# Patient Record
Sex: MALE
[De-identification: names, ages, dates, MRNs, and addresses within clinical notes are randomized per-mention and may not be internally consistent; named-entity substitution may affect disease eponyms.]

---

## 2017-03-22 ENCOUNTER — HOSPITAL ENCOUNTER (INPATIENT)
Dept: HOSPITAL 42 - ED | Age: 82
LOS: 19 days | Discharge: NURSING, REHAB OR LONG-TERM CARE | DRG: 871 | End: 2017-04-10
Attending: INTERNAL MEDICINE | Admitting: INTERNAL MEDICINE
Payer: MEDICARE

## 2017-03-22 VITALS — BODY MASS INDEX: 37.7 KG/M2

## 2017-03-22 DIAGNOSIS — M62.82: ICD-10-CM

## 2017-03-22 DIAGNOSIS — Z93.1: ICD-10-CM

## 2017-03-22 DIAGNOSIS — R53.81: ICD-10-CM

## 2017-03-22 DIAGNOSIS — H26.9: ICD-10-CM

## 2017-03-22 DIAGNOSIS — J44.9: ICD-10-CM

## 2017-03-22 DIAGNOSIS — Z87.891: ICD-10-CM

## 2017-03-22 DIAGNOSIS — I21.4: ICD-10-CM

## 2017-03-22 DIAGNOSIS — R65.21: ICD-10-CM

## 2017-03-22 DIAGNOSIS — I25.2: ICD-10-CM

## 2017-03-22 DIAGNOSIS — I83.009: ICD-10-CM

## 2017-03-22 DIAGNOSIS — Z98.49: ICD-10-CM

## 2017-03-22 DIAGNOSIS — I50.9: ICD-10-CM

## 2017-03-22 DIAGNOSIS — E66.01: ICD-10-CM

## 2017-03-22 DIAGNOSIS — S91.302A: ICD-10-CM

## 2017-03-22 DIAGNOSIS — R32: ICD-10-CM

## 2017-03-22 DIAGNOSIS — T83.83XA: ICD-10-CM

## 2017-03-22 DIAGNOSIS — Z91.81: ICD-10-CM

## 2017-03-22 DIAGNOSIS — F01.51: ICD-10-CM
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
## 2017-03-22 DIAGNOSIS — A41.9: Primary | ICD-10-CM

## 2017-03-22 DIAGNOSIS — J96.02: ICD-10-CM

## 2017-03-22 DIAGNOSIS — E11.65: ICD-10-CM

## 2017-03-22 DIAGNOSIS — B95.4: ICD-10-CM

## 2017-03-22 DIAGNOSIS — Z66: ICD-10-CM

## 2017-03-22 DIAGNOSIS — E61.1: ICD-10-CM

## 2017-03-22 DIAGNOSIS — I45.10: ICD-10-CM

## 2017-03-22 DIAGNOSIS — Z87.442: ICD-10-CM

## 2017-03-22 DIAGNOSIS — N17.9: ICD-10-CM

## 2017-03-22 DIAGNOSIS — E87.2: ICD-10-CM

## 2017-03-22 DIAGNOSIS — B95.61: ICD-10-CM

## 2017-03-22 DIAGNOSIS — S80.219A: ICD-10-CM

## 2017-03-22 DIAGNOSIS — E11.622: ICD-10-CM

## 2017-03-22 DIAGNOSIS — T42.4X5A: ICD-10-CM

## 2017-03-22 DIAGNOSIS — Z78.1: ICD-10-CM

## 2017-03-22 DIAGNOSIS — I13.0: ICD-10-CM

## 2017-03-22 DIAGNOSIS — I25.10: ICD-10-CM

## 2017-03-22 DIAGNOSIS — Z74.01: ICD-10-CM

## 2017-03-22 DIAGNOSIS — E83.39: ICD-10-CM

## 2017-03-22 DIAGNOSIS — L03.116: ICD-10-CM

## 2017-03-22 DIAGNOSIS — K92.2: ICD-10-CM

## 2017-03-22 DIAGNOSIS — H91.90: ICD-10-CM

## 2017-03-22 DIAGNOSIS — R40.2412: ICD-10-CM

## 2017-03-22 DIAGNOSIS — D64.9: ICD-10-CM

## 2017-03-22 DIAGNOSIS — R55: ICD-10-CM

## 2017-03-22 DIAGNOSIS — L03.115: ICD-10-CM

## 2017-03-22 DIAGNOSIS — E87.0: ICD-10-CM

## 2017-03-22 DIAGNOSIS — E11.22: ICD-10-CM

## 2017-03-22 DIAGNOSIS — G92: ICD-10-CM

## 2017-03-22 DIAGNOSIS — I48.0: ICD-10-CM

## 2017-03-22 DIAGNOSIS — M17.11: ICD-10-CM
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
## 2017-03-22 DIAGNOSIS — D68.9: ICD-10-CM

## 2017-03-22 DIAGNOSIS — B96.89: ICD-10-CM

## 2017-03-22 DIAGNOSIS — R79.82: ICD-10-CM
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
## 2017-03-22 DIAGNOSIS — L97.909: ICD-10-CM

## 2017-03-22 DIAGNOSIS — G31.9: ICD-10-CM

## 2017-03-22 DIAGNOSIS — Z88.8: ICD-10-CM

## 2017-03-22 DIAGNOSIS — J96.01: ICD-10-CM

## 2017-03-22 DIAGNOSIS — F05: ICD-10-CM

## 2017-03-22 DIAGNOSIS — I73.9: ICD-10-CM

## 2017-03-22 DIAGNOSIS — R80.9: ICD-10-CM

## 2017-03-22 DIAGNOSIS — N20.0: ICD-10-CM

## 2017-03-22 DIAGNOSIS — M16.11: ICD-10-CM

## 2017-03-22 DIAGNOSIS — R44.1: ICD-10-CM

## 2017-03-22 DIAGNOSIS — N18.3: ICD-10-CM

## 2017-03-22 DIAGNOSIS — M51.36: ICD-10-CM

## 2017-03-22 DIAGNOSIS — R06.89: ICD-10-CM

## 2017-03-22 LAB
ADD MANUAL DIFF?: YES
ALBUMIN/GLOB SERPL: 1.2 {RATIO} (ref 1.1–1.8)
ALP SERPL-CCNC: 97 U/L (ref 38–133)
ALT SERPL-CCNC: 34 U/L (ref 7–56)
APPEARANCE UR: (no result)
APTT BLD: 32.7 SECONDS (ref 23.7–30.8)
AST SERPL-CCNC: 147 U/L (ref 15–59)
BACTERIA #/AREA URNS HPF: (no result) /[HPF]
BASE EXCESS BLDV CALC-SCNC: -3.6 MMOL/L (ref 0–2)
BASE EXCESS BLDV CALC-SCNC: -4.2 MMOL/L (ref 0–2)
BILIRUB SERPL-MCNC: 0.7 MG/DL (ref 0.2–1.3)
BILIRUB UR-MCNC: NEGATIVE MG/DL
BUN SERPL-MCNC: 41 MG/DL (ref 7–21)
CALCIUM SERPL-MCNC: 9.5 MG/DL (ref 8.4–10.5)
CHLORIDE SERPL-SCNC: 103 MMOL/L (ref 95–110)
CO2 SERPL-SCNC: 20 MMOL/L (ref 21–33)
COLOR UR: YELLOW
ERYTHROCYTE [DISTWIDTH] IN BLOOD BY AUTOMATED COUNT: 14.5 % (ref 11.5–14.5)
GLOBULIN SER-MCNC: 3.3 GM/DL
GLUCOSE SERPL-MCNC: 110 MG/DL (ref 70–110)
GLUCOSE UR STRIP-MCNC: NEGATIVE MG/DL
HCT VFR BLD CALC: 42.9 % (ref 42–52)
INR PPP: 1.26 (ref 0.93–1.08)
KETONES UR STRIP-MCNC: NEGATIVE MG/DL
LEUKOCYTE ESTERASE UR-ACNC: (no result) LEU/UL
MAGNESIUM SERPL-MCNC: 1.9 MG/DL (ref 1.7–2.2)
MCH RBC QN AUTO: 27.5 PG (ref 25–35)
MCHC RBC AUTO-ENTMCNC: 33.8 G/DL (ref 31–37)
MCV RBC AUTO: 81.4 FL (ref 80–105)
NEUTROPHILS NFR BLD AUTO: 74 % (ref 50–70)
NEUTS BAND NFR BLD: 11 % (ref 0–2)
PH BLDV: 7.35 [PH] (ref 7.32–7.43)
PH BLDV: 7.4 [PH] (ref 7.32–7.43)
PH UR STRIP: 5.5 [PH] (ref 4.7–8)
PHOSPHATE SERPL-MCNC: 1.9 MG/DL (ref 2.5–4.5)
PLATELET # BLD EST: NORMAL 10*3/UL
PLATELET # BLD: 193 10^3/UL (ref 120–450)
PMV BLD AUTO: 9.7 FL (ref 7–11)
POTASSIUM SERPL-SCNC: 4.5 MMOL/L (ref 3.6–5)
PROT SERPL-MCNC: 7.1 G/DL (ref 5.8–8.3)
PROT UR STRIP-MCNC: 100 MG/DL
RBC # UR STRIP: (no result) /UL
SODIUM SERPL-SCNC: 138 MMOL/L (ref 132–148)
SP GR UR STRIP: 1.02 (ref 1–1.03)
TROPONIN I SERPL-MCNC: 0.3 NG/ML
UROBILINOGEN UR STRIP-ACNC: 0.2 E.U./DL
WBC # BLD AUTO: 24.3 10^3/UL (ref 4.5–11)
WBC #/AREA URNS HPF: (no result) /HPF (ref 0–6)

## 2017-03-22 PROCEDURE — 3E0U3BZ INTRODUCTION OF ANESTHETIC AGENT INTO JOINTS, PERCUTANEOUS APPROACH: ICD-10-PCS | Performed by: ORTHOPAEDIC SURGERY

## 2017-03-22 PROCEDURE — 3E0U33Z INTRODUCTION OF ANTI-INFLAMMATORY INTO JOINTS, PERCUTANEOUS APPROACH: ICD-10-PCS | Performed by: ORTHOPAEDIC SURGERY

## 2017-03-22 RX ADMIN — MORPHINE SULFATE PRN MG: 2 INJECTION, SOLUTION INTRAMUSCULAR; INTRAVENOUS at 12:51

## 2017-03-22 RX ADMIN — POTASSIUM & SODIUM PHOSPHATES POWDER PACK 280-160-250 MG SCH PKT: 280-160-250 PACK at 21:50

## 2017-03-22 RX ADMIN — MEROPENEM AND SODIUM CHLORIDE SCH MLS/HR: 1 INJECTION, SOLUTION INTRAVENOUS at 11:35

## 2017-03-22 RX ADMIN — CLOTRIMAZOLE SCH: 1 CREAM TOPICAL at 19:28

## 2017-03-22 RX ADMIN — MEROPENEM AND SODIUM CHLORIDE SCH MLS/HR: 1 INJECTION, SOLUTION INTRAVENOUS at 21:48

## 2017-03-22 RX ADMIN — MORPHINE SULFATE PRN MG: 2 INJECTION, SOLUTION INTRAMUSCULAR; INTRAVENOUS at 22:19

## 2017-03-22 NOTE — CP.PCM.CON
History of Present Illness





- History of Present Illness


History of Present Illness: 


81 year old male with PMH of HTN, dementia, obesity with BMI 38 came in to 

[facility] after he sustained a mechanical fall at home. He did not 

hit his head and did not lose consciousness, no convulsions, no fevers at home, 

no dizziness, no lightheadedness, no chest pain or palpitations. He was not 

able to get himself off the floor and stayed there for sometime. In the ED, he 

was noted to have low grade fevers and Infectious diseases consult is requested 

to further evaluate and manage. The patient has wounds on both lower 

extremities whic has ongoing for the past month, but seemed to have worsened 

over the past few days. Patient has a pet dog at home, but no history of 

soaking his legs in water. 





Review of Systems





- Review of Systems


All systems: reviewed and no additional remarkable complaints except (as per hPI

)





Past Patient History





- Past Medical History & Family History


Past Medical History?: Yes


Past Family History: Reviewed and not pertinent





- Past Social History


Smoking Status: Former Smoker


Alcohol: None


Drugs: Denies


Home Situation {Lives}: With Family





- CARDIAC


Hx Hypertension: Yes





- PSYCHIATRIC


Hx Substance Use: No





- SURGICAL HISTORY


Other/Comment: pilonidal cyst





Meds


Allergies/Adverse Reactions: 


 Allergies











Allergy/AdvReac Type Severity Reaction Status Date / Time


 


No Known Allergies Allergy   Verified 03/22/17 03:08














- Medications


Medications: 


 Current Medications





Sodium Chloride (Sodium Chloride 0.9%)  1,000 mls @ 100 mls/hr IV .Q10H [facility]


   Last Admin: 03/22/17 05:55 Dose:  100 mls/hr


Cefepime HCl (Maxipime 1gm)  100 mls @ 100 mls/hr IVPB Q12 [facility]


   PRN Reason: Protocol


   Stop: 03/29/17 10:01











Physical Exam





- Constitutional


Appears: Non-toxic, No Acute Distress





- Head Exam


Head Exam: NORMAL INSPECTION





- ENT Exam


ENT Exam: Mucous Membranes Moist





- Neck Exam


Neck exam: Negative for: Lymphadenopathy, Meningismus





- Respiratory Exam


Respiratory Exam: Decreased Breath Sounds





- Cardiovascular Exam


Cardiovascular Exam: +S1, +S2





- GI/Abdominal Exam


GI & Abdominal Exam: Soft.  absent: Tenderness





- Extremities Exam


Additional comments: 


both lower extremities with dressings in place; areas of erythema noted





Results





- Vital Signs


Recent Vital Signs: 


 Last Vital Signs











Temp  97.8 F   03/22/17 05:13


 


Pulse  88   03/22/17 03:18


 


Resp  15   03/22/17 03:18


 


BP  104/48 L  03/22/17 03:18


 


Pulse Ox  100   03/22/17 03:18














- Labs


Result Diagrams: 


 03/22/17 03:33





 03/22/17 03:45





Assessment & Plan





- Assessment and Plan (Free Text)


Plan: 


Assessment


Consider sepsis secondary to both lower extremities skin and skin structure 

infection


HTN


chronic renal failure


dementia


obesity with BMI 38





Plan


Started patient on a dose of IV Vancomycin and Merrem pending blood, wound, 

urine cx, PCT, CXR


Follow up Podiatry evaluation 


will monitor clinically

## 2017-03-22 NOTE — CP.PCM.PN
Subjective





- Date & Time of Evaluation


Date of Evaluation: 03/22/17


Time of Evaluation: 21:30





- Subjective


Subjective: 


S:Patient was seen at bedside because  nurse called and told that BP was 184/84.


    He has no headache, dizziness, heaviness in head, nausea.


    States that he took cozaar and lasix about one week ago , he was not sure 

about that.


    Pertinent medical record was reviewed.





O:


Last Vital Signs





 3





Temp  98 F   03/22/17 18:30


 


Pulse  82   03/22/17 18:30


 


Resp  22   03/22/17 18:30


 


BP  184/84 H  03/22/17 18:30


 


Pulse Ox  96   03/22/17 16:15








    Awake, not in distress.


    Confused.


    LUNGS:Normal breathing pattern.





A:Elevated blood pressure reading 





P:Cozaar 100 mg PO STAT.





Objective





- Vital Signs/Intake and Output


Vital Signs (last 24 hours): 


 











Temp Pulse Resp BP Pulse Ox


 


 98 F   82   22   184/84 H  96 


 


 03/22/17 18:30  03/22/17 18:30  03/22/17 18:30  03/22/17 18:30  03/22/17 16:15











- Medications


Medications: 


 Current Medications





Acetaminophen (Tylenol 325mg Tab)  650 mg PO Q4 PRN


   PRN Reason: Fever >100.4 F


   Last Admin: 03/22/17 11:49 Dose:  650 mg


Clotrimazole (Lotrimin 1%)  0 gm TOP BID [facility]


   Last Admin: 03/22/17 19:28 Dose:  Not Given


MEROPENEM-0.9% SODIUM CHLORIDE (Meropenem 1g/Ns 100ml Ivpb)  100 mls @ 100 mls/

hr IVPB Q12 [facility]


   PRN Reason: Protocol


   Stop: 03/29/17 10:01


   Last Admin: 03/22/17 11:35 Dose:  100 mls/hr


Sodium Chloride (Sodium Chloride 0.9%)  1,000 mls @ 50 mls/hr IV .Q20H [facility]


   Stop: 03/23/17 01:44


   Last Admin: 03/22/17 10:37 Dose:  50 mls/hr


Morphine Sulfate (Morphine)  1 mg IVP Q4H [facility]


   PRN Reason: Pain, moderate (4-7)


   Last Admin: 03/22/17 12:51 Dose:  1 mg











- Labs


Labs: 


 











PT  13.6 Seconds (9.9-11.8)  H  03/22/17  03:33    


 


INR  1.26  (0.93-1.08)  H  03/22/17  03:33    


 


APTT  32.7 Seconds (23.7-30.8)  H  03/22/17  03:33

## 2017-03-22 NOTE — CON
DATE: 03/22/2017



HISTORY OF PRESENT ILLNESS:  This is an 81-year-old white male with past medical history of hypertens
ion, dementia, came to the Emergency Room with severe right knee pain.  The patient remains uncomfort
able because of right knee pain and has multiple lesions on his feet and legs, unable to provide much
 of the history about that.  



PAST MEDICAL HISTORY: The patient had surgery for a Pilonidal cyst.



PHYSICAL EXAMINATION:

HEENT:  Normocephalic, atraumatic.

NECK:  Supple.

NEUROLOGIC:  Awake, alert, oriented.  No aphasia.  Cranial nerves II through XII were tested.  Pupils
 reactive.  EOM intact.  Visual fields full.  No facial asymmetry.  Tongue midline.  Motor examinatio
n:  Moves all the extremities spontaneously.  Deep tendon reflexes 1+.  Both plantars are downgoing. 
 Sensory appears intact.  Cerebellar and gait deferred.



IMPRESSION AND PLAN:  Intermittent confusional state, superimposed on dementia, renal insufficiency, 
right knee pain.  X-ray of the knee is done, did not show fracture.  Had a CAT scan of the head that 
was done, which was reported no infarct or bleed.  Workup is in progress.  We will follow up.





__________________________________________

Kalen Andrade MD







cc:



DD: 03/22/2017 17:29:05  [id number]

TT: 03/22/2017 21:49:48

Confirmation # [id number]

Dictation # [id number]

rn

## 2017-03-22 NOTE — ED PDOC
Arrival/HPI





- General


Historian: Patient, Spouse, Family, EMS





- History of Present Illness


Time/Duration: Prior to Arrival


Symptom Course: Unchanged


Quality: Aching (right knee)


Severity Level: 6





<Carl Thompson - Last Filed: 03/22/17 07:07>





<Brad Kaur - Last Filed: 03/22/17 20:40>





- General


Chief Complaint: Lower Extremity Problem/Injury


Time Seen by Provider: 03/22/17 03:06





- History of Present Illness


Narrative History of Present Illness (Text): 





03/22/17 04:00


This is an 81 year old male with PMH notable for dementia and HTN presenting to 

the ED s/p fall at home.  The patient reports that he does not remember 

falling.  According to the patient's family, the patient has had deteriorating 

short term memory.  The patient reports pain in his right knee.  The patient 

reports that he felt weak since 3/21/17 in the AM.  He states that he had 

difficulty moving his legs.  The patient normally ambulates with an assistive 

device.  The patient denies fever, chills, headache, chest pain, SOB, abdominal 

pain, changes in bowel/bladder, and extremity paresthesias. 








PMH: HTN, dementia


Allergy: NKDA


Surg: Pilonidal Cyst removal


Soc: Quit smoking >10yrs ago, denies EtOH


PMD; Dr. Sotelo


Podiatry: Dr. Howard  (Carl Thompson)








Past Medical History





- Provider Review


Nursing Documentation Reviewed: Yes





- Travel History


Have you recently traveled outside [place] w/in the past 3 mons?: No





- Past History


Past History: Non-Contributing





- Cardiac


Hx Hypertension: Yes





- Psychiatric


Hx Substance Use: No





- Surgical History


Other/Comment: pilonidal cyst





<Carl Thompson - Last Filed: 03/22/17 07:07>





Family/Social History


Family/Social History: No Known Family HX


Smoking Status: Former Smoker


Hx Alcohol Use: No


Hx Substance Use: No





<Carl Thompson - Last Filed: 03/22/17 07:07>





Allergies/Home Meds





<Carl Thompson - Last Filed: 03/22/17 07:07>





<Brad Kaur - Last Filed: 03/22/17 20:40>


Allergies/Adverse Reactions: 


Allergies





No Known Allergies Allergy (Verified 03/22/17 03:08)


 








Home Medications: 


 Home Meds











 Medication  Instructions  Recorded  Confirmed


 


Furosemide [Lasix] 40 mg PO 03/22/17 


 


Losartan Potassium [Cozaar]  03/22/17 














Review of Systems





- Physician Review


All systems were reviewed & negative as marked: Yes





- Review of Systems


Constitutional: absent: Fatigue, Fevers


Eyes: absent: Vision Changes, Photophobia


ENT: absent: Hearing Changes, Tinnitus


Respiratory: absent: SOB, Cough


Cardiovascular: absent: Chest Pain, Palpitations


Gastrointestinal: absent: Abdominal Pain, Nausea, Vomiting


Genitourinary Male: absent: Dysuria, Frequency


Musculoskeletal: absent: Arthralgias


Skin: absent: Rash, Pruritis


Neurological: absent: Headache, Dizziness


Endocrine: absent: Diaphoresis, Polyuria


Hemo/Lymphatic: absent: Adenopathy


Psychiatric: Other (AMS per family )





<Carl Thompson - Last Filed: 03/22/17 07:07>





Physical Exam


Blood Pressure: Hypotensive


Pulse: Regular


Respiratory Rate: Tachypneic


Appearance: Positive for: Well-Appearing, Non-Toxic, Comfortable


Pain Distress: None


Mental Status: Positive for: Alert and Oriented X 3





- Systems Exam


Head: Present: Atraumatic, Normocephalic.  No: Abrasion, Laceration


Pupils: Present: PERRL


Extroacular Muscles: Present: EOMI.  No: Entrapment


Conjunctiva: Present: Normal.  No: Injected


Mouth: Present: Dry


Respiratory/Chest: Present: Clear to Auscultation, Good Air Exchange, 

Tachypneic.  No: Respiratory Distress, Accessory Muscle Use


Cardiovascular: Present: Regular Rate and Rhythm, Normal S1, S2.  No: Murmurs


Abdomen: Present: Normal Bowel Sounds.  No: Tenderness, Distention, Peritoneal 

Signs


Upper Extremity: Present: Normal Inspection, NORMAL PULSES, Neurovascularly 

Intact.  No: Cyanosis, Edema


Lower Extremity: Present: Normal Inspection, Tenderness, Neurovascularly Intact

, Other (abrasion right shin, chronic venous stasis changes foot and ankle B/L)

.  No: Edema, NORMAL PULSES (diminished PT and DP pulse B/L), Swelling, 

Deformity


Neurological: Present: GCS=15, CN II-XII Intact


Skin: Present: Warm, Dry, Abrasion


Psychiatric: Present: Alert, Oriented x 3





<Carl Thompson - Last Filed: 03/22/17 07:07>


Vital Signs











  Temp Pulse Resp BP Pulse Ox


 


 03/22/17 16:15   81  16  160/82 H  96


 


 03/22/17 14:15   95 H  18  162/88 H  97


 


 03/22/17 11:55   98 H  20  145/81  99


 


 03/22/17 10:00   88  18  141/72  98


 


 03/22/17 07:44   80  18  134/65  97


 


 03/22/17 06:16  98.4 F  92 H  20  171/79 H  97


 


 03/22/17 05:13  97.8 F    


 


 03/22/17 03:18   88  15  104/48 L  100

















Medical Decision Making





- RAD Interpretation


: ED Physician, Radiologist





- EKG Interpretation


Interpreted by ED Physician: Yes





<Carl Thompson - Last Filed: 03/22/17 07:07>





<Brad Kaur - Last Filed: 03/22/17 20:40>


ED Course and Treatment: 


03/22/17 04:11


Impression:


This is an 81 year old male with PMH notable for dementia and HTN presenting to 

the ED s/p fall at home.  The patient has impaired short term memory.  The 

patient does not remember his fall at home and is altered as per family. 





Differential:


Syncope


Arrhythmia


Electrolyte Imbalance


Vasovagal Syncope





Plan:


Admit to Hospital for telemetric monitoring


CT Head


Right Knee XR


EKG


CBC, CMP, Mag, Phos, Cardiac ISO, PT, PTT





Prior Visits:


None





Progress Note:


Patient seen and examined at the bedside.  Minimal distress 2/2 to right knee 

pain.  Patient alert and orieted to person, place, time, but not situation.  

The patient is tachypneic and sating 92% on room air.  Saturation increased to 

96% on 4L NC.  The patient will be evaluated for syncope.  





03/22/17 05:31


Code Sepsis called at 5:21AM  for elevated white count, tachypnea, and elevated 

lactate.  Empiric antibiotics (vancomycin 1g and Zosyn 3.375mg) given, 1 dose 

each.  (Carl Thompson)


Impression:


Pt seen and evaluated with medical resident. Pt, whose past medical history 

includes dementia and hypertension, presented s/p fall at home. Pt states he is 

unable to recall the incident and notes he has been experiencing generalized 

weakness since yesterday. Pt also complaining of right knee pain. Aware and 

agree with HPI, clinical findings, plan, and management.





Plan:


-- EKG


-- Chest X-ray


-- XR Right Knee


-- CT Head w/o contrast


-- Lanbs, cardiac enzymes, VBG, blood cultures


-- UA


-- Reassess and disposition





03/22/17 05:10


Reviewed CT Head shows,


Brain: There are areas of diminished density in the white matter bilaterally 

consistent with chronic


small vessel ischemic changes. Gray-white matter differentiation is intact and 

unremarkable. No


mass lesion. No evidence of intracranial hemorrhage. Mild prominence of extra-

axial CSF space in


right frontal and parietal lobes measuring 8 mm consistent with subdural 

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


old subdural bleed. No acute hemorrhagic products seen.


Ventricles: Unremarkable. No ventriculomegaly.


Bones/joints: No evidence of fracture.


Soft tissues: Unremarkable.


Sinuses: Unremarkable as visualized. No acute sinusitis.


Mastoid air cells: Unremarkable as visualized. No mastoid effusion.


IMPRESSION:


1. No evidence of fracture. No evidence of intracranial bleed.


2. Chronic ischemic changes bilaterally.





 (Brad Kaur)








- Lab Interpretations


Lab Results: 








 03/22/17 03:33 





 03/22/17 03:45 





 Lab Results





03/22/17 06:00: Procalcitonin 31.55 H


03/22/17 05:10: pO2 117 H, VBG pH 7.40, VBG pCO2 33.0 L, VBG HCO3 20.4 L, VBG 

Total CO2 21.4 L, VBG O2 Sat (Calc) 98.9 H, VBG Base Excess -3.6 L, VBG 

Potassium 4.3, Sodium 138.0, Chloride 110.0 H, Glucose 111 H, Lactate 2.8 H, 

FiO2 21.0, Venous Blood Potassium 4.3


03/22/17 03:45: Sodium 138, Chloride 103, Potassium 4.5, Carbon Dioxide 20 L, 

Anion Gap 20, BUN 41 H, Creatinine 2.4 H, Est GFR ( Amer) 32, Est GFR (

Non-Af Amer) 26, Random Glucose 110, Calcium 9.5, Phosphorus 1.9 L, Magnesium 

1.9, Total Bilirubin 0.7,  H, ALT 34, Alkaline Phosphatase 97, Lactate 

Dehydrogenase 1269 H, Total Creatine Kinase 7927 H, CK-MB (CK-2) 14.8 H, CK-MB (

CK-2) % 0.2 L, Troponin I 0.30 H*, Total Protein 7.1, Albumin 3.8, Globulin 3.3

, Albumin/Globulin Ratio 1.2


03/22/17 03:33: WBC 24.3 H, RBC 5.27, Hgb 14.5, Hct 42.9, MCV 81.4, MCH 27.5, 

MCHC 33.8, RDW 14.5, Plt Count 193, MPV 9.7, Neutrophils % (Manual) 74 H, Band 

Neutrophils % 11 H*, Lymphocytes % (Manual) 3 L, Monocytes % (Manual) 12 H, 

Platelet Evaluation Normal, PT 13.6 H, INR 1.26 H, APTT 32.7 H














- RAD Interpretation


Narrative RAD Interpretations (Text): 





03/22/17 05:52


CT Head: 


FINDINGS:


Brain: There are areas of diminished density in the white matter bilaterally 

consistent with chronic


small vessel ischemic changes. Gray-white matter differentiation is intact and 

unremarkable. No


mass lesion. No evidence of intracranial hemorrhage. Mild prominence of extra-

axial CSF space in


right frontal and parietal lobes measuring 8 mm consistent with subdural 

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


old subdural bleed. No acute hemorrhagic products seen.


Ventricles: Unremarkable. No ventriculomegaly.


Bones/joints: No evidence of fracture.


Soft tissues: Unremarkable.


Sinuses: Unremarkable as visualized. No acute sinusitis.


Mastoid air cells: Unremarkable as visualized. No mastoid effusion.


IMPRESSION:


1. No evidence of fracture. No evidence of intracranial bleed.


2. Chronic ischemic changes bilaterally.








CXR: No acute pathology





Right Knee XR: No acute fracture or dislocation  (Carl Thompson)





Radiology Orders: 








03/22/17 03:09


HEAD W/O CONTRAST [CT] Stat 


CHEST PORTABLE [RAD] Stat 





03/22/17 03:11


KNEE RIGHT 2 VIEWS (AP & LAT) [RAD] Stat 

















- EKG Interpretation


EKG Interpretation (Text): 





03/22/17 04:16


Normal Sinus Rhythm


Right bundle branch block


inferior infarct age undetermined (Carl Thompson)








- Medication Orders


Current Medication Orders: 








Acetaminophen (Tylenol 325mg Tab)  650 mg PO Q4 PRN


   PRN Reason: Fever >100.4 F


   Last Admin: 03/22/17 11:49  Dose: 650 MG





Clotrimazole (Lotrimin 1%)  0 gm TOP BID [facility]


   Last Admin: 03/22/17 19:28  Dose:  





MEROPENEM-0.9% SODIUM CHLORIDE (Meropenem 1g/Ns 100ml [facility])  100 mls @ 100 mls/

hr IVPB Q12 [facility]


   PRN Reason: Protocol


   Stop: 03/29/17 10:01


   Last Admin: 03/22/17 11:35  Dose: 100 MLS/HR





eMAR Start Stop


 Document     03/22/17 11:35  [facility]  (Rec: 03/22/17 11:35  [place] [id number])


     Intravenous Solution


      Start Date                                 03/22/17


      Start Time                                 11:35


      End Date                                   03/22/17


      End time                                   12:35


      Total Infusion Time                        60





Sodium Chloride (Sodium Chloride 0.9%)  1,000 mls @ 50 mls/hr IV .Q20H [facility]


   Stop: 03/23/17 01:44


   Last Admin: 03/22/17 10:37  Dose: 50 MLS/HR





eMAR Start Stop


 Document     03/22/17 10:37  [facility]  (Rec: 03/22/17 10:37  [place] [id number])


     Intravenous Solution


      Start Date                                 03/22/17


      Start Time                                 10:37





Morphine Sulfate (Morphine)  1 mg IVP Q4H PRN


   PRN Reason: Pain, moderate (4-7)


   Last Admin: 03/22/17 12:51  Dose: 1 MG





MAR Pain Assessment


 Document     03/22/17 12:51  [facility]  (Rec: 03/22/17 12:52  [place] [id number])


     Pain Reassessment


      Is this a pain reassessment?               Yes


     Sleep


      Is patient sleeping during reassessment?   No


     Presence of Pain


      Presence of Pain                           Yes


     Pain Scale Used


      Pain Scale Used                            Numeric


     Location


      Left, Right or Bilateral                   Bilateral


      Pain Location Body Site                    Leg


     Description


      Intensity of Pain at present               10


IVP Administration


 Document     03/22/17 12:51  [facility]  (Rec: 03/22/17 12:52  [place] [id number])


     Charges for Administration


      # of IVP Administrations                   1








Discontinued Medications





Piperacillin Sod/Tazobactam Sod (Zosyn 3.375 In Ns 100ml)  100 mls @ 200 mls/hr 

IVPB STAT STA


   PRN Reason: Protocol


   Stop: 03/22/17 05:46


   Last Admin: 03/22/17 05:45  Dose: 200 MLS/HR





eMAR Start Stop


 Document     03/22/17 05:45  MR  (Rec: 03/22/17 05:45  MR  [id number])


     Intravenous Solution


      Start Date                                 03/22/17


      Start Time                                 05:45


      End Date                                   03/22/17


      End time                                   06:15


      Total Infusion Time                        30





Vancomycin HCl (Vancomycin 1gm)  250 mls @ 167 mls/hr IVPB STAT [facility]


   PRN Reason: Protocol


   Stop: 03/22/17 06:46


   Last Admin: 03/22/17 07:28  Dose: 167 MLS/HR





eMAR Start Stop


 Document     03/22/17 07:28  MR  (Rec: 03/22/17 07:28  MR  [id number])


     Intravenous Solution


      Start Date                                 03/22/17


      Start Time                                 07:18


      End Date                                   03/22/17


      End time                                   08:48


      Total Infusion Time                        90





Sodium Chloride (Sodium Chloride 0.9%)  1,000 mls @ 100 mls/hr IV .Q10H [facility]


   Last Admin: 03/22/17 05:55  Dose: 100 MLS/HR





eMAR Start Stop


 Document     03/22/17 05:55  MR  (Rec: 03/22/17 05:55  MR  [id number])


     Intravenous Solution


      Start Date                                 03/22/17


      Start Time                                 05:55





Cefepime HCl (Maxipime 1gm)  100 mls @ 100 mls/hr IVPB Q12 [facility]


   PRN Reason: Protocol


   Stop: 03/29/17 10:01


   Last Admin: 03/22/17 09:32  Dose: 100 MLS/HR





eMAR Start Stop


 Document     03/22/17 09:32  [facility]  (Rec: 03/22/17 09:32  [facility] [place]  [id number])


     Intravenous Solution


      Start Date                                 03/22/17


      Start Time                                 09:32


      End Date                                   03/22/17


      End time                                   10:35


      Total Infusion Time                        63

















Disposition/Present on Arrival





- Present on Arrival


Any Indicators Present on Arrival: No


History of DVT/PE: No


History of Uncontrolled Diabetes: No


Urinary Catheter: No


History of Decub. Ulcer: No


History Surgical Site Infection Following: None





- Disposition


Have Diagnosis and Disposition been Completed?: Yes


Disposition Time: 04:00


Patient Plan: Admission





<Carl Thompson - Last Filed: 03/22/17 07:07>





- Present on Arrival


Any Indicators Present on Arrival: No


History of DVT/PE: No


History of Uncontrolled Diabetes: No


Urinary Catheter: No


History of Decub. Ulcer: No


History Surgical Site Infection Following: None





- Disposition


Have Diagnosis and Disposition been Completed?: Yes


Disposition Time: 06:22


Patient Plan: Admission





<Brad Kaur - Last Filed: 03/22/17 20:40>





- Disposition


Diagnosis: 


 Sepsis, NSTEMI (non-ST elevated myocardial infarction), Rhabdomyolysis


Disposition: HOSPITALIZED


Patient Problems: 


 Current Active Problems











Problem Status Diagnosed


 


NSTEMI (non-ST elevated myocardial infarction) Acute 


 


Sepsis Acute 











Condition: FAIR

## 2017-03-22 NOTE — CT
PROCEDURE:  CT HEAD WITHOUT CONTRAST.



HISTORY:

fall



COMPARISON:

None available. 



TECHNIQUE:

Axial computed tomography images were obtained through the head/brain 

without intravenous contrast.  



Radiation dose:



Total exam DLP = 768.25 mGy-cm.



FINDINGS:



HEMORRHAGE:

The current study reveals enlargement of the right frontal parietal 

subarachnoid space with underlying cortical atrophic changes.  

Findings could represent ex vacuo dilatation of the subarachnoid 

space versus subdural hygroma or less likely chronic subdural 

hematoma. 



BRAIN:

There are minor chronic periventricular white matter ischemic 

changes. There also a few scattered chronic bilateral basal nuclei 

lacunar type infarcts. Note made of a small round/elliptical shaped 

low-attenuation focus within the mid ventral ovidio that could 

represent artifact note chronic ischemic focus not excluded. Mild 

vascular calcifications are present. 



VENTRICLES:

Mild generalized volume loss with more localized cortical atrophic 

changes right frontoparietal lobe. 



CALVARIUM:

No acute calvarial fractures. . . 



PARANASAL SINUSES:

Unremarkable as visualized. No significant inflammatory changes.



MASTOID AIR CELLS:

Unremarkable as visualized. No inflammatory changes.



OTHER FINDINGS:

Bilateral cataract surgery.



IMPRESSION:

No acute intracranial hemorrhage.  The enlarged subarachnoid space 

right frontoparietal region could be due to atrophy versus subdural 

hygroma.  Chronic subdural hematoma cannot be completely excluded. . 



Mild chronic periventricular white matter and scattered chronic 

bilateral basal nuclei ischemic changes. .  Questionable crossing 

streak and beam hardening artifact versus small ischemic focus within 

the mid ventral ovidio.

## 2017-03-22 NOTE — RAD
PROCEDURE:  Right Knee Radiographs.



HISTORY:

fall



COMPARISON:

None.



FINDINGS:



BONES:

No fracture.  Superior patellar osseous productive changes noted 

anteriorly and posteriorly.  Tibial spine spurring 



JOINTS:

Osteoarthrosis medial femoral tibial and patellofemoral compartments 

probably due most notable. .  The lateral view of the patellofemoral 

joint is limited -nonetheless 



JOINT EFFUSION:

Small 



OTHER FINDINGS:

Vascular calcifications



IMPRESSION:

No fracture. Osteoarthrosis.  Atherosclerotic vascular disease

## 2017-03-22 NOTE — CON
DATE: 03/22/2017



REQUESTING PHYSICIAN:  Dr. Arguello



REASON FOR CONSULTATION:  Elevated troponin.



HISTORY:  This is an 81-year-old man with a history of hypertension and dementia, who apparently fell
 at home.  He complained of severe pain of his right knee.  He feels that his fall was secondary to a
 knee weakness.  There are conflicting reports in the chart as to whether or not he had loss of consc
iousness.  He was brought to the Emergency Room and admitted for evaluation.  He is seen in the Emerg
ency Room at the present time.  He remains uncomfortable.  He states that his right knee is painful. 
 He has multiple lesions on his feet and legs as well.  He is unable to provide much history regardin
g prior injury or providers of care.  The rest of the history is obtained via the chart.  



PAST HISTORY:  Notable for the problems mentioned above.  He underwent prior pilonidal cyst surgery. 
 He apparently has been under the care of Dr. Howard.  Apparently, upon his initial presentation, he
 was hypotensive, but improved with IV fluids.



MEDICATIONS AT HOME:  Included Cozaar and Lasix.



ALLERGIES:  He apparently has no reported allergies.



SOCIAL HISTORY:  He is a former smoker.  There is no history of alcohol use.



FAMILY HISTORY:  He is uncertain.



REVIEW OF SYSTEMS:  Ten point review of systems is limited, but otherwise unremarkable.



PHYSICAL EXAMINATION:

GENERAL:  He is a somewhat disheveled appearing elderly man.

VITAL SIGNS:  His recent blood pressure is 140/70 with a pulse of 88 and respirations are 16.  He is 
currently afebrile.

HEENT:  Normocephalic, atraumatic.  Pupils equally react to light and accommodation.

NECK:  Supple.  No JVD noted.

CHEST:  A few scattered rhonchi heard.

HEART:  PMI displaced laterally with a systolic murmur present at the left sternal border.

ABDOMEN:  Soft, nontender, normoactive bowel sounds.

EXTREMITIES:  1+ lower extremity edema is noted.  An abrasion on his right knee is noted as well.  Ch
ronic stasis changes are present on both lower extremities.  He has multiple apparent lacerations and
 evidence of skin breakdown on his toes and anterior shins.

PSYCHIATRIC:  Somewhat restless and appears disoriented.

NEUROLOGIC:  Moving all 4 extremities, but unable to fully assess.



DIAGNOSTIC DATA:  Chest x-ray reveals enlarged cardiac silhouette with no clear evidence of congestiv
e changes.  Electrocardiogram reveals sinus rhythm with right bundle branch block and prior inferior 
wall myocardial infarction cannot be excluded.  White count is 24.3, hemoglobin and hematocrit of 14.
5 and 42.9 with a platelet count of 193,000.  PT, PTT 13.6 and 32.7.  Arterial blood gas revealed a p
H of 7.40, pCO2 of 33 and pO2 of 113.  Potassium 4.5.  BUN and creatinine are 41 and 2.4.  Phosphorus
 1.9.  CK 7927 with a negative MB fraction.  Troponin 0.30.



IMPRESSION:

1.  Status post fall with evidence of mild rhabdomyolysis. 

2.  Elevated troponin, suspect this may be due to reduced renal clearance.

3.  Renal insufficiency, unclear if this is acute or chronic.

4.  Altered mental status, unclear if this is baseline dementia.

5.  Rest of problems as noted.



RECOMMENDATION:  The admission to telemetry is reasonable.  Repeat cardiac enzymes will be planned fo
r the morning.  Repeat electrocardiogram will be planned as well.  An echocardiogram will be obtained
 to assess LV size and wall motion abnormalities.  Broad spectrum antibiotics were provided.  Culture
s have been drawn.  Podiatric followup would be appropriate.



Thank you for this consultation.  I would be happy to follow along through his hospital course and ma
ke further recommendations as needed.





__________________________________________

Cortez Wallace MD







cc:



DD: 03/22/2017 11:43:53  [id number]

TT: 03/22/2017 12:31:35

Confirmation # [id number]

Dictation # [id number]

sn

## 2017-03-22 NOTE — CARD
--------------- APPROVED REPORT --------------





EKG Measurement

Heart [id number]

GA [id number]

[id number]

[id number]

[id number]



<Conclusion>

Normal sinus rhythm

Right bundle branch block

Possible Inferior infarct, age undetermined

Abnormal ECG

## 2017-03-22 NOTE — PN
DATE: 03/22/2017



This is an 81-year-old male seen in the ER for multiple leg ulcers.  The 
patient is known to me from the podiatry office where he comes in every 2 
months for fungal nail and calluses.  The patient is seen at bedside with his 
family.  He apparently had a fall at home which brought him into the Emergency 
Room.  The patient's wife is upset about his feet.  He has fissures and 
calluses on his feet and I did discuss with her that, her  comes to the 
office all the time in shoes that are big, that are rubbing on his toes and 
comes in without any socks.  She does agree that he does that at home.  He 
walks barefoot at home and she states that the beagle at home is constantly 
licking on his feet.



REVIEW OF SYSTEMS:  Shows that he has negative fever or chills.  He was on the 
floor for about 25 minutes.  His ENT is negative.  His eyes negative.  
Respiratory is negative.  Chest pain:  He does not have any chest pain or 
palpitations; however, his cardiac enzymes are elevated.  He denies any 
gastrointestinal or genitourinary problems.

MUSCULOSKELETAL:  Positive for arthralgias.

SKIN:  For the fissures and the calluses and the fungal nails as noted above.

NEUROLOGICAL:  Also negative.



PHYSICAL EXAMINATION:  

GENERAL:  Shows he is alert at bedside.  He is slightly with some dementia.

VITAL SIGNS:  Show a temperature of 98.7, his pulse is 95, his blood pressure 
is 162/88 and his oxygen sat was 97.



MEDICATIONS:  Noted on the MAR.  He is presently being given Maxipime, meropenem
, morphine, sodium chloride, and Tylenol.



LABORATORY DATA:  The patient's labs were also reviewed.  He has a white blood 
cell count of 24.3.  His H and H is 14.5 and 42.9.  His neutrophils are 74 and 
his lymphocytes were 3 with a shift to the left.  The patient's blood gases 
when he came in the pO2 was high at 117, pCO2 of 33.  Chemistry shows a BUN and 
creatinine of 41 and 2.4.  His random glucose was 110.  His cardiac enzymes are 
all elevated.  Troponins were 0.3.



The patient's lower extremities were evaluated.  He has weakly palpable pedal 
pulses on his right foot.  He has 1/4 palpable pedal pulses on his left foot.  
He has bilateral. +1 edema to his feet and legs.  He has fungal nails to all of 
his toes.  He has large calluses on the bunion area on his left foot, 
especially with an open fissure on that callus.  It does not probe to bone and 
there is necrotic tissue inside that fissure.  He also has a fissure on dorsal 
medial aspect of the hallux.  Again on that foot, it is a partial thickness and 
he has a necrotic callus on his second PIPJ, all on his left foot.  He has an 
open wound on the shin on his left leg.  This is secondary to the fall that he 
had at home, measures approximately 1.5 x 1.5 x 0.3 cm and there are no 
fulminant signs of infection.  There is some redness to the right foot around 
the mid foot area and the lower leg which may be coming from that open fissure.
  However, at this time, it is not certain that the white blood cell count is 
coming from the foot.  



The patient did have evidence of mild rhabdomyolysis which could also be 
causing some of that white blood cell count and the CK.



ASSESSMENT:  Fungal nails, multiple necrotic fissures and partial thickness, an 
ulcer to the leg.



PLAN OF TREATMENT:  Orders were put in for a hemoglobin A1c, CRP, ESR.  We also 
put in for a foot x-ray.  Wound culture was taken and done of the fissure, 
although it was dry and I am not sure if it is going to bring back much.  We 
also put in for him to have heel pads while he is in bed for offloading.  We 
also ordered Lotrimin for the fungal infection on his foot and once he is 
settled in the bed, we will come back and we will debride all of the calluses 
on his feet.  The patient will be seen and followed.  All his wounds were 
cleansed with Betadine and dry sterile dressings.





__________________________________________

Priya Howard DPM







cc:   



DD: 03/22/2017 16:07:55  [id number]

TT: 03/22/2017 16:46:18

Confirmation # [id number]

Dictation # [id number]

elizabeth JOYCE

## 2017-03-22 NOTE — RAD
HISTORY:

syncope  



COMPARISON:

No prior. 



FINDINGS:



LUNGS:

No gross consolidation



The  right infra and periareolar opacity perceived is probably due to 

summation of bronchovascular markings and prominent anterior inferior 

right costo cartilaginous junctional calcifications



PLEURA:

No significant pleural effusion identified, no pneumothorax apparent.



CARDIOVASCULAR:

Mild cardiomegaly



OSSEOUS STRUCTURES:

Diffuse thoracic spondylosis.  Bilateral shoulder arthrosis



VISUALIZED UPPER ABDOMEN:

Normal.



OTHER FINDINGS:

None.



IMPRESSION:

No active pulmonary disease.

## 2017-03-23 LAB
ALBUMIN/GLOB SERPL: 1.1 {RATIO} (ref 1.1–1.8)
ALP SERPL-CCNC: 94 U/L (ref 38–133)
ALT SERPL-CCNC: 244 U/L (ref 7–56)
AST SERPL-CCNC: 1274 U/L (ref 15–59)
BILIRUB SERPL-MCNC: 1.1 MG/DL (ref 0.2–1.3)
BUN SERPL-MCNC: 58 MG/DL (ref 7–21)
CALCIUM SERPL-MCNC: 8.8 MG/DL (ref 8.4–10.5)
CHLORIDE SERPL-SCNC: 102 MMOL/L (ref 98–107)
CO2 SERPL-SCNC: 20 MMOL/L (ref 21–33)
ERYTHROCYTE [DISTWIDTH] IN BLOOD BY AUTOMATED COUNT: 14.7 % (ref 11.5–14.5)
ERYTHROCYTE [SEDIMENTATION RATE] IN BLOOD: 15 MM/HR (ref 0–15)
GLOBULIN SER-MCNC: 3.3 GM/DL
GLUCOSE SERPL-MCNC: 111 MG/DL (ref 70–110)
HCT VFR BLD CALC: 41.3 % (ref 42–52)
MAGNESIUM SERPL-MCNC: 2.2 MG/DL (ref 1.7–2.2)
MCH RBC QN AUTO: 28 PG (ref 25–35)
MCHC RBC AUTO-ENTMCNC: 34.1 G/DL (ref 31–37)
MCV RBC AUTO: 81.9 FL (ref 80–105)
PHOSPHATE SERPL-MCNC: 6.1 MG/DL (ref 2.5–4.5)
PLATELET # BLD: 131 10^3/UL (ref 120–450)
PMV BLD AUTO: 9.4 FL (ref 7–11)
POTASSIUM SERPL-SCNC: 4.6 MMOL/L (ref 3.6–5)
PROT SERPL-MCNC: 6.8 G/DL (ref 5.8–8.3)
SODIUM SERPL-SCNC: 138 MMOL/L (ref 132–148)
TROPONIN I SERPL-MCNC: 0.18 NG/ML
WBC # BLD AUTO: 23.7 10^3/UL (ref 4.5–11)

## 2017-03-23 RX ADMIN — CLOTRIMAZOLE SCH APPLIC: 1 CREAM TOPICAL at 17:46

## 2017-03-23 RX ADMIN — CLOTRIMAZOLE SCH APPLIC: 1 CREAM TOPICAL at 11:00

## 2017-03-23 RX ADMIN — POTASSIUM & SODIUM PHOSPHATES POWDER PACK 280-160-250 MG SCH PKT: 280-160-250 PACK at 11:18

## 2017-03-23 RX ADMIN — POTASSIUM & SODIUM PHOSPHATES POWDER PACK 280-160-250 MG SCH PKT: 280-160-250 PACK at 17:47

## 2017-03-23 RX ADMIN — MEROPENEM AND SODIUM CHLORIDE SCH MLS/HR: 1 INJECTION, SOLUTION INTRAVENOUS at 22:46

## 2017-03-23 RX ADMIN — MEROPENEM AND SODIUM CHLORIDE SCH MLS/HR: 1 INJECTION, SOLUTION INTRAVENOUS at 11:18

## 2017-03-23 NOTE — CP.PCM.PN
Subjective





- Date & Time of Evaluation


Date of Evaluation: 03/23/17


Time of Evaluation: 08:00





- Subjective


Subjective: 


Stable on 2R. Poor historian. Denies CP, SOB





V/S noted. RSR/S. Tachy





PE:





Lungs: clear


Cor.: S1S2


Abd.: soft


Ext.: no edema


Neuro.: ementia





Labs noted: WBC= 23,700, Cr.= 3.4,  trop.= 0.18





BC x1 + GPC in chains





ECG 3/22: RSR, RBBB, Possible IMI





Objective





- Vital Signs/Intake and Output


Vital Signs (last 24 hours): 


 











Temp Pulse Resp BP Pulse Ox


 


 98.6 F   102 H  22   133/66   95 


 


 03/23/17 06:00  03/23/17 06:00  03/23/17 06:00  03/23/17 06:00  03/23/17 06:00








Intake and Output: 


 











 03/23/17 03/23/17





 06:59 18:59


 


Intake Total 620 


 


Output Total 275 


 


Balance 345 














- Medications


Medications: 


 Current Medications





Acetaminophen (Tylenol 325mg Tab)  650 mg PO Q4 PRN


   PRN Reason: Fever >100.4 F


   Last Admin: 03/22/17 11:49 Dose:  650 mg


Clotrimazole (Lotrimin 1%)  0 gm TOP BID [facility]


   Last Admin: 03/22/17 19:28 Dose:  Not Given


MEROPENEM-0.9% SODIUM CHLORIDE (Meropenem 1g/Ns 100ml Ivpb)  100 mls @ 100 mls/

hr IVPB Q12 WAQAR


   PRN Reason: Protocol


   Stop: 03/29/17 10:01


   Last Admin: 03/22/17 21:48 Dose:  100 mls/hr


Vancomycin HCl 2 gm/ Sodium (Chloride)  500 mls @ 170 mls/hr IVPB ONCE ONE


   PRN Reason: Protocol


   Stop: 03/23/17 12:05


Sodium Chloride (Sodium Chloride 0.45%)  1,000 mls @ 80 mls/hr IV .[id number] [facility]


Morphine Sulfate (Morphine)  1 mg IVP Q4H PRN


   PRN Reason: Pain, moderate (4-7)


   Last Admin: 03/22/17 22:19 Dose:  1 mg


Potassium Phos/Sodium Phos (Neutra-Phos)  1 pkt PO BID [facility]


   Stop: 03/23/17 21:16


   Last Admin: 03/22/17 21:50 Dose:  1 pkt











- Labs


Labs: 


 





 03/23/17 07:30 





 03/23/17 07:30 





 











PT  13.6 Seconds (9.9-11.8)  H  03/22/17  03:33    


 


INR  1.26  (0.93-1.08)  H  03/22/17  03:33    


 


APTT  32.7 Seconds (23.7-30.8)  H  03/22/17  03:33    














Assessment and Plan





- Assessment and Plan (Free Text)


Plan: 


Assessment:





Fall at home, details unknown/Right Knee Pain


+ trop in setting of acute on chronic kidney disease, probably not acute MI


Dementia


HBP


RBBB


IMI on ECG, probbably old


Former Smoker


Renal Stones


Cataracts


Diminished hearing








Plan:





AB


Check echo


IVF


Monitor: I/O, labs, Renal fx., cultures, sats., etc


As per ID, Ortho, Neuro.,Podiatry, Dr. Muller


Conservative Cardiac Care is anticipated

## 2017-03-23 NOTE — CP.PCM.PN
Subjective





- Date & Time of Evaluation


Date of Evaluation: 03/23/17


Time of Evaluation: 09:55





- Subjective


Subjective: 


Patient developed fever this morning, still feels weak, no nausea or diarrhea, 

still with pain in both legs





Objective





- Vital Signs/Intake and Output


Vital Signs (last 24 hours): 


 











Temp Pulse Resp BP Pulse Ox


 


 100.7 F H  109 H  22   106/59 L  95 


 


 03/23/17 12:00  03/23/17 12:00  03/23/17 12:00  03/23/17 12:00  03/23/17 06:00








Intake and Output: 


 











 03/23/17 03/23/17





 06:59 18:59


 


Intake Total 620 


 


Output Total 275 


 


Balance 345 














- Medications


Medications: 


 Current Medications





Acetaminophen (Tylenol 325mg Tab)  650 mg PO Q4 PRN


   PRN Reason: Fever >100.4 F


   Last Admin: 03/23/17 09:15 Dose:  650 mg


Clotrimazole (Lotrimin 1%)  0 gm TOP BID [facility]


   Last Admin: 03/23/17 11:00 Dose:  1 applic


MEROPENEM-0.9% SODIUM CHLORIDE (Meropenem 1g/Ns 100ml Ivpb)  100 mls @ 100 mls/

hr IVPB Q12 WAQAR


   PRN Reason: Protocol


   Stop: 03/29/17 10:01


   Last Admin: 03/23/17 11:18 Dose:  100 mls/hr


Sodium Chloride (Sodium Chloride 0.45%)  1,000 mls @ 100 mls/hr IV .Q10H [facility]


   Last Admin: 03/23/17 11:25 Dose:  100 mls/hr


Morphine Sulfate (Morphine)  1 mg IVP Q4H PRN


   PRN Reason: Pain, moderate (4-7)


   Last Admin: 03/22/17 22:19 Dose:  1 mg


Potassium Phos/Sodium Phos (Neutra-Phos)  1 pkt PO BID [facility]


   Stop: 03/23/17 21:16


   Last Admin: 03/23/17 11:18 Dose:  1 pkt











- Labs


Labs: 


 





 03/23/17 07:30 





 03/23/17 07:30 





 











PT  13.6 Seconds (9.9-11.8)  H  03/22/17  03:33    


 


INR  1.26  (0.93-1.08)  H  03/22/17  03:33    


 


APTT  32.7 Seconds (23.7-30.8)  H  03/22/17  03:33    














- Constitutional


Appears: Non-toxic, No Acute Distress





- Head Exam


Head Exam: NORMAL INSPECTION





- ENT Exam


ENT Exam: Mucous Membranes Moist





- Neck Exam


Neck Exam: absent: Lymphadenopathy, Meningismus





- Respiratory Exam


Respiratory Exam: Decreased Breath Sounds





- Cardiovascular Exam


Cardiovascular Exam: +S1, +S2





- GI/Abdominal Exam


GI & Abdominal Exam: Soft.  absent: Tenderness





- Extremities Exam


Additional comments: 


both feet with dry dressings in place





Assessment and Plan





- Assessment and Plan (Free Text)


Plan: 


Assessment


severe sepsis with acute on chronic renal failure due to gram positive cocci in 

chains bacteremia, probably secondary to both lower extremities skin and skin 

structure infection


acute rhabdomyolysis


consider acute NSTEMI


HTN


chronic renal failure


dementia


obesity with BMI 38





Plan


will give another dose of IV Vancomycin (since we are unable to use Daptomycin 

since the CPK is very elevated) and continue Merrem (day 2) pending 

identification and sensitivities of the bacteria in the blood; follow up wound, 

urine cx; PCT is elevated but the patient has renal failure; reviewed CXR


Follow up Podiatry evaluation 


follow up echocardiogram; will repeat blood cultures


will monitor clinically

## 2017-03-23 NOTE — HP
CHIEF COMPLAINT AND HISTORY OF PRESENT ILLNESS:  This is an 81-year-old male who is coming into the [facility] with complaints of right knee pain.  The patient has a history of hypertension and dementia. 
 The patient has been having falls because of his pain.  He does not remember exactly how many times 
he has had a fall.  The patient's family reports that he has been having short term memory loss.  He 
has been feeling weak.  The patient has been having difficulty moving his legs.  He mainly complains 
of pain that is 5/10 in his right knee.  He denies any headaches, no chest pain or shortness of breat
h, no nausea, no vomiting, no dysuria, frequency, no abdominal pain, no back pain, no weakness in the
 arms or the legs.



REVIEW OF SYSTEMS:  All other review of symptoms are within normal limits except as mentioned.



ALLERGIES:  No known drug allergies.



HOME MEDICATIONS:  Cozaar and Lasix.



SOCIAL HISTORY:  Former smoker.  Denies alcohol or drug abuse.



PAST MEDICAL HISTORY:  As mentioned above. Also has:

1.  Hypertension.

2.  Nephrolithiasis.

3.  Cataracts.

4.  Hearing impairment.



PHYSICAL EXAMINATION:

VITAL SIGNS:  Temperature is 98.4, pulse of 92, blood pressure 171/79, repeat is 134/65, respirations
 20, O2 saturation 97%, height is 5 feet 10 inches, weight is 263 pounds, BMI 37.7.

GENERAL:   Patient lying in bed, flat, and in no apparent distress.

HEAD AND NECK EXAM:   Atraumatic, normocephalic.  Conjunctivae are pink.  Throat clear and mouth with
 moist mucosa.  Oropharynx benign.

EYES:   Extraocular movements are intact.  PERRLA.

NECK:   Supple.  No JVD, thyromegaly, or adenopathy.  No bruits.

HEART:   S1 and S2 regular rate and rhythm.  No murmurs, rubs, or gallops.

LUNGS:   Clear to auscultation bilaterally.  No wheezing rales or rhonchi appreciated.  No retraction
s on exam.

ABDOMEN:   Soft, nontender, nondistended.  Bowel sounds are positive in all quadrants.   No rebound. 
  No hepatosplenomegaly.

EXTREMITIES:    Lower extremities have 1+ pulses.  There is 1+ edema bilaterally.  He has onychomycos
is.  There is a necrotic callus on the second PIP joint area of the left foot. 

NEURO:   No facial asymmetry, tongue is midline, no uvula deviation.  Power is 5/5 in upper extremity
 and 5/5 in lower extremity.  Sensation is normal in upper extremity and lower extremity.

PSYCH:   Awake, alert, oriented x3.  No anxiety or depression symptoms.  Good insight.   Normal affec
t.

:   No CVA tenderness

VASCULAR:   2+ pulses in carotid and pedal pulses.

SKIN:   No erythema or abnormal nodules noted.

SPINE:   Normal curvature.

LYMPHADENOPATHY:   No anterior cervical or posterior cervical adenopathy.  No inguinal adenopathy.



LABORATORY DATA:  White count of 24.3, hemoglobin is 14.5.  He has neutrophils of 74% with bandemia o
f 11%.  INR is 1.2.  He has a pH of 7.35.  He has a pCO2 of 38 and his bicarbonate is 21.  Lactate in
itially was 2.8, and now it is 1.8.  He has a sodium 138, potassium 4.5, creatinine is 2.4.  His init
ial troponin 0.3, repeat is 0.4.  Procalcitonin level is 31.5.  The patient has blood that is large, 
nitrites are negative, bilirubin is negative.



Chest x-ray shows no active pulmonary disease.  He had a CT of the head done, shows no acute intracra
nial hemorrhage.  There is an enlarged subarachnoid space, right frontoparietal region, could be due 
to atrophy versus subdural hygroma.



He has an EKG that shows sinus rhythm at 76, right bundle branch block, .



His right knee x-ray done shows no fracture.



ASSESSMENT:

1.  Sepsis.

2.  Acute kidney injury.

3.  Elevated troponin.

4.  Rhabdomyolysis.

5.  Hypophosphatemia.



PLAN:  The patient is going to be admitted to the hospital because of sepsis.  I will get ID and podi
atry evaluation.  The patient is also going to need cardiology evaluation for the elevated troponin. 
 The patient was given Tylenol for pain, but was not having any improvement.  I will get orthopedics 
for evaluation of the right knee.  I will place him on IV fluids for his acute kidney injury.  He is 
going to be on morphine for pain.  He is on meropenem for antibiotics Dopplers of the lower legs have
 been ordered.  An echo has been ordered as well.  We will repeat the patient's blood work.  He has h
ypophosphatemia, this will need to be replaced as well.  We will continue to follow closely.  Overall
 prognosis is guarded.





__________________________________________

Jose Martin Arguello MD







cc:



DD: 03/22/2017 21:05:36  [id number]

TT: 03/23/2017 06:17:14

Job # [id number]

jn

## 2017-03-23 NOTE — RAD
PROCEDURE:  Left Foot Radiographs.



HISTORY:

ulcer bunion left foot  



COMPARISON:

None.



FINDINGS:



BONES:

No definite radiographic evidence of osteomyelitis. No evidence of 

acute fracture. 



JOINTS:

Advanced arthritic degenerative changes. 



SOFT TISSUES:

Soft tissue swelling seen in the left big toe 



OTHER FINDINGS:

None.



IMPRESSION:

No definite radiographic evidence of osteomyelitis. Soft tissue 

swelling of the left big toe.  Advanced arthritic degenerative 

changes.

## 2017-03-23 NOTE — CON
DATE: 03/23/2017



Room 276, bed 2.  



An 81-year-old male complains of right knee pain.  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 
a spur superiorly on the patella which is probably causing some aggravation,  especially since he fel
l on his right knee.  He has a mild abrasion in the prepatellar region.  I feel as though he has oste
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
pportunity to feel better so he can go for therapy by injecting the right knee with Depo-Medrol and M
arcaine.  Hopefully, this will give him enough relief so he could do some therapy.  In the meantime, 
he has a very stiff hip, mostly on the right side.  I am going to order bilateral hip x-rays to see h
ow this contributes to his pain in the lower extremities.  I will follow the patient when the hip x-r
ays are done and see how he does with the injection in the right knee.





__________________________________________

Taran Feliciano DO







cc:



DD: 03/23/2017 08:40:59  [id number]

TT: 03/23/2017 10:54:34

Confirmation # [id number]

Dictation # [id number]

tn

## 2017-03-23 NOTE — RAD
PROCEDURE:  Pelvis bilateral hips



HISTORY:

Pain. No history of recent/ related trauma provided



COMPARISON:

None



TECHNIQUE:

Standard protocol for this study/examination.



FINDINGS:

Right hip: Severe degenerative changes.  Marked joint space 

narrowing.  Proliferative changes primarily on the acetabular side of 

the joint. Chronic degenerative changes presumed posttraumatic. 

Partial collapse of the femoral head suggests a component of 

osteonecrosis.



Left hip: Moderate -severe degenerative changes.  Preserved femoral 

acetabular relationship. No acute findings 



IMPRESSION:

Right hip: Presumed long-standing degenerative/posttraumatic changes. 

 A component of osteonecrosis suspected right femoral head. No acute 

findings.



Left hip: Less severe degenerative changes.

## 2017-03-23 NOTE — US
PROCEDURE:  Lower extremity DEYSI exam



HISTORY:

Peripheral vascular disease with pain and claudication.



PHYSICIAN(S):  Jerzy Sotelo MD.



FINDINGS:

The resting DEYSI's are mildly to moderately abnormal: Right, 0.84 and 

left, 0.60 



The brachial systolic pressures are symmetric.



The high thigh pressures and waveforms are relatively normal.



The calf PVR waveforms do not augment. This is consistent with 

bilateral SFA disease, greater on the left than the right. 



Significant gradients are noted across both knees. This is consistent 

with bilateral distal SFA, popliteal, and/ trifurcation disease. 



The ankle PVR waveforms are moderately blunted bilaterally. This 

consistent with bilateral tibial disease. 



IMPRESSION:

1. Moderately abnormal ABIs at rest. 



2. Bilateral SFA disease, greater on the left than the right. 



3. Bilateral distal SFA, popliteal, and/ trifurcation disease.

## 2017-03-23 NOTE — CARD
--------------- APPROVED REPORT --------------





EKG Measurement

Heart [id number]

NE [id number]

[id number]

[id number]

[id number]



<Conclusion>

Sinus tachycardia

Right bundle branch block

Inferior infarct, age undetermined

Abnormal ECG

## 2017-03-23 NOTE — CP.PCM.PN
<Rebecca Song - Last Filed: 03/23/17 13:26>





Subjective





- Date & Time of Evaluation


Date of Evaluation: 03/23/17


Time of Evaluation: 13:26





- Subjective


Subjective: 


80 yo male seen at bedside for multiple leg ulcers and fissures of feet. 

Patient resting comfortably in bed in NAD and AAOx3. patient denies any acute 

events overnight. Patient went for multiple tests this morning. Patient denies 

any pain in his feet. Patient denies numbness, burning or tingling in his feet. 

Denies n/f/v/d/c/sob. 





Objective





- Vital Signs/Intake and Output


Vital Signs (last 24 hours): 


 











Temp Pulse Resp BP Pulse Ox


 


 100.7 F H  109 H  22   106/59 L  95 


 


 03/23/17 12:00  03/23/17 12:00  03/23/17 12:00  03/23/17 12:00  03/23/17 06:00








Intake and Output: 


 











 03/23/17 03/23/17





 06:59 18:59


 


Intake Total 620 


 


Output Total 275 


 


Balance 345 














- Medications


Medications: 


 Current Medications





Acetaminophen (Tylenol 325mg Tab)  650 mg PO Q4 PRN


   PRN Reason: Fever >100.4 F


   Last Admin: 03/23/17 09:15 Dose:  650 mg


Clotrimazole (Lotrimin 1%)  0 gm TOP BID [facility]


   Last Admin: 03/23/17 11:00 Dose:  1 applic


MEROPENEM-0.9% SODIUM CHLORIDE (Meropenem 1g/Ns 100ml Ivpb)  100 mls @ 100 mls/

hr IVPB Q12 WAQAR


   PRN Reason: Protocol


   Stop: 03/29/17 10:01


   Last Admin: 03/23/17 11:18 Dose:  100 mls/hr


Sodium Chloride (Sodium Chloride 0.45%)  1,000 mls @ 100 mls/hr IV .Q10H [facility]


   Last Admin: 03/23/17 11:25 Dose:  100 mls/hr


Morphine Sulfate (Morphine)  1 mg IVP Q4H PRN


   PRN Reason: Pain, moderate (4-7)


   Last Admin: 03/22/17 22:19 Dose:  1 mg


Potassium Phos/Sodium Phos (Neutra-Phos)  1 pkt PO BID [facility]


   Stop: 03/23/17 21:16


   Last Admin: 03/23/17 11:18 Dose:  1 pkt











- Labs


Labs: 


 





 03/23/17 07:30 





 03/23/17 07:30 





 











PT  13.6 Seconds (9.9-11.8)  H  03/22/17  03:33    


 


INR  1.26  (0.93-1.08)  H  03/22/17  03:33    


 


APTT  32.7 Seconds (23.7-30.8)  H  03/22/17  03:33    














- Constitutional


Appears: Well, Non-toxic, No Acute Distress





- Extremities Exam


Additional comments: 


Vasc: weakly palpable pulses of right foot, 1/4 palpable pedal pulse of left 

foot, +1 edema to feet and legs


neuro: grossly diminished


derm: elongated, thickened and dystrophic nails x 10, hyperkeratotic lesions 

noted to feet bilaterally, no probe to bone- under fissure is necrotic tissue 

of dorsomedial left foot, open superficial wound noted to anterior left leg 

secondary to fall at home, 1.5 x 1.5 x 0.3, no acute clinical signs of infection

, mild redness to right foot








- Neurological Exam


Neurological Exam: Alert, Awake, Oriented x3





- Psychiatric Exam


Psychiatric exam: Normal Affect, Normal Mood





Assessment and Plan





- Assessment and Plan (Free Text)


Assessment: 


80 y/o male seen at bedside for multiple necrotic fissures, fungal nails and 

superficial ulceration to legs 


Plan: 


patient evaluated and chart reviewed


discussed in detail with attending Dr. Howard


labs and vitals reviewed, WBC 23.7


patient instructed to soak feet for 5 minutes, excisional debridement of 

calluses on left foot using a sterilr #15 blade down to level of healthy tissue


excisional debridement of toenails x 10 using sterile nippers down to hygienic 

length


applied DSD to foot and legs b/l


patient tolerated procedures well with no complications


continue IV abx


DEYSI / PVR= 0.84 Right, 0.6 left


f.u wound cx


instructed to apply lotrimin to legs daily


continue wearing heel pads while in bed to offload pressure 


podiatry will continue to monitor while patient remains in house 





<Priya Howard - Last Filed: 03/24/17 15:07>





Objective





- Vital Signs/Intake and Output


Vital Signs (last 24 hours): 


 











Temp Pulse Resp BP Pulse Ox


 


 98.6 F   93 H  20   110/85   95 


 


 03/24/17 11:40  03/24/17 13:00  03/24/17 11:40  03/24/17 11:40  03/24/17 09:00








Intake and Output: 


 











 03/24/17 03/24/17





 06:59 18:59


 


Intake Total 1680 


 


Output Total 200 


 


Balance 1480 














- Medications


Medications: 


 Current Medications





Acetaminophen (Tylenol 325mg Tab)  650 mg PO Q4 PRN


   PRN Reason: Fever >100.4 F


   Last Admin: 03/23/17 09:15 Dose:  650 mg


Clotrimazole (Lotrimin 1%)  0 gm TOP BID [facility]


   Last Admin: 03/24/17 09:27 Dose:  2 applic


MEROPENEM-0.9% SODIUM CHLORIDE (Meropenem 1g/Ns 100ml Ivpb)  100 mls @ 100 mls/

hr IVPB Q12 [facility]


   PRN Reason: Protocol


   Stop: 03/29/17 10:01


   Last Admin: 03/24/17 09:36 Dose:  100 mls/hr


Sodium Bicarbonate 50 meq/ (Sodium Chloride)  1,050 mls @ 125 mls/hr IV .Q8H24M 

[facility]


   Last Admin: 03/24/17 09:14 Dose:  125 mls/hr


diltiaZEM IVPB 100mg in NS (Cardizem 100mg In Ns)  100 mls @ 5 mls/hr IV .Q20H 

PRN; Protocol; 5 MG/HR


   PRN Reason: TITRATE PER MD ORDER


Heparin Sodium/Sodium Chloride (Heparin 27460 Units/250ml 1/2 Normal Saline)  

250 mls @ 11.571 mls/hr IV .U20O79M PRN; Protocol; 10 UNITS/KG/HR


   PRN Reason: ADJUST RATE PER PROTOCOL


Metoprolol Succinate (Toprol Xl)  25 mg PO BRK [facility]


   Last Admin: 03/24/17 13:44 Dose:  Not Given


Morphine Sulfate (Morphine)  1 mg IVP Q4H PRN


   PRN Reason: Pain, moderate (4-7)


   Last Admin: 03/22/17 22:19 Dose:  1 mg











- Labs


Labs: 


 





 03/24/17 06:00 





 03/24/17 06:00 





 











PT  13.6 Seconds (9.9-11.8)  H  03/22/17  03:33    


 


INR  1.26  (0.93-1.08)  H  03/22/17  03:33    


 


APTT  32.7 Seconds (23.7-30.8)  H  03/22/17  03:33    














Attending/Attestation





- Attestation


I have personally seen and examined this patient.: Yes


I have fully participated in the care of the patient.: Yes


I have reviewed all pertinent clinical information, including history, physical 

exam and plan: Yes

## 2017-03-23 NOTE — PN
DATE: 03/23/2017



SUBJECTIVE:  The patient has no complaints of any chest pain, no shortness of breath, no headaches.  
He says he does have pain in the right knee.



PHYSICAL EXAMINATION:

VITAL SIGNS:  Temperature is 98, pulse of 82, blood pressure is 184/84, respirations 22.

GENERAL:   The patient comfortable, in no acute distress.

HEENT:   Anicteric sclerae.  Moist mucosa.

NECK:   No JVD or adenopathy.

CARDIAC:   S1/S2.  No murmurs.  No rubs.  Regular.

RESPIRATORY:   Clear to auscultation bilaterally.  No wheezes, rales, or rhonchi.  Good air entry.

ABDOMEN:   Bowel sounds are positive, soft, nontender, and nondistended.

EXTREMITIES:   No edema.  Has 1+ pulses.



LABORATORIES:  Yesterday's white count was 24.3.  His creatinine was 2.4.



Telemetry monitoring, the patient had a junctional rhythm on monitor.  He is confused and agitated.



ASSESSMENT:

1.  Delirium.

2.  Acute kidney injury.

3.  Sepsis.

4.  Obese with a body mass index of 37.

5.  Right knee pain.

6.  Elevated troponin.

7.  Rhabdomyolysis.

8.  Probable peripheral arterial disease.

9.  Hypophosphatemia.



PLAN:  The patient is going to be admitted to the hospital.  He has an elevated procalcitonin level a
nd elevated white count.  He is being followed by multiple consultants.  Appreciate their input.  The
 patient is on meropenem for antibiotics.  He has blood cultures that are negative so far.  OR cultur
es are pending.  There is also a _____ that has been ordered and a lower extremity Doppler.  The adrián
ent is on Tylenol.  He was given phosphorus replacement yesterday.  He is on morphine for pain.  He i
s going to be needing physical therapy.  I have ordered that as well.  We will get repeat blood work 
today.  We will need to replace his phosphorus.  I have ordered urine protein/creatinine ratio to shahrzad
ntify his urine.  Waiting for those results.





__________________________________________

Jose Martin Arguello MD







cc:



DD: 03/23/2017 05:53:04  358

TT: 03/23/2017 08:48:33

Confirmation # [id number]

Dictation # [id number]

en

## 2017-03-24 LAB
ALBUMIN/GLOB SERPL: 0.9 {RATIO} (ref 1.1–1.8)
ALP SERPL-CCNC: 94 U/L (ref 38–133)
ALT SERPL-CCNC: 201 U/L (ref 7–56)
AST SERPL-CCNC: 516 U/L (ref 15–59)
BILIRUB SERPL-MCNC: 0.7 MG/DL (ref 0.2–1.3)
BUN SERPL-MCNC: 70 MG/DL (ref 7–21)
CALCIUM SERPL-MCNC: 8.2 MG/DL (ref 8.4–10.5)
CHLORIDE SERPL-SCNC: 105 MMOL/L (ref 98–107)
CO2 SERPL-SCNC: 18 MMOL/L (ref 21–33)
ERYTHROCYTE [DISTWIDTH] IN BLOOD BY AUTOMATED COUNT: 14.9 % (ref 11.5–14.5)
GLOBULIN SER-MCNC: 3.5 GM/DL
GLUCOSE SERPL-MCNC: 157 MG/DL (ref 70–110)
HCT VFR BLD CALC: 39.7 % (ref 42–52)
MAGNESIUM SERPL-MCNC: 2.5 MG/DL (ref 1.7–2.2)
MCH RBC QN AUTO: 27.8 PG (ref 25–35)
MCHC RBC AUTO-ENTMCNC: 33.8 G/DL (ref 31–37)
MCV RBC AUTO: 82.4 FL (ref 80–105)
PHOSPHATE SERPL-MCNC: 7.3 MG/DL (ref 2.5–4.5)
PLATELET # BLD: 138 10^3/UL (ref 120–450)
PMV BLD AUTO: 9.3 FL (ref 7–11)
POTASSIUM SERPL-SCNC: 4.7 MMOL/L (ref 3.6–5)
PROT SERPL-MCNC: 6.7 G/DL (ref 5.8–8.3)
SODIUM SERPL-SCNC: 139 MMOL/L (ref 132–148)
T4 FREE SERPL-MCNC: 1.1 NG/DL (ref 0.78–2.19)
TSH SERPL-ACNC: 3.61 MIU/ML (ref 0.46–4.68)
WBC # BLD AUTO: 19 10^3/UL (ref 4.5–11)

## 2017-03-24 RX ADMIN — MEROPENEM AND SODIUM CHLORIDE SCH MLS/HR: 1 INJECTION, SOLUTION INTRAVENOUS at 23:15

## 2017-03-24 RX ADMIN — CLOTRIMAZOLE SCH APPLIC: 1 CREAM TOPICAL at 17:56

## 2017-03-24 RX ADMIN — MEROPENEM AND SODIUM CHLORIDE SCH MLS/HR: 1 INJECTION, SOLUTION INTRAVENOUS at 09:36

## 2017-03-24 RX ADMIN — CLOTRIMAZOLE SCH APPLIC: 1 CREAM TOPICAL at 09:27

## 2017-03-24 NOTE — CARD
--------------- APPROVED REPORT --------------





EKG Measurement

Heart [id number]

[id number]

[id number]

[id number]



<Conclusion>

Atrial fibrillation

Right bundle branch block

Abnormal ECG

## 2017-03-24 NOTE — US
PROCEDURE:  Ultrasound of the Kidneys



HISTORY:

ARF



COMPARISON:

None available.



TECHNIQUE:

Sonogram of the kidneys.



FINDINGS:



RIGHT KIDNEY:

Measures: 13.7 cm. 



Normal in size, contour and echogenicity. 



No stone, solid mass lesion or hydronephrosis visualized. 



LEFT KIDNEY:

Measures: 10.9 cm. 



Normal in size, contour and echogenicity. 



No stone, solid mass lesion or hydronephrosis visualized. 



OTHER FINDINGS:

None.



IMPRESSION:

Unremarkable renal sonogram.

## 2017-03-24 NOTE — CP.PCM.PN
Subjective





- Date & Time of Evaluation


Date of Evaluation: 03/24/17


Time of Evaluation: 14:21





- Subjective


Subjective: 


called by nurse pt has rythem change of a-fb, pt with nstemi rhabdomyalysis. 

sepsis . has a-fib with rate of 95 -104. pt denies complaints.





Objective





- Vital Signs/Intake and Output


Vital Signs (last 24 hours): 


 











Temp Pulse Resp BP Pulse Ox


 


 98.6 F   93 H  20   110/85   95 


 


 03/24/17 11:40  03/24/17 13:00  03/24/17 11:40  03/24/17 11:40  03/24/17 09:00








Intake and Output: 


 











 03/24/17 03/24/17





 06:59 18:59


 


Intake Total 1680 


 


Output Total 200 


 


Balance 1480 














- Medications


Medications: 


 Current Medications





Acetaminophen (Tylenol 325mg Tab)  650 mg PO Q4 PRN


   PRN Reason: Fever >100.4 F


   Last Admin: 03/23/17 09:15 Dose:  650 mg


Clotrimazole (Lotrimin 1%)  0 gm [facility]ID [facility]


   Last Admin: 03/24/17 09:27 Dose:  2 applic


Heparin Sodium (Porcine) (Heparin)  5,000 units IVP STAT STA


   PRN Reason: Protocol


   Stop: 03/24/17 14:15


MEROPENEM-0.9% SODIUM CHLORIDE (Meropenem 1g/Ns 100ml Ivpb)  100 mls @ 100 mls/

hr IVPB Q12 [facility]


   PRN Reason: Protocol


   Stop: 03/29/17 10:01


   Last Admin: 03/24/17 09:36 Dose:  100 mls/hr


Sodium Bicarbonate 50 meq/ (Sodium Chloride)  1,050 mls @ 125 mls/hr IV .Q8H24M 

[facility]


   Last Admin: 03/24/17 09:14 Dose:  125 mls/hr


Vancomycin HCl (Vancomycin 1gm)  250 mls @ 167 mls/hr IVPB STAT STA


   PRN Reason: Protocol


   Stop: 03/24/17 14:25


   Last Admin: 03/24/17 14:05 Dose:  167 mls/hr


diltiaZEM IVPB 100mg in NS (Cardizem 100mg In Ns)  100 mls @ 5 mls/hr IV .Q20H 

PRN; Protocol; 5 MG/HR


   PRN Reason: TITRATE PER MD ORDER


Heparin Sodium/Sodium Chloride (Heparin 42916 Units/250ml 1/2 Normal Saline)  

250 mls @ 11.571 mls/hr IV .[id number] PRN; Protocol; 10 UNITS/KG/HR


   PRN Reason: ADJUST RATE PER PROTOCOL


Metoprolol Succinate (Toprol Xl)  25 mg PO BRK [facility]


   Last Admin: 03/24/17 13:44 Dose:  Not Given


Morphine Sulfate (Morphine)  1 mg IVP Q4H PRN


   PRN Reason: Pain, moderate (4-7)


   Last Admin: 03/22/17 22:19 Dose:  1 mg











- Labs


Labs: 


 





 03/24/17 06:00 





 03/24/17 06:00 





 











PT  13.6 Seconds (9.9-11.8)  H  03/22/17  03:33    


 


INR  1.26  (0.93-1.08)  H  03/22/17  03:33    


 


APTT  32.7 Seconds (23.7-30.8)  H  03/22/17  03:33    














- Constitutional


Appears: No Acute Distress





- Eye Exam


Eye Exam: PERRL





- ENT Exam


ENT Exam: Mucous Membranes Moist





- Respiratory Exam


Respiratory Exam: Decreased Breath Sounds, Wheezes


Additional comments: 


pt has decreased breath sound abd wheezez in the left lower lung region.





- Cardiovascular Exam


Cardiovascular Exam: Tachycardia, Irregular Rhythm





- GI/Abdominal Exam


GI & Abdominal Exam: Soft





- Extremities Exam


Additional comments: 


generalized weakness.





- Neurological Exam


Neurological Exam: Alert, Awake, Oriented x3





- Skin


Skin Exam: Dry, Warm





Assessment and Plan





- Assessment and Plan (Free Text)


Assessment: 


a-fib with rapid vent rate.


NSTEMI.


 RENAL FAILURE 


rhabdomyalysis.


Plan: 


xopenex t/m x1 chest x-ray. cardiazem debbie and drip . haparine.


pt discussed with dr watson.

## 2017-03-24 NOTE — CP.PCM.PN
Subjective





- Date & Time of Evaluation


Date of Evaluation: 03/24/17


Time of Evaluation: 08:45





- Subjective


Subjective: 


Patient is comfortable in bed, still have low grade fevers overnight but this 

morning he is afebrile. No diarrhea, no chest pain.





Objective





- Vital Signs/Intake and Output


Vital Signs (last 24 hours): 


 











Temp Pulse Resp BP Pulse Ox


 


 97.9 F   72   20   129/81   96 


 


 03/24/17 06:00  03/24/17 06:00  03/24/17 06:00  03/24/17 06:00  03/24/17 06:00








Intake and Output: 


 











 03/24/17 03/24/17





 06:59 18:59


 


Intake Total 1680 


 


Output Total 200 


 


Balance 1480 














- Medications


Medications: 


 Current Medications





Acetaminophen (Tylenol 325mg Tab)  650 mg PO Q4 PRN


   PRN Reason: Fever >100.4 F


   Last Admin: 03/23/17 09:15 Dose:  650 mg


Clotrimazole (Lotrimin 1%)  0 gm TOP BID [facility]


   Last Admin: 03/23/17 17:46 Dose:  1 applic


MEROPENEM-0.9% SODIUM CHLORIDE (Meropenem 1g/Ns 100ml Ivpb)  100 mls @ 100 mls/

hr IVPB Q12 [facility]


   PRN Reason: Protocol


   Stop: 03/29/17 10:01


   Last Admin: 03/23/17 22:46 Dose:  100 mls/hr


Morphine Sulfate (Morphine)  1 mg IVP Q4H PRN


   PRN Reason: Pain, moderate (4-7)


   Last Admin: 03/22/17 22:19 Dose:  1 mg











- Labs


Labs: 


 





 03/24/17 06:00 





 03/24/17 06:00 





 











PT  13.6 Seconds (9.9-11.8)  H  03/22/17  03:33    


 


INR  1.26  (0.93-1.08)  H  03/22/17  03:33    


 


APTT  32.7 Seconds (23.7-30.8)  H  03/22/17  03:33    














- Constitutional


Appears: Non-toxic, No Acute Distress





- Head Exam


Head Exam: NORMAL INSPECTION





- ENT Exam


ENT Exam: Mucous Membranes Moist





- Neck Exam


Neck Exam: absent: Lymphadenopathy, Meningismus





- Respiratory Exam


Respiratory Exam: Decreased Breath Sounds





- Cardiovascular Exam


Cardiovascular Exam: +S1, +S2





- GI/Abdominal Exam


GI & Abdominal Exam: Soft.  absent: Tenderness





- Extremities Exam


Additional comments: 


both lower extremities with dry dressings in place





Assessment and Plan





- Assessment and Plan (Free Text)


Plan: 


Assessment


severe sepsis with acute on chronic renal failure due to gram positive cocci in 

chains bacteremia, probably secondary to bilateral lower extremities skin and 

skin structure infection


acute rhabdomyolysis


consider acute NSTEMI


HTN


chronic renal failure


dementia


obesity with BMI 38





Plan


will give another dose of IV Vancomycin (since we are unable to use Daptomycin 

since the CPK is very elevated at ~44,000) and get Vanco level in the morning; 

will also continue Merrem (day 3) pending identification and sensitivities of 

the bacteria in the blood and in the wound cx; PCT is elevated but the patient 

has renal failure; reviewed CXR


reviewed Podiatry evaluation 


follow up echocardiogram results and repeat blood cultures which were taken 

yesterday


will continue to monitor clinically


Discussed with Dr. Bhat

## 2017-03-24 NOTE — PN
DATE: 03/24/2017



SUBJECTIVE:  The patient has no complaints of any chest pain, no shortness of breath, no headaches.  
He had a right knee injection yesterday given by Dr. Feliciano from orthopedics.    

 

PHYSICAL EXAMINATION:    

VITAL SIGNS:  Temperature is 97.6, pulse is 72, blood pressure is 120/72, respirations 20.  

GENERAL:   The patient comfortable, in no acute distress.

HEENT:   Anicteric sclerae.  Moist mucosa.

NECK:   No JVD or adenopathy.

CARDIAC:   S1/S2.  No murmurs.  No rubs.  Regular.

RESPIRATORY:   Clear to auscultation bilaterally.  No wheezes, rales, or rhonchi.  Good air entry.

ABDOMEN:   Bowel sounds are positive, soft, nontender, and nondistended.

EXTREMITIES:   No edema.  Has 1+ pulses.



LABORATORIES:  White count of 23.7, hemoglobin is 14.1.  Creatinine is _____.



Pelvis, bilateral hips shows right hip is _____ longstanding degenerative changes, component of avasc
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
nic changes.



Echo is pending.



ASSESSMENT:

1.  Delirium, improving.

2.  Acute kidney injury versus chronic kidney disease.

3.  Sepsis.

4.  Hypophosphatemia.

5.  Right knee pain, secondary to degenerative joint disease.

6.  Elevated troponin.

7.  Rhabdomyolysis.

8.  Possible peripheral arterial disease.

9.  Obesity with a body mass index of 37.     

10.  Proteinuria.

 

PLAN:  The patient has 1 blood culture that was positive for gram-positive cocci.  The patient is cur
rently on IV fluids with normal saline.  He is on _____ antibiotics.  He is on morphine for pain.  He
 is receiving Xanax as needed.  He is on a heart healthy diet.  He is being followed by Dr. Sandro moreira.  The patient will need a renal ultrasound.  I am not sure of the cause of his elevated creatinin
e.  He may have chronic kidney disease, but he does have proteinuria according to his protein/creatin
ine ratio.  We will get renal ultrasound and also serology.





__________________________________________

Jose Martin Arguello MD







cc:



DD: 03/24/2017 05:55:43  358

TT: 03/24/2017 09:30:25

Confirmation # [id number]

Dictation # [id number]

en

## 2017-03-24 NOTE — CARD
--------------- APPROVED REPORT --------------





EKG Measurement

Heart [id number]

[id number]

[id number]

[id number]



<Conclusion>

Atrial fibrillation

Right bundle branch block

Inferior infarct, age undetermined

Abnormal ECG

## 2017-03-24 NOTE — CARD
--------------- APPROVED REPORT --------------





EXAM: Two-dimensional and M-mode echocardiogram with Doppler and 

color Doppler.



Other Information 

Quality : AverageRhythm : 



INDICATION

Syncope 



2D DIMENSIONS 

Left Atrium (2D)3.6   (1.6-4.0cm)IVSd1.2   (0.7-1.1cm)

LVDd4.8   (3.9-5.9cm)PWd1.2   (0.7-1.1cm)

LVDs3.1   (2.5-4.0cm)FS (%) 34.7   %

LVEF (%)63.0   (>50%)



M-Mode DIMENSIONS 

Aortic Root3.30   (2.2-3.7cm)Aortic Cusp Exc.1.70   (1.5-2.0cm)



Aortic Valve

AoV Peak Vdvwfjiu581.0cm/Tisha Peak GR.6mmHg



Mitral Valve

E/A ratio0.0



TDI

E/Lateral E'0.0E/Medial E'0.0



Tricuspid Valve

TR Peak [id number]/sRAP [id number] Peak Gr.10mmHg

[id number]



 LEFT VENTRICLE 

The left ventricle is normal size. There is mild concentric left 

ventricular hypertrophy. The left ventricular function is normal. The 

left ventricular ejection fraction is within the normal range. There 

is normal LV segmental wall motion.



 RIGHT VENTRICLE 

The right ventricle is normal size.



 ATRIA 

The left atrium size is normal. The right atrium size is normal. The 

interatrial septum is intact with no evidence for an atrial septal 

defect.



 AORTIC VALVE 

The aortic valve is mildly to moderately calcified.



 MITRAL VALVE 

The mitral valve is mildly thickened but opens well. Mitral annular 

calcification is mild to moderate. Mitral regurgitation is trace.



 TRICUSPID VALVE 

The tricuspid valve is normal in structure. There is mild tricuspid 

regurgitation.



 PULMONIC VALVE 

The pulmonic valve is not well visualized.



 GREAT VESSELS 

The aortic root is normal in size.



 PERICARDIAL EFFUSION 

There is no pericardial effusion.



<Conclusion>

The left ventricle is normal size.

There is mild concentric left ventricular hypertrophy.

The left ventricular function is normal.

## 2017-03-25 LAB
ALBUMIN/GLOB SERPL: 0.9 {RATIO} (ref 1.1–1.8)
ALP SERPL-CCNC: 91 U/L (ref 38–133)
ALT SERPL-CCNC: 177 U/L (ref 7–56)
AST SERPL-CCNC: 323 U/L (ref 15–59)
BILIRUB SERPL-MCNC: 0.8 MG/DL (ref 0.2–1.3)
BUN SERPL-MCNC: 61 MG/DL (ref 7–21)
CALCIUM SERPL-MCNC: 8.2 MG/DL (ref 8.4–10.5)
CHLORIDE SERPL-SCNC: 103 MMOL/L (ref 98–107)
CO2 SERPL-SCNC: 22 MMOL/L (ref 21–33)
COLLECT DURATION TIME UR: 24 HOURS
ERYTHROCYTE [DISTWIDTH] IN BLOOD BY AUTOMATED COUNT: 14.7 % (ref 11.5–14.5)
GLOBULIN SER-MCNC: 3.3 GM/DL
GLUCOSE SERPL-MCNC: 135 MG/DL (ref 70–110)
HCT VFR BLD CALC: 38.5 % (ref 42–52)
INR PPP: 1.12 (ref 0.93–1.08)
IRON SERPL-MCNC: 11 UG/DL (ref 45–180)
MCH RBC QN AUTO: 27.6 PG (ref 25–35)
MCHC RBC AUTO-ENTMCNC: 34 G/DL (ref 31–37)
MCV RBC AUTO: 81.1 FL (ref 80–105)
PLATELET # BLD: 133 10^3/UL (ref 120–450)
PMV BLD AUTO: 9.5 FL (ref 7–11)
POTASSIUM SERPL-SCNC: 4.1 MMOL/L (ref 3.6–5)
PROT SERPL-MCNC: 6.2 G/DL (ref 5.8–8.3)
SODIUM SERPL-SCNC: 137 MMOL/L (ref 132–148)
URATE SERPL-MCNC: 8.3 MG/DL (ref 3.5–8.5)
VITAMIN D 25 OH TOTAL: 42.5 NG/ML (ref 30–100)
WBC # BLD AUTO: 12.9 10^3/UL (ref 4.5–11)

## 2017-03-25 RX ADMIN — MORPHINE SULFATE PRN MG: 2 INJECTION, SOLUTION INTRAMUSCULAR; INTRAVENOUS at 19:46

## 2017-03-25 RX ADMIN — CLOTRIMAZOLE SCH APPLIC: 1 CREAM TOPICAL at 17:36

## 2017-03-25 RX ADMIN — SILVER SULFADIAZINE SCH APPLIC: 10 CREAM TOPICAL at 08:19

## 2017-03-25 NOTE — PN
DATE: 03/25/2017



This is an 81-year-old male seen at bedside for bilateral multiple lower leg ulcerations and painful 
fissures on both feet.  The patient is complaining of pain in his right knee and denies any pain in b
oth feet or lower legs.



VITAL SIGNS:  Reveal temperature of 98.1, pulse rate of 63, blood pressure 138/63, respiratory rate o
f 20.



LABORATORY DATA:  Reveal a white count of 12.9 down from 19 yesterday.  Hemoglobin of 13.1, hematocri
t of 38.5, platelet count of 133.  His ESR 15.  Left foot culture reveals Staphylococcus aureus growt
h.  Foot x-rays reveal no radiographic evidence of osteomyelitis.  Arterial Dopplers revealed PVR wav
es that are moderately blunted, consistent with bilateral tibial disease.



OBJECTIVE:  Nonpalpable posterior tibial pulse bilaterally.  Weakly palpable dorsalis pedis pulse not
ed bilaterally, +1 nonpitting lower extremity edema noted bilaterally.  The patient has superficial n
on-stageable wounds which are at the anterior aspect of his left lower leg, each measure approximatel
y 1.2 cm x 1.2 cm x 0.3 cm.  The bases are granular with no signs of purulence and no clinical signs 
of abscess formation.  There are unstageable superficial wounds on the right lower leg, measure less 
than 0.5 x 0.5 x 0.2 cm.  They remain granular with minimal serous drainage and no clinical signs of 
abscess formation.



ASSESSMENT:  Non-stageable lateral lower leg ulcerations with accompanying localized cellulitis.



PLAN:  Both legs were cleansed with normal sterile saline.  We will apply Silvadene cream to both wou
nds with Optifoam dressing.  Recommend orthopedic consultation for right knee pain.  The patient will
 be seen and followed daily.





__________________________________________

Rodrigo Do DPM







cc:



DD: 03/25/2017 09:46:01  [id number]

TT: 03/25/2017 10:02:52

Confirmation # [id number]

Dictation # [id number]

jn

## 2017-03-25 NOTE — PN
DATE: 03/25/2017



The patient is in bed in no acute distress, nontoxic.



PHYSICAL EXAMINATION:

VITAL SIGNS:  Temperature is 97, blood pressure is 138/60, respiratory rate of 20.

HEENT:  Unremarkable.

NECK:  Supple.

LUNGS:  Have decreased breath sounds.

HEART:  Normal S1, S2.

ABDOMEN:  Soft, nontender.



LABORATORY DATA:  Reveals a white count of 12,900, hemoglobin of 13, platelets of 133.  Coagulation i
s noted.  Chemistries reveal a BUN of 61, creatinine of 1.7.  Procalcitonin is 31.  Urinalysis is not
ed.  Vancomycin trough is 15.7 yesterday.  HIV is nonreactive.  Microbiology reveals the patient has 
a group G strep in the blood and left foot the patient has a Staphylococcus aureus and foot was exami
edouard with Dr. Do.  The Staphylococcus aureus is pansensitive.



ASSESSMENT AND PLAN:  This is an 81-year-old male with past medical history significant for dementia,
 chronic renal failure, obesity, BMI of 38, admitted with severe sepsis with acute kidney injury on t
op of chronic renal failure with group G strep bacteremia and Staph aureus lower extremity cellulitis
.  Will use ceftriaxone 2 grams a day, discontinue the meropenem.  Although lower extremity has mild 
erythema, I am not convinced that this is the etiology of the bacteremia.  Repeat blood cultures from
 the 23rd are negative, must rule out underlying osteomyelitis.  The patient's sed rate is 15 with a 
C-reactive protein of greater than 15.  Should have an MRI of the lower extremities if possible and r
epeat cultures are negative.  The patient had an echo on the 23rd, read by Dr. Cam Arias, mitral v
alve thickened.  There is no mention of vegetation by Dr. Cam Arias.  We will continue with the an
tibiotic ceftriaxone 2 grams daily.  The patient's renal function appears to be improving.





__________________________________________

Lj Baca MD







cc:



DD: 03/25/2017 09:49:40  350

TT: 03/25/2017 10:06:38

Confirmation # [id number]

Dictation # [id number]

jn

## 2017-03-25 NOTE — PN
DATE: 03/25/2017



SUBJECTIVE:  The patient is an 81-year-old white male who came to Emergency Room on 03/22, after he f
ell at home.  The patient could not recall about that.  According to the patient's family,  he was fo
und to be confused and disoriented.  He also has history of hypertension, mild dementia, morbid obesi
ty.  He also has bilateral ulcers and being treated for that.



PHYSICAL EXAMINATION:

GENERAL:  He is sleepy, but arousable, does not seem to be in distress.

VITAL SIGNS:  He is afebrile, pulse 63, respirations 20, blood pressure 160/70.

LUNGS:  Bilateral fair airflow, no rhonchi or crackle.

HEART:  S1, S2 audible.

ABDOMEN:  Soft, obese, nontender, no rebound, no guarding.

NEUROLOGIC:  He is awake and alert, somewhat confused, moves all extremities.

EXTREMITIES:  Bilateral legs, he has cellulitis and bilateral leg ulcers.



LABORATORY EXAMINATION:  WBC is 12.9, that is nicely coming down.  His admission WBC was 24,000.  Hem
oglobin 13, hematocrit 38, platelets 133.  PT 12.1, INR 1.12.  Chemistry:  Sodium 137, potassium 4.1,
 chloride 103, CO2 22, BUN 61, creatinine 1.7, blood sugar 135.  His iron is 11.  His , that i
s coming down, ALT is 177.  Troponin was positive on 03/23/2017, and his CPK is also nicely coming do
wn.  His foot wound was positive for Staphylococcus aureus and one blood culture positive for group G
 Streptococcus.



ASSESSMENT:

1. Status post fall with generalized weakness.

2. Improving renal failure.

3. Improving leukocytosis.

4. Paroxysmal atrial fibrillation, but has been in sinus rhythm lately.

5. Mild dementia.

6. Status post electrolyte imbalance.

7. Right knee degenerative disk disease.

8. Resolving rhabdomyolysis.



PLAN:  Currently, the patient is on IV fluid.  He is on aspirin.  He is getting nebulizer treatment a
s needed.  He is on beta blocker.  He is also receiving Rocephin 2 grams q. 24 hour.  He is being car
ed for by Dr. Do, a podiatrist, for his leg wounds.  His _____ is being discontinued today. We wi
ll follow up his electrolytes, CBC, and CMP in a.m., and also request for Physical Therapy evaluation
.





__________________________________________

Madelin Gay MD







cc:



DD: 03/25/2017 11:31:47  [id number]

TT: 03/25/2017 12:11:29

Confirmation # [id number]

Dictation # [id number]

ln

## 2017-03-25 NOTE — PN
DATE: 03/25/2017



SUBJECTIVE:  The patient is seen lying in bed on telemetry.  He remains uncomfortable.  He finds diff
iculty moving his right leg because of knee pain.  He has had paroxysmal episodes of atrial fibrillat
ion with a heart rate into the 130-range.  He is currently in sinus rhythm.



CURRENT MEDICATIONS:  Include DuoNeb inhaler, meropenem, morphine p.r.n., IV fluids with sodium bicar
bonate.  Metoprolol has been placed on hold apparently.



OBJECTIVE:

GENERAL:  He is an elderly man who appears somewhat uncomfortable in bed.

VITAL SIGNS:  His blood pressure is 138/60 with a pulse of 66.  He is in sinus rhythm.  Respirations 
are 16.  He is afebrile.

HEENT:  No JVD.

CHEST:  Bilateral scattered rhonchi.

HEART:  PMI displaced laterally with a systolic murmur at the left sternal border.

ABDOMEN:  Soft, nontender, normoactive bowel sounds.

EXTREMITIES:  Multiple excoriations on both legs and feet are present, and abrasion on his right knee
 persists.  One plus edema is noted.



DIAGNOSTIC DATA:  White count 12.9, hemoglobin and hematocrit 13.1 and 38.5 with a platelet count to 
133,000.  Potassium 4.1.  BUN and creatinine are 61 and 1.7, down from 70 and 2.9.  AST and ALT - 323
 and 177.  CK 5352, down from 44,000.  



Chest x-ray reveals mildly increased cardiac silhouette with clear lung fields.  



Echocardiogram reveals normal LV size, mild concentric LVH and normal LV systolic function.



IMPRESSION:

1.  Status post fall.

2.  Resolving acute renal failure likely secondary to rhabdomyolysis.

3.  Recent mildly elevated troponin likely due to decreased renal clearance.

4.  History of tobacco abuse.

5.  Possible coronary artery disease, clinically stable at present.

6.  Paroxysmal atrial fibrillation, currently in sinus rhythm.



RECOMMENDATIONS:  At the present time, resumption of beta-blocker therapy would appear reasonable for
 heart rate control in the event of recurrence of atrial fibrillation.  Given his current status and 
risk of falls, I would withhold full anticoagulation for now unless he develops larger  evidence of i
ncreased burden of atrial fibrillation.  At the present time, I believe his risk of falls exceeds the
 benefit of anticoagulation.  Although not totally efficacious, one aspirin can be added to his regim
en for now.  We will continue to follow along and make further recommendations as needed.





__________________________________________

Cortez Wallace MD







cc:



DD: 03/25/2017 09:39:03  [id number]

TT: 03/25/2017 10:12:31

Confirmation # [id number]

Dictation # [id number]

jn

## 2017-03-25 NOTE — CP.PCM.PN
Subjective





- Date & Time of Evaluation


Date of Evaluation: 03/25/17


Time of Evaluation: 20:53





- Subjective


Subjective: 


Patient was seen at bedside because nurse Raiza told that patient is restless

, trying to get out of bed, is Sundowning.


Patient is screaming occasionally,is confused, states that he is in his house 

on [address], this is March 17h, it's a Saturday or Sunday.


Denies pains, sob, nausea,sweating.


Medical record was reviewed.


This 81 year old white male after a fall with acute rhabdomyolysis,ARF,sepsis.


 Has PMH of HTN,Dementia, obesity, hearing impairment, OA,CKD.





Objective





- Vital Signs/Intake and Output


Vital Signs (last 24 hours): 


 











Temp Pulse Resp BP Pulse Ox


 


 98.6 F   80   18   175/69 H  100 


 


 03/25/17 20:39  03/25/17 20:39  03/25/17 20:39  03/25/17 17:36  03/25/17 20:39








Intake and Output: 


Pulse ox 94 % on 2L/min.





- Medications


Medications: 


 Current Medications





Acetaminophen (Tylenol 325mg Tab)  650 mg PO Q4 PRN


   PRN Reason: Fever >100.4 F


   Last Admin: 03/23/17 09:15 Dose:  650 mg


Albuterol/Ipratropium (Duoneb 3 Mg/0.5 Mg (3 Ml) Ud)  3 ml IH [id number] PRN


   PRN Reason: Shortness of Breath


Aspirin (Aspirin)  325 mg PO DAILY Formerly [facility], 1928–1983


   Last Admin: 03/25/17 10:55 Dose:  325 mg


Clotrimazole (Lotrimin 1%)  0 gm TOP BID Former[facility], [facility]–1983


   Last Admin: 03/25/17 17:36 Dose:  2 applic


Sodium Bicarbonate 50 meq/ (Sodium Chloride)  1,050 mls @ 125 mls/hr IV .[id number] 

Formerly [facility], [facility]–1983


   Last Admin: 03/25/17 12:14 Dose:  125 mls/hr


Ceftriaxone Sodium (Rocephin 2 Gm Ivpb)  100 mls @ 100 mls/hr IVPB DAILY Former[facility], 1928–[facility]


   PRN Reason: Protocol


   Stop: 04/08/17 10:01


   Last Admin: 03/25/17 10:55 Dose:  100 mls/hr


Metoprolol Tartrate (Lopressor)  25 mg PO BID [facility], [facility]–1983


   Last Admin: 03/25/17 17:36 Dose:  25 mg


Morphine Sulfate (Morphine)  1 mg IVP Q4H PRN


   PRN Reason: Pain, moderate (4-7)


   Last Admin: 03/25/17 19:46 Dose:  1 mg


Silver Sulfadiazine (Silvadene 1% 20 Gm)  0 ea TOP DAILY [facility]


   Last Admin: 03/25/17 08:19 Dose:  2 applic











- Labs


Labs: 


 





 03/25/17 06:15 





 03/25/17 14:50 





 











PT  12.1 Seconds (9.9-11.8)  H  03/25/17  06:15    


 


INR  1.12  (0.93-1.08)  H  03/25/17  06:15    


 


APTT  32.7 Seconds (23.7-30.8)  H  03/22/17  03:33    














- Constitutional


Appears: Well, No Acute Distress





- Head Exam


Head Exam: ATRAUMATIC, NORMAL INSPECTION, NORMOCEPHALIC





- Eye Exam


Eye Exam: Normal appearance





- ENT Exam


ENT Exam: Normal External Ear Exam





- Neck Exam


Neck Exam: Normal Inspection





- Respiratory Exam


Respiratory Exam: NORMAL BREATHING PATTERN





- Cardiovascular Exam


Cardiovascular Exam: REGULAR RHYTHM.  absent: JVD





- Rectal Exam


Rectal Exam: Deferred





- Extremities Exam


Extremities Exam: Normal Inspection





- Back Exam


Back Exam: NORMAL INSPECTION





- Neurological Exam


Neurological Exam: Altered





- Psychiatric Exam


Psychiatric exam: Agitated





- Skin


Skin Exam: Normal Color





Assessment and Plan





- Assessment and Plan (Free Text)


Assessment: 


A/P:Agitation.


      Confusion.


      Restlessness.


      Sundowning.


      S/P fall.


      Acute rhabdomyolysis.


      Sepsis.


      CKD.


      HTN.


      Dementia.


      Ativan 1 mg IV now.


      POSEY vest restraint.

## 2017-03-25 NOTE — CON
DATE: 03/25/2017



HISTORY OF PRESENT ILLNESS:  The patient is an 81-year-old male admitted to the telemetry unit on 03/
22/2017 after sustaining a fall at home.  The patient reports slipping in his bathroom and being unab
le to get up.  Apparently, the patient was lying on the floor for an unspecified amount of time befor
e being placed on a stretcher by the fire department.  



In the Emergency Room, he was found to be in acute rhabdomyolysis, and has also been empirically tapan
adrian for presumed sepsis of unclear etiology.  The patient received IV hydration, urine was alkalinize
d with bicarb, and kidney function has improved.  



The patient was seen in consultation by cardiology.  The patient has had intermittent episodes of rap
id atrial fibrillation, and it was felt that anticoagulation has been contraindicated due to increase
d risk of falls.  



The patient has been seen in consultation by Dr. Taran Feliciano for orthopedic surgery.  The pat
ient has sustained no acute fracture, but is unable to ambulate or move his right leg due to pain inv
olving the right knee.  Dr. Feliciano has recommended an MRI of the lower extremities to evaluate 
this problem.  The patient is essentially bedbound at the present time.  He is also being treated for
 chronic venous stasis ulcers of the lower extremities by Dr. Howard.  He denies any chest pain or s
hortness of breath at the present time.  There is no nausea, no vomiting, no melena, no bright red bl
ood per rectum.



PAST MEDICAL HISTORY:  Hypertension, possible chronic kidney disease, dementia, and hearing impairmen
t.



The patient has no significant past surgical history.



He has no known drug allergies.



CURRENT MEDICATIONS:  Include DuoNeb q. 4 hours, metoprolol 25 mg twice a daily, Rocephin 1 g IV q. 2
4 hours.  The patient was on vancomycin, which has been discontinued.  The patient had a trough level
 of 15.7, which is high.  The patient is on aspirin 325 mg per day, and is currently receiving half n
ormal saline with 1 amp of sodium bicarb at 125 mL per hour.



The patient has a history of tobacco use, but quit 10 years ago.  He has no history of alcohol use.  
He presently lives with his wife.  He was previously independent with ADLs.



REVIEW OF SYSTEMS:  Essentially as above.



On physical examination the patient is a well-developed, obese male lying in bed in no acute distress
.

VITAL SIGNS:  Blood pressure 172/57, pulse 101, temperature 98.2, respiratory rate 19.

HEAD:  Normocephalic, atraumatic.  Pupils equal, round, reactive.  Extraocular movements intact.  The
re is some mild sensorineural hearing loss.

NECK:  Supple, with no thyromegaly, no adenopathy.

HEART:  Regular rate and rhythm.  Occasional ectopic.  No murmurs.

LUNGS:  Clear.

ABDOMEN:  Soft, obese, nontender.  Bowel sounds are normoactive.

EXTREMITIES:  Without cyanosis or clubbing.  There is trace to 1+ bipedal edema.

NEUROLOGIC:  The patient is awake and responsive to verbal commands.  There are some mild deficits in
 orientation and short-term memory.  The patient has some weakness of the right lower extremity which
 may be secondary to pain.

SKIN:  There are chronic degenerative skin changes of the lower extremities bilaterally with chronic 
venous stasis ulcers, to which dressings have been applied, and are intact.



LABORATORY DATA:  WBC is 12.9, hemoglobin 13.1, hematocrit 38.5.  Sodium 137, potassium 4.1, chloride
 103, CO2 of 22, BUN 61, creatinine 1.7, glucose 135.  CPK is 5352, troponin 0.18.  



Chest x-ray from 03/24 shows no active disease.



IMPRESSION:

1. Acute rhabdomyolysis status post fall, improving.

2. Acute renal failure superimposed on probable chronic kidney disease secondary to #1.

3. Chronic venous stasis ulcers of the lower extremities with possible cellulitis.  The patient is cu
rrently being empirically treated for sepsis.

4. Hypertension.

5. Paroxysmal atrial fibrillation with intermittent episodes of rapid ventricular rate.

6. Possible coronary artery disease.

7. Dementia with possible superimposed delirium secondary to underlying metabolic problem.

8. Immobility secondary to right leg weakness of uncertain etiology, and obesity.



PLAN:  The patient, at the present time, has needs for complex medical care, and at the present time,
 I feel is not stable for transfer to a skilled nursing facility.  Would consider starting anticoagul
ation as  this patient is currently admitted immobility and at very low risk for falls.  Would possib
ly start Lovenox 40 mg subQ daily.  Continue orthopedic workup by Dr. Feliciano.  Continue cardiol
ogy followup.  Continue infectious disease followup, and monitor renal function, and continue IV hydr
ation for rhabdomyolysis.



Thank you for this consultation.





__________________________________________

Ariel Browne JD, MD







cc:



DD: 03/25/2017 14:33:24  [id number]

TT: 03/25/2017 15:33:14

Confirmation # [id number]

Dictation # [id number]

jn

## 2017-03-25 NOTE — RAD
HISTORY:

r/o infiltrates  



COMPARISON:

03/22/2017. 



FINDINGS:



LUNGS:

The lungs are clear.



PLEURA:

No significant pleural effusion identified, no pneumothorax apparent.



CARDIOVASCULAR:

Stable. Atherosclerotic aortic arch calcifications are present. 



OSSEOUS STRUCTURES:

No significant abnormalities.



VISUALIZED UPPER ABDOMEN:

Normal.



OTHER FINDINGS:

None.



IMPRESSION:

No active pulmonary disease.

## 2017-03-25 NOTE — PN
DATE: 03/25/2017



CHIEF COMPLAINT:  Follow up for confusion.



SUBJECTIVE:  The patient seen and examined at bedside.  He was found to be confused and disoriented w
hich has slowly improved since he has been having improvement in renal failure and improvement in  le
ukocytosis. He had paroxysmal AFib, but has been in sinus rhythm.  He is status post electrolyte imba
david which is being currently managed. He had no acute events overnight.  He was found to have bilat
eral ulcers which are being treated.  His foot wound was positive for Staph aureus and one blood cult
ure was positive for group B Streptococcus for which he is on antibiotics per ID.



PAST MEDICAL HISTORY:  History of dementia, history of right knee degenerative disk disease, history 
of hypertension, morbid obesity.



REVIEW OF SYSTEMS:  A 14-point review of systems is negative except for the HPI.



MEDICATIONS:  Reviewed in nurse reconciliation sheet. 



ALLERGIES:  No known drug allergies.



SOCIAL HISTORY:  No illicit drug use, smoking or EtOH abuse at this time.



PHYSICAL EXAMINATION:

VITAL SIGNS:  Temperature 98.2, pulse rate of 70, blood pressure 161/70, respiratory rate 20, oxygen 
94% on room air.

GENERAL:  The patient is sitting up in bed in no acute distress.

HEENT:  Atraumatic, normocephalic.  PERRLA. Extraocular muscles intact.

NECK:  Supple, no JVD, no adenopathy noted.

LUNGS:  Clear to auscultation.  No adventitious sounds.

HEART:  S1, S2, normal rate and rhythm.  No murmurs, rubs, or gallops.

ABDOMEN:  Soft, nontender, nondistended.  Bowel sounds are present.  He is obese.

EXTREMITIES:  He has bilateral leg cellulitis and bilateral leg ulcers. 

NEUROLOGIC:  The patient is alert, oriented to person and place, not to month and year.  Poor attenti
on span, slow thought process.  Cranial nerves II through XII are intact.

MOTOR:  Moves all extremities equally.  No pronator is seen.

SENSORY:  Decreased light touch and pinprick up to calves bilaterally, decreased vibration of the toe
s.

DEEP TENDON REFLEXES:  1+ throughout.

COORDINATION:  Finger-to-nose intact.

GAIT:  Deferred for now.



LABORATORIES:  Sodium is 131, potassium 4.1, chloride of 103, carbon dioxide 22, BUN of 61, creatinin
e 1.7, random glucose 135.  His hemoglobin A1c is 6.6.  He has  iron deficiency anemia at this time.



ASSESSMENT AND PLAN:  This is an 81-year-old man, morbidly obese with mild dementia, hypertension who
 came in for generalized weakness and had bilateral lower extremity cellulitis.  The patient was admi
tted for severe sepsis, acute kidney injury on top of chronic renal failure with group B strep bacter
emia and Staph aureus lower extremity cellulitis, for which he is on antibiotics. I was called in for
 confusion and generalized weakness.  His confusion and generalized weakness are transient, which is 
likely secondary to toxic metabolic encephalopathy superimposed on underlying medical problems.  At t
his time, recommend:

1.  Continue with podiatry management for his underlying bilateral lower extremity cellulitis in [place]
tion to his ceftriaxone antibiotics per ID.

2.  Aspirin 325 p.o. daily for stroke prevention.

3.  Keep his blood pressure between 120 to 130 mmHg.  He is on Lopressor 25 p.o. b.i.d.

4.  Will monitor his electrolytes accordingly.

5.  Will likely need some physical therapy evaluation and subacute rehab eventually. 



No further neurological workup needed at this time.  Avoid nighttime interruptions and make frequent 
orientations throughout the day and will sign off.





__________________________________________

Andres Andrade MD







cc:



DD: 03/25/2017 14:55:50  [phone]

TT: 03/25/2017 15:39:21

Confirmation # [id number]

Dictation # [id number]

an

## 2017-03-26 LAB
ADD MANUAL DIFF?: YES
ALBUMIN/GLOB SERPL: 0.9 {RATIO} (ref 1.1–1.8)
ALP SERPL-CCNC: 98 U/L (ref 38–133)
ALT SERPL-CCNC: 171 U/L (ref 7–56)
AST SERPL-CCNC: 322 U/L (ref 15–59)
BILIRUB SERPL-MCNC: 0.8 MG/DL (ref 0.2–1.3)
BUN SERPL-MCNC: 59 MG/DL (ref 7–21)
CALCIUM SERPL-MCNC: 8.3 MG/DL (ref 8.4–10.5)
CHLORIDE SERPL-SCNC: 103 MMOL/L (ref 98–107)
CO2 SERPL-SCNC: 25 MMOL/L (ref 21–33)
COHGB MFR BLD: 2.1 % (ref 0.5–1.5)
EOSINOPHIL NFR BLD: 1 % (ref 0–3)
ERYTHROCYTE [DISTWIDTH] IN BLOOD BY AUTOMATED COUNT: 14.5 % (ref 11.5–14.5)
GLOBULIN SER-MCNC: 3.2 GM/DL
GLUCOSE SERPL-MCNC: 166 MG/DL (ref 70–110)
HCO3 BLDA-SCNC: 25.2 MMOL/L (ref 21–28)
HCT VFR BLD CALC: 39.1 % (ref 42–52)
HHB: 2.1 % (ref 0–5)
MAGNESIUM SERPL-MCNC: 2.1 MG/DL (ref 1.7–2.2)
MCH RBC QN AUTO: 27.5 PG (ref 25–35)
MCHC RBC AUTO-ENTMCNC: 33.5 G/DL (ref 31–37)
MCV RBC AUTO: 82.1 FL (ref 80–105)
METHGB MFR BLD: 0.8 % (ref 0–3)
NEUTROPHILS NFR BLD AUTO: 90 % (ref 50–70)
O2 CAP BLDA-SCNC: 19.6 ML/DL (ref 16–24)
O2 CT BLDA-SCNC: 19.2 ML/DL (ref 15–23)
PH BLDA: 7.43 [PH] (ref 7.35–7.45)
PLATELET # BLD: 144 10^3/UL (ref 120–450)
PMV BLD AUTO: 9.8 FL (ref 7–11)
PO2 BLDA: 90 MM/HG (ref 80–100)
POTASSIUM SERPL-SCNC: 4.1 MMOL/L (ref 3.6–5)
PROT SERPL-MCNC: 5.3 G/DL (ref 6.1–8.1)
PROT SERPL-MCNC: 6.1 G/DL (ref 5.8–8.3)
SAO2 % BLDA: 95.1 % (ref 95–98)
SODIUM SERPL-SCNC: 139 MMOL/L (ref 132–148)
TROPONIN I SERPL-MCNC: 0.09 NG/ML
WBC # BLD AUTO: 14.3 10^3/UL (ref 4.5–11)

## 2017-03-26 RX ADMIN — CLOTRIMAZOLE SCH APPLIC: 1 CREAM TOPICAL at 10:56

## 2017-03-26 RX ADMIN — CLOTRIMAZOLE SCH APPLIC: 1 CREAM TOPICAL at 17:55

## 2017-03-26 RX ADMIN — IPRATROPIUM BROMIDE AND ALBUTEROL SULFATE PRN ML: .5; 3 SOLUTION RESPIRATORY (INHALATION) at 21:16

## 2017-03-26 RX ADMIN — MORPHINE SULFATE PRN MG: 2 INJECTION, SOLUTION INTRAMUSCULAR; INTRAVENOUS at 02:00

## 2017-03-26 RX ADMIN — IPRATROPIUM BROMIDE AND ALBUTEROL SULFATE PRN ML: .5; 3 SOLUTION RESPIRATORY (INHALATION) at 05:05

## 2017-03-26 RX ADMIN — SILVER SULFADIAZINE SCH APPLIC: 10 CREAM TOPICAL at 10:57

## 2017-03-26 NOTE — PN
DATE: 03/26/2017



SUBJECTIVE:  The patient is an 81-year-old, seen and examined, seems to be confused and disoriented, 
was confused last night, currently in posey to keep him safe from climbing out of bed.  According to 
the nurse, he did not eat much.  



PHYSICAL EXAMINATION:

VITAL SIGNS:  He has temperature of 100.5, pulse 71, respirations 18, blood pressure 175/60.

LUNGS:  Bilateral soft crackle in the upper lung region.

HEART:  S1, S2 audible.

ABDOMEN:  Soft, obese, nontender, no rebound, no guarding.

NEUROLOGIC:  He is awake and alert, but confused and disoriented.

EXTREMITIES:  Bilateral leg.  He has chronic stasis dermatitis with social history of ulcers.



LABORATORY EXAMINATION:  WBC is 14.3, hemoglobin 13, hematocrit 39, platelet of 144.  Chemistry:  Sod
ium 139, potassium 4.1, chloride 103, CO2 of 25, BUN 59, creatinine 1.7, blood sugar of 166.  CPK is 
4436.  BNP is 9350.



ASSESSMENT AND PLAN:

1.  Status post altered mental status, status post fall.

2.  Resolving rhabdomyolysis, resolving renal failure.

3.  Paroxysmal atrial fibrillation.

4.  Dementia.

5.  Metabolic encephalopathy.

6.  Electrolyte imbalance.

7.  Staphylococcus aureus foot infection.

8.  Streptococcus bacteremia.  Repeat urine cultures are negative.

9.  Dementia.

10.  Mild chronic periventricular disease, vascular disease.



PLAN:  The patient is on IV fluid.  I will cut down from 125 mL to 80 mL per hour since his BNP is hi
gh.  Continue him on beta blockers.  He is currently on Rocephin.  Follow up his electrolytes in a.m.






__________________________________________

Madelin Gay MD







cc:



DD: 03/26/2017 14:17:46  413

TT: 03/26/2017 14:43:17

Confirmation # [id number]

Dictation # [id number]

an

## 2017-03-26 NOTE — PN
DATE: 03/26/2017



SUBJECTIVE:  The patient is lying in bed.  He is slightly agitated and coughing.  He is responsive to
 verbal commands.  He is restrained in a Posey vest.



OBJECTIVE:

VITAL SIGNS:  Blood pressure 175/60, temperature 100.5 axillary, pulse 71, respiratory rate 18.

LUNGS:  Show a few bibasilar rales.

HEART:  Regular rate and rhythm.

ABDOMEN:  Soft, bowel sounds are normal, active, nontender.

GENITOURINARY:  Indwelling Bill catheter is intact with gross red blood.

EXTREMITIES:  With 1+ bipedal edema.  Wound dressings are intact.

NEUROLOGIC:  The patient is awake and slightly confused without focal sensory or motor deficits.

SKIN:  Warm and dry.



LABORATORY DATA:  WBC is 14.3, hemoglobin 13.1, hematocrit 35.1.  Sodium 139, potassium 4.1, chloride
 103, CO2 of 25, BUN 59, creatinine 1.7, glucose 166.  CPK is 4436.  BNP is elevated at 9350.  Chest 
x-ray shows no active disease.  Arterial blood gas shows pH 7.43, pCO2 38, pO2 of 90, bicarb 25.2% on
 FIO2 of 32% with O2 saturation 95.1%.



IMPRESSION:

1.  Hypertension, hypertensive cardiovascular disease and congestive heart failure.

2.  Acute rhabdomyolysis with acute renal failure superimposed on chronic kidney disease.

3.  Chronic venous stasis ulcers of the lower extremities with cellulitis and sepsis.

4.  Paroxysmal atrial fibrillation with intermittent rapid ventricular rate.

5.  Coronary artery disease.

6.  Dementia with superimposed delirium secondary to underlying metabolic problem.

7.  Immobility secondary to degenerative joint disease and right leg weakness of uncertain etiology.

8.  Hematuria, probably trauma secondary to patient pulling on Bill catheter.



PLAN:  The patient has been given a dose of IV Lasix this morning.  Will give additional 40 mg subQ n
ow, monitor renal function.  Consider discontinuation of saline.  Continue discontinuation of IV flui
ds with bicarb when renal function is stable.  Continue orthopedic followup with Dr. Feliciano.  A
waiting results of CT of the hip.  Continue cardiology followup with Calvin Arias/Rodrigo.  Continue 
infectious disease followup with Dr. Baca, continue with IV antibiotics.  Would obtain  consu
lt if hematuria persists.  The patient at the present time requires a higher level of care than can b
e provided at a skilled nursing facility.  Would continue inpatient care at the present time.





__________________________________________

Ariel Browne JD, MD







cc:



DD: 03/26/2017 14:49:27  353

TT: 03/26/2017 15:13:53

Confirmation # [id number]

Dictation # [id number]

an

## 2017-03-26 NOTE — CT
PROCEDURE:  CT of the bilateral hip joints 



HISTORY:

fx rt hip c arthritis







COMPARISON:

Plain radiographs from 03/23 2017.



TECHNIQUE:

Contiguous axial images of the right hip were obtained. Coronal and 

sagittal reformats were generated.



FINDINGS:



BONES:

The pelvic ring is intact.  There is no acute fracture or bone 

destruction.  There is diffuse bone demineralization. 



RIGHT HIP JOINT:

There is severe degenerative osteoarthrosis in the right hip joint 

with near complete loss of joint space, extensive subarticular cystic 

changes in the femoral head and acetabulum and large marginal 

osteophytes. There is a small joint effusion. 



There is also moderate to severe degenerative osteoarthrosis in the 

left hip joint with near complete loss of superolateral joint space 

and marginal osteophytes.  There are mild subarticular cystic changes.



SOFT TISSUES:

There is calcification lateral to the right greater trochanter. There 

is mild fatty atrophy of the periarticular muscles. 



IMPRESSION:

1. Severe degenerative osteoarthrosis in the right hip joint. 

Extensive subarticular cystic changes.  Small right joint effusion. 



2. Moderate to severe degenerative osteoarthrosis in the left hip 

joint. 



3. No acute displaced fracture or dislocation. Please note occult 

fractures cannot be excluded.  If there is a persistent clinical 

concern, an MRI of the hip may be performed for further evaluation.

## 2017-03-26 NOTE — RAD
HISTORY:

cough  



COMPARISON:

03/24/2017 



FINDINGS:



LUNGS:

The lungs are well inflated.



PLEURA:

No significant pleural effusion identified, no pneumothorax apparent.



CARDIOVASCULAR:

The heart is normal in size. 



OSSEOUS STRUCTURES:

No significant abnormalities.



VISUALIZED UPPER ABDOMEN:

Normal.



OTHER FINDINGS:

None.



IMPRESSION:

No active pulmonary disease.

## 2017-03-26 NOTE — PN
DATE: 03/26/2017



SUBJECTIVE:  The patient is seen lying in bed on telemetry.  He was restless last evening and require
d sedation, as well as placement of a Posey vest.  He remains in a Posey vest and is somewhat restles
s and somnolent.  He has had paroxysmal atrial fibrillation on the monitor.



CURRENT MEDICATIONS:  Include aspirin, DuoNeb inhaler, metoprolol 25 mg b.i.d., Rocephin, sodium bica
rbonate infusion.



OBJECTIVE:

GENERAL:  He is an obese, elderly man.

VITAL SIGNS:  His blood pressure is 146/90 with a pulse of 100-120 in atrial fibrillation.  Respirati
ons are 16.  He is afebrile.

HEENT:  No JVD.

CHEST:  Clear breath sounds anteriorly.

HEART:  Rhythm is irregularly irregular with a systolic murmur at the left sternal border.

ABDOMEN:  Soft, obese with bowel sounds present.

EXTREMITIES:  Chronic cellulitic changes, and wounds appear improved.



DIAGNOSTIC DATA:  CK is down to 4436.  BUN and creatinine 59 and 1.7.  Potassium 4.1.  White count 14
.3 with hemoglobin and hematocrit 13.1 and 39.1, and platelet count 144,000.



IMPRESSION:

1.  Resolving rhabdomyolysis, clinically improved with improved renal parameters.

2.  Status post recent fall.

3.  Paroxysmal atrial fibrillation with high risk for self-injury given falls and altered mental stat
us.

4.  Probable coronary artery disease, clinically stable.

5.  History of tobacco abuse.



RECOMMENDATIONS:  Continued beta-blocker therapy for rate control is advised at this time.  Anticoagu
lation appears somewhat risky given his recent falls and potential risk for self-injury.  If his ment
al status improves, and it is proven he does not have significant gait instability, chronic anticoagu
lant therapy can be considered.  We will continue to follow along and make further recommendations as
 appropriate.





__________________________________________

Cortez Wallace MD







cc:



DD: 03/26/2017 10:19:41  [id number]

TT: 03/26/2017 11:10:49

Confirmation # [id number]

Dictation # [id number]

jannie

## 2017-03-27 LAB
ABG MECHANICAL RATE: 16
ADD MANUAL DIFF?: NO
ADD MANUAL DIFF?: YES
ALBUMIN/GLOB SERPL: 0.8 {RATIO} (ref 1.1–1.8)
ALBUMIN/GLOB SERPL: 0.9 {RATIO} (ref 1.1–1.8)
ALP SERPL-CCNC: 70 U/L (ref 38–133)
ALP SERPL-CCNC: 98 U/L (ref 38–133)
ALT SERPL-CCNC: 150 U/L (ref 7–56)
ALT SERPL-CCNC: 183 U/L (ref 7–56)
ANISOCYTOSIS BLD QL SMEAR: SLIGHT
AST SERPL-CCNC: 203 U/L (ref 15–59)
AST SERPL-CCNC: 226 U/L (ref 15–59)
ATERIAL BLOOD GAS PEEP: 5
BASE EXCESS BLDV CALC-SCNC: -0.3 MMOL/L (ref 0–2)
BASOPHILS # BLD AUTO: 0.02 K/MM3 (ref 0–2)
BASOPHILS NFR BLD: 0.1 % (ref 0–3)
BILIRUB SERPL-MCNC: 0.6 MG/DL (ref 0.2–1.3)
BILIRUB SERPL-MCNC: 0.7 MG/DL (ref 0.2–1.3)
BUN SERPL-MCNC: 72 MG/DL (ref 7–21)
BUN SERPL-MCNC: 79 MG/DL (ref 7–21)
CALCIUM SERPL-MCNC: 7.9 MG/DL (ref 8.4–10.5)
CALCIUM SERPL-MCNC: 8.5 MG/DL (ref 8.4–10.5)
CHLORIDE SERPL-SCNC: 104 MMOL/L (ref 98–107)
CHLORIDE SERPL-SCNC: 108 MMOL/L (ref 98–107)
CO2 SERPL-SCNC: 27 MMOL/L (ref 21–33)
CO2 SERPL-SCNC: 27 MMOL/L (ref 21–33)
COHGB MFR BLD: 2.6 % (ref 0.5–1.5)
EOSINOPHIL # BLD: 0 10*3/UL (ref 0–0.7)
EOSINOPHIL NFR BLD: 0 % (ref 1.5–5)
ERYTHROCYTE [DISTWIDTH] IN BLOOD BY AUTOMATED COUNT: 14.5 % (ref 11.5–14.5)
ERYTHROCYTE [DISTWIDTH] IN BLOOD BY AUTOMATED COUNT: 14.7 % (ref 11.5–14.5)
GLOBULIN SER-MCNC: 2.9 GM/DL
GLOBULIN SER-MCNC: 3.1 GM/DL
GLUCOSE SERPL-MCNC: 159 MG/DL (ref 70–110)
GLUCOSE SERPL-MCNC: 167 MG/DL (ref 70–110)
GRANULOCYTES # BLD: 11.97 10*3/UL (ref 1.4–6.5)
GRANULOCYTES NFR BLD: 87.3 % (ref 50–68)
HCO3 BLDA-SCNC: 25.4 MMOL/L (ref 21–28)
HCO3 BLDA-SCNC: 27.8 MMOL/L (ref 21–28)
HCT VFR BLD CALC: 33.6 % (ref 42–52)
HCT VFR BLD CALC: 39.7 % (ref 42–52)
HHB: 2.2 % (ref 0–5)
LYMPHOCYTES # BLD: 1.1 10*3/UL (ref 1.2–3.4)
LYMPHOCYTES NFR BLD AUTO: 8 % (ref 22–35)
MCH RBC QN AUTO: 27.5 PG (ref 25–35)
MCH RBC QN AUTO: 27.8 PG (ref 25–35)
MCHC RBC AUTO-ENTMCNC: 32.4 G/DL (ref 31–37)
MCHC RBC AUTO-ENTMCNC: 33.5 G/DL (ref 31–37)
MCV RBC AUTO: 82.9 FL (ref 80–105)
MCV RBC AUTO: 84.6 FL (ref 80–105)
METHGB MFR BLD: 0.5 % (ref 0–3)
MONOCYTES # BLD AUTO: 0.6 10*3/UL (ref 0.1–0.6)
MONOCYTES NFR BLD: 4.6 % (ref 1–6)
NEUTROPHILS NFR BLD AUTO: 83 % (ref 50–70)
NEUTS BAND NFR BLD: 2 % (ref 0–2)
O2 CAP BLDA-SCNC: 17.7 ML/DL (ref 16–24)
O2 CT BLDA-SCNC: 17.3 ML/DL (ref 15–23)
PH BLDA: 7.16 [PH] (ref 7.35–7.45)
PH BLDA: 7.34 [PH] (ref 7.35–7.45)
PH BLDV: 7.27 [PH] (ref 7.32–7.43)
PLATELET # BLD EST: NORMAL 10*3/UL
PLATELET # BLD: 156 10^3/UL (ref 120–450)
PLATELET # BLD: 163 10^3/UL (ref 120–450)
PMV BLD AUTO: 9.4 FL (ref 7–11)
PMV BLD AUTO: 9.6 FL (ref 7–11)
PO2 BLDA: 100 MM/HG (ref 80–100)
PO2 BLDA: 366 MM/HG (ref 80–100)
POTASSIUM SERPL-SCNC: 4.1 MMOL/L (ref 3.6–5)
POTASSIUM SERPL-SCNC: 4.6 MMOL/L (ref 3.6–5)
PROT SERPL-MCNC: 5.2 G/DL (ref 5.8–8.3)
PROT SERPL-MCNC: 5.9 G/DL (ref 5.8–8.3)
SAO2 % BLDA: 94.8 % (ref 95–98)
SODIUM SERPL-SCNC: 143 MMOL/L (ref 132–148)
SODIUM SERPL-SCNC: 145 MMOL/L (ref 132–148)
TROPONIN I SERPL-MCNC: 1.51 NG/ML
WBC # BLD AUTO: 13.7 10^3/UL (ref 4.5–11)
WBC # BLD AUTO: 19.4 10^3/UL (ref 4.5–11)

## 2017-03-27 PROCEDURE — 02HV33Z INSERTION OF INFUSION DEVICE INTO SUPERIOR VENA CAVA, PERCUTANEOUS APPROACH: ICD-10-PCS | Performed by: RADIOLOGY

## 2017-03-27 PROCEDURE — 0BH18EZ INSERTION OF ENDOTRACHEAL AIRWAY INTO TRACHEA, VIA NATURAL OR ARTIFICIAL OPENING ENDOSCOPIC: ICD-10-PCS | Performed by: INTERNAL MEDICINE

## 2017-03-27 PROCEDURE — B548ZZA ULTRASONOGRAPHY OF SUPERIOR VENA CAVA, GUIDANCE: ICD-10-PCS | Performed by: RADIOLOGY

## 2017-03-27 PROCEDURE — 5A1935Z RESPIRATORY VENTILATION, LESS THAN 24 CONSECUTIVE HOURS: ICD-10-PCS | Performed by: INTERNAL MEDICINE

## 2017-03-27 PROCEDURE — B51NZZA FLUOROSCOPY OF LEFT UPPER EXTREMITY VEINS, GUIDANCE: ICD-10-PCS | Performed by: RADIOLOGY

## 2017-03-27 RX ADMIN — IPRATROPIUM BROMIDE AND ALBUTEROL SULFATE PRN ML: .5; 3 SOLUTION RESPIRATORY (INHALATION) at 02:20

## 2017-03-27 RX ADMIN — MORPHINE SULFATE PRN MG: 2 INJECTION, SOLUTION INTRAMUSCULAR; INTRAVENOUS at 02:22

## 2017-03-27 RX ADMIN — SILVER SULFADIAZINE SCH APPLIC: 10 CREAM TOPICAL at 10:59

## 2017-03-27 RX ADMIN — IPRATROPIUM BROMIDE AND ALBUTEROL SULFATE PRN ML: .5; 3 SOLUTION RESPIRATORY (INHALATION) at 13:58

## 2017-03-27 RX ADMIN — CLOTRIMAZOLE SCH APPLIC: 1 CREAM TOPICAL at 10:57

## 2017-03-27 RX ADMIN — Medication PRN MLS/HR: at 17:02

## 2017-03-27 RX ADMIN — HEPARIN SODIUM SCH MLS/HR: 10000 INJECTION, SOLUTION INTRAVENOUS at 21:42

## 2017-03-27 NOTE — PN
DATE: 03/27/2017



SUBJECTIVE:  The patient is lying in bed in no acute distress.  He is lethargic, but arousable.



OBJECTIVE:

VITAL SIGNS:  Blood pressure 115/44, temperature 98, pulse 85, respiratory rate 20.

LUNGS:  Show a few crepitations at the bases.

HEART:  Regular rate and rhythm.

ABDOMEN:  Soft, nontender, bowel sounds are normoactive.

GENITOURINARY:  Indwelling Bill catheter is intact with blood-tinged urine.

EXTREMITIES:  With 1+ bipedal edema.  Wound dressings are intact.

NEUROLOGIC:  The patient is lethargic, slightly confused, without focal sensory or motor deficits.

SKIN:  Warm and dry.



LABORATORY DATA:  WBC is 19.4, hemoglobin 13.3, hematocrit 39.7.  Sodium 143, potassium 4.1, chloride
 104, CO2 27, BUN 72, creatinine 2.1, glucose 159.



IMPRESSION:

1.  Hypertension, hypertensive cardiovascular disease, possible congestive heart failure.

2.  Acute rhabdomyolysis with acute renal failure superimposed on chronic kidney disease.

3.  Chronic venous stasis ulcers of the lower extremities with cellulitis and sepsis.

4.  Paroxysmal atrial fibrillation with intermittent rapid ventricular rate.

5.  Coronary artery disease.

6.  Dementia with superimposed delirium secondary to underlying metabolic derangement.

7.  Immobility secondary to severe degenerative joint disease of the right hip and obesity and decond
itioning.

8.  Hematuria, probably trauma secondary to Bill catheter.



PLAN:  Continue medical followup, IV antibiotics.  The patient for PICC line placement today.  Social
 work for discharge planning.





__________________________________________

Ariel Browne JD, MD







cc:



DD: 03/27/2017 14:31:21  [id number]

TT: 03/27/2017 15:42:37

Confirmation # [id number]

Dictation # [id number]

mn

## 2017-03-27 NOTE — CP.PCM.PN
<Yoan Gamble - Last Filed: 03/27/17 14:58>





Subjective





- Date & Time of Evaluation


Date of Evaluation: 03/27/17


Time of Evaluation: 14:26





- Subjective


Subjective: 


Rapid Response Note:





Responded stat to rapid call. Patient was unresponsive and ABG was abnormal 

which led to the rapid response call. Patient was obtunded and per nursing, 

patient was somnolent all morning and went for PICC line placement. He had been 

given ativan for procedure.





VS were: /50, HR- 99, SpO2 < 90, on BIPAP SpO2 94 





ROS could not be obtained due to patient's condition.





PMHx: HTN, dementia, obesity, chronic renal failure, mechanical falls was 

admitted with sepsis and NSTEMI














Objective





- Vital Signs/Intake and Output


Vital Signs (last 24 hours): 


 











Temp Pulse Resp BP Pulse Ox


 


 98 F   82   20   115/44 L  96 


 


 03/27/17 12:00  03/27/17 14:40  03/27/17 12:00  03/27/17 12:00  03/27/17 06:00








Intake and Output: 


 











 03/27/17 03/27/17





 06:59 18:59


 


Intake Total 960 240


 


Output Total 700 2500


 


Balance 260 -2260














- Medications


Medications: 


 Current Medications





Acetaminophen (Tylenol 325mg Tab)  650 mg PO Q4 PRN


   PRN Reason: Fever >100.4 F


   Last Admin: 03/23/17 09:15 Dose:  650 mg


Albuterol/Ipratropium (Duoneb 3 Mg/0.5 Mg (3 Ml) Ud)  3 ml IH [id number] PRN


   PRN Reason: Shortness of Breath


   Last Admin: 03/27/17 13:58 Dose:  3 ml


Alprazolam (Xanax)  0.25 mg PO Q6 PRN; Protocol


   PRN Reason: Anxiety


   Stop: 04/03/17 12:01


Aspirin (Aspirin)  325 mg PO DAILY [facility]


   Last Admin: 03/26/17 11:51 Dose:  325 mg


Clotrimazole (Lotrimin 1%)  0 gm TOP BID [facility]


   Last Admin: 03/27/17 10:57 Dose:  1 applic


Ceftriaxone Sodium (Rocephin 2 Gm Ivpb)  100 mls @ 100 mls/hr IVPB DAILY [facility]


   PRN Reason: Protocol


   Stop: 04/08/17 10:01


   Last Admin: 03/27/17 11:02 Dose:  100 mls/hr


Metoprolol Tartrate (Lopressor)  25 mg PO BID [facility]


   Last Admin: 03/26/17 17:55 Dose:  25 mg


Morphine Sulfate (Morphine)  1 mg IVP Q4H PRN


   PRN Reason: Pain, moderate (4-7)


   Last Admin: 03/27/17 02:22 Dose:  1 mg


Silver Sulfadiazine (Silvadene 1% 20 Gm)  0 ea TOP DAILY [facility]


   Last Admin: 03/27/17 10:59 Dose:  1 applic











- Labs


Labs: 


 





 03/27/17 06:30 





 03/27/17 06:30 





 











PT  12.1 Seconds (9.9-11.8)  H  03/25/17  06:15    


 


INR  1.12  (0.93-1.08)  H  03/25/17  06:15    


 


APTT  32.7 Seconds (23.7-30.8)  H  03/22/17  03:33    














- Constitutional


Appears: Other (obtunded, unresponsive to deep pain stimuli, shallow 

respirations, patient just placed on BIPAP)





- Head Exam


Head Exam: ATRAUMATIC, NORMAL INSPECTION, NORMOCEPHALIC





- Eye Exam


Additional comments: 


2mm pupils bilaterally


sluggish to light





- ENT Exam


ENT Exam: Mucous Membranes Moist





- Neck Exam


Neck Exam: Normal Inspection





- Respiratory Exam


Respiratory Exam: Decreased Breath Sounds.  absent: Accessory Muscle Use, 

Rhonchi, Wheezes





- Cardiovascular Exam


Cardiovascular Exam: REGULAR RHYTHM, +S1, +S2.  absent: Gallop, Rubs, Murmur





- GI/Abdominal Exam


GI & Abdominal Exam: Soft, Normal Bowel Sounds





- Extremities Exam


Extremities Exam: absent: Pedal Edema


Additional comments: 


multiple non-healed lesions


some with dressings


dorsalis pedis pulses not palpable





- Neurological Exam


Neurological Exam: Altered (obtunded, unresponsive to deep painful stimuli, )


Neuro motor strength exam: Left Upper Extremity: 0, Right Upper Extremity: 0, 

Left Lower Extremity: 0, Right Lower Extremity: 0


Additional comments: 


obtunded








- Skin


Skin Exam: Diaphoretic, Warm.  absent: Intact (multiple non-healing lesions)





Assessment and Plan





- Assessment and Plan (Free Text)


Assessment: 


80 y/o M  admitted for sepsis developed CO2 narcosis


Plan: 


Stat EKG showed NSR with RBBB unchanged from prior EKG in chart.


Already placed on BIPAP, labs from this AM reviewed.


Portable chest x-ray and troponin ordered stat


Patient assessed by intensivist Dr. Ferrara and accepted to ICU, to be 

intubated there.


With BIPAP and cardiac monitor in place, he was transferred to ICU by RR team 

and endorsed to ICU.








<Jennifer Dang - Last Filed: 03/27/17 15:35>





Objective





- Vital Signs/Intake and Output


Vital Signs (last 24 hours): 


 











Temp Pulse Resp BP Pulse Ox


 


 98 F   82   20   115/44 L  96 


 


 03/27/17 12:00  03/27/17 14:40  03/27/17 12:00  03/27/17 12:00  03/27/17 06:00








Intake and Output: 


 











 03/27/17 03/27/17





 06:59 18:59


 


Intake Total 960 240


 


Output Total 700 2500


 


Balance 260 -2260














- Medications


Medications: 


 Current Medications





Acetaminophen (Tylenol 325mg Tab)  650 mg PO Q4 PRN


   PRN Reason: Fever >100.4 F


   Last Admin: 03/23/17 09:15 Dose:  650 mg


Albuterol/Ipratropium (Duoneb 3 Mg/0.5 Mg (3 Ml) Ud)  3 ml IH [id number] PRN


   PRN Reason: Shortness of Breath


   Last Admin: 03/27/17 13:58 Dose:  3 ml


Alprazolam (Xanax)  0.25 mg PO Q6 PRN; Protocol


   PRN Reason: Anxiety


   Stop: 04/03/17 12:01


Aspirin (Aspirin)  325 mg PO DAILY [facility]


   Last Admin: 03/26/17 11:51 Dose:  325 mg


Clotrimazole (Lotrimin 1%)  0 gm TOP BID [facility]


   Last Admin: 03/27/17 10:57 Dose:  1 applic


Ceftriaxone Sodium (Rocephin 2 Gm Ivpb)  100 mls @ 100 mls/hr IVPB DAILY [facility]


   PRN Reason: Protocol


   Stop: 04/08/17 10:01


   Last Admin: 03/27/17 11:02 Dose:  100 mls/hr


Metoprolol Tartrate (Lopressor)  25 mg PO BID [facility]


   Last Admin: 03/26/17 17:55 Dose:  25 mg


Morphine Sulfate (Morphine)  1 mg IVP Q4H PRN


   PRN Reason: Pain, moderate (4-7)


   Last Admin: 03/27/17 02:22 Dose:  1 mg


Silver Sulfadiazine (Silvadene 1% 20 Gm)  0 ea TOP DAILY [facility]


   Last Admin: 03/27/17 10:59 Dose:  1 applic











- Labs


Labs: 


 





 03/27/17 06:30 





 03/27/17 06:30 





 











PT  12.1 Seconds (9.9-11.8)  H  03/25/17  06:15    


 


INR  1.12  (0.93-1.08)  H  03/25/17  06:15    


 


APTT  32.7 Seconds (23.7-30.8)  H  03/22/17  03:33    














Attending/Attestation





- Attestation


I have personally seen and examined this patient.: Yes


I have fully participated in the care of the patient.: Yes


I have reviewed all pertinent clinical information, including history, physical 

exam and plan: Yes

## 2017-03-27 NOTE — CP.PCM.PN
Subjective





- Date & Time of Evaluation


Date of Evaluation: 03/26/17


Time of Evaluation: 23:59





- Subjective


Subjective: 


Patient was seen at bedside because he was acting up.


 Patient has no complaints.Keeps on moaning.


 Medical record was reviewed.


 This 81 year old white male after a fall with acute rhabdomyolysis,ARF,sepsis.


 Has PMH of HTN,Dementia, obesity, hearing impairment, OA,CKD.








Objective





- Vital Signs/Intake and Output


Vital Signs (last 24 hours): 


 











Temp Pulse Resp BP Pulse Ox


 


 98.2 F   86   18   167/69 H  98 


 


 03/26/17 20:28  03/26/17 17:55  03/26/17 12:00  03/26/17 17:55  03/26/17 06:00








Intake and Output: 


 











 03/26/17 03/27/17





 18:59 06:59


 


Intake Total 900 240


 


Output Total  200


 


Balance 900 40














- Medications


Medications: 


 Current Medications





Acetaminophen (Tylenol 325mg Tab)  650 mg PO Q4 PRN


   PRN Reason: Fever >100.4 F


   Last Admin: 03/23/17 09:15 Dose:  650 mg


Albuterol/Ipratropium (Duoneb 3 Mg/0.5 Mg (3 Ml) Ud)  3 ml IH [id number] PRN


   PRN Reason: Shortness of Breath


   Last Admin: 03/26/17 21:16 Dose:  3 ml


Aspirin (Aspirin)  325 mg PO DAILY [facility]


   Last Admin: 03/26/17 11:51 Dose:  325 mg


Clotrimazole (Lotrimin 1%)  0 gm TOP BID [facility]


   Last Admin: 03/26/17 17:55 Dose:  1 applic


Ceftriaxone Sodium (Rocephin 2 Gm Ivpb)  100 mls @ 100 mls/hr IVPB DAILY [facility]


   PRN Reason: Protocol


   Stop: 04/08/17 10:01


   Last Admin: 03/26/17 10:03 Dose:  100 mls/hr


Sodium Bicarbonate 50 meq/ (Sodium Chloride)  1,050 mls @ 50 mls/hr IV .Q21H [facility]


Metoprolol Tartrate (Lopressor)  25 mg PO BID [facility]


   Last Admin: 03/26/17 17:55 Dose:  25 mg


Morphine Sulfate (Morphine)  1 mg IVP Q4H PRN


   PRN Reason: Pain, moderate (4-7)


   Last Admin: 03/26/17 02:00 Dose:  1 mg


Silver Sulfadiazine (Silvadene 1% 20 Gm)  0 ea TOP DAILY [facility]


   Last Admin: 03/26/17 10:57 Dose:  1 applic











- Labs


Labs: 


 





 03/26/17 06:00 





 03/26/17 06:00 





 











PT  12.1 Seconds (9.9-11.8)  H  03/25/17  06:15    


 


INR  1.12  (0.93-1.08)  H  03/25/17  06:15    


 


APTT  32.7 Seconds (23.7-30.8)  H  03/22/17  03:33    














- Constitutional


Appears: Well, No Acute Distress





- Head Exam


Head Exam: ATRAUMATIC, NORMAL INSPECTION, NORMOCEPHALIC





- Eye Exam


Eye Exam: Normal appearance


Additional comments: 


Obese.





- ENT Exam


ENT Exam: Normal External Ear Exam





- Neck Exam


Neck Exam: Normal Inspection





- Respiratory Exam


Respiratory Exam: NORMAL BREATHING PATTERN





- Cardiovascular Exam


Cardiovascular Exam: absent: JVD





- GI/Abdominal Exam


GI & Abdominal Exam: absent: Distended





- Rectal Exam


Rectal Exam: Deferred





- Extremities Exam


Extremities Exam: Normal Inspection





- Back Exam


Back Exam: NORMAL INSPECTION





- Neurological Exam


Neurological Exam: Altered





- Psychiatric Exam


Psychiatric exam: absent: Agitated





- Skin


Skin Exam: Normal Color





Assessment and Plan





- Assessment and Plan (Free Text)


Assessment: 


A/P:Agitation.


      Hypertension.


      Dementia.


      Obesity.


      Ativan 0.5 mg IV x 1


      Continue management.

## 2017-03-27 NOTE — PN
DATE: 03/27/2017



SUBJECTIVE:  The patient has no complaints of any chest pain, no shortness of breath, no headaches or
 dizziness.



PHYSICAL EXAMINATION:

VITAL SIGNS:  Temperature 98, pulse of 107, blood pressure 150/73. 

GENERAL:   The patient comfortable, in no acute distress.

HEENT:   Anicteric sclerae.  Moist mucosa.

NECK:   No JVD or adenopathy.

CARDIAC:   S1/S2.  No murmurs.  No rubs.  Regular.

RESPIRATORY:   Clear to auscultation bilaterally.  No wheezes, rales, or rhonchi.  Good air entry.

ABDOMEN:   Bowel sounds are positive, soft, nontender, and nondistended.

EXTREMITIES:   No edema.  Has 1+ pulses.



LABS:    Creatinine is 1.7.  White count is 14.3.



Chest x-ray done shows no active pulmonary disease.



Imaging of the hip shows degenerative osteoarthritis with extensive articular cystic changes, moderat
e to severe degenerative osteoarthritis in the left hip.  No acute displaced fracture.



ASSESSMENT:

1.  Delirium.

2.  Sepsis.

3.  Acute kidney injury secondary to rhabdomyolysis.

4.  Iron deficiency.

5.  Proteinuria, 2 grams per day.  

6.  Paroxysmal atrial fibrillation.  

7.  Right knee pain secondary to degenerative joint disease.

8.  Hyperphosphatemia.

9.  Obesity with a body mass index of 37.

10.  Acute kidney injury, improving.



PLAN:  The patient is currently comfortable.  I will discontinue his IV fluids.  He is on morphine fo
r pain.  He is receiving Rocephin for antibiotics.  He is on a heart healthy diet.  He is to get phys
ical therapy.  He is being followed by multiple consultants.  Will repeat his blood work tomorrow.





__________________________________________

Jose Martin Arguello MD







cc:



DD: 03/27/2017 06:31:17  358

TT: 03/27/2017 08:15:37

Confirmation # [id number]

Dictation # [id number]

mn

## 2017-03-27 NOTE — CP.PCM.CON
<Pattie Campo - Last Filed: 03/27/17 15:47>





History of Present Illness





- History of Present Illness


History of Present Illness: 


ICU consult 





81 year old male with PMH of COPD, HTN, dementia, obesity with BMI 38 came in 

to [facility] after he sustained a mechanical fall at home. He did 

not hit his head and did not lose consciousness, no convulsions, no fevers at 

home, no dizziness, no lightheadedness, no chest pain or palpitations. He was 

not able to get himself off the floor and stayed there for sometime. In the ED, 

he was noted to have low grade fevers. The patient has wounds on both lower 

extremities whic has ongoing for the past month, but seemed to have worsened 

over the past few days. Patient has a pet dog at home, but no history of 

soaking his legs in water. Pt was admitted for acute on chronic CARLOS 2/2 

rhabdomyelysis and sepsis. Pt has been spiking fevers and has leukocytosis. 

While pt was on the floor, rapid response was called, pt was found to be 

unresponsive and have respiratory failure. Pt is tranfered to ICU and 

intubated. 





Review of Systems





- Review of Systems


Systems not reviewed;Unavailable: Intubated





Past Patient History





- Past Medical History & Family History


Past Medical History?: Yes


Past Family History: Reviewed and not pertinent





- Past Social History


Smoking Status: Former Smoker





- CARDIAC


Hx Hypertension: Yes





- PULMONARY


Hx Respiratory Disorders: No





- NEUROLOGICAL


Hx Neurological Disorder: No





- HEENT


Hx Cataracts: Yes (bilateral surgery)


Hx Deafness: Yes (bilateral hearing aids)


Other/Comment: eye glasses for driving and reading





- RENAL


Hx Kidney Stones: Yes (many years ago)





- HEMATOLOGICAL/ONCOLOGICAL


Hx Blood Disorders: No





- MUSCULOSKELETAL/RHEUMATOLOGICAL


Hx Falls: Yes





- PSYCHIATRIC


Hx Substance Use: No





- SURGICAL HISTORY


Hx Surgeries: Yes (cataract surgery)


Other/Comment: pilonidal cyst





Meds


Allergies/Adverse Reactions: 


 Allergies











Allergy/AdvReac Type Severity Reaction Status Date / Time


 


No Known Allergies Allergy   Verified 03/22/17 03:08














- Medications


Medications: 


 Current Medications





Acetaminophen (Tylenol 325mg Tab)  650 mg PO Q4 PRN


   PRN Reason: Fever >100.4 F


   Last Admin: 03/23/17 09:15 Dose:  650 mg


Albuterol/Ipratropium (Duoneb 3 Mg/0.5 Mg (3 Ml) Ud)  3 ml IH [id number] PRN


   PRN Reason: Shortness of Breath


   Last Admin: 03/27/17 13:58 Dose:  3 ml


Alprazolam (Xanax)  0.25 mg PO Q6 PRN; Protocol


   PRN Reason: Anxiety


   Stop: 04/03/17 12:01


Aspirin (Aspirin)  325 mg PO DAILY [facility]


   Last Admin: 03/26/17 11:51 Dose:  325 mg


Clotrimazole (Lotrimin 1%)  0 gm TOP BID [facility]


   Last Admin: 03/27/17 10:57 Dose:  1 applic


Ceftriaxone Sodium (Rocephin 2 Gm Ivpb)  100 mls @ 100 mls/hr IVPB DAILY [facility]


   PRN Reason: Protocol


   Stop: 04/08/17 10:01


   Last Admin: 03/27/17 11:02 Dose:  100 mls/hr


Metoprolol Tartrate (Lopressor)  25 mg PO BID [facility]


   Last Admin: 03/26/17 17:55 Dose:  25 mg


Morphine Sulfate (Morphine)  1 mg IVP Q4H PRN


   PRN Reason: Pain, moderate (4-7)


   Last Admin: 03/27/17 02:22 Dose:  1 mg


Silver Sulfadiazine (Silvadene 1% 20 Gm)  0 ea TOP DAILY [facility]


   Last Admin: 03/27/17 10:59 Dose:  1 applic











Physical Exam





- Constitutional


Appears: In Acute Distress, Confused





- Head Exam


Head Exam: ATRAUMATIC, NORMAL INSPECTION, NORMOCEPHALIC





- Eye Exam


Eye Exam: EOMI, Normal appearance, PERRL


Pupil Exam: NORMAL ACCOMODATION





- ENT Exam


ENT Exam: Mucous Membranes Dry





- Respiratory Exam


Respiratory Exam: Accessory Muscle Use, Wheezes, Respiratory Distress.  absent: 

NORMAL BREATHING PATTERN


Additional comments: 


Intubated 





- Cardiovascular Exam


Cardiovascular Exam: REGULAR RHYTHM, +S1, +S2





- GI/Abdominal Exam


GI & Abdominal Exam: Soft.  absent: Distended, Firm, Guarding, Hernia





-  Exam


Additional comments: 


Bill in place: Dione cloudy color 





- Extremities Exam


Extremities exam: Positive for: pedal pulses present.  Negative for: normal 

inspection





- Neurological Exam


Neurological exam: Altered





- Skin


Skin Exam: Dry, Erythema, Intact





Results





- Vital Signs


Recent Vital Signs: 


 Last Vital Signs











Temp  98 F   03/27/17 12:00


 


Pulse  82   03/27/17 14:40


 


Resp  20   03/27/17 12:00


 


BP  115/44 L  03/27/17 12:00


 


Pulse Ox  96   03/27/17 06:00














- Labs


Result Diagrams: 


 03/27/17 06:30





 03/27/17 06:30


Labs: 


 Laboratory Results - last 24 hr











  03/25/17 03/27/17 03/27/17





  06:15 06:30 13:55


 


WBC   19.4 H D 


 


RBC   4.79 


 


Hgb   13.3 L 


 


Hct   39.7 L 


 


MCV   82.9 


 


MCH   27.8 


 


MCHC   33.5 


 


RDW   14.5 


 


Plt Count   163 


 


MPV   9.6 


 


Neutrophils % (Manual)   83 H 


 


Band Neutrophils %   2 


 


Lymphocytes % (Manual)   10 L 


 


Monocytes % (Manual)   4 


 


Platelet Evaluation   Normal 


 


Anisocytosis (manual)   Slight 


 


pCO2   


 


pO2   


 


HCO3   


 


ABG pH   


 


ABG Total CO2   


 


ABG O2 Saturation   


 


ABG O2 Content   


 


ABG Base Excess   


 


ABG Hemoglobin   


 


ABG Carboxyhemoglobin   


 


POC ABG HHb (Measured)   


 


ABG Methemoglobin   


 


ABG O2 Capacity   


 


Hgb O2 Saturation   


 


FiO2   


 


Sodium   143 


 


Potassium   4.1 


 


Chloride   104 


 


Carbon Dioxide   27 


 


Anion Gap   16 


 


BUN   72 H 


 


Creatinine   2.1 H 


 


Est GFR ( Amer)   37 


 


Est GFR (Non-Af Amer)   30 


 


POC Glucose (mg/dL)    217 H


 


Random Glucose   159 H 


 


Calcium   8.5 


 


Total Bilirubin   0.7 


 


AST   203 H 


 


ALT   150 H 


 


Alkaline Phosphatase   98 


 


Ammonia   < 9 L 


 


Total Protein   5.9 


 


Albumin   2.8 L 


 


Globulin   3.1 


 


Albumin/Globulin Ratio   0.9 L 


 


Rheumatoid Factor  16 H  


 


Hepatitis A IgM Ab  Negative  


 


Hep Bs Antigen  Negative  


 


Hep B Core IgM Ab  Negative  


 


Hepatitis C Antibody  Negative  














  03/27/17 03/27/17





  14:00 14:26


 


WBC  


 


RBC  


 


Hgb  


 


Hct  


 


MCV  


 


MCH  


 


MCHC  


 


RDW  


 


Plt Count  


 


MPV  


 


Neutrophils % (Manual)  


 


Band Neutrophils %  


 


Lymphocytes % (Manual)  


 


Monocytes % (Manual)  


 


Platelet Evaluation  


 


Anisocytosis (manual)  


 


pCO2  78 H* 


 


pO2  100.0 


 


HCO3  27.8 


 


ABG pH  7.16 L* 


 


ABG Total CO2  30.2 H 


 


ABG O2 Saturation  97.7 


 


ABG O2 Content  17.3 


 


ABG Base Excess  -2.7 L 


 


ABG Hemoglobin  12.9 


 


ABG Carboxyhemoglobin  2.6 H 


 


POC ABG HHb (Measured)  2.2 


 


ABG Methemoglobin  0.5 


 


ABG O2 Capacity  17.7 


 


Hgb O2 Saturation  94.8 L 


 


FiO2  32.0 


 


Sodium  


 


Potassium  


 


Chloride  


 


Carbon Dioxide  


 


Anion Gap  


 


BUN  


 


Creatinine  


 


Est GFR ( Amer)  


 


Est GFR (Non-Af Amer)  


 


POC Glucose (mg/dL)   144 H


 


Random Glucose  


 


Calcium  


 


Total Bilirubin  


 


AST  


 


ALT  


 


Alkaline Phosphatase  


 


Ammonia  


 


Total Protein  


 


Albumin  


 


Globulin  


 


Albumin/Globulin Ratio  


 


Rheumatoid Factor  


 


Hepatitis A IgM Ab  


 


Hep Bs Antigen  


 


Hep B Core IgM Ab  


 


Hepatitis C Antibody  














Assessment & Plan





- Assessment and Plan (Free Text)


Assessment: 


81 M with 


Hypercapnic respiratory failure : vent dependent 


Acute on chronic kidney injury 


Sepsis 





Neuro


-Sedation : versed


-Fentanyl


-Morphine





CV: keep AMP 65+


-Levophed/Vasopressin


-Lopressor PRN 


-ASA





Pulm: Keep SaO2 90%+


-Wean vent 


-Aspiration precaution 


-Elevate head 30degree


-Duoneb


-Hydrocortisone


-repeat ABG 





GI


-PTX 


-OGT 





Renal


-Bill 


-replete e-lyte as needed





ID


Leukocytosis


-Keep normothermia


-ID on board


-Tylenol


-Ceftriaxone


-Silvadene


-Podiatry on board





DVT


-SCD





DW ICU attending. 





<Kentrell Lira - Last Filed: 03/27/17 17:08>





Meds





- Medications


Medications: 


 Current Medications





Acetaminophen (Tylenol 325mg Tab)  650 mg PO Q4 PRN


   PRN Reason: Fever >100.4 F


   Last Admin: 03/23/17 09:15 Dose:  650 mg


Albuterol/Ipratropium (Duoneb 3 Mg/0.5 Mg (3 Ml) Ud)  3 ml IH [id number] PRN


   PRN Reason: Shortness of Breath


   Last Admin: 03/27/17 13:58 Dose:  3 ml


Alprazolam (Xanax)  0.25 mg PO Q6 PRN; Protocol


   PRN Reason: Anxiety


   Stop: 04/03/17 12:01


Aspirin (Aspirin)  325 mg PO DAILY [facility]


   Last Admin: 03/26/17 11:51 Dose:  325 mg


Clotrimazole (Lotrimin 1%)  0 gm TOP BID [facility]


   Last Admin: 03/27/17 10:57 Dose:  1 applic


Heparin Sodium (Porcine) (Heparin)  5,000 units SC Q8 [facility]


   PRN Reason: Protocol


Hydrocortisone Sodium Succinate (Solu-Cortef)  50 mg IVP Q6H [facility]


Ceftriaxone Sodium (Rocephin 2 Gm Ivpb)  100 mls @ 100 mls/hr IVPB DAILY [facility]


   PRN Reason: Protocol


   Stop: 04/08/17 10:01


   Last Admin: 03/27/17 11:02 Dose:  100 mls/hr


Fentanyl Citrate (Fentanyl Citrate/Sodium Chloride 1 Mg/100 Ml)  100 mls @ 7.5 

mls/hr IV .[id number] PRN; Protocol; 75 MCG/HR


   PRN Reason: TITRATE PER MD ORDER


Norepinephrine Bitartrate 4 mg (/ Dextrose)  254 mls @ 15.24 mls/hr IV .[id number] 

PRN; Protocol; 4 MCG/MIN


   PRN Reason: TITRATE PER MD ORDER


Vasopressin 20 units/ Sodium (Chloride)  101 mls @ 9.09 mls/hr IV .[id number] [facility]; 

0.03 U/MIN


   PRN Reason: Protocol


Vancomycin HCl (Vancomycin 1gm)  250 mls @ 167 mls/hr IVPB STAT STA


   PRN Reason: Protocol


   Stop: 03/27/17 17:23


Meropenem 1g/NS 100mL IVPB (Meropenem 1g/Ns 100ml Ivpb)  100 mls @ 100 mls/hr 

IVPB Q12 [facility]


   PRN Reason: Protocol


   Stop: 03/27/17 22:59


Metoprolol Tartrate (Lopressor)  25 mg PO BID [facility]


   Last Admin: 03/26/17 17:55 Dose:  25 mg


Midazolam HCl (Versed Inj)  4 mg IVP Q4H PRN


   PRN Reason: Agitation


Morphine Sulfate (Morphine)  1 mg IVP Q4H PRN


   PRN Reason: Pain, moderate (4-7)


   Last Admin: 03/27/17 02:22 Dose:  1 mg


Pantoprazole Sodium (Protonix Inj)  40 mg IVP DAILY [facility]


Silver Sulfadiazine (Silvadene 1% 20 Gm)  0 ea TOP DAILY [facility]


   Last Admin: 03/27/17 10:59 Dose:  1 applic











Results





- Vital Signs


Recent Vital Signs: 


 Last Vital Signs











Temp  98 F   03/27/17 12:00


 


Pulse  82   03/27/17 14:40


 


Resp  20   03/27/17 12:00


 


BP  115/44 L  03/27/17 12:00


 


Pulse Ox  96   03/27/17 06:00














- Labs


Result Diagrams: 


 03/27/17 16:24





 03/27/17 16:24


Labs: 


 Laboratory Results - last 24 hr











  03/25/17 03/27/17 03/27/17





  06:15 06:30 13:55


 


WBC   19.4 H D 


 


RBC   4.79 


 


Hgb   13.3 L 


 


Hct   39.7 L 


 


MCV   82.9 


 


MCH   27.8 


 


MCHC   33.5 


 


RDW   14.5 


 


Plt Count   163 


 


MPV   9.6 


 


Gran %   


 


Lymph % (Auto)   


 


Mono % (Auto)   


 


Eos % (Auto)   


 


Baso % (Auto)   


 


Gran #   


 


Lymph #   


 


Mono #   


 


Eos #   


 


Baso #   


 


Neutrophils % (Manual)   83 H 


 


Band Neutrophils %   2 


 


Lymphocytes % (Manual)   10 L 


 


Monocytes % (Manual)   4 


 


Platelet Evaluation   Normal 


 


Anisocytosis (manual)   Slight 


 


pCO2   


 


pO2   


 


HCO3   


 


ABG pH   


 


ABG Total CO2   


 


ABG O2 Saturation   


 


ABG O2 Content   


 


ABG Base Excess   


 


ABG Hemoglobin   


 


ABG Carboxyhemoglobin   


 


POC ABG HHb (Measured)   


 


ABG Methemoglobin   


 


ABG O2 Capacity   


 


ABG Potassium   


 


VBG pH   


 


VBG pCO2   


 


VBG HCO3   


 


VBG Total CO2   


 


VBG O2 Sat (Calc)   


 


VBG Base Excess   


 


VBG Potassium   


 


Hgb O2 Saturation   


 


Glucose   


 


Lactate   


 


Mechanical Rate   


 


FiO2   


 


Tidal Volume   


 


PEEP   


 


Sodium   143 


 


Potassium   4.1 


 


Chloride   104 


 


Carbon Dioxide   27 


 


Anion Gap   16 


 


BUN   72 H 


 


Creatinine   2.1 H 


 


Est GFR ( Amer)   37 


 


Est GFR (Non-Af Amer)   30 


 


POC Glucose (mg/dL)    217 H


 


Random Glucose   159 H 


 


Calcium   8.5 


 


Total Bilirubin   0.7 


 


AST   203 H 


 


ALT   150 H 


 


Alkaline Phosphatase   98 


 


Ammonia   < 9 L 


 


Troponin I   


 


Total Protein   5.9 


 


Albumin   2.8 L 


 


Globulin   3.1 


 


Albumin/Globulin Ratio   0.9 L 


 


Arterial Blood Potassium   


 


Venous Blood Potassium   


 


Rheumatoid Factor  16 H  


 


Hepatitis A IgM Ab  Negative  


 


Hep Bs Antigen  Negative  


 


Hep B Core IgM Ab  Negative  


 


Hepatitis C Antibody  Negative  














  03/27/17 03/27/17 03/27/17





  14:00 14:26 16:24


 


WBC    13.7 H D


 


RBC    3.97


 


Hgb    10.9 L


 


Hct    33.6 L


 


MCV    84.6


 


MCH    27.5


 


MCHC    32.4


 


RDW    14.7 H


 


Plt Count    156


 


MPV    9.4


 


Gran %    87.3 H


 


Lymph % (Auto)    8.0 L


 


Mono % (Auto)    4.6


 


Eos % (Auto)    0.0 L


 


Baso % (Auto)    0.1


 


Gran #    11.97 H


 


Lymph #    1.1 L


 


Mono #    0.6


 


Eos #    0.0


 


Baso #    0.02


 


Neutrophils % (Manual)   


 


Band Neutrophils %   


 


Lymphocytes % (Manual)   


 


Monocytes % (Manual)   


 


Platelet Evaluation   


 


Anisocytosis (manual)   


 


pCO2  78 H*   47 H


 


pO2  100.0   45


 


HCO3  27.8   25.4


 


ABG pH  7.16 L*   7.34 L


 


ABG Total CO2  30.2 H   26.8


 


ABG O2 Saturation  97.7   99.5 H


 


ABG O2 Content  17.3  


 


ABG Base Excess  -2.7 L   -0.8


 


ABG Hemoglobin  12.9  


 


ABG Carboxyhemoglobin  2.6 H  


 


POC ABG HHb (Measured)  2.2  


 


ABG Methemoglobin  0.5  


 


ABG O2 Capacity  17.7  


 


ABG Potassium    4.4


 


VBG pH    7.27 L


 


VBG pCO2    61.0 H


 


VBG HCO3    28.0


 


VBG Total CO2    29.9 H


 


VBG O2 Sat (Calc)    82.3 H


 


VBG Base Excess    -0.3 L


 


VBG Potassium    4.6


 


Hgb O2 Saturation  94.8 L  


 


Glucose    174 H


 


Lactate    1.2


 


Mechanical Rate    16


 


FiO2  32.0   21.0


 


Tidal Volume    450


 


PEEP    5


 


Sodium    145


 


Potassium    4.6


 


Chloride    108 H


 


Carbon Dioxide    27


 


Anion Gap    15


 


BUN    79 H


 


Creatinine    2.3 H


 


Est GFR ( Amer)    33


 


Est GFR (Non-Af Amer)    27


 


POC Glucose (mg/dL)   144 H 


 


Random Glucose    167 H


 


Calcium    7.9 L


 


Total Bilirubin    0.6


 


AST    226 H


 


ALT    183 H


 


Alkaline Phosphatase    70


 


Ammonia   


 


Troponin I    1.51 H* D


 


Total Protein    5.2 L


 


Albumin    2.3 L


 


Globulin    2.9


 


Albumin/Globulin Ratio    0.8 L


 


Arterial Blood Potassium    4.4


 


Venous Blood Potassium    4.6


 


Rheumatoid Factor   


 


Hepatitis A IgM Ab   


 


Hep Bs Antigen   


 


Hep B Core IgM Ab   


 


Hepatitis C Antibody   














Addendum


Addendum: 





03/27/17 17:08


patient was seen and examined at bedside with Dr. Pattie Campo. Please see Dr. Lira note

## 2017-03-27 NOTE — CP.PCM.PN
Subjective





- Date & Time of Evaluation


Date of Evaluation: 03/27/17


Time of Evaluation: 08:00





- Subjective


Subjective: 


Stable on 2R. Confused. No CP or SOB reported.





V/S noted. RSR/S. Tachy. No PAF recently





PE:





Lungs: clear


Cor.: S1S2


Abd.: soft


Ext.: no edema


Neuro.: dementia





I/O= 240/2500





Labs noted: Cr.= 2.1,  WBC = 90579





BC x1 + Grp G Strep. Several BCs are NG. Wound C+S: Staph aur.





ECG 3/22: RSR, RBBB, Possible IMI





Echo: Nl LV fx with mild conc. LVH, no veg. seen. See report.





Objective





- Vital Signs/Intake and Output


Vital Signs (last 24 hours): 


 











Temp Pulse Resp BP Pulse Ox


 


 98 F   104 H  18   148/76   96 


 


 03/27/17 06:00  03/27/17 06:00  03/27/17 06:00  03/27/17 06:00  03/27/17 06:00








Intake and Output: 


 











 03/27/17 03/27/17





 06:59 18:59


 


Intake Total 960 240


 


Output Total 700 2500


 


Balance 260 -2260














- Medications


Medications: 


 Current Medications





Acetaminophen (Tylenol 325mg Tab)  650 mg PO Q4 PRN


   PRN Reason: Fever >100.4 F


   Last Admin: 03/23/17 09:15 Dose:  650 mg


Albuterol/Ipratropium (Duoneb 3 Mg/0.5 Mg (3 Ml) Ud)  3 ml IH [id number] PRN


   PRN Reason: Shortness of Breath


   Last Admin: 03/27/17 02:20 Dose:  3 ml


Aspirin (Aspirin)  325 mg PO DAILY [facility]


   Last Admin: 03/26/17 11:51 Dose:  325 mg


Clotrimazole (Lotrimin 1%)  0 gm TOP BID [facility]


   Last Admin: 03/26/17 17:55 Dose:  1 applic


Ceftriaxone Sodium (Rocephin 2 Gm Ivpb)  100 mls @ 100 mls/hr IVPB DAILY [facility]


   PRN Reason: Protocol


   Stop: 04/08/17 10:01


   Last Admin: 03/26/17 10:03 Dose:  100 mls/hr


Metoprolol Tartrate (Lopressor)  25 mg PO BID [facility]


   Last Admin: 03/26/17 17:55 Dose:  25 mg


Morphine Sulfate (Morphine)  1 mg IVP Q4H PRN


   PRN Reason: Pain, moderate (4-7)


   Last Admin: 03/27/17 02:22 Dose:  1 mg


Silver Sulfadiazine (Silvadene 1% 20 Gm)  0 ea TOP DAILY [facility]


   Last Admin: 03/26/17 10:57 Dose:  1 applic











- Labs


Labs: 


 





 03/27/17 06:30 





 03/27/17 06:30 





 











PT  12.1 Seconds (9.9-11.8)  H  03/25/17  06:15    


 


INR  1.12  (0.93-1.08)  H  03/25/17  06:15    


 


APTT  32.7 Seconds (23.7-30.8)  H  03/22/17  03:33    














Assessment and Plan





- Assessment and Plan (Free Text)


Plan: 


Assessment:





Fall at home, details unknown/Right Knee Pain


Acute rhabdo., improving


Acute renal failure, improving


+ trop in setting of acute on chronic kidney disease and acute rhabdo., 

probably not acute MI


Sepsis/Grp G strept bacteremia


Cellulitis


Dementia


HBP


RBBB


IMI on ECG, probbably old


Former Smoker


Renal Stones


Cataracts


Diminished hearing








Plan:





AB, as per ID


Monitor: I/O, labs, Renal fx., cultures, sats., CK's, etc


As per ID, Ortho, Neuro.,Podiatry, Dr. Muller and Dr. Browne


Conservative Cardiac Care is anticipated

## 2017-03-27 NOTE — RAD
HISTORY:

intubation , OGT  



COMPARISON:

Earlier same day 



FINDINGS:



LUNGS:

The endotracheal and nasogastric tubes are in satisfactory position.  

There is a left-sided PICC line in the right atrium



PLEURA:

No significant pleural effusion identified, no pneumothorax apparent.



CARDIOVASCULAR:

Normal.



OSSEOUS STRUCTURES:

No significant abnormalities.



VISUALIZED UPPER ABDOMEN:

Normal.



OTHER FINDINGS:

None.



IMPRESSION:

Central lines and tubes in satisfactory position

## 2017-03-27 NOTE — RAD
HISTORY:

sob  



COMPARISON:

3/24/2017 



FINDINGS:



LUNGS:

Linear scar/atelectasis at left base. No pulmonary infiltrate.



PLEURA:

No significant pleural effusion identified, no pneumothorax apparent.



CARDIOVASCULAR:

The left PICC catheter terminates in the region of the superior vena 

cava.



OSSEOUS STRUCTURES:

No significant abnormalities.



VISUALIZED UPPER ABDOMEN:

Normal.



OTHER FINDINGS:

None.



IMPRESSION:

No acute infiltrate. Probable linear atelectasis/ scar at left base.  

New left PICC catheter.

## 2017-03-28 LAB
ALBUMIN/GLOB SERPL: 0.9 {RATIO} (ref 1.1–1.8)
ALP SERPL-CCNC: 72 U/L (ref 38–133)
ALT SERPL-CCNC: 171 U/L (ref 7–56)
AST SERPL-CCNC: 171 U/L (ref 15–59)
BILIRUB SERPL-MCNC: 0.5 MG/DL (ref 0.2–1.3)
BUN SERPL-MCNC: 87 MG/DL (ref 7–21)
CALCIUM SERPL-MCNC: 8.1 MG/DL (ref 8.4–10.5)
CHLORIDE SERPL-SCNC: 110 MMOL/L (ref 95–110)
CO2 SERPL-SCNC: 27 MMOL/L (ref 21–33)
COHGB MFR BLD: 1.8 % (ref 0.5–1.5)
ERYTHROCYTE [DISTWIDTH] IN BLOOD BY AUTOMATED COUNT: 14.8 % (ref 11.5–14.5)
GLOBULIN SER-MCNC: 2.8 GM/DL
GLUCOSE SERPL-MCNC: 196 MG/DL (ref 70–110)
HCO3 BLDA-SCNC: 25.4 MMOL/L (ref 21–28)
HCT VFR BLD CALC: 33.6 % (ref 42–52)
HHB: 1.2 % (ref 0–5)
MCH RBC QN AUTO: 27.7 PG (ref 25–35)
MCHC RBC AUTO-ENTMCNC: 33 G/DL (ref 31–37)
MCV RBC AUTO: 83.8 FL (ref 80–105)
METHGB MFR BLD: 0.4 % (ref 0–3)
O2 CAP BLDA-SCNC: 15.4 ML/DL (ref 16–24)
O2 CT BLDA-SCNC: 15.2 ML/DL (ref 15–23)
PH BLDA: 7.38 [PH] (ref 7.35–7.45)
PLATELET # BLD: 191 10^3/UL (ref 120–450)
PMV BLD AUTO: 9.4 FL (ref 7–11)
PO2 BLDA: 105 MM/HG (ref 80–100)
POTASSIUM SERPL-SCNC: 4.2 MMOL/L (ref 3.6–5)
PROT SERPL-MCNC: 5.3 G/DL (ref 5.8–8.3)
PROTEINASE3 AB SER IA-ACNC: <1 AI (ref ?–1)
SAO2 % BLDA: 96.6 % (ref 95–98)
SODIUM SERPL-SCNC: 149 MMOL/L (ref 132–148)
WBC # BLD AUTO: 12.8 10^3/UL (ref 4.5–11)

## 2017-03-28 RX ADMIN — IPRATROPIUM BROMIDE AND ALBUTEROL SULFATE SCH ML: .5; 3 SOLUTION RESPIRATORY (INHALATION) at 20:22

## 2017-03-28 RX ADMIN — SILVER SULFADIAZINE SCH APPLIC: 10 CREAM TOPICAL at 10:40

## 2017-03-28 RX ADMIN — CLOTRIMAZOLE SCH APPLIC: 1 CREAM TOPICAL at 18:01

## 2017-03-28 RX ADMIN — HEPARIN SODIUM SCH MLS/HR: 10000 INJECTION, SOLUTION INTRAVENOUS at 13:25

## 2017-03-28 RX ADMIN — Medication PRN MLS/HR: at 03:31

## 2017-03-28 RX ADMIN — IPRATROPIUM BROMIDE AND ALBUTEROL SULFATE SCH ML: .5; 3 SOLUTION RESPIRATORY (INHALATION) at 13:14

## 2017-03-28 RX ADMIN — MEROPENEM AND SODIUM CHLORIDE SCH MLS/HR: 1 INJECTION, SOLUTION INTRAVENOUS at 22:02

## 2017-03-28 RX ADMIN — IPRATROPIUM BROMIDE AND ALBUTEROL SULFATE SCH ML: .5; 3 SOLUTION RESPIRATORY (INHALATION) at 07:21

## 2017-03-28 RX ADMIN — METOPROLOL TARTRATE SCH MG: 5 INJECTION INTRAVENOUS at 09:27

## 2017-03-28 RX ADMIN — MEROPENEM AND SODIUM CHLORIDE SCH MLS/HR: 1 INJECTION, SOLUTION INTRAVENOUS at 09:31

## 2017-03-28 RX ADMIN — CLOTRIMAZOLE SCH APPLIC: 1 CREAM TOPICAL at 10:40

## 2017-03-28 RX ADMIN — METOPROLOL TARTRATE SCH MG: 5 INJECTION INTRAVENOUS at 18:01

## 2017-03-28 RX ADMIN — MORPHINE SULFATE PRN MG: 2 INJECTION, SOLUTION INTRAMUSCULAR; INTRAVENOUS at 23:16

## 2017-03-28 NOTE — CP.PCM.PCO
Physician Communication Note





- Physician Communication Note


Physician Communication Note: Because of a trop inc overnight (1.51-->3.37), 

Heparin Drip was started

## 2017-03-28 NOTE — CON
DATE: 03/28/2017



PULMONARY CONSULTATION



REASON FOR CONSULTATION:  Respiratory failure.



REFERRING PHYSICIAN:  Jose Martin Arguello MD.



History is obtained via extensive discussion with the ICU staff.  I have also 
reviewed the chart at length.  The patient is currently intubated and sedated.



The patient is an 81-year-old male with past medical history significant for 
hypertension, dementia, chronic obstructive pulmonary disease, who originally 
presented to the hospital - on 3/22/17 - after falling at home.  On admission, 
initial work up and laboratory evaluation were consistent with acute 
rhabdomyolysis, along with acute renal insufficiency.  The patient was, thus, 
admitted for additional evaluation.



Again, I did discuss the case with the ICU staff at length.  Apparently, 
shortly after the PICC line insertion, the patient was found to be lethargic (
by the nurses on telemetry).  Arterial blood gas was then done - and showed an 
acute respiratory acidosis.  The patient was initially placed on BiPAP and 
transferred to the medical ICU.  I did review the note by Dr. Lira (
intensivist).  The patient remained lethargic on presentation to the ICU.  He 
was, subsequently, intubated for airway protection and ventilation.  I am, thus
, asked to evaluate on this case for additional ventilator management.



Again, I did discuss the case with the ICU staff at length.  I have also 
reviewed the notes by the telemetry nurse.  Again, after returning from the 
PICC line insertion, the patient was noted to be lethargic,short of breath and 
with oxygen desaturation.  There is no history of cough or sputum production.  
There is no history of chest pain, coughing up of blood, or chest pain - made 
worse with deep respirations.  There have been temperatures throughout the 
patient's hospital stay.  Fortunately, the temperatures have been resolving 
over the past 1-2 days.  No history of chills or infectious exposure.  No 
history of night sweats, weight loss, or appetite change prior to the above 
events.  No history of calf pains.  No history of true syncope.  No history of 
recent travel or trauma.



REVIEW OF SYSTEMS:  No history of nausea, vomiting, or diarrhea.  No acute 
urinary symptoms.  The rest of the review of systems is negative.



ALLERGIES:  No known allergies.



SOCIAL HISTORY:  Positive for tobacco, negative for alcohol.



FAMILY HISTORY:  No inheritable diseases.



HOME MEDICATIONS:  Include Cozaar and Lasix.



PHYSICAL EXAMINATION:

GENERAL:  The patient is currently intubated and sedated.

VITAL SIGNS:  Temperature is 99.1, pulse 58, respirations 16/16, blood pressure 
131/48.

HEENT:  Normocephalic, atraumatic.

NECK:  No JVD.

CARDIOVASCULAR:  Systolic ejection murmur at the lower left sternal border.  No 
S3 gallop.

LUNGS:  Decreased breath sounds at the bases.  Minimal bilateral rhonchi.  No 
wheezing.

EXTREMITIES:  Mild edema.  No cyanosis, no clubbing.

GASTROINTESTINAL:  Abdomen is soft, nondistended.  Bowel sounds are positive.

SKIN:  Reveals evidence of cellulitis on both legs (anterior aspects).

NEUROLOGIC:  Limited at the present time.



PERTINENT LABORATORY DATA:  Chest x-ray was done this morning and reviewed.  It 
is very poor, very rotated film.  There is a small consolidation noted at the 
left base.  I do not appreciate any other significant infiltrates.  



Arterial blood gas was done on assist control 16, tidal volume 450, FiO2 of 50%
.  Results are:  pH 7.38, pCO2 of 43, pO2 of 105.  Peak troponin 3.37.  
Complete metabolic profile:  Sodium 149, BUN 87, creatinine 2.0.  Glucose 196.  
, , total protein 5.3, albumin 2.5.  The rest of the metabolic 
profile is within normal limits.  CBC:  White count 12.8, hemoglobin 11.1, 
hematocrit 33.6, platelets of 191.



Arterial blood gas done prior to intubation:  pH 7.16, pCO2 of 78, pO2 of 100.



IMPRESSION:

1.  Hypercarbic respiratory failure.

2.  Severe sepsis.

3.  Rule out pneumonia - left base.

4.  Bilateral cellulitis.

5.  Chronic obstructive pulmonary disease.

6.  Renal insufficiency.

7.  Acute myocardial infarction.

8.  Mild anemia.



PLAN:  I did discuss the case with the ICU staff at length.  I have also 
reviewed the chart at length.  Apparently, the patient was initially admitted 
to [facility] - on 3/22/17 - after falling at home.  As above, 
initial evaluation was consistent with acute rhabdomyolysis and worsening renal 
dysfunction.  The patient was, thus, admitted for additional evaluation.  I 
have also reviewed the notes by the telemetry nurse.  Apparently, after the 
PICC line insertion, the patient was found to be short of breath,lethargic and 
with oxygen desaturation.  Arterial blood gas then revealed an acute 
respiratory acidosis.  The patient was, subsequently, intubated in the ICU.  



I have also reviewed the note by Dr. Lira.  I did review the x-ray from 
today.  The x-ray is a very poor rotated portable film.  There is a small 
consolidation at the left base.  This consolidation was seen on previous films.
  



I have also reviewed the arterial blood gas.  The arterial blood gas is much 
improved with normalization of the pH and a decrease in the alveolar arterial 
gradient.  I will discuss weaning  procedures with the ICU team in the next few 
moments.  



On physical exam, there is no significant bronchospasm noted.  The patient does 
have a long history of chronic obstructive pulmonary disease.  I will change to 
scheduled nebulizer treatments at this point in time.  I would continue with 
the antibiotic coverage, as per infectious disease.  Input by Dr. Ham is 
noted.  Temperatures are resolving.  The leukocytosis is also resolving.  



Lastly, rising troponin is noted.  I would continue with the cardiology 
evaluation, as per Dr. Arias.  Repeat labs and x-ray are ordered and will be 
followed.  The patient is critically ill at the present time with a very 
guarded prognosis.  I will discuss the above with the entire ICU team in the 
next few moments.  I will also discuss the above with Dr. Arguello.



Thank you very much for this pulmonary consultation.





__________________________________________

Balwinder Fall MD







cc:   



DD: 03/28/2017 06:50:20  [id number]

TT: 03/28/2017 11:11:46

Confirmation # [id number]

Dictation # [id number]

jn

MARIA DEL CARMEN

## 2017-03-28 NOTE — CON
DATE: 03/27/2017



HISTORY OF PRESENT ILLNESS:   This is an 81-year-old gentleman with history of 
dementia, hypertension, who presented to the [facility] on 3/22 (5 
days ago) after a fall at home.  The patient, however, reportedly did not 
remember falling.  The patient started to have right knee pain after the fall 
and had difficulty moving his legs.  Subsequent workup revealed small joint 
effusion in the affected knee; however, decision was made to proceed with 
conservative management as it was pretty minimal.  At the same time, he started 
to develop low-grade fevers and ID service was consulted to rule out severe 
sepsis.  A small joint effusion in the effected knee did not appear to be a 
source of low-grade fever.  The patient was started on broad-spectrum 
antibiotics and septic workup was sent at that time.  Sepsis was attributed to 
lower extremity skin and skin tissue infection.  Meanwhile blood culture came 
back group G Streptococcus positive and a left foot was also positive for MSSA 
as well.  At the same time, procalcitonin came back highly elevated at 31.55 (
disproportionally higher than the degree of renal insufficiency at that time). 
Patient maintained relative respiratory and hemodynamical stability.  However, 
today his respiratory status substantially worsened and he developed acute 
hypercarbic respiratory failure, was emergently transferred to ICU where he was 
intubated.  No nausea, no vomiting, no diarrhea, no constipation.  Of note, his 
echocardiogram was done on 3/23 and showed normal right ventricle and left 
ventricle.  RVSP was 20 mmHg.  The patient did get new PICC line today.  The 
patient received 1 mg of Ativan for the procedure.



PAST MEDICAL HISTORY:  Hypertension, dementia.



FAMILY HISTORY:  Noncontributory.



SOCIAL HISTORY:  No alcohol or illicit drug abuse.  The patient is a former 
tobacco smoker.



ALLERGIES:  NKDA.



REVIEW OF SYSTEMS:  Revealed 12 organ system other than mentioned in history of 
present illness is negative.



PHYSICAL EXAMINATION:

GENERAL:  At present time, the patient is intubated, blood pressure 91/38, 
heart rate 65, oxygen saturation 100% on 100% FIO2.  His setting is 450/16/5.

HEAD AND NECK:  Atraumatic.

LUNGS:  Clear to auscultation bilaterally.

HEART:  Regular rate and rhythm.  S1, S2 normal.

ABDOMEN:  Soft, nontender, nondistended.

MUSCULOSKELETAL:  No C/C/E; however, there is some acute cellulitic changes on 
both lower extremities.

SKIN:  Color moist with signs of acute cellulitis.  

PSYCHIATRIC:  The patient is sedated for the intubation.



LABORATORY DATA:  WBC 19.4, hemoglobin 13.3, platelet count 163.  Sodium 143, 
potassium 4.1, chloride 104, carbon dioxide 27, BUN 72, creatinine 2.1, up from 
1.7, glucose 159.  , , ammonia level less than 9, albumin 2.8.  
ProBNP 9350.  INR 1.12, ABG prior to intubation showed 7.16, pCO2 78, pO2 132% 
FIO2.  Vancomycin as of 2 days ago 15.7.



MEDICATIONS:  Aspirin, DuoNeb p.r.n., metoprolol 25 mg p.o. b.i.d., now held, 
topical lotrimin cream, morphine p.r.n., ceftriaxone, Xanax p.r.n., Tylenol 
p.r.n.



Chest x-ray:  No acute pulmonary disease.  An NG tube and endotracheal tube is 
in the right position.



Microbiology positive for blood culture and group G streptococcus in the blood 
and culture from the left foot positive for MSSA.  The patient is on 
ceftriaxone.



ASSESSMENT AND PLAN:  This is an 81-year-old gentleman with severe sepsis 
secondary to bilateral cellulitis complicated by multiorgan system failure 
including acute kidney injury, transaminitis, acute hypercapnic respiratory 
failure, acute kidney injury and gram-positive bacteremia.

1.  Neurologic:  The patient is sedated, will proceed with fentanyl drip and 
Ativan p.r.n.  He has underlying dementia.  Thus, he is at risk for developing 
ICU delirium, especially in the setting of critical illness.  We will proceed 
with sedation vacation, pain control, optimizing his sleep and circadian 
rhythms. 

2.  Pulmonary:  At present time, we will avoid hyperventilation and 
overcorrection of acute respiratory acidosis.  ABG will be done in 1 hour and 
will be followed.  We will also at the same time proceed with protective lung 
ventilation strategy to avoid ventilator-induced lung injury.  The patient is 
borderline hypotensive, thus he requires fluid resuscitation.  Once his 
hemodynamics improve, we will proceed with conservative fluid management.  We 
will continue with head of bed elevated more than 35 degrees.  VAP bundle.  
Deep venous thrombosis and gastrointestinal prophylaxis.  Pulmonary toilet and 
bronchodilators.

3.  Cardiovascular:  Patient has septic shock. Will start NE, vasopressin, 
stress dose steroids. We will proceed with POCUS.  The echocardiogram performed 
4 days ago did not show significant left or right ventricular dysfunction.

4.  Infectious Disease:  The patient has septic shock secondary to lower 
extremity cellulitis.  We will repeat procalcitonin.  I would empirically 
upgraded his antibiotics pending infectious disease service input.  I would 
repeat blood, urine and sputum culture.

5.  Endocrine:  We will maintain blood glucose within 140-180 range.

6.  Renal:  The patient's acute kidney injury is worsening.  We will continue 
with fluid resuscitation, treating his severe sepsis and stricter glucose 
control for additional nephro protection.  We will avoid nephrotoxic medication
; however, not in expense of treating underlying disease.

7.  Gastrointestinal:  We will start patient on enteral nutrition.  Nasogastric 
tube is in the right position.  Gastrointestinal prophylaxis.



ccm time 40 min





__________________________________________

Kentrell Lira MD







cc:   



DD: 03/27/2017 15:40:45  [id number]

TT: 03/28/2017 03:14:05

Confirmation # [id number]

Dictation # [id number]

tn

MTDD

## 2017-03-28 NOTE — CP.CCUPN
<Lucero Lopez - Last Filed: 03/28/17 12:30>





CCU Subjective





- Physician Review


Events Since Last Encounter (Free Text): 


03/28/17 11:45


Patient seen and examined bedside. No acute events overnight. Patient tolerated 

PS trial this morning, was successfully extubated this morning. Denies CP, Abd 

pain, n/v. Patient able to cough and protect airway s/p extubation thus far. 


Critical Care Time Spent (in minutes): 40





CCU Objective





- Vital Signs / Intake & Output


Vital Signs (Last 4 hours): 


Vital Signs











  BP


 


 03/28/17 09:27  183/76 H











Intake and Output (Last 8hrs): 


 Intake & Output











 03/27/17 03/28/17 03/28/17





 22:59 06:59 14:59


 


Intake Total 265 447 


 


Output Total 251 1000 


 


Balance 14 -553 


 


Weight 250 lb 6.4 oz  


 


Intake:   


 


   447 


 


    Left Forearm 15 183 


 


    Left Upper arm  184 


 


    right antecubital 250 80 


 


  Oral  0 


 


Output:   


 


  Urine 251 1000 


 


    Urine, Voided 1  


 


    Urethral (Bill) 250 1000 


 


Other:   


 


  Voiding Method Indwelling Catheter  


 


  # Bowel Movements  0 














- Physical Exam


Head: Positive for: Atraumatic, Normocephalic.  Negative for: Abrasion, 

Laceration


Pupils: Positive for: PERRL


Extroacular Muscles: Positive for: EOMI.  Negative for: Entrapment


Conjunctiva: Positive for: Normal.  Negative for: Injected


Mouth: Positive for: Moist Mucous Membranes


Respiratory/Chest: Positive for: Clear to Auscultation, Good Air Exchange, 

Tachypneic.  Negative for: Respiratory Distress, Accessory Muscle Use


Cardiovascular: Positive for: Regular Rate and Rhythm, Normal S1, S2.  Negative 

for: Murmurs


Abdomen: Positive for: Normal Bowel Sounds.  Negative for: Tenderness, 

Distention, Peritoneal Signs


Upper Extremity: Positive for: Normal Inspection, NORMAL PULSES, 

Neurovascularly Intact.  Negative for: Cyanosis, Edema


Lower Extremity: Positive for: Normal Inspection, Tenderness, Neurovascularly 

Intact, Other (abrasion right shin, chronic venous stasis changes foot and 

ankle B/L).  Negative for: Edema, NORMAL PULSES (diminished PT and DP pulse B/L)

, Swelling, Deformity


Neurological: Positive for: GCS=15, CN II-XII Intact


Skin: Positive for: Warm, Dry, Abrasion


Psychiatric: Positive for: Alert, Oriented x 3





- Medications


Active Medications: 


Active Medications











Generic Name Dose Route Start Last Admin





  Trade Name Freq  PRN Reason Stop Dose Admin


 


Acetaminophen  650 mg 03/22/17 11:33 03/23/17 09:15





  Tylenol 325mg Tab  PO   650 mg





  Q4 PRN   Administration





  Fever >100.4 F   


 


Albuterol/Ipratropium  3 ml 03/28/17 08:00 03/28/17 07:21





  Duoneb 3 Mg/0.5 Mg (3 Ml) Ud  IH   3 ml





  [id number] WAQAR   Administration


 


Alprazolam  0.25 mg 03/27/17 08:42  





  Xanax  PO 04/03/17 12:01  





  Q6 PRN   





  Anxiety   





  Protocol   


 


Aspirin  325 mg 03/25/17 10:00 03/28/17 10:32





  Aspirin  PO   Not Given





  DAILY WAQAR   


 


Clotrimazole  0 gm 03/22/17 18:00 03/28/17 10:40





  Lotrimin 1%  TOP   1 applic





  BID WAQAR   Administration


 


Hydrocortisone Sodium Succinate  50 mg 03/27/17 15:45 03/28/17 09:26





  Solu-Cortef  IVP   50 mg





  Q6H WAQAR   Administration


 


Fentanyl Citrate  100 mls @ 7.5 mls/hr 03/27/17 15:41 03/28/17 03:31





  Fentanyl Citrate/Sodium Chloride 1 Mg/100 Ml  IV   7.5 mls/hr





  .[id number] PRN   Administration





  TITRATE PER MD ORDER   





  Protocol   





  75 MCG/HR   


 


Norepinephrine Bitartrate 4 mg  254 mls @ 15.24 mls/hr 03/27/17 15:42 03/27/17 

17:06





  / Dextrose  IV   15.24 mls/hr





  .[id number] PRN   Administration





  TITRATE PER MD ORDER   





  Protocol   





  4 MCG/MIN   


 


Vasopressin 20 units/ Sodium  101 mls @ 9.09 mls/hr 03/27/17 15:45 03/28/17 04:

00





  Chloride  IV   9.09 mls/hr





  .[id number] WAQAR   Administration





  Protocol   





  0.03 U/MIN   


 


Heparin Sodium/Sodium Chloride  250 mls @ 13.63 mls/hr 03/27/17 21:30 03/28/17 

05:30





  Heparin 70895 Units/250ml 1/2 Normal Saline  IV   9 units/kg/hr





  .[id number] WAQAR   Titration





  Protocol   





  12 UNITS/KG/HR   


 


Dextrose  1,000 mls @ 100 mls/hr 03/28/17 08:00 03/28/17 08:50





  Dextrose 5% In Water 1000 Ml  IV 03/29/17 03:59  100 mls/hr





  .Q10H WAQAR   Administration


 


Meropenem 1g/NS 100mL IVPB  100 mls @ 100 mls/hr 03/28/17 08:30 03/28/17 09:31





  Meropenem 1g/Ns 100ml Ivpb  IVPB 04/04/17 08:31  100 mls/hr





  Q12 WAQAR   Administration





  Protocol   


 


Metoprolol Tartrate  2.5 mg 03/28/17 09:00 03/28/17 09:27





  Lopressor  IV   2.5 mg





  Q8H WAQAR   Administration


 


Midazolam HCl  4 mg 03/27/17 15:41  





  Versed Inj  IVP   





  Q4H PRN   





  Agitation   


 


Morphine Sulfate  1 mg 03/22/17 12:41 03/27/17 02:22





  Morphine  IVP   1 mg





  Q4H PRN   Administration





  Pain, moderate (4-7)   


 


Pantoprazole Sodium  40 mg 03/27/17 16:00 03/28/17 09:26





  Protonix Inj  IVP   40 mg





  DAILY WAQAR   Administration


 


Silver Sulfadiazine  0 ea 03/25/17 10:00 03/28/17 10:40





  Silvadene 1% 20 Gm  TOP   1 applic





  DAILY WAQAR   Administration














- Patient Studies


Lab Studies: 


 Microbiology Studies











 03/23/17 15:50 Blood Culture - Preliminary





 Blood-Venous    NO GROWTH AFTER 4 DAYS


 


 03/23/17 15:30 Blood Culture - Preliminary





 Blood-Venous    NO GROWTH AFTER 4 DAYS








 Lab Studies











  03/28/17 03/28/17 03/28/17 Range/Units





  07:00 05:30 05:15 


 


WBC   12.8 H   (4.5-11.0)  10^3/ul


 


RBC   4.01   (3.5-6.1)  10^6/uL


 


Hgb   11.1 L   (14.0-18.0)  gm/dL


 


Hct   33.6 L   (42.0-52.0)  %


 


MCV   83.8   (80.0-105.0)  fL


 


MCH   27.7   (25.0-35.0)  pg


 


MCHC   33.0   (31.0-37.0)  g/dl


 


RDW   14.8 H   (11.5-14.5)  %


 


Plt Count   191   (120.0-450.0)  10^3/uL


 


MPV   9.4   (7.0-11.0)  fl


 


Gran %     (50.0-68.0)  %


 


Lymph % (Auto)     (22.0-35.0)  %


 


Mono % (Auto)     (1.0-6.0)  %


 


Eos % (Auto)     (1.5-5.0)  %


 


Baso % (Auto)     (0.0-3.0)  %


 


Gran #     (1.4-6.5)  


 


Lymph #     (1.2-3.4)  


 


Mono #     (0.1-0.6)  


 


Eos #     (0.0-0.7)  


 


Baso #     (0.0-2.0)  K/mm3


 


APTT     (23.7-30.8)  Seconds


 


pCO2    43  (35-45)  mm/Hg


 


pO2    105.0 H  ()  mm/Hg


 


HCO3    25.4  (21-28)  mmol/L


 


ABG pH    7.38  (7.35-7.45)  


 


ABG Total CO2    26.7  (22-28)  mmol.L


 


ABG O2 Saturation    98.8 H  (95-98)  %


 


ABG O2 Content    15.2  (15-23)  ML/dl


 


ABG Base Excess    0.1  (-2.0-3.0)  mmol/L


 


ABG Hemoglobin    11.1 L  (11.7-17.4)  g/dL


 


ABG Carboxyhemoglobin    1.8 H  (0.5-1.5)  %


 


POC ABG HHb (Measured)    1.2  (0-5)  %


 


ABG Methemoglobin    0.4  (0.0-3.0)  %


 


ABG O2 Capacity    15.4 L  (16-24)  mL/dl


 


ABG Potassium     (3.6-5.2)  mmol/L


 


VBG pH     (7.32-7.43)  


 


VBG pCO2     (40-60)  


 


VBG HCO3     (21-28)  mmol/l


 


VBG Total CO2     (22-28)  mmol.L


 


VBG O2 Sat (Calc)     (40-65)  %


 


VBG Base Excess     (0.0-2.0)  mmol/L


 


VBG Potassium     (3.6-5.2)  mmol/L


 


Hgb O2 Saturation    96.6  (95.0-98.0)  %


 


Glucose     ()  mg/dl


 


Lactate     (0.7-2.1)  mmol/L


 


Mechanical Rate     


 


FiO2    50.0  %


 


Tidal Volume     


 


PEEP     


 


Sodium   149 H   (132-148)  mmol/L


 


Potassium   4.2   (3.6-5.0)  mmol/L


 


Chloride   110   ()  mmol/L


 


Carbon Dioxide   27   (21-33)  mmol/L


 


Anion Gap   16   (10-20)  


 


BUN   87 H   (7-21)  mg/dL


 


Creatinine   2.0 H   (0.5-1.4)  mg/dL


 


Est GFR ( Amer)   39   


 


Est GFR (Non-Af Amer)   32   


 


POC Glucose (mg/dL)     ()  mg/dL


 


Random Glucose   196 H   ()  mg/dL


 


Calcium   8.1 L   (8.4-10.5)  mg/dL


 


Total Bilirubin   0.5   (0.2-1.3)  mg/dL


 


AST   171 H   (15-59)  U/L


 


ALT   171 H   (7-56)  U/L


 


Alkaline Phosphatase   72   ()  U/L


 


Troponin I  5.25 H* D    ng/mL


 


Total Protein   5.3 L   (5.8-8.3)  g/dL


 


Albumin   2.5 L   (3.0-4.8)  g/dL


 


Globulin   2.8   gm/dL


 


Albumin/Globulin Ratio   0.9 L   (1.1-1.8)  


 


Procalcitonin     (0.19-0.49)  NG/ML


 


Arterial Blood Potassium     (3.6-5.2)  mmol/L


 


Venous Blood Potassium     (3.6-5.2)  mmol/L


 


REJI Screen     (Negative)  


 


REJI Titer     


 


REJI Titer 2     


 


REJI Pattern     


 


REJI Pattern 2     














  03/28/17 03/27/17 03/27/17 Range/Units





  03:45 21:30 19:50 


 


WBC     (4.5-11.0)  10^3/ul


 


RBC     (3.5-6.1)  10^6/uL


 


Hgb     (14.0-18.0)  gm/dL


 


Hct     (42.0-52.0)  %


 


MCV     (80.0-105.0)  fL


 


MCH     (25.0-35.0)  pg


 


MCHC     (31.0-37.0)  g/dl


 


RDW     (11.5-14.5)  %


 


Plt Count     (120.0-450.0)  10^3/uL


 


MPV     (7.0-11.0)  fl


 


Gran %     (50.0-68.0)  %


 


Lymph % (Auto)     (22.0-35.0)  %


 


Mono % (Auto)     (1.0-6.0)  %


 


Eos % (Auto)     (1.5-5.0)  %


 


Baso % (Auto)     (0.0-3.0)  %


 


Gran #     (1.4-6.5)  


 


Lymph #     (1.2-3.4)  


 


Mono #     (0.1-0.6)  


 


Eos #     (0.0-0.7)  


 


Baso #     (0.0-2.0)  K/mm3


 


APTT  118.3 H*    (23.7-30.8)  Seconds


 


pCO2     (35-45)  mm/Hg


 


pO2     ()  mm/Hg


 


HCO3     (21-28)  mmol/L


 


ABG pH     (7.35-7.45)  


 


ABG Total CO2     (22-28)  mmol.L


 


ABG O2 Saturation     (95-98)  %


 


ABG O2 Content     (15-23)  ML/dl


 


ABG Base Excess     (-2.0-3.0)  mmol/L


 


ABG Hemoglobin     (11.7-17.4)  g/dL


 


ABG Carboxyhemoglobin     (0.5-1.5)  %


 


POC ABG HHb (Measured)     (0-5)  %


 


ABG Methemoglobin     (0.0-3.0)  %


 


ABG O2 Capacity     (16-24)  mL/dl


 


ABG Potassium     (3.6-5.2)  mmol/L


 


VBG pH     (7.32-7.43)  


 


VBG pCO2     (40-60)  


 


VBG HCO3     (21-28)  mmol/l


 


VBG Total CO2     (22-28)  mmol.L


 


VBG O2 Sat (Calc)     (40-65)  %


 


VBG Base Excess     (0.0-2.0)  mmol/L


 


VBG Potassium     (3.6-5.2)  mmol/L


 


Hgb O2 Saturation     (95.0-98.0)  %


 


Glucose     ()  mg/dl


 


Lactate     (0.7-2.1)  mmol/L


 


Mechanical Rate     


 


FiO2     %


 


Tidal Volume     


 


PEEP     


 


Sodium     (132-148)  mmol/L


 


Potassium     (3.6-5.0)  mmol/L


 


Chloride     ()  mmol/L


 


Carbon Dioxide     (21-33)  mmol/L


 


Anion Gap     (10-20)  


 


BUN     (7-21)  mg/dL


 


Creatinine     (0.5-1.4)  mg/dL


 


Est GFR (African Amer)     


 


Est GFR (Non-Af Amer)     


 


POC Glucose (mg/dL)   203 H   ()  mg/dL


 


Random Glucose     ()  mg/dL


 


Calcium     (8.4-10.5)  mg/dL


 


Total Bilirubin     (0.2-1.3)  mg/dL


 


AST     (15-59)  U/L


 


ALT     (7-56)  U/L


 


Alkaline Phosphatase     ()  U/L


 


Troponin I    3.37 H* D  ng/mL


 


Total Protein     (5.8-8.3)  g/dL


 


Albumin     (3.0-4.8)  g/dL


 


Globulin     gm/dL


 


Albumin/Globulin Ratio     (1.1-1.8)  


 


Procalcitonin     (0.19-0.49)  NG/ML


 


Arterial Blood Potassium     (3.6-5.2)  mmol/L


 


Venous Blood Potassium     (3.6-5.2)  mmol/L


 


REJI Screen     (Negative)  


 


REJI Titer     


 


REJI Titer 2     


 


REJI Pattern     


 


REJI Pattern 2     














  03/27/17 03/27/17 03/27/17 Range/Units





  16:58 16:24 14:26 


 


WBC   13.7 H D   (4.5-11.0)  10^3/ul


 


RBC   3.97   (3.5-6.1)  10^6/uL


 


Hgb   10.9 L   (14.0-18.0)  gm/dL


 


Hct   33.6 L   (42.0-52.0)  %


 


MCV   84.6   (80.0-105.0)  fL


 


MCH   27.5   (25.0-35.0)  pg


 


MCHC   32.4   (31.0-37.0)  g/dl


 


RDW   14.7 H   (11.5-14.5)  %


 


Plt Count   156   (120.0-450.0)  10^3/uL


 


MPV   9.4   (7.0-11.0)  fl


 


Gran %   87.3 H   (50.0-68.0)  %


 


Lymph % (Auto)   8.0 L   (22.0-35.0)  %


 


Mono % (Auto)   4.6   (1.0-6.0)  %


 


Eos % (Auto)   0.0 L   (1.5-5.0)  %


 


Baso % (Auto)   0.1   (0.0-3.0)  %


 


Gran #   11.97 H   (1.4-6.5)  


 


Lymph #   1.1 L   (1.2-3.4)  


 


Mono #   0.6   (0.1-0.6)  


 


Eos #   0.0   (0.0-0.7)  


 


Baso #   0.02   (0.0-2.0)  K/mm3


 


APTT     (23.7-30.8)  Seconds


 


pCO2   47 H   (35-45)  mm/Hg


 


pO2   366.0 H   ()  mm/Hg


 


HCO3   25.4   (21-28)  mmol/L


 


ABG pH   7.34 L   (7.35-7.45)  


 


ABG Total CO2   26.8   (22-28)  mmol.L


 


ABG O2 Saturation   99.5 H   (95-98)  %


 


ABG O2 Content     (15-23)  ML/dl


 


ABG Base Excess   -0.8   (-2.0-3.0)  mmol/L


 


ABG Hemoglobin     (11.7-17.4)  g/dL


 


ABG Carboxyhemoglobin     (0.5-1.5)  %


 


POC ABG HHb (Measured)     (0-5)  %


 


ABG Methemoglobin     (0.0-3.0)  %


 


ABG O2 Capacity     (16-24)  mL/dl


 


ABG Potassium   4.4   (3.6-5.2)  mmol/L


 


VBG pH   7.27 L   (7.32-7.43)  


 


VBG pCO2   61.0 H   (40-60)  


 


VBG HCO3   28.0   (21-28)  mmol/l


 


VBG Total CO2   29.9 H   (22-28)  mmol.L


 


VBG O2 Sat (Calc)   82.3 H   (40-65)  %


 


VBG Base Excess   -0.3 L   (0.0-2.0)  mmol/L


 


VBG Potassium   4.6   (3.6-5.2)  mmol/L


 


Hgb O2 Saturation     (95.0-98.0)  %


 


Glucose   167 H   ()  mg/dl


 


Lactate   1.2   (0.7-2.1)  mmol/L


 


Mechanical Rate   16   


 


FiO2   100.0   %


 


Tidal Volume   450   


 


PEEP   5   


 


Sodium   145.0   (132-148)  mmol/L


 


Potassium   4.6   (3.6-5.0)  mmol/L


 


Chloride   116.0 H   ()  mmol/L


 


Carbon Dioxide   27   (21-33)  mmol/L


 


Anion Gap   15   (10-20)  


 


BUN   79 H   (7-21)  mg/dL


 


Creatinine   2.3 H   (0.5-1.4)  mg/dL


 


Est GFR ( Amer)   33   


 


Est GFR (Non-Af Amer)   27   


 


POC Glucose (mg/dL)  171 H   144 H  ()  mg/dL


 


Random Glucose   167 H   ()  mg/dL


 


Calcium   7.9 L   (8.4-10.5)  mg/dL


 


Total Bilirubin   0.6   (0.2-1.3)  mg/dL


 


AST   226 H   (15-59)  U/L


 


ALT   183 H   (7-56)  U/L


 


Alkaline Phosphatase   70   ()  U/L


 


Troponin I   1.51 H* D   ng/mL


 


Total Protein   5.2 L   (5.8-8.3)  g/dL


 


Albumin   2.3 L   (3.0-4.8)  g/dL


 


Globulin   2.9   gm/dL


 


Albumin/Globulin Ratio   0.8 L   (1.1-1.8)  


 


Procalcitonin   7.36 H   (0.19-0.49)  NG/ML


 


Arterial Blood Potassium   4.4   (3.6-5.2)  mmol/L


 


Venous Blood Potassium   4.6   (3.6-5.2)  mmol/L


 


REJI Screen     (Negative)  


 


REJI Titer     


 


REJI Titer 2     


 


REJI Pattern     


 


REJI Pattern 2     














  03/27/17 03/27/17 03/25/17 Range/Units





  14:00 13:55 06:15 


 


WBC     (4.5-11.0)  10^3/ul


 


RBC     (3.5-6.1)  10^6/uL


 


Hgb     (14.0-18.0)  gm/dL


 


Hct     (42.0-52.0)  %


 


MCV     (80.0-105.0)  fL


 


MCH     (25.0-35.0)  pg


 


MCHC     (31.0-37.0)  g/dl


 


RDW     (11.5-14.5)  %


 


Plt Count     (120.0-450.0)  10^3/uL


 


MPV     (7.0-11.0)  fl


 


Gran %     (50.0-68.0)  %


 


Lymph % (Auto)     (22.0-35.0)  %


 


Mono % (Auto)     (1.0-6.0)  %


 


Eos % (Auto)     (1.5-5.0)  %


 


Baso % (Auto)     (0.0-3.0)  %


 


Gran #     (1.4-6.5)  


 


Lymph #     (1.2-3.4)  


 


Mono #     (0.1-0.6)  


 


Eos #     (0.0-0.7)  


 


Baso #     (0.0-2.0)  K/mm3


 


APTT     (23.7-30.8)  Seconds


 


pCO2  78 H*    (35-45)  mm/Hg


 


pO2  100.0    ()  mm/Hg


 


HCO3  27.8    (21-28)  mmol/L


 


ABG pH  7.16 L*    (7.35-7.45)  


 


ABG Total CO2  30.2 H    (22-28)  mmol.L


 


ABG O2 Saturation  97.7    (95-98)  %


 


ABG O2 Content  17.3    (15-23)  ML/dl


 


ABG Base Excess  -2.7 L    (-2.0-3.0)  mmol/L


 


ABG Hemoglobin  12.9    (11.7-17.4)  g/dL


 


ABG Carboxyhemoglobin  2.6 H    (0.5-1.5)  %


 


POC ABG HHb (Measured)  2.2    (0-5)  %


 


ABG Methemoglobin  0.5    (0.0-3.0)  %


 


ABG O2 Capacity  17.7    (16-24)  mL/dl


 


ABG Potassium     (3.6-5.2)  mmol/L


 


VBG pH     (7.32-7.43)  


 


VBG pCO2     (40-60)  


 


VBG HCO3     (21-28)  mmol/l


 


VBG Total CO2     (22-28)  mmol.L


 


VBG O2 Sat (Calc)     (40-65)  %


 


VBG Base Excess     (0.0-2.0)  mmol/L


 


VBG Potassium     (3.6-5.2)  mmol/L


 


Hgb O2 Saturation  94.8 L    (95.0-98.0)  %


 


Glucose     ()  mg/dl


 


Lactate     (0.7-2.1)  mmol/L


 


Mechanical Rate     


 


FiO2  32.0    %


 


Tidal Volume     


 


PEEP     


 


Sodium     (132-148)  mmol/L


 


Potassium     (3.6-5.0)  mmol/L


 


Chloride     ()  mmol/L


 


Carbon Dioxide     (21-33)  mmol/L


 


Anion Gap     (10-20)  


 


BUN     (7-21)  mg/dL


 


Creatinine     (0.5-1.4)  mg/dL


 


Est GFR (African Amer)     


 


Est GFR (Non-Af Amer)     


 


POC Glucose (mg/dL)   217 H   ()  mg/dL


 


Random Glucose     ()  mg/dL


 


Calcium     (8.4-10.5)  mg/dL


 


Total Bilirubin     (0.2-1.3)  mg/dL


 


AST     (15-59)  U/L


 


ALT     (7-56)  U/L


 


Alkaline Phosphatase     ()  U/L


 


Troponin I     ng/mL


 


Total Protein     (5.8-8.3)  g/dL


 


Albumin     (3.0-4.8)  g/dL


 


Globulin     gm/dL


 


Albumin/Globulin Ratio     (1.1-1.8)  


 


Procalcitonin     (0.19-0.49)  NG/ML


 


Arterial Blood Potassium     (3.6-5.2)  mmol/L


 


Venous Blood Potassium     (3.6-5.2)  mmol/L


 


REJI Screen    Negative  (Negative)  


 


REJI Titer    TEST NOT PERFORMED  


 


REJI Titer 2    TEST NOT PERFORMED  


 


REJI Pattern    TEST NOT PERFORMED  


 


REJI Pattern 2    TEST NOT PERFORMED  








 Laboratory Results - last 24 hr











  03/25/17 03/27/17 03/27/17





  06:15 13:55 14:00


 


WBC   


 


RBC   


 


Hgb   


 


Hct   


 


MCV   


 


MCH   


 


MCHC   


 


RDW   


 


Plt Count   


 


MPV   


 


Gran %   


 


Lymph % (Auto)   


 


Mono % (Auto)   


 


Eos % (Auto)   


 


Baso % (Auto)   


 


Gran #   


 


Lymph #   


 


Mono #   


 


Eos #   


 


Baso #   


 


APTT   


 


pCO2    78 H*


 


pO2    100.0


 


HCO3    27.8


 


ABG pH    7.16 L*


 


ABG Total CO2    30.2 H


 


ABG O2 Saturation    97.7


 


ABG O2 Content    17.3


 


ABG Base Excess    -2.7 L


 


ABG Hemoglobin    12.9


 


ABG Carboxyhemoglobin    2.6 H


 


POC ABG HHb (Measured)    2.2


 


ABG Methemoglobin    0.5


 


ABG O2 Capacity    17.7


 


ABG Potassium   


 


VBG pH   


 


VBG pCO2   


 


VBG HCO3   


 


VBG Total CO2   


 


VBG O2 Sat (Calc)   


 


VBG Base Excess   


 


VBG Potassium   


 


Hgb O2 Saturation    94.8 L


 


Sodium   


 


Chloride   


 


Glucose   


 


Lactate   


 


Mechanical Rate   


 


FiO2    32.0


 


Tidal Volume   


 


PEEP   


 


Potassium   


 


Carbon Dioxide   


 


Anion Gap   


 


BUN   


 


Creatinine   


 


Est GFR ( Amer)   


 


Est GFR (Non-Af Amer)   


 


POC Glucose (mg/dL)   217 H 


 


Random Glucose   


 


Calcium   


 


Total Bilirubin   


 


AST   


 


ALT   


 


Alkaline Phosphatase   


 


Troponin I   


 


Total Protein   


 


Albumin   


 


Globulin   


 


Albumin/Globulin Ratio   


 


Procalcitonin   


 


Arterial Blood Potassium   


 


Venous Blood Potassium   


 


REJI Screen  Negative  


 


REJI Titer  TEST NOT PERFORMED  


 


REJI Titer 2  TEST NOT PERFORMED  


 


REJI Pattern  TEST NOT PERFORMED  


 


REJI Pattern 2  TEST NOT PERFORMED  














  03/27/17 03/27/17 03/27/17





  14:26 16:24 16:58


 


WBC   13.7 H D 


 


RBC   3.97 


 


Hgb   10.9 L 


 


Hct   33.6 L 


 


MCV   84.6 


 


MCH   27.5 


 


MCHC   32.4 


 


RDW   14.7 H 


 


Plt Count   156 


 


MPV   9.4 


 


Gran %   87.3 H 


 


Lymph % (Auto)   8.0 L 


 


Mono % (Auto)   4.6 


 


Eos % (Auto)   0.0 L 


 


Baso % (Auto)   0.1 


 


Gran #   11.97 H 


 


Lymph #   1.1 L 


 


Mono #   0.6 


 


Eos #   0.0 


 


Baso #   0.02 


 


APTT   


 


pCO2   47 H 


 


pO2   45 


 


HCO3   25.4 


 


ABG pH   7.34 L 


 


ABG Total CO2   26.8 


 


ABG O2 Saturation   99.5 H 


 


ABG O2 Content   


 


ABG Base Excess   -0.8 


 


ABG Hemoglobin   


 


ABG Carboxyhemoglobin   


 


POC ABG HHb (Measured)   


 


ABG Methemoglobin   


 


ABG O2 Capacity   


 


ABG Potassium   4.4 


 


VBG pH   7.27 L 


 


VBG pCO2   61.0 H 


 


VBG HCO3   28.0 


 


VBG Total CO2   29.9 H 


 


VBG O2 Sat (Calc)   82.3 H 


 


VBG Base Excess   -0.3 L 


 


VBG Potassium   4.6 


 


Hgb O2 Saturation   


 


Sodium   145 


 


Chloride   108 H 


 


Glucose   174 H 


 


Lactate   1.2 


 


Mechanical Rate   16 


 


FiO2   21.0 


 


Tidal Volume   450 


 


PEEP   5 


 


Potassium   4.6 


 


Carbon Dioxide   27 


 


Anion Gap   15 


 


BUN   79 H 


 


Creatinine   2.3 H 


 


Est GFR ( Amer)   33 


 


Est GFR (Non-Af Amer)   27 


 


POC Glucose (mg/dL)  144 H   171 H


 


Random Glucose   167 H 


 


Calcium   7.9 L 


 


Total Bilirubin   0.6 


 


AST   226 H 


 


ALT   183 H 


 


Alkaline Phosphatase   70 


 


Troponin I   1.51 H* D 


 


Total Protein   5.2 L 


 


Albumin   2.3 L 


 


Globulin   2.9 


 


Albumin/Globulin Ratio   0.8 L 


 


Procalcitonin   7.36 H 


 


Arterial Blood Potassium   4.4 


 


Venous Blood Potassium   4.6 


 


REJI Screen   


 


REJI Titer   


 


REJI Titer 2   


 


REJI Pattern   


 


REJI Pattern 2   














  03/27/17 03/27/17 03/28/17





  19:50 21:30 03:45


 


WBC   


 


RBC   


 


Hgb   


 


Hct   


 


MCV   


 


MCH   


 


MCHC   


 


RDW   


 


Plt Count   


 


MPV   


 


Gran %   


 


Lymph % (Auto)   


 


Mono % (Auto)   


 


Eos % (Auto)   


 


Baso % (Auto)   


 


Gran #   


 


Lymph #   


 


Mono #   


 


Eos #   


 


Baso #   


 


APTT    118.3 H*


 


pCO2   


 


pO2   


 


HCO3   


 


ABG pH   


 


ABG Total CO2   


 


ABG O2 Saturation   


 


ABG O2 Content   


 


ABG Base Excess   


 


ABG Hemoglobin   


 


ABG Carboxyhemoglobin   


 


POC ABG HHb (Measured)   


 


ABG Methemoglobin   


 


ABG O2 Capacity   


 


ABG Potassium   


 


VBG pH   


 


VBG pCO2   


 


VBG HCO3   


 


VBG Total CO2   


 


VBG O2 Sat (Calc)   


 


VBG Base Excess   


 


VBG Potassium   


 


Hgb O2 Saturation   


 


Sodium   


 


Chloride   


 


Glucose   


 


Lactate   


 


Mechanical Rate   


 


FiO2   


 


Tidal Volume   


 


PEEP   


 


Potassium   


 


Carbon Dioxide   


 


Anion Gap   


 


BUN   


 


Creatinine   


 


Est GFR ( Amer)   


 


Est GFR (Non-Af Amer)   


 


POC Glucose (mg/dL)   203 H 


 


Random Glucose   


 


Calcium   


 


Total Bilirubin   


 


AST   


 


ALT   


 


Alkaline Phosphatase   


 


Troponin I  3.37 H* D  


 


Total Protein   


 


Albumin   


 


Globulin   


 


Albumin/Globulin Ratio   


 


Procalcitonin   


 


Arterial Blood Potassium   


 


Venous Blood Potassium   


 


REJI Screen   


 


REJI Titer   


 


REJI Titer 2   


 


REJI Pattern   


 


REJI Pattern 2   














  03/28/17 03/28/17 03/28/17





  05:15 05:30 07:00


 


WBC   12.8 H 


 


RBC   4.01 


 


Hgb   11.1 L 


 


Hct   33.6 L 


 


MCV   83.8 


 


MCH   27.7 


 


MCHC   33.0 


 


RDW   14.8 H 


 


Plt Count   191 


 


MPV   9.4 


 


Gran %   


 


Lymph % (Auto)   


 


Mono % (Auto)   


 


Eos % (Auto)   


 


Baso % (Auto)   


 


Gran #   


 


Lymph #   


 


Mono #   


 


Eos #   


 


Baso #   


 


APTT   


 


pCO2  43  


 


pO2  105.0 H  


 


HCO3  25.4  


 


ABG pH  7.38  


 


ABG Total CO2  26.7  


 


ABG O2 Saturation  98.8 H  


 


ABG O2 Content  15.2  


 


ABG Base Excess  0.1  


 


ABG Hemoglobin  11.1 L  


 


ABG Carboxyhemoglobin  1.8 H  


 


POC ABG HHb (Measured)  1.2  


 


ABG Methemoglobin  0.4  


 


ABG O2 Capacity  15.4 L  


 


ABG Potassium   


 


VBG pH   


 


VBG pCO2   


 


VBG HCO3   


 


VBG Total CO2   


 


VBG O2 Sat (Calc)   


 


VBG Base Excess   


 


VBG Potassium   


 


Hgb O2 Saturation  96.6  


 


Sodium   149 H 


 


Chloride   110 


 


Glucose   


 


Lactate   


 


Mechanical Rate   


 


FiO2  50.0  


 


Tidal Volume   


 


PEEP   


 


Potassium   4.2 


 


Carbon Dioxide   27 


 


Anion Gap   16 


 


BUN   87 H 


 


Creatinine   2.0 H 


 


Est GFR ( Amer)   39 


 


Est GFR (Non-Af Amer)   32 


 


POC Glucose (mg/dL)   


 


Random Glucose   196 H 


 


Calcium   8.1 L 


 


Total Bilirubin   0.5 


 


AST   171 H 


 


ALT   171 H 


 


Alkaline Phosphatase   72 


 


Troponin I    5.25 H* D


 


Total Protein   5.3 L 


 


Albumin   2.5 L 


 


Globulin   2.8 


 


Albumin/Globulin Ratio   0.9 L 


 


Procalcitonin   


 


Arterial Blood Potassium   


 


Venous Blood Potassium   


 


REJI Screen   


 


REJI Titer   


 


REJI Titer 2   


 


REJI Pattern   


 


REJI Pattern 2   











EKG/Cardiology Studies: 


Cardiology / EKG Studies





03/27/17 14:40


EKG [ELECTROCARDIOGRAM] Stat 


   Comment: NSTEMI on admission


   Reason For Exam: Pt is unresponsive














Review of Systems





- Constitutional


Constitutional: absent: Fever, Chills





- EENT


Eyes: absent: Change in Vision


Ears: absent: Dizziness





- Cardiovascular


Cardiovascular: absent: Chest Pain, Diaphoresis





- Respiratory


Respiratory: absent: Cough, Dyspnea, Pain on Inspiration





- Gastrointestinal


Gastrointestinal: absent: Abdominal Pain, Nausea, Vomiting





- Integumentary


Integumentary: absent: New Lesions





Critical Care Progress Note





- Ventilator Checklist


PUD Prophalyxis: Yes


DVT Prophylaxis: Yes





- Nutrition


Nutrition: 


 Nutrition











 Category Date Time Status


 


 NPO Diet [DIET] Diets  03/27/17 Dinner Ordered














Assessment/Plan





- Assessment and Plan (Free Text)


Assessment: 


82 yo M w h/o HTN, COPD, obesity, dementia initially admitted to [facility] with Group 

G strep bacteremia, L foot MSSA infection transferred to ICU yesterday s/p RRT 

due to hypoxic, hypercarbic RF requiring intubation, now s/p extubation this 

AM. Now off pressors, new troponin elevations. 


Plan: 


Neuro: AAOx3, NAD, s/p extubation this AM, tolerating NC, able to clear 

secretions and protect airway





Pulm: Hypercarbic RF 2/2 Ativan with hypoventilation, patient was difficult to 

arouse, GCS worrisome for protecting airway yesterday


   Will monitor in ICU for a few hours post-extubation


   Duonebs Q6hr





CV: Off vasopressors. 


   New troponin elevation now to 5.25. Will repeat EKG STAT and 2D ECHO to 

assess for new LV wall motion abnormalities. Cardio recs appreciated. 


   


GI: GI ppx


   Transaminitis still improving. Continue monitoring





Renal: Stress dose steroids.


   CARLOS (unknonw baseline) stable. Continue IVF as per primary team





Endo:  No acute issues. Maintain euglycemia 140-180





ID: Continue Meropenem for L foot MSSA cellulitis, Group G Strep bacteremia. 

Procalcitonin trending down 7.36 from 31.55


   


Heme: Hb stable. No signs of bleeding. 





DVT/GI ppx: Heparin gtt, protonix





- Date & Time


Date: 03/28/17


Time: 12:32





<Mohsen Lovelace MD H - Last Filed: 03/28/17 15:19>





CCU Objective





- Vital Signs / Intake & Output


Vital Signs (Last 4 hours): 


Vital Signs











  Temp Pulse Resp BP Pulse Ox


 


 03/28/17 12:32   71  24  191/75 H  96


 


 03/28/17 12:30   72  22  186/69 H  97


 


 03/28/17 12:19   73   182/45 H 


 


 03/28/17 12:15   69  16   98


 


 03/28/17 12:00  97.1 F L  73  27 H  182/45 H  96


 


 03/28/17 11:45   86  30 H  


 


 03/28/17 11:30   66  29 H  187/65 H  96


 


 03/28/17 11:27   67  19  193/79 H  95











Intake and Output (Last 8hrs): 


 Intake & Output











 03/28/17 03/28/17 03/28/17





 06:59 14:59 22:59


 


Intake Total 447  


 


Output Total 1000  


 


Balance -553  


 


Weight  250 lb 6.4 oz 


 


Intake:   


 


    


 


    Left Forearm 183  


 


    Left Upper arm 184  


 


    right antecubital 80  


 


  Oral 0  


 


Output:   


 


  Urine 1000  


 


    Urethral (Bill) 1000  


 


Other:   


 


  # Bowel Movements 0  














- Medications


Active Medications: 


Active Medications











Generic Name Dose Route Start Last Admin





  Trade Name Freq  PRN Reason Stop Dose Admin


 


Acetaminophen  650 mg 03/22/17 11:33 03/23/17 09:15





  Tylenol 325mg Tab  PO   650 mg





  Q4 PRN   Administration





  Fever >100.4 F   


 


Albuterol/Ipratropium  3 ml 03/28/17 08:00 03/28/17 13:14





  Duoneb 3 Mg/0.5 Mg (3 Ml) Ud  IH   3 ml





  [id number] WAQAR   Administration


 


Alprazolam  0.25 mg 03/27/17 08:42  





  Xanax  PO 04/03/17 12:01  





  Q6 PRN   





  Anxiety   





  Protocol   


 


Aspirin  325 mg 03/25/17 10:00 03/28/17 10:32





  Aspirin  PO   Not Given





  DAILY WAQAR   


 


Clotrimazole  0 gm 03/22/17 18:00 03/28/17 10:40





  Lotrimin 1%  TOP   1 applic





  BID WAQAR   Administration


 


Hydralazine HCl  5 mg 03/28/17 12:07 03/28/17 12:19





  Apresoline  IVP   5 mg





  Q6 PRN   Administration





  Systolic Blood Pressure   


 


Hydralazine HCl  10 mg 03/28/17 18:00  





  Apresoline  IVP   





  Q6 WAQAR   


 


Hydrocortisone Sodium Succinate  50 mg 03/27/17 15:45 03/28/17 14:59





  Solu-Cortef  IVP   50 mg





  Q6H WAQAR   Administration


 


Heparin Sodium/Sodium Chloride  250 mls @ 13.63 mls/hr 03/27/17 21:30 03/28/17 

05:30





  Heparin 87368 Units/250ml 1/2 Normal Saline  IV   9 units/kg/hr





  .[id number] WAQAR   Titration





  Protocol   





  12 UNITS/KG/HR   


 


Dextrose  1,000 mls @ 100 mls/hr 03/28/17 08:00 03/28/17 08:50





  Dextrose 5% In Water 1000 Ml  IV 03/29/17 03:59  100 mls/hr





  .Q10H WAQAR   Administration


 


Meropenem 1g/NS 100mL IVPB  100 mls @ 100 mls/hr 03/28/17 08:30 03/28/17 09:31





  Meropenem 1g/Ns 100ml Ivpb  IVPB 04/04/17 08:31  100 mls/hr





  Q12 WAQAR   Administration





  Protocol   


 


Metoprolol Tartrate  2.5 mg 03/28/17 09:00 03/28/17 09:27





  Lopressor  IV   2.5 mg





  Q8H WAQAR   Administration


 


Morphine Sulfate  1 mg 03/22/17 12:41 03/27/17 02:22





  Morphine  IVP   1 mg





  Q4H PRN   Administration





  Pain, moderate (4-7)   


 


Pantoprazole Sodium  40 mg 03/27/17 16:00 03/28/17 09:26





  Protonix Inj  IVP   40 mg





  DAILY WAQAR   Administration


 


Silver Sulfadiazine  0 ea 03/25/17 10:00 03/28/17 10:40





  Silvadene 1% 20 Gm  TOP   1 applic





  DAILY WAQAR   Administration














- Patient Studies


Lab Studies: 


 Microbiology Studies











 03/27/17 23:05 Gram Stain - Final





 Trachasp 


 


 03/23/17 15:50 Blood Culture - Preliminary





 Blood-Venous    NO GROWTH AFTER 4 DAYS


 


 03/23/17 15:30 Blood Culture - Preliminary





 Blood-Venous    NO GROWTH AFTER 4 DAYS








 Lab Studies











  03/28/17 03/28/17 03/28/17 Range/Units





  12:30 07:00 05:30 


 


WBC    12.8 H  (4.5-11.0)  10^3/ul


 


RBC    4.01  (3.5-6.1)  10^6/uL


 


Hgb    11.1 L  (14.0-18.0)  gm/dL


 


Hct    33.6 L  (42.0-52.0)  %


 


MCV    83.8  (80.0-105.0)  fL


 


MCH    27.7  (25.0-35.0)  pg


 


MCHC    33.0  (31.0-37.0)  g/dl


 


RDW    14.8 H  (11.5-14.5)  %


 


Plt Count    191  (120.0-450.0)  10^3/uL


 


MPV    9.4  (7.0-11.0)  fl


 


Gran %     (50.0-68.0)  %


 


Lymph % (Auto)     (22.0-35.0)  %


 


Mono % (Auto)     (1.0-6.0)  %


 


Eos % (Auto)     (1.5-5.0)  %


 


Baso % (Auto)     (0.0-3.0)  %


 


Gran #     (1.4-6.5)  


 


Lymph #     (1.2-3.4)  


 


Mono #     (0.1-0.6)  


 


Eos #     (0.0-0.7)  


 


Baso #     (0.0-2.0)  K/mm3


 


APTT  37.0 H    (23.7-30.8)  Seconds


 


pCO2     (35-45)  mm/Hg


 


pO2     ()  mm/Hg


 


HCO3     (21-28)  mmol/L


 


ABG pH     (7.35-7.45)  


 


ABG Total CO2     (22-28)  mmol.L


 


ABG O2 Saturation     (95-98)  %


 


ABG O2 Content     (15-23)  ML/dl


 


ABG Base Excess     (-2.0-3.0)  mmol/L


 


ABG Hemoglobin     (11.7-17.4)  g/dL


 


ABG Carboxyhemoglobin     (0.5-1.5)  %


 


POC ABG HHb (Measured)     (0-5)  %


 


ABG Methemoglobin     (0.0-3.0)  %


 


ABG O2 Capacity     (16-24)  mL/dl


 


ABG Potassium     (3.6-5.2)  mmol/L


 


VBG pH     (7.32-7.43)  


 


VBG pCO2     (40-60)  


 


VBG HCO3     (21-28)  mmol/l


 


VBG Total CO2     (22-28)  mmol.L


 


VBG O2 Sat (Calc)     (40-65)  %


 


VBG Base Excess     (0.0-2.0)  mmol/L


 


VBG Potassium     (3.6-5.2)  mmol/L


 


Hgb O2 Saturation     (95.0-98.0)  %


 


Glucose     ()  mg/dl


 


Lactate     (0.7-2.1)  mmol/L


 


Mechanical Rate     


 


FiO2     %


 


Tidal Volume     


 


PEEP     


 


Sodium    149 H  (132-148)  mmol/L


 


Potassium    4.2  (3.6-5.0)  mmol/L


 


Chloride    110  ()  mmol/L


 


Carbon Dioxide    27  (21-33)  mmol/L


 


Anion Gap    16  (10-20)  


 


BUN    87 H  (7-21)  mg/dL


 


Creatinine    2.0 H  (0.5-1.4)  mg/dL


 


Est GFR ( Amer)    39  


 


Est GFR (Non-Af Amer)    32  


 


POC Glucose (mg/dL)     ()  mg/dL


 


Random Glucose    196 H  ()  mg/dL


 


Calcium    8.1 L  (8.4-10.5)  mg/dL


 


Total Bilirubin    0.5  (0.2-1.3)  mg/dL


 


AST    171 H  (15-59)  U/L


 


ALT    171 H  (7-56)  U/L


 


Alkaline Phosphatase    72  ()  U/L


 


Troponin I   5.25 H* D   ng/mL


 


Total Protein    5.3 L  (5.8-8.3)  g/dL


 


Albumin    2.5 L  (3.0-4.8)  g/dL


 


Globulin    2.8  gm/dL


 


Albumin/Globulin Ratio    0.9 L  (1.1-1.8)  


 


Procalcitonin     (0.19-0.49)  NG/ML


 


Arterial Blood Potassium     (3.6-5.2)  mmol/L


 


Venous Blood Potassium     (3.6-5.2)  mmol/L


 


Serum Immunofixation     (())  


 


Urine Immunofixation     (())  


 


REJI Screen     (Negative)  


 


REJI Titer     


 


REJI Titer 2     


 


REJI Pattern     


 


REJI Pattern 2     














  03/28/17 03/28/17 03/27/17 Range/Units





  05:15 03:45 21:30 


 


WBC     (4.5-11.0)  10^3/ul


 


RBC     (3.5-6.1)  10^6/uL


 


Hgb     (14.0-18.0)  gm/dL


 


Hct     (42.0-52.0)  %


 


MCV     (80.0-105.0)  fL


 


MCH     (25.0-35.0)  pg


 


MCHC     (31.0-37.0)  g/dl


 


RDW     (11.5-14.5)  %


 


Plt Count     (120.0-450.0)  10^3/uL


 


MPV     (7.0-11.0)  fl


 


Gran %     (50.0-68.0)  %


 


Lymph % (Auto)     (22.0-35.0)  %


 


Mono % (Auto)     (1.0-6.0)  %


 


Eos % (Auto)     (1.5-5.0)  %


 


Baso % (Auto)     (0.0-3.0)  %


 


Gran #     (1.4-6.5)  


 


Lymph #     (1.2-3.4)  


 


Mono #     (0.1-0.6)  


 


Eos #     (0.0-0.7)  


 


Baso #     (0.0-2.0)  K/mm3


 


APTT   118.3 H*   (23.7-30.8)  Seconds


 


pCO2  43    (35-45)  mm/Hg


 


pO2  105.0 H    ()  mm/Hg


 


HCO3  25.4    (21-28)  mmol/L


 


ABG pH  7.38    (7.35-7.45)  


 


ABG Total CO2  26.7    (22-28)  mmol.L


 


ABG O2 Saturation  98.8 H    (95-98)  %


 


ABG O2 Content  15.2    (15-23)  ML/dl


 


ABG Base Excess  0.1    (-2.0-3.0)  mmol/L


 


ABG Hemoglobin  11.1 L    (11.7-17.4)  g/dL


 


ABG Carboxyhemoglobin  1.8 H    (0.5-1.5)  %


 


POC ABG HHb (Measured)  1.2    (0-5)  %


 


ABG Methemoglobin  0.4    (0.0-3.0)  %


 


ABG O2 Capacity  15.4 L    (16-24)  mL/dl


 


ABG Potassium     (3.6-5.2)  mmol/L


 


VBG pH     (7.32-7.43)  


 


VBG pCO2     (40-60)  


 


VBG HCO3     (21-28)  mmol/l


 


VBG Total CO2     (22-28)  mmol.L


 


VBG O2 Sat (Calc)     (40-65)  %


 


VBG Base Excess     (0.0-2.0)  mmol/L


 


VBG Potassium     (3.6-5.2)  mmol/L


 


Hgb O2 Saturation  96.6    (95.0-98.0)  %


 


Glucose     ()  mg/dl


 


Lactate     (0.7-2.1)  mmol/L


 


Mechanical Rate     


 


FiO2  50.0    %


 


Tidal Volume     


 


PEEP     


 


Sodium     (132-148)  mmol/L


 


Potassium     (3.6-5.0)  mmol/L


 


Chloride     ()  mmol/L


 


Carbon Dioxide     (21-33)  mmol/L


 


Anion Gap     (10-20)  


 


BUN     (7-21)  mg/dL


 


Creatinine     (0.5-1.4)  mg/dL


 


Est GFR (African Amer)     


 


Est GFR (Non-Af Amer)     


 


POC Glucose (mg/dL)    203 H  ()  mg/dL


 


Random Glucose     ()  mg/dL


 


Calcium     (8.4-10.5)  mg/dL


 


Total Bilirubin     (0.2-1.3)  mg/dL


 


AST     (15-59)  U/L


 


ALT     (7-56)  U/L


 


Alkaline Phosphatase     ()  U/L


 


Troponin I     ng/mL


 


Total Protein     (5.8-8.3)  g/dL


 


Albumin     (3.0-4.8)  g/dL


 


Globulin     gm/dL


 


Albumin/Globulin Ratio     (1.1-1.8)  


 


Procalcitonin     (0.19-0.49)  NG/ML


 


Arterial Blood Potassium     (3.6-5.2)  mmol/L


 


Venous Blood Potassium     (3.6-5.2)  mmol/L


 


Serum Immunofixation     (())  


 


Urine Immunofixation     (())  


 


REJI Screen     (Negative)  


 


REJI Titer     


 


ERJI Titer 2     


 


REJI Pattern     


 


REJI Pattern 2     














  03/27/17 03/27/17 03/27/17 Range/Units





  19:50 16:58 16:24 


 


WBC    13.7 H D  (4.5-11.0)  10^3/ul


 


RBC    3.97  (3.5-6.1)  10^6/uL


 


Hgb    10.9 L  (14.0-18.0)  gm/dL


 


Hct    33.6 L  (42.0-52.0)  %


 


MCV    84.6  (80.0-105.0)  fL


 


MCH    27.5  (25.0-35.0)  pg


 


MCHC    32.4  (31.0-37.0)  g/dl


 


RDW    14.7 H  (11.5-14.5)  %


 


Plt Count    156  (120.0-450.0)  10^3/uL


 


MPV    9.4  (7.0-11.0)  fl


 


Gran %    87.3 H  (50.0-68.0)  %


 


Lymph % (Auto)    8.0 L  (22.0-35.0)  %


 


Mono % (Auto)    4.6  (1.0-6.0)  %


 


Eos % (Auto)    0.0 L  (1.5-5.0)  %


 


Baso % (Auto)    0.1  (0.0-3.0)  %


 


Gran #    11.97 H  (1.4-6.5)  


 


Lymph #    1.1 L  (1.2-3.4)  


 


Mono #    0.6  (0.1-0.6)  


 


Eos #    0.0  (0.0-0.7)  


 


Baso #    0.02  (0.0-2.0)  K/mm3


 


APTT     (23.7-30.8)  Seconds


 


pCO2    47 H  (35-45)  mm/Hg


 


pO2    366.0 H  ()  mm/Hg


 


HCO3    25.4  (21-28)  mmol/L


 


ABG pH    7.34 L  (7.35-7.45)  


 


ABG Total CO2    26.8  (22-28)  mmol.L


 


ABG O2 Saturation    99.5 H  (95-98)  %


 


ABG O2 Content     (15-23)  ML/dl


 


ABG Base Excess    -0.8  (-2.0-3.0)  mmol/L


 


ABG Hemoglobin     (11.7-17.4)  g/dL


 


ABG Carboxyhemoglobin     (0.5-1.5)  %


 


POC ABG HHb (Measured)     (0-5)  %


 


ABG Methemoglobin     (0.0-3.0)  %


 


ABG O2 Capacity     (16-24)  mL/dl


 


ABG Potassium    4.4  (3.6-5.2)  mmol/L


 


VBG pH    7.27 L  (7.32-7.43)  


 


VBG pCO2    61.0 H  (40-60)  


 


VBG HCO3    28.0  (21-28)  mmol/l


 


VBG Total CO2    29.9 H  (22-28)  mmol.L


 


VBG O2 Sat (Calc)    82.3 H  (40-65)  %


 


VBG Base Excess    -0.3 L  (0.0-2.0)  mmol/L


 


VBG Potassium    4.6  (3.6-5.2)  mmol/L


 


Hgb O2 Saturation     (95.0-98.0)  %


 


Glucose    167 H  ()  mg/dl


 


Lactate    1.2  (0.7-2.1)  mmol/L


 


Mechanical Rate    16  


 


FiO2    100.0  %


 


Tidal Volume    450  


 


PEEP    5  


 


Sodium    145.0  (132-148)  mmol/L


 


Potassium    4.6  (3.6-5.0)  mmol/L


 


Chloride    116.0 H  ()  mmol/L


 


Carbon Dioxide    27  (21-33)  mmol/L


 


Anion Gap    15  (10-20)  


 


BUN    79 H  (7-21)  mg/dL


 


Creatinine    2.3 H  (0.5-1.4)  mg/dL


 


Est GFR ( Amer)    33  


 


Est GFR (Non-Af Amer)    27  


 


POC Glucose (mg/dL)   171 H   ()  mg/dL


 


Random Glucose    167 H  ()  mg/dL


 


Calcium    7.9 L  (8.4-10.5)  mg/dL


 


Total Bilirubin    0.6  (0.2-1.3)  mg/dL


 


AST    226 H  (15-59)  U/L


 


ALT    183 H  (7-56)  U/L


 


Alkaline Phosphatase    70  ()  U/L


 


Troponin I  3.37 H* D   1.51 H* D  ng/mL


 


Total Protein    5.2 L  (5.8-8.3)  g/dL


 


Albumin    2.3 L  (3.0-4.8)  g/dL


 


Globulin    2.9  gm/dL


 


Albumin/Globulin Ratio    0.8 L  (1.1-1.8)  


 


Procalcitonin    7.36 H  (0.19-0.49)  NG/ML


 


Arterial Blood Potassium    4.4  (3.6-5.2)  mmol/L


 


Venous Blood Potassium    4.6  (3.6-5.2)  mmol/L


 


Serum Immunofixation     (())  


 


Urine Immunofixation     (())  


 


REJI Screen     (Negative)  


 


REJI Titer     


 


REJI Titer 2     


 


REJI Pattern     


 


REJI Pattern 2     














  03/25/17 03/24/17 Range/Units





  06:15 12:00 


 


WBC    (4.5-11.0)  10^3/ul


 


RBC    (3.5-6.1)  10^6/uL


 


Hgb    (14.0-18.0)  gm/dL


 


Hct    (42.0-52.0)  %


 


MCV    (80.0-105.0)  fL


 


MCH    (25.0-35.0)  pg


 


MCHC    (31.0-37.0)  g/dl


 


RDW    (11.5-14.5)  %


 


Plt Count    (120.0-450.0)  10^3/uL


 


MPV    (7.0-11.0)  fl


 


Gran %    (50.0-68.0)  %


 


Lymph % (Auto)    (22.0-35.0)  %


 


Mono % (Auto)    (1.0-6.0)  %


 


Eos % (Auto)    (1.5-5.0)  %


 


Baso % (Auto)    (0.0-3.0)  %


 


Gran #    (1.4-6.5)  


 


Lymph #    (1.2-3.4)  


 


Mono #    (0.1-0.6)  


 


Eos #    (0.0-0.7)  


 


Baso #    (0.0-2.0)  K/mm3


 


APTT    (23.7-30.8)  Seconds


 


pCO2    (35-45)  mm/Hg


 


pO2    ()  mm/Hg


 


HCO3    (21-28)  mmol/L


 


ABG pH    (7.35-7.45)  


 


ABG Total CO2    (22-28)  mmol.L


 


ABG O2 Saturation    (95-98)  %


 


ABG O2 Content    (15-23)  ML/dl


 


ABG Base Excess    (-2.0-3.0)  mmol/L


 


ABG Hemoglobin    (11.7-17.4)  g/dL


 


ABG Carboxyhemoglobin    (0.5-1.5)  %


 


POC ABG HHb (Measured)    (0-5)  %


 


ABG Methemoglobin    (0.0-3.0)  %


 


ABG O2 Capacity    (16-24)  mL/dl


 


ABG Potassium    (3.6-5.2)  mmol/L


 


VBG pH    (7.32-7.43)  


 


VBG pCO2    (40-60)  


 


VBG HCO3    (21-28)  mmol/l


 


VBG Total CO2    (22-28)  mmol.L


 


VBG O2 Sat (Calc)    (40-65)  %


 


VBG Base Excess    (0.0-2.0)  mmol/L


 


VBG Potassium    (3.6-5.2)  mmol/L


 


Hgb O2 Saturation    (95.0-98.0)  %


 


Glucose    ()  mg/dl


 


Lactate    (0.7-2.1)  mmol/L


 


Mechanical Rate    


 


FiO2    %


 


Tidal Volume    


 


PEEP    


 


Sodium    (132-148)  mmol/L


 


Potassium    (3.6-5.0)  mmol/L


 


Chloride    ()  mmol/L


 


Carbon Dioxide    (21-33)  mmol/L


 


Anion Gap    (10-20)  


 


BUN    (7-21)  mg/dL


 


Creatinine    (0.5-1.4)  mg/dL


 


Est GFR (African Amer)    


 


Est GFR (Non-Af Amer)    


 


POC Glucose (mg/dL)    ()  mg/dL


 


Random Glucose    ()  mg/dL


 


Calcium    (8.4-10.5)  mg/dL


 


Total Bilirubin    (0.2-1.3)  mg/dL


 


AST    (15-59)  U/L


 


ALT    (7-56)  U/L


 


Alkaline Phosphatase    ()  U/L


 


Troponin I    ng/mL


 


Total Protein    (5.8-8.3)  g/dL


 


Albumin    (3.0-4.8)  g/dL


 


Globulin    gm/dL


 


Albumin/Globulin Ratio    (1.1-1.8)  


 


Procalcitonin    (0.19-0.49)  NG/ML


 


Arterial Blood Potassium    (3.6-5.2)  mmol/L


 


Venous Blood Potassium    (3.6-5.2)  mmol/L


 


Serum Immunofixation  See note   (())  


 


Urine Immunofixation   See note  (())  


 


REJI Screen  Negative   (Negative)  


 


REJI Titer  TEST NOT PERFORMED   


 


REJI Titer 2  TEST NOT PERFORMED   


 


REJI Pattern  TEST NOT PERFORMED   


 


REJI Pattern 2  TEST NOT PERFORMED   








 Laboratory Results - last 24 hr











  03/24/17 03/25/17 03/27/17





  12:00 06:15 16:24


 


WBC    13.7 H D


 


RBC    3.97


 


Hgb    10.9 L


 


Hct    33.6 L


 


MCV    84.6


 


MCH    27.5


 


MCHC    32.4


 


RDW    14.7 H


 


Plt Count    156


 


MPV    9.4


 


Gran %    87.3 H


 


Lymph % (Auto)    8.0 L


 


Mono % (Auto)    4.6


 


Eos % (Auto)    0.0 L


 


Baso % (Auto)    0.1


 


Gran #    11.97 H


 


Lymph #    1.1 L


 


Mono #    0.6


 


Eos #    0.0


 


Baso #    0.02


 


APTT   


 


pCO2    47 H


 


pO2    45


 


HCO3    25.4


 


ABG pH    7.34 L


 


ABG Total CO2    26.8


 


ABG O2 Saturation    99.5 H


 


ABG O2 Content   


 


ABG Base Excess    -0.8


 


ABG Hemoglobin   


 


ABG Carboxyhemoglobin   


 


POC ABG HHb (Measured)   


 


ABG Methemoglobin   


 


ABG O2 Capacity   


 


ABG Potassium    4.4


 


VBG pH    7.27 L


 


VBG pCO2    61.0 H


 


VBG HCO3    28.0


 


VBG Total CO2    29.9 H


 


VBG O2 Sat (Calc)    82.3 H


 


VBG Base Excess    -0.3 L


 


VBG Potassium    4.6


 


Hgb O2 Saturation   


 


Sodium    145


 


Chloride    108 H


 


Glucose    174 H


 


Lactate    1.2


 


Mechanical Rate    16


 


FiO2    21.0


 


Tidal Volume    450


 


PEEP    5


 


Potassium    4.6


 


Carbon Dioxide    27


 


Anion Gap    15


 


BUN    79 H


 


Creatinine    2.3 H


 


Est GFR ( Amer)    33


 


Est GFR (Non-Af Amer)    27


 


POC Glucose (mg/dL)   


 


Random Glucose    167 H


 


Calcium    7.9 L


 


Total Bilirubin    0.6


 


AST    226 H


 


ALT    183 H


 


Alkaline Phosphatase    70


 


Troponin I    1.51 H* D


 


Total Protein    5.2 L


 


Albumin    2.3 L


 


Globulin    2.9


 


Albumin/Globulin Ratio    0.8 L


 


Procalcitonin    7.36 H


 


Arterial Blood Potassium    4.4


 


Venous Blood Potassium    4.6


 


Serum Immunofixation   See note 


 


Urine Immunofixation  See note  


 


REJI Screen   Negative 


 


REJI Titer   TEST NOT PERFORMED 


 


REJI Titer 2   TEST NOT PERFORMED 


 


REJI Pattern   TEST NOT PERFORMED 


 


REJI Pattern 2   TEST NOT PERFORMED 














  03/27/17 03/27/17 03/27/17





  16:58 19:50 21:30


 


WBC   


 


RBC   


 


Hgb   


 


Hct   


 


MCV   


 


MCH   


 


MCHC   


 


RDW   


 


Plt Count   


 


MPV   


 


Gran %   


 


Lymph % (Auto)   


 


Mono % (Auto)   


 


Eos % (Auto)   


 


Baso % (Auto)   


 


Gran #   


 


Lymph #   


 


Mono #   


 


Eos #   


 


Baso #   


 


APTT   


 


pCO2   


 


pO2   


 


HCO3   


 


ABG pH   


 


ABG Total CO2   


 


ABG O2 Saturation   


 


ABG O2 Content   


 


ABG Base Excess   


 


ABG Hemoglobin   


 


ABG Carboxyhemoglobin   


 


POC ABG HHb (Measured)   


 


ABG Methemoglobin   


 


ABG O2 Capacity   


 


ABG Potassium   


 


VBG pH   


 


VBG pCO2   


 


VBG HCO3   


 


VBG Total CO2   


 


VBG O2 Sat (Calc)   


 


VBG Base Excess   


 


VBG Potassium   


 


Hgb O2 Saturation   


 


Sodium   


 


Chloride   


 


Glucose   


 


Lactate   


 


Mechanical Rate   


 


FiO2   


 


Tidal Volume   


 


PEEP   


 


Potassium   


 


Carbon Dioxide   


 


Anion Gap   


 


BUN   


 


Creatinine   


 


Est GFR ( Amer)   


 


Est GFR (Non-Af Amer)   


 


POC Glucose (mg/dL)  171 H   203 H


 


Random Glucose   


 


Calcium   


 


Total Bilirubin   


 


AST   


 


ALT   


 


Alkaline Phosphatase   


 


Troponin I   3.37 H* D 


 


Total Protein   


 


Albumin   


 


Globulin   


 


Albumin/Globulin Ratio   


 


Procalcitonin   


 


Arterial Blood Potassium   


 


Venous Blood Potassium   


 


Serum Immunofixation   


 


Urine Immunofixation   


 


REJI Screen   


 


REJI Titer   


 


REJI Titer 2   


 


REJI Pattern   


 


REJI Pattern 2   














  03/28/17 03/28/17 03/28/17





  03:45 05:15 05:30


 


WBC    12.8 H


 


RBC    4.01


 


Hgb    11.1 L


 


Hct    33.6 L


 


MCV    83.8


 


MCH    27.7


 


MCHC    33.0


 


RDW    14.8 H


 


Plt Count    191


 


MPV    9.4


 


Gran %   


 


Lymph % (Auto)   


 


Mono % (Auto)   


 


Eos % (Auto)   


 


Baso % (Auto)   


 


Gran #   


 


Lymph #   


 


Mono #   


 


Eos #   


 


Baso #   


 


APTT  118.3 H*  


 


pCO2   43 


 


pO2   105.0 H 


 


HCO3   25.4 


 


ABG pH   7.38 


 


ABG Total CO2   26.7 


 


ABG O2 Saturation   98.8 H 


 


ABG O2 Content   15.2 


 


ABG Base Excess   0.1 


 


ABG Hemoglobin   11.1 L 


 


ABG Carboxyhemoglobin   1.8 H 


 


POC ABG HHb (Measured)   1.2 


 


ABG Methemoglobin   0.4 


 


ABG O2 Capacity   15.4 L 


 


ABG Potassium   


 


VBG pH   


 


VBG pCO2   


 


VBG HCO3   


 


VBG Total CO2   


 


VBG O2 Sat (Calc)   


 


VBG Base Excess   


 


VBG Potassium   


 


Hgb O2 Saturation   96.6 


 


Sodium    149 H


 


Chloride    110


 


Glucose   


 


Lactate   


 


Mechanical Rate   


 


FiO2   50.0 


 


Tidal Volume   


 


PEEP   


 


Potassium    4.2


 


Carbon Dioxide    27


 


Anion Gap    16


 


BUN    87 H


 


Creatinine    2.0 H


 


Est GFR ( Amer)    39


 


Est GFR (Non-Af Amer)    32


 


POC Glucose (mg/dL)   


 


Random Glucose    196 H


 


Calcium    8.1 L


 


Total Bilirubin    0.5


 


AST    171 H


 


ALT    171 H


 


Alkaline Phosphatase    72


 


Troponin I   


 


Total Protein    5.3 L


 


Albumin    2.5 L


 


Globulin    2.8


 


Albumin/Globulin Ratio    0.9 L


 


Procalcitonin   


 


Arterial Blood Potassium   


 


Venous Blood Potassium   


 


Serum Immunofixation   


 


Urine Immunofixation   


 


REJI Screen   


 


REJI Titer   


 


REJI Titer 2   


 


REJI Pattern   


 


REJI Pattern 2   














  03/28/17 03/28/17





  07:00 12:30


 


WBC  


 


RBC  


 


Hgb  


 


Hct  


 


MCV  


 


MCH  


 


MCHC  


 


RDW  


 


Plt Count  


 


MPV  


 


Gran %  


 


Lymph % (Auto)  


 


Mono % (Auto)  


 


Eos % (Auto)  


 


Baso % (Auto)  


 


Gran #  


 


Lymph #  


 


Mono #  


 


Eos #  


 


Baso #  


 


APTT   37.0 H


 


pCO2  


 


pO2  


 


HCO3  


 


ABG pH  


 


ABG Total CO2  


 


ABG O2 Saturation  


 


ABG O2 Content  


 


ABG Base Excess  


 


ABG Hemoglobin  


 


ABG Carboxyhemoglobin  


 


POC ABG HHb (Measured)  


 


ABG Methemoglobin  


 


ABG O2 Capacity  


 


ABG Potassium  


 


VBG pH  


 


VBG pCO2  


 


VBG HCO3  


 


VBG Total CO2  


 


VBG O2 Sat (Calc)  


 


VBG Base Excess  


 


VBG Potassium  


 


Hgb O2 Saturation  


 


Sodium  


 


Chloride  


 


Glucose  


 


Lactate  


 


Mechanical Rate  


 


FiO2  


 


Tidal Volume  


 


PEEP  


 


Potassium  


 


Carbon Dioxide  


 


Anion Gap  


 


BUN  


 


Creatinine  


 


Est GFR ( Amer)  


 


Est GFR (Non-Af Amer)  


 


POC Glucose (mg/dL)  


 


Random Glucose  


 


Calcium  


 


Total Bilirubin  


 


AST  


 


ALT  


 


Alkaline Phosphatase  


 


Troponin I  5.25 H* D 


 


Total Protein  


 


Albumin  


 


Globulin  


 


Albumin/Globulin Ratio  


 


Procalcitonin  


 


Arterial Blood Potassium  


 


Venous Blood Potassium  


 


Serum Immunofixation  


 


Urine Immunofixation  


 


REJI Screen  


 


REJI Titer  


 


REJI Titer 2  


 


REJI Pattern  


 


REJI Pattern 2  











EKG/Cardiology Studies: 


Cardiology / EKG Studies





03/27/17 14:40


EKG [ELECTROCARDIOGRAM] Stat 


   Comment: NSTEMI on admission


   Reason For Exam: Pt is unresponsive














Critical Care Progress Note





- Nutrition


Nutrition: 


 Nutrition











 Category Date Time Status


 


 Liquid Diet [DIET] Diets  03/28/17 Lunch Ordered














Attending/Attestation





- Attestation


I have personally seen and examined this patient.: Yes


I have fully participated in the care of the patient.: Yes


I have reviewed all pertinent clinical information: Yes


Notes (Text): 





03/28/17 15:16


82 y/o M intubated for alveolar hypoventilation secondary to Ativan yesterday.


Passed SBT PS trial this a.m. Extubated 


On treatment for presumed bactermia from cellulitis. I.D following. MSSA .


Chest PT needed, nebulizers. 


PT/OT needed 


Off all vasopressors. Momentary need due to induction for Intubation and 

iatrogenic causes from medication. No signs of active septic shock currently. 


NSTEMI with increasing Troponins. Cardiology aware of troponin elevation, no 

plans for intervention.


ECHo pending to evaluate wall motion and EF. On Heparin ggt. PTT WNL. 





cc time 65 min

## 2017-03-28 NOTE — CP.PCM.PN
Subjective





- Date & Time of Evaluation


Date of Evaluation: 03/28/17


Time of Evaluation: 08:00





- Subjective


Subjective: 


Intubated in ICU now. Sedated. Events of yesterday afternoon noted. Resp. 

failure after PICC line insertion and IV Ativan.





V/S noted. RSR/S. Tachy.  Multiple drips.





PE:





Lungs: rhonchi


Cor.: S1S2


Abd.: soft


Ext.: no edema


Neuro.: sedaeted





I/O=952/3751





Labs noted: Na+= 149,  K+= 4.2, Cr.= 2.0.  Trop 3/27: 3.37





BC x1 + Grp G Strep. Several BCs are NG. Wound C+S: Staph aur.





ECG 3/27: RSR, RBBB, IMI, ST changes. No change





Echo: Nl LV fx with mild conc. LVH, no veg. seen. See report.





CXR 3/27 and 28: noted





Objective





- Vital Signs/Intake and Output


Vital Signs (last 24 hours): 


 











Temp Pulse Resp BP Pulse Ox


 


 99.1 F   66   29 H  157/73 H  100 


 


 03/28/17 06:00  03/28/17 06:45  03/28/17 07:34  03/28/17 06:45  03/28/17 07:34








Intake and Output: 


 











 03/28/17 03/28/17





 06:59 18:59


 


Intake Total 712 


 


Output Total 1251 


 


Balance -539 














- Medications


Medications: 


 Current Medications





Acetaminophen (Tylenol 325mg Tab)  650 mg PO Q4 PRN


   PRN Reason: Fever >100.4 F


   Last Admin: 03/23/17 09:15 Dose:  650 mg


Albuterol/Ipratropium (Duoneb 3 Mg/0.5 Mg (3 Ml) Ud)  3 ml IH [id number] [facility]


   Last Admin: 03/28/17 07:21 Dose:  3 ml


Alprazolam (Xanax)  0.25 mg PO Q6 PRN; Protocol


   PRN Reason: Anxiety


   Stop: 04/03/17 12:01


Aspirin (Aspirin)  325 mg PO DAILY [facility]


   Last Admin: 03/27/17 19:04 Dose:  325 mg


Clotrimazole (Lotrimin 1%)  0 gm TOP BID [facility]


   Last Admin: 03/27/17 10:57 Dose:  1 applic


Hydrocortisone Sodium Succinate (Solu-Cortef)  50 mg IVP Q6H [facility]


   Last Admin: 03/28/17 03:37 Dose:  50 mg


Ceftriaxone Sodium (Rocephin 2 Gm Ivpb)  100 mls @ 100 mls/hr IVPB DAILY [facility]


   PRN Reason: Protocol


   Stop: 04/08/17 10:01


   Last Admin: 03/27/17 11:02 Dose:  100 mls/hr


Fentanyl Citrate (Fentanyl Citrate/Sodium Chloride 1 Mg/100 Ml)  100 mls @ 7.5 

mls/hr IV .[id number] PRN; Protocol; 75 MCG/HR


   PRN Reason: TITRATE PER MD ORDER


   Last Admin: 03/28/17 03:31 Dose:  7.5 mls/hr


Norepinephrine Bitartrate 4 mg (/ Dextrose)  254 mls @ 15.24 mls/hr IV .K16U05H 

PRN; Protocol; 4 MCG/MIN


   PRN Reason: TITRATE PER MD ORDER


   Last Admin: 03/27/17 17:06 Dose:  15.24 mls/hr


Vasopressin 20 units/ Sodium (Chloride)  101 mls @ 9.09 mls/hr IV .Q11H7M [facility]; 

0.03 U/MIN


   PRN Reason: Protocol


   Last Admin: 03/28/17 04:00 Dose:  9.09 mls/hr


Heparin Sodium/Sodium Chloride (Heparin 97740 Units/250ml 1/2 Normal Saline)  

250 mls @ 13.63 mls/hr IV .G62U59T [facility]; 12 UNITS/KG/HR


   PRN Reason: Protocol


   Last Titration: 03/28/17 05:30 Dose:  9 units/kg/hr


Dextrose (Dextrose 5% In Water 1000 Ml)  1,000 mls @ 100 mls/hr IV .Q10H [facility]


   Stop: 03/29/17 03:59


Metoprolol Tartrate (Lopressor)  25 mg PO BID [facility]


   Last Admin: 03/26/17 17:55 Dose:  25 mg


Midazolam HCl (Versed Inj)  4 mg IVP Q4H PRN


   PRN Reason: Agitation


Morphine Sulfate (Morphine)  1 mg IVP Q4H PRN


   PRN Reason: Pain, moderate (4-7)


   Last Admin: 03/27/17 02:22 Dose:  1 mg


Pantoprazole Sodium (Protonix Inj)  40 mg IVP DAILY [facility]


   Last Admin: 03/27/17 17:26 Dose:  40 mg


Silver Sulfadiazine (Silvadene 1% 20 Gm)  0 ea TOP DAILY [facility]


   Last Admin: 03/27/17 10:59 Dose:  1 applic











- Labs


Labs: 


 





 03/28/17 05:30 





 03/28/17 05:30 





 











PT  12.1 Seconds (9.9-11.8)  H  03/25/17  06:15    


 


INR  1.12  (0.93-1.08)  H  03/25/17  06:15    


 


APTT  118.3 Seconds (23.7-30.8)  H*  03/28/17  03:45    














Assessment and Plan





- Assessment and Plan (Free Text)


Plan: 


Assessment:





Respiratory failure following PICC line and IV Ativan.


R/O MI.


Fall at home, details unknown/Right Knee Pain


Acute rhabdo., improving


Acute renal failure, improving


Initial + trop in setting of acute on chronic kidney disease and acute rhabdo., 

probably not acute MI


Sepsis/Grp G strept bacteremia


PAF


Cellulitis


Dementia


HBP


RBBB


IMI on ECG, probbably old


Former Smoker


Renal Stones


Cataracts


Diminished hearing








Plan:





AB, as per ID, Intensivists, Dr. Arguello, Dr. Browne


PO > IV metoprolol


Monitor: I/O, labs, Renal fx., cultures, sats., CK's, etc


Wean drips and vent as able.

## 2017-03-28 NOTE — CP.PCM.PN
Subjective





- Date & Time of Evaluation


Date of Evaluation: 03/28/17


Time of Evaluation: 07:30





- Subjective


Subjective: 


Noted events overnight. The patient developed hypercarbic respiratory failure 

and had to be intubated. The patient is now also on vaspressor support. 

Currently sedated.





Objective





- Vital Signs/Intake and Output


Vital Signs (last 24 hours): 


 











Temp Pulse Resp BP Pulse Ox


 


 97.1 F L  71   24   191/75 H  96 


 


 03/28/17 12:00  03/28/17 12:32  03/28/17 12:32  03/28/17 12:32  03/28/17 12:32








Intake and Output: 


 











 03/28/17 03/28/17





 06:59 18:59


 


Intake Total 712 


 


Output Total 1251 


 


Balance -539 














- Medications


Medications: 


 Current Medications





Acetaminophen (Tylenol 325mg Tab)  650 mg PO Q4 PRN


   PRN Reason: Fever >100.4 F


   Last Admin: 03/23/17 09:15 Dose:  650 mg


Albuterol/Ipratropium (Duoneb 3 Mg/0.5 Mg (3 Ml) Ud)  3 ml IH [id number] [facility]


   Last Admin: 03/28/17 13:14 Dose:  3 ml


Alprazolam (Xanax)  0.25 mg PO Q6 PRN; Protocol


   PRN Reason: Anxiety


   Stop: 04/03/17 12:01


Aspirin (Aspirin)  325 mg PO DAILY [facility]


   Last Admin: 03/28/17 10:32 Dose:  Not Given


Clotrimazole (Lotrimin 1%)  0 gm TOP BID [facility]


   Last Admin: 03/28/17 10:40 Dose:  1 applic


Hydralazine HCl (Apresoline)  5 mg IVP Q6 PRN


   PRN Reason: Systolic Blood Pressure


   Last Admin: 03/28/17 12:19 Dose:  5 mg


Hydralazine HCl (Apresoline)  10 mg IVP Q6 WAQAR


Hydrocortisone Sodium Succinate (Solu-Cortef)  50 mg IVP Q6H [facility]


   Last Admin: 03/28/17 14:59 Dose:  50 mg


Heparin Sodium/Sodium Chloride (Heparin 59422 Units/250ml 1/2 Normal Saline)  

250 mls @ 13.63 mls/hr IV .[id number] [facility]; 12 UNITS/KG/HR


   PRN Reason: Protocol


   Last Titration: 03/28/17 05:30 Dose:  9 units/kg/hr


Dextrose (Dextrose 5% In Water 1000 Ml)  1,000 mls @ 100 mls/hr IV .Q10H [facility]


   Stop: 03/29/17 03:59


   Last Admin: 03/28/17 08:50 Dose:  100 mls/hr


Meropenem 1g/NS 100mL IVPB (Meropenem 1g/Ns 100ml Ivpb)  100 mls @ 100 mls/hr 

IVPB Q12 [facility]


   PRN Reason: Protocol


   Stop: 04/04/17 08:31


   Last Admin: 03/28/17 09:31 Dose:  100 mls/hr


Metoprolol Tartrate (Lopressor)  2.5 mg IV Q8H [facility]


   Last Admin: 03/28/17 09:27 Dose:  2.5 mg


Morphine Sulfate (Morphine)  1 mg IVP Q4H PRN


   PRN Reason: Pain, moderate (4-7)


   Last Admin: 03/27/17 02:22 Dose:  1 mg


Pantoprazole Sodium (Protonix Inj)  40 mg IVP DAILY [facility]


   Last Admin: 03/28/17 09:26 Dose:  40 mg


Silver Sulfadiazine (Silvadene 1% 20 Gm)  0 ea TOP DAILY [facility]


   Last Admin: 03/28/17 10:40 Dose:  1 applic











- Labs


Labs: 


 





 03/28/17 05:30 





 03/28/17 05:30 





 











PT  12.1 Seconds (9.9-11.8)  H  03/25/17  06:15    


 


INR  1.12  (0.93-1.08)  H  03/25/17  06:15    


 


APTT  37.0 Seconds (23.7-30.8)  H  03/28/17  12:30    














- Constitutional


Appears: Other (Intubated, sedated)





- ENT Exam


Additional comments: 


ET tube in place





- Neck Exam


Neck Exam: absent: Lymphadenopathy, Meningismus





- Respiratory Exam


Respiratory Exam: Decreased Breath Sounds





- Cardiovascular Exam


Cardiovascular Exam: +S1, +S2





- GI/Abdominal Exam


GI & Abdominal Exam: Soft.  absent: Tenderness





Assessment and Plan





- Assessment and Plan (Free Text)


Plan: 


Assessment


septic shock with acute on chronic renal failure due to Group G strep bacteremia

, probably secondary to bilateral lower extremities skin and skin structure 

infection; also with MSSA from the wound cultures; R/O new onset sepsis, source 

to be determined


acute rhabdomyolysis


consider acute NSTEMI


HTN


chronic renal failure


dementia


obesity with BMI 38





Plan


upgraded antibiotics to one dose of IV vancomycin and renally-adjusted Merrem (

day 6 from first negative blood cx) pending repeat septic work up; CXR does not 

show focal infiltrates


2D echocardiogram does not show vegetations


will continue to monitor clinically


Prognosis is very guarded at best

## 2017-03-28 NOTE — PN
DATE: 03/28/2017



SUBJECTIVE:  The patient is intubated and sedated on a fentanyl drip.  The patient's events from yest
erday were noted.



PHYSICAL EXAMINATION:

VITAL SIGNS:  Temperature is 99.1, pulse of 66, blood pressure 157/73, respirations 24.

GENERAL:   The patient comfortable, in no acute distress.

HEENT:   Anicteric sclerae.  Moist mucosa.

NECK:   Positive for endotracheal tube.  No JVD or adenopathy.

CARDIAC:   S1/S2.  No murmurs.  No rubs.  Regular.

RESPIRATORY:   Clear to auscultation bilaterally.  No wheezes, rales, or rhonchi.  Good air entry.

ABDOMEN:   Bowel sounds are positive, soft, nontender, and nondistended.

EXTREMITIES:   No edema.  Has 1+ pulses.  In the left arm, left PICC line.



LABORATORY DATA:  White count of 12.8, hemoglobin 11.1.  Sodium is 149, creatinine is 2.0.



Chest x-ray shows central line and tubes in satisfactory position.  Chest x-ray before that shows no 
acute infiltrates.  There is linear atelectasis at the left base.



ASSESSMENT:

1.  Septic shock.

2.  Acute kidney injury secondary to rhabdomyolysis.

3.  Delirium.

4.  Proteinuria 2 grams per day.

5.  Paroxysmal atrial fibrillation.

6.  Right knee pain secondary to degenerative joint disease.

7.  Obesity with a body mass index of 37.

8.  Hypophosphatemia, resolved.

9.  Hyperphosphatemia secondary to acute kidney injury.

10.  Hypernatremia.

11.  Non-ST elevation myocardial infarction.

12.  Left foot wound with Staphylococcus aureus.

13.  Hematuria.



PLAN:  The patient is currently admitted to the hospital.  He became hypotensive yesterday.  He was t
ransferred to the ICU.  He was placed on pressors with Levophed and  vasopressin.  He is on Solu-Richie
ef for his sepsis.  The patient is on Xanax as needed.  He is receiving morphine for pain as needed. 
 He is on a heparin drip for his anticoagulation.  The patient was having hematuria because of a Fole
y.  We will need to continue to follow that closely.  The patient is on aspirin as well because of hi
s non-ST elevation MI.  He is being followed by multiple consultants.  Overall, prognosis is guarded.
  He is a full code.  This morning, his clinical status is more stable.  Creatinine continues to impr
ove.  It is 2.0 this morning.  The patient's sodium is elevated.  We will need to give more free wate
r.  Currently, he is not on IV fluids.  We will place him on IV fluids.  I did speak to the patient's
 wife yesterday to give her an update.  I will speak to her again this morning to give her an update.






__________________________________________

Jose Martin Arguello MD







cc:



DD: 03/28/2017 07:48:08  [id number]

TT: 03/28/2017 10:22:24

Confirmation # [id number]

Dictation # [id number]

tn

## 2017-03-28 NOTE — RAD
HISTORY:

intubated  



COMPARISON:

03/27/2017 



FINDINGS:

The endotracheal tube terminates in the proximal trachea.  The 

nasogastric tube terminates in the stomach.  The left PICC line 

terminates in the SVC. 



LUNGS:

The lungs are clear. 



PLEURA:

No significant pleural effusion identified, no pneumothorax apparent.



CARDIOVASCULAR:

Normal.



OSSEOUS STRUCTURES:

No significant abnormalities.



VISUALIZED UPPER ABDOMEN:

Normal.



OTHER FINDINGS:

None.



IMPRESSION:

Stable position of line and tubes.



No active pulmonary disease.

## 2017-03-28 NOTE — CARD
--------------- APPROVED REPORT --------------





EXAM: Two-dimensional and M-mode echocardiogram with Doppler and 

color Doppler.



INDICATION

Acute MI 



2D DIMENSIONS 

Left Atrium (2D)5.1   (1.6-4.0cm)IVSd1.2   (0.7-1.1cm)

LVDd5.6   (3.9-5.9cm)PWd1.4   (0.7-1.1cm)

LVDs4.0   (2.5-4.0cm)FS (%) 28.0   %

LVEF (%)53.0   (>50%)



M-Mode DIMENSIONS 

Aortic Root4.00   (2.2-3.7cm)Aortic Cusp Exc.2.10   (1.5-2.0cm)



Aortic Valve

AoV Peak Kzyhardl400.0cm/Tisha Peak GR.11mmHgLVOT Peak 

Kypzzzin291.0cm/s

LVOT VTI25.70cm



Mitral Valve

MV E Mcornniz47.5cm/sMV A Xxozlhne370.0cm/sE/A ratio0.6



TDI

Lateral E' Peak V8.58cm/sMedial E' Peak V5.17cm/sE/Lateral E'9.3

E/Medial E'15.4



Pulmonary Valve

PV Peak Lvrlybrx36.1cm/sPV Peak Grad.2mmHg



Tricuspid Valve

TR Peak Dptthvrd097we/sRAP GIRFRZSH30rfZhQF Peak Gr.17mmHg

[id number]



 LEFT VENTRICLE 

The left ventricle is normal size. There is mild concentric left 

ventricular hypertrophy. The left ventricular function is 

normal.EF-55% There is normal LV segmental wall motion. Transmitral 

Doppler flow pattern is Grade III-reversible restrictive diastolic 

dysfunction. No left ventricle thrombus noted on this study. There is 

no ventricular septal defect visualized. There is no left ventricular 

aneurysm. There is no mass noted in the left ventricle.



 RIGHT VENTRICLE 

The right ventricle is mildly dilated. There is normal right 

ventricular wall thickness. Systolic function is mildly reduced.



 ATRIA 

The left atrium is mildly dilated. The right atrium size is normal. 

The interatrial septum is intact with no evidence for an atrial 

septal defect.



 AORTIC VALVE 

The aortic valve is thickened but opens well. There is trace aortic 

regurgitation. There is no aortic valvular stenosis. There is no 

aortic valvular vegetation.



 MITRAL VALVE 

The mitral valve is thickened but opens well. Mitral regurgitation is 

trace to mild. There is no mitral valve stenosis. There is no 

evidence of mitral valve prolapse.



 TRICUSPID VALVE 

The tricuspid valve leaflets are thickened , but open well. There is 

trace to mild tricuspid regurgitation.RVSP-20 mmof hg. There is no 

tricuspid valve stenosis. There is no tricuspid valve prolapse or 

vegetation.



 PULMONIC VALVE 

The pulmonic valve is not well visualized.



 GREAT VESSELS 

The aortic root is normal in size. The ascending aorta is normal in 

size. The pulmonary artery is normal. The IVC is normal in size and 

collapses >50% with inspiration.



 PERICARDIAL EFFUSION 

There is no pleural effusion. There is no pericardial effusion.



<Conclusion>

The left ventricle is normal size.

There is mild concentric left ventricular hypertrophy.

The left ventricular function is normal.EF-55%

There is trace aortic regurgitation.

Mitral regurgitation is trace to mild.

There is trace to mild tricuspid regurgitation.RVSP-20 mmof hg.

The IVC is normal in size and collapses >50% with inspiration.

There is no pericardial effusion.

## 2017-03-28 NOTE — PN
DATE: 03/28/2017



SUBJECTIVE:  The patient has been transferred to the ICU.  He was extubated this morning after ventil
atory failure, status post PEG placement.  He is now awake and responsive, but remains somewhat confu
sed.



OBJECTIVE:

VITAL SIGNS:  Blood pressure 191/75, pulse 71, respiratory rate 24, temperature 97.1.

LUNGS:  Show a few crackles at the bases.

HEART:  Regular rate and rhythm.

ABDOMEN:  Soft, nontender, bowel sounds are hypoactive.

EXTREMITIES:  Without cyanosis or clubbing.  There is trace to 1+ bipedal edema.

NEUROLOGIC:  The patient is awake and confused without focal sensory or motor deficits.

SKIN:  Warm and dry.



LABORATORY DATA:  WBC is 12.8, hemoglobin 11.1, hematocrit 33.6, sodium 149, potassium 4.2, chloride 
100, CO2 of 27, BUN 87, creatinine 2.0.  Glucose 196.  Troponin is elevated at 5.25.



IMPRESSION:

1.  Status post ventilatory failure, rule out acute ischemia/infarct.

2.  Hypertension, hypertensive cardiovascular disease and congestive heart failure.

3.  Status post acute rhabdomyolysis with acute renal failure superimposed on chronic kidney disease,
 improving.

4.  Chronic venous stasis ulcers of the lower extremities with cellulitis and sepsis.

5.  Paroxysmal atrial fibrillation with intermittent rapid ventricular rate.

6.  Dementia with superimposed delirium secondary to underlying metabolic derangement.

7.  Immobility secondary to severe degenerative joint disease and obesity and deconditioning.



PLAN:  The patient has been started on anticoagulation.  Continue IV antibiotics as well as cardiolog
y, pulmonary, infectious disease followup.  Prognosis is guarded at the present time.





__________________________________________

Ariel Browne JD, MD







cc:



DD: 03/28/2017 16:36:48  [id number]

TT: 03/28/2017 19:14:54

Confirmation # [id number]

Dictation # [id number]

mn

## 2017-03-29 LAB
ALBUMIN/GLOB SERPL: 0.9 {RATIO} (ref 1.1–1.8)
ALP SERPL-CCNC: 74 U/L (ref 38–133)
ALT SERPL-CCNC: 135 U/L (ref 7–56)
AST SERPL-CCNC: 134 U/L (ref 15–59)
BETA1 GLOB FLD ELPH-MCNC: 0.4 G/DL (ref 0.4–0.6)
BETA2 GLOB FLD ELPH-MCNC: 0.5 G/DL (ref 0.2–0.5)
BILIRUB SERPL-MCNC: 0.7 MG/DL (ref 0.2–1.3)
BUN SERPL-MCNC: 62 MG/DL (ref 7–21)
C-ANCA TITR SER IF: (no result) {TITER}
CALCIUM SERPL-MCNC: 8.3 MG/DL (ref 8.4–10.5)
CHLORIDE SERPL-SCNC: 106 MMOL/L (ref 98–107)
CO2 SERPL-SCNC: 27 MMOL/L (ref 21–33)
COHGB MFR BLD: 1.7 % (ref 0.5–1.5)
ERYTHROCYTE [DISTWIDTH] IN BLOOD BY AUTOMATED COUNT: 14.7 % (ref 11.5–14.5)
GAMMA GLOB SERPL ELPH-MCNC: 0.6 G/DL (ref 0.8–1.7)
GLOBULIN SER-MCNC: 3 GM/DL
GLUCOSE SERPL-MCNC: 148 MG/DL (ref 70–110)
HCO3 BLDA-SCNC: 25.9 MMOL/L (ref 21–28)
HCT VFR BLD CALC: 33.6 % (ref 42–52)
HHB: 2.4 % (ref 0–5)
MAGNESIUM SERPL-MCNC: 2 MG/DL (ref 1.7–2.2)
MCH RBC QN AUTO: 27.3 PG (ref 25–35)
MCHC RBC AUTO-ENTMCNC: 33 G/DL (ref 31–37)
MCV RBC AUTO: 82.6 FL (ref 80–105)
METHGB MFR BLD: 0.9 % (ref 0–3)
O2 CAP BLDA-SCNC: 26.6 ML/DL (ref 16–24)
O2 CT BLDA-SCNC: 25.9 ML/DL (ref 15–23)
P-ANCA ATYPICAL TITR SER IF: (no result) {TITER}
P-ANCA TITR SER IF: (no result) {TITER}
PH BLDA: 7.43 [PH] (ref 7.35–7.45)
PHOSPHATE SERPL-MCNC: 2.9 MG/DL (ref 2.5–4.5)
PLATELET # BLD: 222 10^3/UL (ref 120–450)
PMV BLD AUTO: 9.1 FL (ref 7–11)
PO2 BLDA: 88 MM/HG (ref 80–100)
POTASSIUM SERPL-SCNC: 3.2 MMOL/L (ref 3.6–5)
PROT SERPL-MCNC: 5.6 G/DL (ref 5.8–8.3)
SAO2 % BLDA: 95.1 % (ref 95–98)
SODIUM SERPL-SCNC: 142 MMOL/L (ref 132–148)
WBC # BLD AUTO: 11.8 10^3/UL (ref 4.5–11)

## 2017-03-29 RX ADMIN — HEPARIN SODIUM SCH MLS/HR: 10000 INJECTION, SOLUTION INTRAVENOUS at 21:03

## 2017-03-29 RX ADMIN — SILVER SULFADIAZINE SCH APPLIC: 10 CREAM TOPICAL at 09:09

## 2017-03-29 RX ADMIN — MEROPENEM AND SODIUM CHLORIDE SCH MLS/HR: 1 INJECTION, SOLUTION INTRAVENOUS at 21:44

## 2017-03-29 RX ADMIN — METOPROLOL TARTRATE SCH MG: 5 INJECTION INTRAVENOUS at 08:47

## 2017-03-29 RX ADMIN — IPRATROPIUM BROMIDE AND ALBUTEROL SULFATE SCH ML: .5; 3 SOLUTION RESPIRATORY (INHALATION) at 13:12

## 2017-03-29 RX ADMIN — CLOTRIMAZOLE SCH APPLIC: 1 CREAM TOPICAL at 09:08

## 2017-03-29 RX ADMIN — MEROPENEM AND SODIUM CHLORIDE SCH MLS/HR: 1 INJECTION, SOLUTION INTRAVENOUS at 10:11

## 2017-03-29 RX ADMIN — IPRATROPIUM BROMIDE AND ALBUTEROL SULFATE SCH ML: .5; 3 SOLUTION RESPIRATORY (INHALATION) at 07:07

## 2017-03-29 RX ADMIN — IPRATROPIUM BROMIDE AND ALBUTEROL SULFATE SCH ML: .5; 3 SOLUTION RESPIRATORY (INHALATION) at 02:05

## 2017-03-29 RX ADMIN — CLOTRIMAZOLE SCH APPLIC: 1 CREAM TOPICAL at 17:34

## 2017-03-29 RX ADMIN — METOPROLOL TARTRATE SCH MG: 5 INJECTION INTRAVENOUS at 01:46

## 2017-03-29 RX ADMIN — IPRATROPIUM BROMIDE AND ALBUTEROL SULFATE SCH ML: .5; 3 SOLUTION RESPIRATORY (INHALATION) at 20:04

## 2017-03-29 NOTE — PN
DATE: 03/29/2017



SUBJECTIVE:  The patient is extubated. No CP/SOB/N/V.



PHYSICAL EXAMINATION:

VITAL SIGNS:  Temperature is 97.5, pulse of 104, blood pressure is 190/106, 
respirations 17.

GENERAL:   The patient comfortable, in no acute distress.

HEENT:   Anicteric sclerae.  Moist mucosa.

NECK:   No JVD or adenopathy.

CARDIAC:   S1/S2.  No murmurs.  No rubs.  Regular.

RESPIRATORY:   Clear to auscultation bilaterally.  No wheezes, rales, or 
rhonchi.  Good air entry.

ABDOMEN:   Bowel sounds are positive, soft, nontender, and nondistended.

EXTREMITIES:   No edema.  Has 1+ pulses.



LABORATORIES:  Sodium is 149, creatinine is 2.0.



ASSESSMENT:

1.  Sepsis.

2.  Respiratory failure, off ventilator.

3.  Acute kidney injury, secondary to rhabdomyolysis.

4.  Delirium.

5.  Proteinuria 2 grams per day.

6.  Paroxysmal atrial fibrillation.

7.  Right knee pain, secondary to degenerative joint disease.

8.  Obesity with a body mass index of 37.

9.  Hypernatremia.

10.  Hyperphosphatemia, secondary to acute kidney injury.

11.  Non-ST elevation myocardial infarction.

12.  Left foot wound with Staphylococcus aureus.

13.  Hematuria.



PLAN:  The patient is currently extubated from ventilator.  He is on aspirin.  
He is going to continue with metoprolol.  He is on heparin for anticoagulation.
  He is being followed by cardiology.  The patient is on meropenem for 
antibiotics.  He is on Xanax as needed.  I did speak to the patient's wife 
yesterday, Beata, to give her an update on patient's diagnosis and plan of 
care.  We will continue to follow closely.  He remains critically ill.



Addendum: Sodium improved today. Labs for today reviewed. 





__________________________________________

Jose Martin Arguello MD







cc:   



DD: 03/29/2017 06:06:47  [id number]

TT: 03/29/2017 07:33:35

Confirmation # [id number]

Dictation # [id number]

en

MTDD

## 2017-03-29 NOTE — PN
DATE: 03/29/2017(635am--725am)



SUBJECTIVE:  The patient appears comfortable this morning.  He is currently 
extubated.  He is not short of breath at rest.



OBJECTIVE:

VITAL SIGNS:  Temperature is 97.5, pulse on the monitor is 78, respirations 17, 
blood pressure 190/106.  Oxygen saturation on nasal cannula is 97%.

HEENT:  Normocephalic, atraumatic.  No JVD.

CARDIOVASCULAR:  Systolic ejection murmur at the lower left sternal border.  No 
S3 gallop.

LUNGS:  Better breath sounds at the bases.  Less rhonchi.  No wheezing.

EXTREMITIES:  Mild edema.  No cyanosis, no clubbing.  Calves are nontender to 
palpation.

GASTROINTESTINAL:  Abdomen is soft, nontender, nondistended.  Bowel sounds are 
positive.

SKIN:  Reveals evidence of cellulitis on both lower extremities (anterior 
aspects).

NEUROLOGIC:  Limited at the present time.



PERTINENT LABORATORY DATA:  Chest x-ray was done this morning and reviewed.  
There are no significant changes noted on this film.  I do not appreciate any 
significant infiltrates.  Arterial blood gas was done on nasal cannula.  
Results are:  pH 7.43, pCO2 of 39, pO2 of 88.



IMPRESSION:

1.  Hypercarbic respiratory failure.

2.  Severe sepsis.

3.  Bilateral cellulitis.

4.  Chronic obstructive pulmonary disease.

5.  Renal insufficiency.

6.  Acute myocardial infarction.

7.  Mild anemia.



PLAN:  The patient is now extubated and remains in the ICU.  He is comfortable 
at rest and not short of breath.  He states he is feeling much better overall.  
I did discuss the case with the night nurse at length.  The night nurse stated 
that the patient is doing very well overall.  I did review the x-ray as above.  
The x-ray shows no significant infiltrates.  I have also reviewed the arterial 
blood gas.  The arterial blood gas is significantly improved with normalization 
of the pH and with no significant alveolar arterial gradient.  I will continue 
with the current nebulizer treatments for now.  The patient remains on 
antibiotic therapy - as per infectious disease.  Temperatures have resolved.  
The leukocytosis is also resolving.  Cardiology evaluation is also ongoing.  
Input by Dr. Arias is noted.  Clinical status of the patient is significantly 
improved - compared to last week.  However, again, the overall status/prognosis 
of this patient does remain guarded.  I will discuss the above with the entire 
ICU team in the next few moments.  I will also discuss the above with the 
attending physician.





__________________________________________

Balwinder Fall MD







cc:   



DD: 03/29/2017 07:25:13  389

TT: 03/29/2017 07:50:17

Confirmation # [id number]

Dictation # [id number]

mn

MARIA DEL CARMEN

## 2017-03-29 NOTE — CP.PCM.PN
Subjective





- Date & Time of Evaluation


Date of Evaluation: 03/29/17


Time of Evaluation: 08:20





- Subjective


Subjective: 


Patient is now extubated, awake and alert, afebrile overnight, feeling better.





Objective





- Vital Signs/Intake and Output


Vital Signs (last 24 hours): 


 











Temp Pulse Resp BP Pulse Ox


 


 97.5 F L  104 H  17   190/106 H  94 L


 


 03/29/17 04:00  03/29/17 05:28  03/29/17 05:00  03/29/17 05:00  03/29/17 05:00








Intake and Output: 


 











 03/29/17 03/29/17





 06:59 18:59


 


Intake Total 2138 


 


Output Total 1250 


 


Balance 888 














- Medications


Medications: 


 Current Medications





Acetaminophen (Tylenol 325mg Tab)  650 mg PO Q4 PRN


   PRN Reason: Fever >100.4 F


   Last Admin: 03/23/17 09:15 Dose:  650 mg


Albuterol/Ipratropium (Duoneb 3 Mg/0.5 Mg (3 Ml) Ud)  3 ml IH [id number]


   Last Admin: 03/29/17 07:07 Dose:  3 ml


Alprazolam (Xanax)  0.25 mg PO Q6 PRN; Protocol


   PRN Reason: Anxiety


   Stop: 04/03/17 12:01


   Last Admin: 03/29/17 05:05 Dose:  0.25 mg


Aspirin (Aspirin)  325 mg PO DAILY [facility]


   Last Admin: 03/28/17 10:32 Dose:  Not Given


Clotrimazole (Lotrimin 1%)  0 gm TOP BID [facility]


   Last Admin: 03/28/17 18:01 Dose:  1 applic


Hydralazine HCl (Apresoline)  10 mg IVP Q6 PRN


   PRN Reason: Systolic Blood Pressure


   Last Admin: 03/29/17 02:46 Dose:  10 mg


Heparin Sodium/Sodium Chloride (Heparin 06265 Units/250ml 1/2 Normal Saline)  

250 mls @ 13.63 mls/hr IV .[id number] WAQAR; 12 UNITS/KG/HR


   PRN Reason: Protocol


   Last Admin: 03/28/17 13:25 Dose:  12.494 mls/hr


Meropenem 1g/NS 100mL IVPB (Meropenem 1g/Ns 100ml Ivpb)  100 mls @ 100 mls/hr 

IVPB Q12 WAQAR


   PRN Reason: Protocol


   Stop: 04/04/17 08:31


   Last Admin: 03/28/17 22:02 Dose:  100 mls/hr


Metoprolol Tartrate (Lopressor)  2.5 mg IV Q8H [facility]


   Last Admin: 03/29/17 01:46 Dose:  2.5 mg


Morphine Sulfate (Morphine)  1 mg IVP Q4H PRN


   PRN Reason: Pain, moderate (4-7)


   Last Admin: 03/28/17 23:16 Dose:  1 mg


Pantoprazole Sodium (Protonix Inj)  40 mg IVP DAILY [facility]


   Last Admin: 03/28/17 09:26 Dose:  40 mg


Silver Sulfadiazine (Silvadene 1% 20 Gm)  0 ea TOP DAILY [facility]


   Last Admin: 03/28/17 10:40 Dose:  1 applic











- Labs


Labs: 


 





 03/29/17 06:20 





 03/29/17 06:20 





 











PT  12.1 Seconds (9.9-11.8)  H  03/25/17  06:15    


 


INR  1.12  (0.93-1.08)  H  03/25/17  06:15    


 


APTT  69.6 Seconds (23.7-30.8)  H  03/29/17  01:55    














- Constitutional


Appears: Non-toxic, No Acute Distress





- Head Exam


Head Exam: NORMAL INSPECTION





- ENT Exam


ENT Exam: Mucous Membranes Moist





- Neck Exam


Neck Exam: absent: Lymphadenopathy, Meningismus





- Respiratory Exam


Respiratory Exam: Decreased Breath Sounds





- Cardiovascular Exam


Cardiovascular Exam: +S1, +S2





- GI/Abdominal Exam


GI & Abdominal Exam: Soft.  absent: Tenderness





- Extremities Exam


Additional comments: 


both lower extremities with some erythema and swelling, which has improved





Assessment and Plan





- Assessment and Plan (Free Text)


Plan: 


Assessment


severe sepsis S/P shock S/P ventilator-dependent respiratory failure with acute 

on chronic renal failure due to Group G strep bacteremia, probably secondary to 

bilateral lower extremities skin and skin structure infection; also with MSSA 

from the wound cultures; so far no evidence of new infection; patient is 

clinically improving


acute rhabdomyolysis


consider acute NSTEMI


HTN


chronic renal failure


dementia


obesity with BMI 38





Plan


continue renally-adjusted Merrem (day 7 from first negative blood cx); repeat 

septic work up so far negative, PCT is improving; CXR does not show focal 

infiltrates; if cultures continue to be negative, will switch back to Rocephin 

to finish the 28 day course


2D echocardiogram does not show vegetations


will continue to monitor clinically

## 2017-03-29 NOTE — PN
DATE: 03/29/2017



SUBJECTIVE:  The patient is sitting up in bed in no acute distress.  There is no chest pain, no short
ness of breath.  He remains awake, somewhat confused.



OBJECTIVE:

VITAL SIGNS:  Blood pressure 112/60, pulse 88, temperature 97.6, respiratory rate 20.

LUNGS:  Show a few crackles at the bases.

HEART:  Regular rate and rhythm.

ABDOMEN:  Soft, nontender.  Bowel sounds are normoactive.

EXTREMITIES:  With trace bipedal edema, chronic venous stasis ulcer.  Wound dressings are intact.

NEUROLOGIC:  The patient is awake and confused without focal sensory or motor deficits.

SKIN:  Warm and dry.



LABORATORY DATA:  WBCs 11.8, hemoglobin 11.1, hematocrit 33.6, sodium 142, potassium 3.2, chloride 10
6, CO2 27, BUN 62, creatinine 1.3, glucose 148.



IMPRESSION:

1.  Status post ventilatory failure, rule out acute ischemia/infarct.

2.  Hypertension, hypertensive cardiovascular disease and congestive heart failure.

3.  Status post acute rhabdomyolysis with acute renal failure superimposed on chronic kidney disease,
 improving.

4.  Chronic venous stasis ulcer of the lower extremities with cellulitis and sepsis, on IV meropenem.


5.  Paroxysmal atrial fibrillation with rapid ventricular rate.

6.  Dementia with superimposed delirium secondary to underlying metabolic derangement.

7.  Immobility secondary to severe degenerative joint disease and obesity with deconditioning.



PLAN:  We will continue the patient on anticoagulation.  The patient is essentially immobile and not 
at risk of falls at the present time.  Continue antibiotics as well as cardiology, pulmonary, infecti
ous disease followup.  The patient is stable for transfer to the medical surgical floor.





__________________________________________

Ariel Browne JD, MD







cc:



DD: 03/29/2017 14:40:25  [id number]

TT: 03/29/2017 14:56:26

Confirmation # [id number]

Dictation # [id number]

tn

## 2017-03-29 NOTE — PN
DATE: 03/29/2017



SUBJECTIVE:  The patient is seen sitting up in bed in the CCU.  He is feeling somewhat better.  He is
 currently taking clear liquids.  He is breathing comfortably.



CURRENT MEDICATIONS:  Include aspirin, IV heparin, DuoNeb inhaler, metoprolol 2.5 mg IV q. 12 hours, 
meropenem, morphine p.r.n., Norvasc 10 mg daily, Protonix 40 mg daily, and Xanax p.r.n.



OBJECTIVE:

GENERAL:  He is an elderly man who appears in no distress at present.

VITAL SIGNS:  Blood pressure is 112/60 with occasional spikes over 200 systolic.  Pulse is 88 and irr
egularly irregular.  Respirations are 14.  He is afebrile.

HEENT:  No JVD.

CHEST:  Bilateral scattered rhonchi.

HEART:  PMI displaced laterally with an irregularly irregular rhythm and a systolic murmur at the lef
t sternal border.

ABDOMEN:  Soft, nontender, normoactive bowel sounds.

EXTREMITIES:  Bilateral chronic cellulitic changes are present.  1+ edema is present as well.



DIAGNOSTIC DATA:  Potassium is 3.2.  BUN and creatinine are 62 and 1.3.  White count 11.8, hemoglobin
 and hematocrit 11.1 and 33.6 with platelet count of 222,000.  AST and  and 135.  



Chest x-ray reveals poor inspiratory effort, borderline cardiac silhouette enlargement, and mild claudine
hilar vascular congestion in part may be related to poor inspiratory effort and technique.  



IMPRESSION:

1.  Status post respiratory failure likely secondary to excessive sedation, currently extubated.

2.  Paroxysmal atrial fibrillation.

3.  Resolving renal failure.

4.  Bilateral leg cellulitis.

5.  Probable coronary artery disease.



RECOMMENDATIONS:  Continued supportive care is advised.  If he is able to take oral intake, his metop
rolol can be switched from IV to oral form again.  Anticoagulation with heparin will be continued.  H
owever, long-term anticoagulation may be too risky if he has evidence of high fall risk.  We will con
tinue to follow along and make further recommendations as appropriate.





__________________________________________

Cortez Wallace MD







cc:



DD: 03/29/2017 10:24:23  [id number]

TT: 03/29/2017 10:44:28

Confirmation # [id number]

Dictation # [id number]

jannie

## 2017-03-29 NOTE — CP.CCUPN
<Lucero Lopez - Last Filed: 03/29/17 10:56>





CCU Subjective





- Physician Review


Events Since Last Encounter (Free Text): 





03/29/17 10:57


Patient seen and examined bedside. No acute events overnight. Patient 

successfully extubated yesterday, doing well on NC. Denies CP, SOB, Abd pain, n/

v, fevers, chills. Tolerating FLD without nausea or abdominal pain. 


Critical Care Time Spent (in minutes): 30





CCU Objective





- Vital Signs / Intake & Output


Vital Signs (Last 4 hours): 


Vital Signs











  Pulse BP


 


 03/29/17 09:04  88  112/60


 


 03/29/17 08:47  80  153/56 H











Intake and Output (Last 8hrs): 


 Intake & Output











 03/28/17 03/29/17 03/29/17





 22:59 06:59 14:59


 


Intake Total 2572 2138 


 


Output Total 1300 1250 


 


Balance 1272 888 


 


Weight  252 lb 252 lb


 


Intake:   


 


  IV 1252 1388 


 


    Left Forearm 135 144 


 


    Left Upper arm 1117 1244 


 


  Oral 1320 750 


 


Output:   


 


  Urine 1300 1250 


 


    Urethral (Bill) 1300 1250 


 


  Oral Regurgitation  0 


 


Other:   


 


  Voiding Method Indwelling Catheter  


 


  # Bowel Movements  0 














- Physical Exam


Head: Positive for: Atraumatic, Normocephalic.  Negative for: Abrasion, 

Laceration


Pupils: Positive for: PERRL


Extroacular Muscles: Positive for: EOMI.  Negative for: Entrapment


Conjunctiva: Positive for: Normal.  Negative for: Injected


Mouth: Positive for: Moist Mucous Membranes


Respiratory/Chest: Positive for: Clear to Auscultation, Good Air Exchange, 

Tachypneic.  Negative for: Respiratory Distress, Accessory Muscle Use


Cardiovascular: Positive for: Regular Rate and Rhythm, Normal S1, S2.  Negative 

for: Murmurs


Abdomen: Positive for: Normal Bowel Sounds.  Negative for: Tenderness, 

Distention, Peritoneal Signs


Upper Extremity: Positive for: Normal Inspection, NORMAL PULSES, 

Neurovascularly Intact.  Negative for: Cyanosis, Edema


Lower Extremity: Positive for: Normal Inspection, Tenderness, Neurovascularly 

Intact, Other (abrasion right shin, chronic venous stasis changes foot and 

ankle B/L).  Negative for: Edema, NORMAL PULSES (diminished PT and DP pulse B/L)

, Swelling, Deformity


Neurological: Positive for: GCS=15, CN II-XII Intact


Skin: Positive for: Warm, Dry, Abrasion


Psychiatric: Positive for: Alert, Oriented x 3





- Medications


Active Medications: 


Active Medications











Generic Name Dose Route Start Last Admin





  Trade Name Freq  PRN Reason Stop Dose Admin


 


Acetaminophen  650 mg 03/22/17 11:33 03/23/17 09:15





  Tylenol 325mg Tab  PO   650 mg





  Q4 PRN   Administration





  Fever >100.4 F   


 


Albuterol/Ipratropium  3 ml 03/28/17 08:00 03/29/17 07:07





  Duoneb 3 Mg/0.5 Mg (3 Ml) Ud  IH   3 ml





  [id number] WAQAR   Administration


 


Alprazolam  0.25 mg 03/27/17 08:42 03/29/17 05:05





  Xanax  PO 04/03/17 12:01  0.25 mg





  Q6 PRN   Administration





  Anxiety   





  Protocol   


 


Amlodipine Besylate  10 mg 03/29/17 10:00 03/29/17 09:04





  Norvasc  PO   10 mg





  DAILY WAQAR   Administration


 


Aspirin  325 mg 03/25/17 10:00 03/29/17 09:04





  Aspirin  PO   325 mg





  DAILY WAQAR   Administration


 


Clotrimazole  0 gm 03/22/17 18:00 03/29/17 09:08





  Lotrimin 1%  TOP   1 applic





  BID WAQAR   Administration


 


Hydralazine HCl  10 mg 03/28/17 18:01 03/29/17 02:46





  Apresoline  IVP   10 mg





  Q6 PRN   Administration





  Systolic Blood Pressure   


 


Heparin Sodium/Sodium Chloride  250 mls @ 13.63 mls/hr 03/27/17 21:30 03/28/17 

13:25





  Heparin 97551 Units/250ml 1/2 Normal Saline  IV   12.494 mls/hr





  [id number] WAQAR   Administration





  Protocol   





  12 UNITS/KG/HR   


 


Meropenem 1g/NS 100mL IVPB  100 mls @ 100 mls/hr 03/28/17 08:30 03/29/17 10:11





  Meropenem 1g/Ns 100ml Ivpb  IVPB 04/04/17 08:31  100 mls/hr





  Q12 WAQAR   Administration





  Protocol   


 


Metoprolol Tartrate  2.5 mg 03/28/17 09:00 03/29/17 08:47





  Lopressor  IV   2.5 mg





  Q8H WAQAR   Administration


 


Morphine Sulfate  1 mg 03/22/17 12:41 03/28/17 23:16





  Morphine  IVP   1 mg





  Q4H PRN   Administration





  Pain, moderate (4-7)   


 


Pantoprazole Sodium  40 mg 03/27/17 16:00 03/29/17 09:07





  Protonix Inj  IVP   40 mg





  DAILY WAQAR   Administration


 


Silver Sulfadiazine  0 ea 03/25/17 10:00 03/29/17 09:09





  Silvadene 1% 20 Gm  TOP   1 applic





  DAILY WAQAR   Administration














- Patient Studies


Lab Studies: 


 Microbiology Studies











 03/27/17 23:05 Gram Stain - Final





 Trachasp Sputum Culture - Final





    Yeast Species


 


 03/27/17 16:00 MRSA Culture (Admit) - Final





 Naris    MRSA NOT DETECTED


 


 03/27/17 16:00 Blood Culture - Preliminary





 Blood-Venous    NO GROWTH AFTER 24 HOURS


 


 03/27/17 15:30 Blood Culture - Preliminary





 Blood-Venous    NO GROWTH AFTER 24 HOURS


 


 03/23/17 15:50 Blood Culture - Final





 Blood-Venous    NO GROWTH AFTER 5 DAYS





 Gram Stain - Final





    TEST NOT PERFORMED


 


 03/23/17 15:30 Blood Culture - Final





 Blood-Venous    NO GROWTH AFTER 5 DAYS





 Gram Stain - Final





    TEST NOT PERFORMED








 Lab Studies











  03/29/17 03/29/17 03/29/17 Range/Units





  06:20 05:20 01:55 


 


WBC  11.8 H    (4.5-11.0)  10^3/ul


 


RBC  4.07    (3.5-6.1)  10^6/uL


 


Hgb  11.1 L    (14.0-18.0)  gm/dL


 


Hct  33.6 L    (42.0-52.0)  %


 


MCV  82.6    (80.0-105.0)  fL


 


MCH  27.3    (25.0-35.0)  pg


 


MCHC  33.0    (31.0-37.0)  g/dl


 


RDW  14.7 H    (11.5-14.5)  %


 


Plt Count  222    (120.0-450.0)  10^3/uL


 


MPV  9.1    (7.0-11.0)  fl


 


APTT    69.6 H  (23.7-30.8)  Seconds


 


pCO2   39   (35-45)  mm/Hg


 


pO2   88.0   ()  mm/Hg


 


HCO3   25.9   (21-28)  mmol/L


 


ABG pH   7.43   (7.35-7.45)  


 


ABG Total CO2   27.1   (22-28)  mmol.L


 


ABG O2 Saturation   97.5   (95-98)  %


 


ABG O2 Content   25.9 H   (15-23)  ML/dl


 


ABG Base Excess   1.6   (-2.0-3.0)  mmol/L


 


ABG Hemoglobin   19.4 H   (11.7-17.4)  g/dL


 


ABG Carboxyhemoglobin   1.7 H   (0.5-1.5)  %


 


POC ABG HHb (Measured)   2.4   (0-5)  %


 


ABG Methemoglobin   0.9   (0.0-3.0)  %


 


ABG O2 Capacity   26.6 H   (16-24)  mL/dl


 


Hgb O2 Saturation   95.1   (95.0-98.0)  %


 


FiO2   28.0   %


 


Sodium  142    (132-148)  mmol/L


 


Potassium  3.2 L    (3.6-5.0)  mmol/L


 


Chloride  106    ()  mmol/L


 


Carbon Dioxide  27    (21-33)  mmol/L


 


Anion Gap  12    (10-20)  


 


BUN  62 H    (7-21)  mg/dL


 


Creatinine  1.3    (0.5-1.4)  mg/dL


 


Est GFR (African Amer)  > 60    


 


Est GFR (Non-Af Amer)  53    


 


Random Glucose  148 H    ()  mg/dL


 


Calcium  8.3 L    (8.4-10.5)  mg/dL


 


Phosphorus  2.9    (2.5-4.5)  mg/dL


 


Magnesium  2.0    (1.7-2.2)  mg/dL


 


Total Bilirubin  0.7    (0.2-1.3)  mg/dL


 


AST  134 H    (15-59)  U/L


 


ALT  135 H    (7-56)  U/L


 


Alkaline Phosphatase  74    ()  U/L


 


Total Protein  5.6 L    (5.8-8.3)  g/dL


 


Albumin  2.6 L    (3.0-4.8)  g/dL


 


Albumin (PEP)     (3.8-4.8)  g/dL


 


Globulin  3.0    gm/dL


 


Albumin/Globulin Ratio  0.9 L    (1.1-1.8)  


 


Alpha-1-Globulins     (0.2-0.3)  g/dL


 


Alpha-2-Globulins     (0.5-0.9)  g/dL


 


Beta-1-Globulin     (0.4-0.6)  g/dL


 


Beta-2-Globulin     (0.2-0.5)  g/dL


 


Gamma Globulins     (0.8-1.7)  g/dL


 


Abnorm Protein Band 1     


 


Abnorm Protein Band 2     


 


Abnorm Protein Band 3     


 


ABIOLA & SPEP Interp     (())  


 


Serum Immunofixation     (())  


 


Urine Immunofixation     (())  


 


REJI Screen     (Negative)  


 


ANCA Screen     (NEGATIVE)  


 


c-ANCA Titer     


 


Proteinase 3 (PR3)     (<1.0)  AI


 


p-ANCA Titer     


 


Atypical p-ANCA Titer     


 


Myeloperoxidase Ab     (<1.0)  AI














  03/28/17 03/28/17 03/25/17 Range/Units





  19:58 12:30 06:15 


 


WBC     (4.5-11.0)  10^3/ul


 


RBC     (3.5-6.1)  10^6/uL


 


Hgb     (14.0-18.0)  gm/dL


 


Hct     (42.0-52.0)  %


 


MCV     (80.0-105.0)  fL


 


MCH     (25.0-35.0)  pg


 


MCHC     (31.0-37.0)  g/dl


 


RDW     (11.5-14.5)  %


 


Plt Count     (120.0-450.0)  10^3/uL


 


MPV     (7.0-11.0)  fl


 


APTT  63.7 H  37.0 H   (23.7-30.8)  Seconds


 


pCO2     (35-45)  mm/Hg


 


pO2     ()  mm/Hg


 


HCO3     (21-28)  mmol/L


 


ABG pH     (7.35-7.45)  


 


ABG Total CO2     (22-28)  mmol.L


 


ABG O2 Saturation     (95-98)  %


 


ABG O2 Content     (15-23)  ML/dl


 


ABG Base Excess     (-2.0-3.0)  mmol/L


 


ABG Hemoglobin     (11.7-17.4)  g/dL


 


ABG Carboxyhemoglobin     (0.5-1.5)  %


 


POC ABG HHb (Measured)     (0-5)  %


 


ABG Methemoglobin     (0.0-3.0)  %


 


ABG O2 Capacity     (16-24)  mL/dl


 


Hgb O2 Saturation     (95.0-98.0)  %


 


FiO2     %


 


Sodium     (132-148)  mmol/L


 


Potassium     (3.6-5.0)  mmol/L


 


Chloride     ()  mmol/L


 


Carbon Dioxide     (21-33)  mmol/L


 


Anion Gap     (10-20)  


 


BUN     (7-21)  mg/dL


 


Creatinine     (0.5-1.4)  mg/dL


 


Est GFR (African Amer)     


 


Est GFR (Non-Af Amer)     


 


Random Glucose     ()  mg/dL


 


Calcium     (8.4-10.5)  mg/dL


 


Phosphorus     (2.5-4.5)  mg/dL


 


Magnesium     (1.7-2.2)  mg/dL


 


Total Bilirubin     (0.2-1.3)  mg/dL


 


AST     (15-59)  U/L


 


ALT     (7-56)  U/L


 


Alkaline Phosphatase     ()  U/L


 


Total Protein     (5.8-8.3)  g/dL


 


Albumin     (3.0-4.8)  g/dL


 


Albumin (PEP)    2.4 L  (3.8-4.8)  g/dL


 


Globulin     gm/dL


 


Albumin/Globulin Ratio     (1.1-1.8)  


 


Alpha-1-Globulins    0.5 H  (0.2-0.3)  g/dL


 


Alpha-2-Globulins    1.0 H  (0.5-0.9)  g/dL


 


Beta-1-Globulin    0.4  (0.4-0.6)  g/dL


 


Beta-2-Globulin    0.5  (0.2-0.5)  g/dL


 


Gamma Globulins    0.6 L  (0.8-1.7)  g/dL


 


Abnorm Protein Band 1    TEST NOT PERFORMED  


 


Abnorm Protein Band 2    TEST NOT PERFORMED  


 


Abnorm Protein Band 3    TEST NOT PERFORMED  


 


ABIOLA & SPEP Interp    See note  (())  


 


Serum Immunofixation    See note  (())  


 


Urine Immunofixation     (())  


 


REJI Screen    Negative  (Negative)  


 


ANCA Screen    Negative  (NEGATIVE)  


 


c-ANCA Titer    TNP  


 


Proteinase 3 (PR3)    <1.0  (<1.0)  AI


 


p-ANCA Titer    TNP  


 


Atypical p-ANCA Titer    TNP  


 


Myeloperoxidase Ab    <1.0  (<1.0)  AI














  03/24/17 Range/Units





  12:00 


 


WBC   (4.5-11.0)  10^3/ul


 


RBC   (3.5-6.1)  10^6/uL


 


Hgb   (14.0-18.0)  gm/dL


 


Hct   (42.0-52.0)  %


 


MCV   (80.0-105.0)  fL


 


MCH   (25.0-35.0)  pg


 


MCHC   (31.0-37.0)  g/dl


 


RDW   (11.5-14.5)  %


 


Plt Count   (120.0-450.0)  10^3/uL


 


MPV   (7.0-11.0)  fl


 


APTT   (23.7-30.8)  Seconds


 


pCO2   (35-45)  mm/Hg


 


pO2   ()  mm/Hg


 


HCO3   (21-28)  mmol/L


 


ABG pH   (7.35-7.45)  


 


ABG Total CO2   (22-28)  mmol.L


 


ABG O2 Saturation   (95-98)  %


 


ABG O2 Content   (15-23)  ML/dl


 


ABG Base Excess   (-2.0-3.0)  mmol/L


 


ABG Hemoglobin   (11.7-17.4)  g/dL


 


ABG Carboxyhemoglobin   (0.5-1.5)  %


 


POC ABG HHb (Measured)   (0-5)  %


 


ABG Methemoglobin   (0.0-3.0)  %


 


ABG O2 Capacity   (16-24)  mL/dl


 


Hgb O2 Saturation   (95.0-98.0)  %


 


FiO2   %


 


Sodium   (132-148)  mmol/L


 


Potassium   (3.6-5.0)  mmol/L


 


Chloride   ()  mmol/L


 


Carbon Dioxide   (21-33)  mmol/L


 


Anion Gap   (10-20)  


 


BUN   (7-21)  mg/dL


 


Creatinine   (0.5-1.4)  mg/dL


 


Est GFR (African Amer)   


 


Est GFR (Non-Af Amer)   


 


Random Glucose   ()  mg/dL


 


Calcium   (8.4-10.5)  mg/dL


 


Phosphorus   (2.5-4.5)  mg/dL


 


Magnesium   (1.7-2.2)  mg/dL


 


Total Bilirubin   (0.2-1.3)  mg/dL


 


AST   (15-59)  U/L


 


ALT   (7-56)  U/L


 


Alkaline Phosphatase   ()  U/L


 


Total Protein   (5.8-8.3)  g/dL


 


Albumin   (3.0-4.8)  g/dL


 


Albumin (PEP)   (3.8-4.8)  g/dL


 


Globulin   gm/dL


 


Albumin/Globulin Ratio   (1.1-1.8)  


 


Alpha-1-Globulins   (0.2-0.3)  g/dL


 


Alpha-2-Globulins   (0.5-0.9)  g/dL


 


Beta-1-Globulin   (0.4-0.6)  g/dL


 


Beta-2-Globulin   (0.2-0.5)  g/dL


 


Gamma Globulins   (0.8-1.7)  g/dL


 


Abnorm Protein Band 1   


 


Abnorm Protein Band 2   


 


Abnorm Protein Band 3   


 


ABIOLA & SPEP Interp   (())  


 


Serum Immunofixation   (())  


 


Urine Immunofixation  See note  (())  


 


REJI Screen   (Negative)  


 


ANCA Screen   (NEGATIVE)  


 


c-ANCA Titer   


 


Proteinase 3 (PR3)   (<1.0)  AI


 


p-ANCA Titer   


 


Atypical p-ANCA Titer   


 


Myeloperoxidase Ab   (<1.0)  AI








 Laboratory Results - last 24 hr











  03/24/17 03/25/17 03/28/17





  12:00 06:15 12:30


 


WBC   


 


RBC   


 


Hgb   


 


Hct   


 


MCV   


 


MCH   


 


MCHC   


 


RDW   


 


Plt Count   


 


MPV   


 


APTT    37.0 H


 


pCO2   


 


pO2   


 


HCO3   


 


ABG pH   


 


ABG Total CO2   


 


ABG O2 Saturation   


 


ABG O2 Content   


 


ABG Base Excess   


 


ABG Hemoglobin   


 


ABG Carboxyhemoglobin   


 


POC ABG HHb (Measured)   


 


ABG Methemoglobin   


 


ABG O2 Capacity   


 


Hgb O2 Saturation   


 


FiO2   


 


Sodium   


 


Potassium   


 


Chloride   


 


Carbon Dioxide   


 


Anion Gap   


 


BUN   


 


Creatinine   


 


Est GFR ( Amer)   


 


Est GFR (Non-Af Amer)   


 


Random Glucose   


 


Calcium   


 


Phosphorus   


 


Magnesium   


 


Total Bilirubin   


 


AST   


 


ALT   


 


Alkaline Phosphatase   


 


Total Protein   


 


Albumin   


 


Albumin (PEP)   2.4 L 


 


Globulin   


 


Albumin/Globulin Ratio   


 


Alpha-1-Globulins   0.5 H 


 


Alpha-2-Globulins   1.0 H 


 


Beta-1-Globulin   0.4 


 


Beta-2-Globulin   0.5 


 


Gamma Globulins   0.6 L 


 


Abnorm Protein Band 1   TEST NOT PERFORMED 


 


Abnorm Protein Band 2   TEST NOT PERFORMED 


 


Abnorm Protein Band 3   TEST NOT PERFORMED 


 


ABIOLA & SPEP Interp   See note 


 


Serum Immunofixation   See note 


 


Urine Immunofixation  See note  


 


REJI Screen   Negative 


 


ANCA Screen   Negative 


 


c-ANCA Titer   TNP 


 


Proteinase 3 (PR3)   <1.0 


 


p-ANCA Titer   TNP 


 


Atypical p-ANCA Titer   TNP 


 


Myeloperoxidase Ab   <1.0 














  03/28/17 03/29/17 03/29/17





  19:58 01:55 05:20


 


WBC   


 


RBC   


 


Hgb   


 


Hct   


 


MCV   


 


MCH   


 


MCHC   


 


RDW   


 


Plt Count   


 


MPV   


 


APTT  63.7 H  69.6 H 


 


pCO2    39


 


pO2    88.0


 


HCO3    25.9


 


ABG pH    7.43


 


ABG Total CO2    27.1


 


ABG O2 Saturation    97.5


 


ABG O2 Content    25.9 H


 


ABG Base Excess    1.6


 


ABG Hemoglobin    19.4 H


 


ABG Carboxyhemoglobin    1.7 H


 


POC ABG HHb (Measured)    2.4


 


ABG Methemoglobin    0.9


 


ABG O2 Capacity    26.6 H


 


Hgb O2 Saturation    95.1


 


FiO2    28.0


 


Sodium   


 


Potassium   


 


Chloride   


 


Carbon Dioxide   


 


Anion Gap   


 


BUN   


 


Creatinine   


 


Est GFR ( Amer)   


 


Est GFR (Non-Af Amer)   


 


Random Glucose   


 


Calcium   


 


Phosphorus   


 


Magnesium   


 


Total Bilirubin   


 


AST   


 


ALT   


 


Alkaline Phosphatase   


 


Total Protein   


 


Albumin   


 


Albumin (PEP)   


 


Globulin   


 


Albumin/Globulin Ratio   


 


Alpha-1-Globulins   


 


Alpha-2-Globulins   


 


Beta-1-Globulin   


 


Beta-2-Globulin   


 


Gamma Globulins   


 


Abnorm Protein Band 1   


 


Abnorm Protein Band 2   


 


Abnorm Protein Band 3   


 


ABIOLA & SPEP Interp   


 


Serum Immunofixation   


 


Urine Immunofixation   


 


REJI Screen   


 


ANCA Screen   


 


c-ANCA Titer   


 


Proteinase 3 (PR3)   


 


p-ANCA Titer   


 


Atypical p-ANCA Titer   


 


Myeloperoxidase Ab   














  03/29/17





  06:20


 


WBC  11.8 H


 


RBC  4.07


 


Hgb  11.1 L


 


Hct  33.6 L


 


MCV  82.6


 


MCH  27.3


 


MCHC  33.0


 


RDW  14.7 H


 


Plt Count  222


 


MPV  9.1


 


APTT 


 


pCO2 


 


pO2 


 


HCO3 


 


ABG pH 


 


ABG Total CO2 


 


ABG O2 Saturation 


 


ABG O2 Content 


 


ABG Base Excess 


 


ABG Hemoglobin 


 


ABG Carboxyhemoglobin 


 


POC ABG HHb (Measured) 


 


ABG Methemoglobin 


 


ABG O2 Capacity 


 


Hgb O2 Saturation 


 


FiO2 


 


Sodium  142


 


Potassium  3.2 L


 


Chloride  106


 


Carbon Dioxide  27


 


Anion Gap  12


 


BUN  62 H


 


Creatinine  1.3


 


Est GFR ( Amer)  > 60


 


Est GFR (Non-Af Amer)  53


 


Random Glucose  148 H


 


Calcium  8.3 L


 


Phosphorus  2.9


 


Magnesium  2.0


 


Total Bilirubin  0.7


 


AST  134 H


 


ALT  135 H


 


Alkaline Phosphatase  74


 


Total Protein  5.6 L


 


Albumin  2.6 L


 


Albumin (PEP) 


 


Globulin  3.0


 


Albumin/Globulin Ratio  0.9 L


 


Alpha-1-Globulins 


 


Alpha-2-Globulins 


 


Beta-1-Globulin 


 


Beta-2-Globulin 


 


Gamma Globulins 


 


Abnorm Protein Band 1 


 


Abnorm Protein Band 2 


 


Abnorm Protein Band 3 


 


ABIOLA & SPEP Interp 


 


Serum Immunofixation 


 


Urine Immunofixation 


 


REJI Screen 


 


ANCA Screen 


 


c-ANCA Titer 


 


Proteinase 3 (PR3) 


 


p-ANCA Titer 


 


Atypical p-ANCA Titer 


 


Myeloperoxidase Ab 














Review of Systems





- Constitutional


Constitutional: absent: Fever, Chills





- EENT


Eyes: absent: Change in Vision


Ears: absent: Dizziness





- Cardiovascular


Cardiovascular: absent: Chest Pain, Diaphoresis





- Respiratory


Respiratory: absent: Cough, Dyspnea





- Genitourinary


Genitourinary: absent: Dysuria, Flank Pain





- Integumentary


Integumentary: absent: New Lesions





- Neurological


Neurological: absent: Dizziness, Focal Weakness





Critical Care Progress Note





- Ventilator Checklist


PUD Prophalyxis: Yes


DVT Prophylaxis: Yes





- Prophylaxis GI


Prophylaxis GI: PPI





- Prophylaxis DVT


Prophylaxis DVT: Heparin SQ





- Nutrition


Nutrition: 


 Nutrition











 Category Date Time Status


 


 Heart Healthy Diet [DIET] Diets  03/29/17 Lunch Ordered


 


 Liquid Diet [DIET] Diets  03/28/17 Lunch Ordered














Assessment/Plan





- Assessment and Plan (Free Text)


Assessment: 


82 yo M w h/o HTN, COPD, obesity, dementia [place] [facility] [place] 

G strep bacteremia, L foot MSSA infection transferred to ICU s/p RRT due to 

hypoxic, hypercarbic RF requiring intubation, now s/p extubation yesterday, off 

vasopressors, new troponin elevations without EKG or ECHO changes


Plan: 


Neuro: AAOx3, NAD, s/p extubation this AM, tolerating NC, able to clear 

secretions and protect airway





Pulm: Hypercarbic RF 2/2 Ativan with hypoventilation, patient was difficult to 

arouse, GCS worrisome for protecting airway requiring intubation now s/p 

extubation, tolerating NC, able to clear secretions and protect airway. ABG 

reviewed


   Duonebs Q6hr





CV: Off vasopressors. 


   New troponin elevation now to 5.25. EKG and 2D ECHO show no new changes. 

Continue heparin drip. Management as per cardiology service. Continue Heparin 

drip for now pending further cardio recs


   Continue cardiac meds as per cardio service


   


GI: GI ppx


   Transaminitis continue to improve. Continue monitoring





Renal: Stress dose steroids.


   CARLOS (unknown baseline) resolved. Continue IVF as per primary team





Endo:  No acute issues. Maintain euglycemia 140-180





ID: Continue Meropenem for L foot MSSA cellulitis, Group G Strep bacteremia. 

Procalcitonin trending down 7.36 from 31.55


   Repeat Blood cultures have been negative thus far


   


Heme: Hb stable. No signs of bleeding. 





DVT/GI ppx: Heparin gtt, protonix, HHD





Dispo: Transfer to telemetry





- Date & Time


Date: 03/29/17


Time: 10:59





<Kentrell Lira - Last Filed: 03/29/17 16:42>





CCU Objective





- Vital Signs / Intake & Output


Intake and Output (Last 8hrs): 


 Intake & Output











 03/29/17 03/29/17 03/29/17





 06:59 14:59 22:59


 


Intake Total 2138  


 


Output Total 1250  


 


Balance 888  


 


Weight 252 lb 252 lb 


 


Intake:   


 


  IV 1388  


 


    Left Forearm 144  


 


    Left Upper arm 1244  


 


  Oral 750  


 


Output:   


 


  Urine 1250  


 


    Urethral (Bill) 1250  


 


  Oral Regurgitation 0  


 


Other:   


 


  # Bowel Movements 0  














- Medications


Active Medications: 


Active Medications











Generic Name Dose Route Start Last Admin





  Trade Name Freq  PRN Reason Stop Dose Admin


 


Acetaminophen  650 mg 03/22/17 11:33 03/23/17 09:15





  Tylenol 325mg Tab  PO   650 mg





  Q4 PRN   Administration





  Fever >100.4 F   


 


Albuterol/Ipratropium  3 ml 03/28/17 08:00 03/29/17 13:12





  Duoneb 3 Mg/0.5 Mg (3 Ml) Ud  IH   3 ml





  [id number] WAQAR   Administration


 


Alprazolam  0.25 mg 03/27/17 08:42 03/29/17 05:05





  Xanax  PO 04/03/17 12:01  0.25 mg





  Q6 PRN   Administration





  Anxiety   





  Protocol   


 


Amlodipine Besylate  10 mg 03/29/17 10:00 03/29/17 09:04





  Norvasc  PO   10 mg





  DAILY WAQAR   Administration


 


Aspirin  325 mg 03/25/17 10:00 03/29/17 09:04





  Aspirin  PO   325 mg





  DAILY WAQAR   Administration


 


Clotrimazole  0 gm 03/22/17 18:00 03/29/17 09:08





  Lotrimin 1%  TOP   1 applic





  BID WAQAR   Administration


 


Hydralazine HCl  10 mg 03/28/17 18:01 03/29/17 02:46





  Apresoline  IVP   10 mg





  Q6 PRN   Administration





  Systolic Blood Pressure   


 


Heparin Sodium/Sodium Chloride  250 mls @ 13.63 mls/hr 03/27/17 21:30 03/28/17 

13:25





  Heparin 77447 Units/250ml 1/2 Normal Saline  IV   12.494 mls/hr





  .[id number] WAQAR   Administration





  Protocol   





  12 UNITS/KG/HR   


 


Meropenem 1g/NS 100mL IVPB  100 mls @ 100 mls/hr 03/28/17 08:30 03/29/17 10:11





  Meropenem 1g/Ns 100ml Ivpb  IVPB 04/04/17 08:31  100 mls/hr





  Q12 WAQAR   Administration





  Protocol   


 


Metoprolol Tartrate  50 mg 03/29/17 18:00  





  Lopressor  PO   





  0800,1800 WAQAR   


 


Morphine Sulfate  1 mg 03/22/17 12:41 03/28/17 23:16





  Morphine  IVP   1 mg





  Q4H PRN   Administration





  Pain, moderate (4-7)   


 


Pantoprazole Sodium  40 mg 03/30/17 06:30  





  Protonix Ec Tab  PO   





  0630 WAQAR   


 


Silver Sulfadiazine  0 ea 03/25/17 10:00 03/29/17 09:09





  Silvadene 1% 20 Gm  TOP   1 applic





  DAILY WAQAR   Administration














- Patient Studies


Lab Studies: 


 Microbiology Studies











 03/27/17 16:00 Blood Culture - Preliminary





 Blood-Venous    NO GROWTH AFTER 48 HOURS


 


 03/27/17 15:30 Blood Culture - Preliminary





 Blood-Venous    NO GROWTH AFTER 48 HOURS


 


 03/27/17 18:30 Urine Culture - Final





 Urine,Bill    No Growth (<1,000 CFU/ML)


 


 03/27/17 23:05 Gram Stain - Final





 Trachasp Sputum Culture - Final





    Yeast Species


 


 03/27/17 16:00 MRSA Culture (Admit) - Final





 Naris    MRSA NOT DETECTED


 


 03/23/17 15:50 Blood Culture - Final





 Blood-Venous    NO GROWTH AFTER 5 DAYS





 Gram Stain - Final





    TEST NOT PERFORMED


 


 03/23/17 15:30 Blood Culture - Final





 Blood-Venous    NO GROWTH AFTER 5 DAYS





 Gram Stain - Final





    TEST NOT PERFORMED








 Lab Studies











  03/29/17 03/29/17 03/29/17 Range/Units





  06:20 05:20 01:55 


 


WBC  11.8 H    (4.5-11.0)  10^3/ul


 


RBC  4.07    (3.5-6.1)  10^6/uL


 


Hgb  11.1 L    (14.0-18.0)  gm/dL


 


Hct  33.6 L    (42.0-52.0)  %


 


MCV  82.6    (80.0-105.0)  fL


 


MCH  27.3    (25.0-35.0)  pg


 


MCHC  33.0    (31.0-37.0)  g/dl


 


RDW  14.7 H    (11.5-14.5)  %


 


Plt Count  222    (120.0-450.0)  10^3/uL


 


MPV  9.1    (7.0-11.0)  fl


 


APTT    69.6 H  (23.7-30.8)  Seconds


 


pCO2   39   (35-45)  mm/Hg


 


pO2   88.0   ()  mm/Hg


 


HCO3   25.9   (21-28)  mmol/L


 


ABG pH   7.43   (7.35-7.45)  


 


ABG Total CO2   27.1   (22-28)  mmol.L


 


ABG O2 Saturation   97.5   (95-98)  %


 


ABG O2 Content   25.9 H   (15-23)  ML/dl


 


ABG Base Excess   1.6   (-2.0-3.0)  mmol/L


 


ABG Hemoglobin   19.4 H   (11.7-17.4)  g/dL


 


ABG Carboxyhemoglobin   1.7 H   (0.5-1.5)  %


 


POC ABG HHb (Measured)   2.4   (0-5)  %


 


ABG Methemoglobin   0.9   (0.0-3.0)  %


 


ABG O2 Capacity   26.6 H   (16-24)  mL/dl


 


Hgb O2 Saturation   95.1   (95.0-98.0)  %


 


FiO2   28.0   %


 


Sodium  142    (132-148)  mmol/L


 


Potassium  3.2 L    (3.6-5.0)  mmol/L


 


Chloride  106    ()  mmol/L


 


Carbon Dioxide  27    (21-33)  mmol/L


 


Anion Gap  12    (10-20)  


 


BUN  62 H    (7-21)  mg/dL


 


Creatinine  1.3    (0.5-1.4)  mg/dL


 


Est GFR (African Amer)  > 60    


 


Est GFR (Non-Af Amer)  53    


 


Random Glucose  148 H    ()  mg/dL


 


Calcium  8.3 L    (8.4-10.5)  mg/dL


 


Phosphorus  2.9    (2.5-4.5)  mg/dL


 


Magnesium  2.0    (1.7-2.2)  mg/dL


 


Total Bilirubin  0.7    (0.2-1.3)  mg/dL


 


AST  134 H    (15-59)  U/L


 


ALT  135 H    (7-56)  U/L


 


Alkaline Phosphatase  74    ()  U/L


 


Total Protein  5.6 L    (5.8-8.3)  g/dL


 


Albumin  2.6 L    (3.0-4.8)  g/dL


 


Albumin (PEP)     (3.8-4.8)  g/dL


 


Globulin  3.0    gm/dL


 


Albumin/Globulin Ratio  0.9 L    (1.1-1.8)  


 


Alpha-1-Globulins     (0.2-0.3)  g/dL


 


Alpha-2-Globulins     (0.5-0.9)  g/dL


 


Beta-1-Globulin     (0.4-0.6)  g/dL


 


Beta-2-Globulin     (0.2-0.5)  g/dL


 


Gamma Globulins     (0.8-1.7)  g/dL


 


Abnorm Protein Band 1     


 


Abnorm Protein Band 2     


 


Abnorm Protein Band 3     


 


ABIOLA & SPEP Interp     (())  


 


ANCA Screen     (NEGATIVE)  


 


c-ANCA Titer     


 


p-ANCA Titer     


 


Atypical p-ANCA Titer     














  03/28/17 03/25/17 Range/Units





  19:58 06:15 


 


WBC    (4.5-11.0)  10^3/ul


 


RBC    (3.5-6.1)  10^6/uL


 


Hgb    (14.0-18.0)  gm/dL


 


Hct    (42.0-52.0)  %


 


MCV    (80.0-105.0)  fL


 


MCH    (25.0-35.0)  pg


 


MCHC    (31.0-37.0)  g/dl


 


RDW    (11.5-14.5)  %


 


Plt Count    (120.0-450.0)  10^3/uL


 


MPV    (7.0-11.0)  fl


 


APTT  63.7 H   (23.7-30.8)  Seconds


 


pCO2    (35-45)  mm/Hg


 


pO2    ()  mm/Hg


 


HCO3    (21-28)  mmol/L


 


ABG pH    (7.35-7.45)  


 


ABG Total CO2    (22-28)  mmol.L


 


ABG O2 Saturation    (95-98)  %


 


ABG O2 Content    (15-23)  ML/dl


 


ABG Base Excess    (-2.0-3.0)  mmol/L


 


ABG Hemoglobin    (11.7-17.4)  g/dL


 


ABG Carboxyhemoglobin    (0.5-1.5)  %


 


POC ABG HHb (Measured)    (0-5)  %


 


ABG Methemoglobin    (0.0-3.0)  %


 


ABG O2 Capacity    (16-24)  mL/dl


 


Hgb O2 Saturation    (95.0-98.0)  %


 


FiO2    %


 


Sodium    (132-148)  mmol/L


 


Potassium    (3.6-5.0)  mmol/L


 


Chloride    ()  mmol/L


 


Carbon Dioxide    (21-33)  mmol/L


 


Anion Gap    (10-20)  


 


BUN    (7-21)  mg/dL


 


Creatinine    (0.5-1.4)  mg/dL


 


Est GFR (African Amer)    


 


Est GFR (Non-Af Amer)    


 


Random Glucose    ()  mg/dL


 


Calcium    (8.4-10.5)  mg/dL


 


Phosphorus    (2.5-4.5)  mg/dL


 


Magnesium    (1.7-2.2)  mg/dL


 


Total Bilirubin    (0.2-1.3)  mg/dL


 


AST    (15-59)  U/L


 


ALT    (7-56)  U/L


 


Alkaline Phosphatase    ()  U/L


 


Total Protein    (5.8-8.3)  g/dL


 


Albumin    (3.0-4.8)  g/dL


 


Albumin (PEP)   2.4 L  (3.8-4.8)  g/dL


 


Globulin    gm/dL


 


Albumin/Globulin Ratio    (1.1-1.8)  


 


Alpha-1-Globulins   0.5 H  (0.2-0.3)  g/dL


 


Alpha-2-Globulins   1.0 H  (0.5-0.9)  g/dL


 


Beta-1-Globulin   0.4  (0.4-0.6)  g/dL


 


Beta-2-Globulin   0.5  (0.2-0.5)  g/dL


 


Gamma Globulins   0.6 L  (0.8-1.7)  g/dL


 


Abnorm Protein Band 1   TEST NOT PERFORMED  


 


Abnorm Protein Band 2   TEST NOT PERFORMED  


 


Abnorm Protein Band 3   TEST NOT PERFORMED  


 


ABIOLA & SPEP Interp   See note  (())  


 


ANCA Screen   Negative  (NEGATIVE)  


 


c-ANCA Titer   TNP  


 


p-ANCA Titer   TNP  


 


Atypical p-ANCA Titer   TNP  








 Laboratory Results - last 24 hr











  03/25/17 03/28/17 03/29/17





  06:15 19:58 01:55


 


WBC   


 


RBC   


 


Hgb   


 


Hct   


 


MCV   


 


MCH   


 


MCHC   


 


RDW   


 


Plt Count   


 


MPV   


 


APTT   63.7 H  69.6 H


 


pCO2   


 


pO2   


 


HCO3   


 


ABG pH   


 


ABG Total CO2   


 


ABG O2 Saturation   


 


ABG O2 Content   


 


ABG Base Excess   


 


ABG Hemoglobin   


 


ABG Carboxyhemoglobin   


 


POC ABG HHb (Measured)   


 


ABG Methemoglobin   


 


ABG O2 Capacity   


 


Hgb O2 Saturation   


 


FiO2   


 


Sodium   


 


Potassium   


 


Chloride   


 


Carbon Dioxide   


 


Anion Gap   


 


BUN   


 


Creatinine   


 


Est GFR ( Amer)   


 


Est GFR (Non-Af Amer)   


 


Random Glucose   


 


Calcium   


 


Phosphorus   


 


Magnesium   


 


Total Bilirubin   


 


AST   


 


ALT   


 


Alkaline Phosphatase   


 


Total Protein   


 


Albumin   


 


Albumin (PEP)  2.4 L  


 


Globulin   


 


Albumin/Globulin Ratio   


 


Alpha-1-Globulins  0.5 H  


 


Alpha-2-Globulins  1.0 H  


 


Beta-1-Globulin  0.4  


 


Beta-2-Globulin  0.5  


 


Gamma Globulins  0.6 L  


 


Abnorm Protein Band 1  TEST NOT PERFORMED  


 


Abnorm Protein Band 2  TEST NOT PERFORMED  


 


Abnorm Protein Band 3  TEST NOT PERFORMED  


 


ABIOLA & SPEP Interp  See note  


 


ANCA Screen  Negative  


 


c-ANCA Titer  TNP  


 


p-ANCA Titer  TNP  


 


Atypical p-ANCA Titer  TNP  














  03/29/17 03/29/17





  05:20 06:20


 


WBC   11.8 H


 


RBC   4.07


 


Hgb   11.1 L


 


Hct   33.6 L


 


MCV   82.6


 


MCH   27.3


 


MCHC   33.0


 


RDW   14.7 H


 


Plt Count   222


 


MPV   9.1


 


APTT  


 


pCO2  39 


 


pO2  88.0 


 


HCO3  25.9 


 


ABG pH  7.43 


 


ABG Total CO2  27.1 


 


ABG O2 Saturation  97.5 


 


ABG O2 Content  25.9 H 


 


ABG Base Excess  1.6 


 


ABG Hemoglobin  19.4 H 


 


ABG Carboxyhemoglobin  1.7 H 


 


POC ABG HHb (Measured)  2.4 


 


ABG Methemoglobin  0.9 


 


ABG O2 Capacity  26.6 H 


 


Hgb O2 Saturation  95.1 


 


FiO2  28.0 


 


Sodium   142


 


Potassium   3.2 L


 


Chloride   106


 


Carbon Dioxide   27


 


Anion Gap   12


 


BUN   62 H


 


Creatinine   1.3


 


Est GFR ( Amer)   > 60


 


Est GFR (Non-Af Amer)   53


 


Random Glucose   148 H


 


Calcium   8.3 L


 


Phosphorus   2.9


 


Magnesium   2.0


 


Total Bilirubin   0.7


 


AST   134 H


 


ALT   135 H


 


Alkaline Phosphatase   74


 


Total Protein   5.6 L


 


Albumin   2.6 L


 


Albumin (PEP)  


 


Globulin   3.0


 


Albumin/Globulin Ratio   0.9 L


 


Alpha-1-Globulins  


 


Alpha-2-Globulins  


 


Beta-1-Globulin  


 


Beta-2-Globulin  


 


Gamma Globulins  


 


Abnorm Protein Band 1  


 


Abnorm Protein Band 2  


 


Abnorm Protein Band 3  


 


ABIOLA & SPEP Interp  


 


ANCA Screen  


 


c-ANCA Titer  


 


p-ANCA Titer  


 


Atypical p-ANCA Titer  














Critical Care Progress Note





- Nutrition


Nutrition: 


 Nutrition











 Category Date Time Status


 


 Heart Healthy Diet [DIET] Diets  03/29/17 Lunch Ordered














Addendum


Addendum: 





03/29/17 16:34


patient was seen, examined and discussed with Dr. Lopez shoulder to shoulder. 

Her notes reflects my exam, assessment and plan, except as below. Meds/Labs/ONE 

reviewed.





This is 82 yo male with resolved septic shock secondary to b/l LE cellulitis 

complicated by MOSF. Now patient is hemodynamically stable, extubated yesterday

, doing much better. Mental status improved, respiratory wise stable, able to 

protect his airways. Cardiology service is following troponin leak, no plan for 

cath presently by cardiology service. TAC, ASA, bb-ers. ok to downgrade to tele





ccm time 40 min

## 2017-03-29 NOTE — RAD
HISTORY:

intubated  



COMPARISON:

3/28/2017 at 5:26 a.m. 



FINDINGS:



LUNGS:

Lung volumes are shallow. Mild pulmonary venous congestion suggested 

slightly increased in conspicuity . Although the history states 

intubated, no endotracheal tube is appreciated.  The prior 

endotracheal tube is no longer present. 



No interval consolidation noted. Interval left mid lung zone discoid 

atelectasis 



PLEURA:

Left pleural effusion and Hunter allowing for differences in 

technique 



CARDIOVASCULAR:

Mild cardiomegaly



OSSEOUS STRUCTURES:

No significant abnormalities.



VISUALIZED UPPER ABDOMEN:

Normal.



OTHER FINDINGS:

Left PICC line tip in right atrium as before. Prior NG tube removed







IMPRESSION:

Cardiomegaly with mild pulmonary venous congestion which appears 

increased slightly since the prior exam.  



Similar left pleural effusion



Prior NG tube removed. Left PICC line unchanged

## 2017-03-29 NOTE — PN
DATE: 03/29/2017



This is an 81-year-old male seen for lower extremity ulcerations.  The patient is seen in CCU.  The p
atient is extubated and he is answering questions.



VITAL SIGNS:  Noted with his pulse at 104.  His blood pressure was 190/106.  Oxygen sat was 94.



LABORATORY DATA:  Reviewed.  His white blood cell count is 11.8, the H and H is 11.1 and 33.6, and hi
s platelets are 222.  The patient's chemistry shows a random glucose at 148.  His uric acid was 8.3. 
 His AST and ALT were elevated 134/135, although this has come down.  His troponin is 5.25.  His tota
l protein was 5.6 and albumin is 2.6.  The patient's microbiology shows that his left foot grew sensi
tive Staph aureus.  The blood was growing group G strep.



PHYSICAL EXAMINATION:  The patient's lower extremities were reviewed.  The patient was seen at [facility].  His dressings are clean, dry and intact to his shins where he has 2 open blisters.  His foot has 
no open wounds.  He did have an open fissure at the plantar first MPJ; this was debrided upon his fir
st initial presentation to the Emergency Room.  At that time we saw him, there was some redness aroun
d the foot; however, there was no fluctuance, there was no pus, and there was no ascending cellulitis
.  There was an erythema at that time greater than 2 cm.  However, at this time there is no erythema 
to the foot.  There is no open wound to the foot.  There is no edema to the foot.  There is no increa
sed temperature gradient to the foot.  The 2 leg ulcerations are superficial open blisters.  May be g
martin when he was on the floor at home waiting to be picked up when he fell.  The knee has an eschar 
and that is from the fall at home.



ASSESSMENT:  A diabetic with 2 small open blisters to his lower legs.



PLAN OF TREATMENT:  I am not sure if this patient's sepsis came from his foot.  At this time there ar
e no signs of infection in the foot and there are no open ulcerations.  The patient's feet are being 
treated with Lotrimin.  He does have severe fungal infection to the feet.  The wounds are being dress
ed with Maxorb and Optifoam.  We are ordering heel pads to keep his heels off the bed to prevent ulce
rations.  At this time, his feet are elevated with a heel wedge.  The patient will be seen and follow
ed.





__________________________________________

Priya Howard DPM







cc:



DD: 03/29/2017 09:07:35  [id number]

TT: 03/29/2017 09:21:16

Confirmation # [id number]

Dictation # [id number]

mn

## 2017-03-30 LAB
ALBUMIN/GLOB SERPL: 0.8 {RATIO} (ref 1.1–1.8)
ALP SERPL-CCNC: 71 U/L (ref 38–133)
ALT SERPL-CCNC: 107 U/L (ref 7–56)
AST SERPL-CCNC: 89 U/L (ref 15–59)
BILIRUB SERPL-MCNC: 1 MG/DL (ref 0.2–1.3)
BUN SERPL-MCNC: 48 MG/DL (ref 7–21)
CALCIUM SERPL-MCNC: 9.3 MG/DL (ref 8.4–10.5)
CHLORIDE SERPL-SCNC: 108 MMOL/L (ref 98–107)
CO2 SERPL-SCNC: 29 MMOL/L (ref 21–33)
COHGB MFR BLD: 2.1 % (ref 0.5–1.5)
ERYTHROCYTE [DISTWIDTH] IN BLOOD BY AUTOMATED COUNT: 14.4 % (ref 11.5–14.5)
GLOBULIN SER-MCNC: 3.1 GM/DL
GLUCOSE SERPL-MCNC: 118 MG/DL (ref 70–110)
HCO3 BLDA-SCNC: 25.1 MMOL/L (ref 21–28)
HCT VFR BLD CALC: 33.7 % (ref 42–52)
HHB: 2.4 % (ref 0–5)
MCH RBC QN AUTO: 27.5 PG (ref 25–35)
MCHC RBC AUTO-ENTMCNC: 32.9 G/DL (ref 31–37)
MCV RBC AUTO: 83.4 FL (ref 80–105)
METHGB MFR BLD: 0.9 % (ref 0–3)
O2 CAP BLDA-SCNC: 18.5 ML/DL (ref 16–24)
O2 CT BLDA-SCNC: 18 ML/DL (ref 15–23)
PH BLDA: 7.44 [PH] (ref 7.35–7.45)
PLATELET # BLD: 253 10^3/UL (ref 120–450)
PMV BLD AUTO: 9.1 FL (ref 7–11)
PO2 BLDA: 81 MM/HG (ref 80–100)
POTASSIUM SERPL-SCNC: 4 MMOL/L (ref 3.6–5)
PROT SERPL-MCNC: 5.7 G/DL (ref 5.8–8.3)
SAO2 % BLDA: 94.7 % (ref 95–98)
SODIUM SERPL-SCNC: 147 MMOL/L (ref 132–148)
WBC # BLD AUTO: 14.7 10^3/UL (ref 4.5–11)

## 2017-03-30 RX ADMIN — SILVER SULFADIAZINE SCH APPLIC: 10 CREAM TOPICAL at 09:54

## 2017-03-30 RX ADMIN — PANTOPRAZOLE SODIUM SCH MG: 40 TABLET, DELAYED RELEASE ORAL at 07:18

## 2017-03-30 RX ADMIN — CLOTRIMAZOLE SCH APPLIC: 1 CREAM TOPICAL at 09:54

## 2017-03-30 RX ADMIN — HEPARIN SODIUM SCH MLS/HR: 10000 INJECTION, SOLUTION INTRAVENOUS at 07:46

## 2017-03-30 RX ADMIN — IPRATROPIUM BROMIDE AND ALBUTEROL SULFATE SCH ML: .5; 3 SOLUTION RESPIRATORY (INHALATION) at 07:08

## 2017-03-30 RX ADMIN — IPRATROPIUM BROMIDE AND ALBUTEROL SULFATE SCH ML: .5; 3 SOLUTION RESPIRATORY (INHALATION) at 20:10

## 2017-03-30 RX ADMIN — IPRATROPIUM BROMIDE AND ALBUTEROL SULFATE SCH ML: .5; 3 SOLUTION RESPIRATORY (INHALATION) at 13:20

## 2017-03-30 RX ADMIN — IPRATROPIUM BROMIDE AND ALBUTEROL SULFATE SCH: .5; 3 SOLUTION RESPIRATORY (INHALATION) at 01:05

## 2017-03-30 RX ADMIN — VITAMIN A AND VITAMIN D PRN EA: 929.3 OINTMENT TOPICAL at 09:54

## 2017-03-30 RX ADMIN — CLOTRIMAZOLE SCH APPLIC: 1 CREAM TOPICAL at 18:53

## 2017-03-30 NOTE — OP
PROCEDURE DATE: 03/27/2017



PROCEDURE:  Emergent endotracheal intubation.



INDICATION: Hypercapnic respiratory failure.



DESCRIPTION OF PROCEDURE:  The patient was preoxygenated with 100% FIO2, 2 liters of IV fluids as a b
olus started wide open.  The patient was sedated with etomidate 10 mL.  Direct laryngoscopy performed
 with curved MAC 3 blade.  Vocal cords are visualized.  Trachea cannulated by 7.5 endotracheal tube. 
 Position confirmed with change of the color of the end-tidal CO2 monitor, stomach auscultation and b
ilateral lung auscultation.  Subsequent chest x-ray also confirmed correct position of the endotrache
al tube.  The patient tolerated the procedure well.  Vital signs were monitored every 1 minute and ox
ygen saturation remained above 90 throughout the procedure.





__________________________________________

Kentrell Lira MD







cc:



DD: 03/30/2017 17:30:37  [id number]

TT: 03/30/2017 20:17:21

Job # [id number]

rn

## 2017-03-30 NOTE — PN
DATE: 03/30/2017(640am--730am)



SUBJECTIVE:  The patient appears comfortable this morning.  He is not short of 
breath at rest.



PHYSICAL EXAMINATION:

VITAL SIGNS:  Temperature is 99.0, pulse on the monitor is 89, respiratory rate 
18/20, blood pressure 161/101.  Oxygen saturation on nasal cannula is 100%.

HEENT:  Normocephalic, atraumatic.  No JVD.

CARDIOVASCULAR:  Systolic ejection murmur at the lower left sternal border.  No 
S3 gallop.

LUNGS:  Improved breath sounds at the bases.  Minimal/less rhonchi.  No 
wheezing.

EXTREMITIES:  Mild edema.  No cyanosis, no clubbing.  Calves are nontender to 
palpation.

GASTROINTESTINAL:  Abdomen is soft, nontender, nondistended.  Bowel sounds are 
positive.

SKIN:  Improving cellulitis -- lower extremities (anterior aspects).

NEUROLOGIC:  Limited at the present time.



PERTINENT LABORATORY DATA:  Chest x-ray was done this morning and reviewed.  
There are no new or significant changes noted.  Arterial blood gas was done on 
nasal cannula.  Results are:  pH 7.44, pCO2 of 37, pO2 of 81.



IMPRESSION:

1.  Hypercarbic respiratory failure.

2.  Severe sepsis.

3.  Bilateral cellulitis.

4.  Chronic obstructive pulmonary disease.

5.  Renal insufficiency.

6.  Acute myocardial infarction.

7.  Mild anemia.



PLAN:  The patient remains extubated.  He is comfortable this morning, and not 
short of breath at rest.  I did discuss the case with the night nurse at 
length.  The night nurse stated that, other than minimal confusion, the patient 
had a very good night.  I did review the x-ray as above.  I do not appreciate 
any new or significant changes.  On physical exam, his bronchospasm continues 
to resolve.  In addition, the alveolar-arterial gradient also continues to 
resolve.  Oxygen saturation on nasal cannula is now 100%.  I did review the 
arterial blood gas in addition.  The arterial blood gas reveals a normal pH 
with a significant decrease in the alveolar-arterial gradient.  I would 
continue with the antibiotic coverage as per infectious disease.  Temperatures 
have resolved.  There is a mild leukocytosis.  I will also order aspiration 
precautions -- given the slight change in mental status.  Cardiology and 
neurologic evaluations are noted.  The patient is significantly improved -- 
compared to last week.  However, again, the overall status/prognosis of this 
patient remains guarded.  I will discuss the above with the entire ICU team in 
the next few moments.  I will also discuss the above with the attending 
physician.





__________________________________________

Balwinder Fall MD







cc:   



DD: 03/30/2017 07:33:22  [id number]

TT: 03/30/2017 08:05:46

Confirmation # [id number]

Dictation # [id number]

en

[email or web address]

## 2017-03-30 NOTE — CP.PCM.PN
Subjective





- Date & Time of Evaluation


Date of Evaluation: 03/30/17


Time of Evaluation: 08:40





- Subjective


Subjective: 


Comfortable in bed, not in distress, has occasional dry cough, no fevers 

overnight, no diarrhea, no muscle aches.





Objective





- Vital Signs/Intake and Output


Vital Signs (last 24 hours): 


 











Temp Pulse Resp BP Pulse Ox


 


 99.9 F H  112 H  24   161/101 H  100 


 


 03/30/17 04:00  03/30/17 04:00  03/30/17 04:00  03/30/17 04:00  03/30/17 04:00








Intake and Output: 


 











 03/30/17 03/30/17





 06:59 18:59


 


Intake Total 450 


 


Output Total 1000 


 


Balance -550 














- Medications


Medications: 


 Current Medications





Acetaminophen (Tylenol 325mg Tab)  650 mg PO Q4 PRN


   PRN Reason: Fever >100.4 F


   Last Admin: 03/23/17 09:15 Dose:  650 mg


Albuterol/Ipratropium (Duoneb 3 Mg/0.5 Mg (3 Ml) Ud)  3 ml IH [id number] [facility]


   Last Admin: 03/30/17 07:08 Dose:  3 ml


Alprazolam (Xanax)  0.25 mg PO Q6 PRN; Protocol


   PRN Reason: Anxiety


   Stop: 04/03/17 12:01


   Last Admin: 03/29/17 21:45 Dose:  0.25 mg


Amlodipine Besylate (Norvasc)  10 mg PO DAILY [facility]


   Last Admin: 03/29/17 09:04 Dose:  10 mg


Aspirin (Aspirin)  325 mg PO DAILY [facility]


   Last Admin: 03/29/17 09:04 Dose:  325 mg


Clotrimazole (Lotrimin 1%)  0 gm TOP BID [facility]


   Last Admin: 03/29/17 17:34 Dose:  1 applic


Hydralazine HCl (Apresoline)  10 mg IVP Q6 PRN


   PRN Reason: Systolic Blood Pressure


   Last Admin: 03/29/17 23:22 Dose:  10 mg


Heparin Sodium/Sodium Chloride (Heparin 58091 Units/250ml 1/2 Normal Saline)  

250 mls @ 13.63 mls/hr IV .[id number] WAQAR; 12 UNITS/KG/HR


   PRN Reason: Protocol


   Last Admin: 03/30/17 07:46 Dose:  10.222 mls/hr


Meropenem 1g/NS 100mL IVPB (Meropenem 1g/Ns 100ml Ivpb)  100 mls @ 100 mls/hr 

IVPB Q12 WAQAR


   PRN Reason: Protocol


   Stop: 04/04/17 08:31


   Last Admin: 03/29/17 21:44 Dose:  100 mls/hr


Metoprolol Tartrate (Lopressor)  50 mg PO 0800,1800 [facility]


   Last Admin: 03/29/17 17:34 Dose:  50 mg


Morphine Sulfate (Morphine)  1 mg IVP Q4H PRN


   PRN Reason: Pain, moderate (4-7)


   Last Admin: 03/28/17 23:16 Dose:  1 mg


Pantoprazole Sodium (Protonix Ec Tab)  40 mg PO 0630 [facility]


   Last Admin: 03/30/17 07:18 Dose:  40 mg


Silver Sulfadiazine (Silvadene 1% 20 Gm)  0 ea TOP DAILY [facility]


   Last Admin: 03/29/17 09:09 Dose:  1 applic


Vitamin A (Vitamin A & D Oint Ud Foilpak)  1 ea TOP BID PRN


   PRN Reason: chapped lips











- Labs


Labs: 


 





 03/30/17 06:20 





 03/30/17 06:20 





 











PT  12.1 Seconds (9.9-11.8)  H  03/25/17  06:15    


 


INR  1.12  (0.93-1.08)  H  03/25/17  06:15    


 


APTT  87.8 Seconds (23.7-30.8)  H*  03/30/17  06:20    














- Constitutional


Appears: Non-toxic, No Acute Distress





- Head Exam


Head Exam: NORMAL INSPECTION





- ENT Exam


ENT Exam: Mucous Membranes Moist





- Neck Exam


Neck Exam: absent: Lymphadenopathy, Meningismus





- Respiratory Exam


Respiratory Exam: Decreased Breath Sounds





- Cardiovascular Exam


Cardiovascular Exam: +S1, +S2





- GI/Abdominal Exam


GI & Abdominal Exam: Soft.  absent: Tenderness





- Extremities Exam


Additional comments: 


slowly decreasing swelling of both lower extremities





Assessment and Plan





- Assessment and Plan (Free Text)


Plan: 


Assessment


severe sepsis S/P shock S/P ventilator-dependent respiratory failure with acute 

on chronic renal failure due to Group G strep bacteremia, probably secondary to 

bilateral lower extremities skin and skin structure infection; also with MSSA 

from the wound cultures; so far no evidence of new infection; patient is 

clinically improving


acute rhabdomyolysis


consider acute NSTEMI


HTN


chronic renal failure


dementia


obesity with BMI 38





Plan


on renally-adjusted Merrem (day 8 from first negative blood cx); repeat septic 

work up so far negative, PCT is improving; CXR does not show focal infiltrates; 

since the cultures continue to be negative, will switch back to Rocephin to 

finish the 28 day course


2D echocardiogram does not show vegetations


will continue to monitor clinically

## 2017-03-30 NOTE — PN
DATE: 03/30/2017



SUBJECTIVE:  The patient is awake and alert.  He is comfortable.  He has no complaints of any pain.  
He does have episodes on confusion, according to nursing staff.



PHYSICAL EXAMINATION:

VITAL SIGNS:  Temperature is 99.9, pulse of 112, blood pressure 161/101, respirations 24.

GENERAL:   The patient comfortable, in no acute distress.

HEENT:   Anicteric sclerae.  Moist mucosa.

NECK:   No JVD or adenopathy.

CARDIAC:   S1/S2.  No murmurs.  No rubs.  Regular.

RESPIRATORY:   Clear to auscultation bilaterally.  No wheezes, rales, or rhonchi.  Good air entry.

ABDOMEN:   Bowel sounds are positive, soft, nontender, and nondistended.

EXTREMITIES:   No edema.  Has 1+ pulses.



LABS:    White count 11.8, hemoglobin 11.1.  Creatinine is 1.3, potassium 3.2.



ASSESSMENT:

1.  Sepsis:

2.  Acute kidney injury secondary to rhabdomyolysis, improved.

3.  Respiratory failure, status post extubation.

4.  Delirium, improving.

5.  Proteinuria, 2 grams per day.

6.  Paroxysmal atrial fibrillation.

7.  Non-ST-elevation myocardial infarction.

8.  Hyperphosphatemia secondary to acute kidney injury.

9.  Hypernatremia.

10.  Obesity with a body mass index of 37.

11.  Right knee pain secondary to degenerative joint disease.

12.  Paroxysmal atrial fibrillation.

13.  Left foot wound with Staphylococcus aureus infection.

14.  Hematuria.

15.  Transaminitis. 



PLAN:  The patient is currently comfortable.  His white count has improved significantly.  The patien
t's hypernatremia has improved as well.  The transaminitis that the patient has is near normal.  The 
patient will be transferred out of the ICU.  He will get physical therapy.  He is on Xanax as needed.
  The patient is on morphine for pain.  He is on Norvasc for hypertension.  The patient is on heparin
 for anticoagulation.  He is on aspirin for his ST elevation MI.  He is on beta-blockers with metopro
lol.  He is on a heart healthy diet.





__________________________________________

Jose Martin Arguello MD







cc:



DD: 03/30/2017 06:47:54  [id number]

TT: 03/30/2017 08:22:42

Confirmation # [id number]

Dictation # [id number]

mn

## 2017-03-30 NOTE — CP.PCM.PN
Subjective





- Date & Time of Evaluation


Date of Evaluation: 03/30/17


Time of Evaluation: 08:00





- Subjective


Subjective: 


Extubated. restrained in CCU. Confused. No CP or SOB.





V/S noted. RSR/S. Tachy. 





PE:





Lungs: rhonchi


Cor.: S1S2


Abd.: soft


Ext.: no edema


Neuro.: sedaeted





I/O= 1720/2800





Labs and ABG's noted: PTT = 87.8





BC x1 + Grp G Strep. Several BCs are NG. Wound C+S: Staph aur.





ECG 3/27: RSR, RBBB, IMI, ST changes. No change





Echo: Nl LV fx with mild conc. LVH, no veg. seen. See report.  3/28 echo noted.





CXR 3/30 noted: clear lungs, small left pl. effusion (my reading)








Objective





- Vital Signs/Intake and Output


Vital Signs (last 24 hours): 


 











Temp Pulse Resp BP Pulse Ox


 


 99.9 F H  112 H  24   161/101 H  100 


 


 03/30/17 04:00  03/30/17 04:00  03/30/17 04:00  03/30/17 04:00  03/30/17 04:00








Intake and Output: 


 











 03/30/17 03/30/17





 06:59 18:59


 


Intake Total 450 


 


Output Total 1000 


 


Balance -550 














- Medications


Medications: 


 Current Medications





Acetaminophen (Tylenol 325mg Tab)  650 mg PO Q4 PRN


   PRN Reason: Fever >100.4 F


   Last Admin: 03/23/17 09:15 Dose:  650 mg


Albuterol/Ipratropium (Duoneb 3 Mg/0.5 Mg (3 Ml) Ud)  3 ml IH [id number] [facility]


   Last Admin: 03/30/17 07:08 Dose:  3 ml


Alprazolam (Xanax)  0.25 mg PO Q6 PRN; Protocol


   PRN Reason: Anxiety


   Stop: 04/03/17 12:01


   Last Admin: 03/29/17 21:45 Dose:  0.25 mg


Amlodipine Besylate (Norvasc)  10 mg PO DAILY [facility]


   Last Admin: 03/29/17 09:04 Dose:  10 mg


Aspirin (Aspirin)  325 mg PO DAILY [facility]


   Last Admin: 03/29/17 09:04 Dose:  325 mg


Clotrimazole (Lotrimin 1%)  0 gm TOP BID [facility]


   Last Admin: 03/29/17 17:34 Dose:  1 applic


Hydralazine HCl (Apresoline)  10 mg IVP Q6 PRN


   PRN Reason: Systolic Blood Pressure


   Last Admin: 03/29/17 23:22 Dose:  10 mg


Heparin Sodium/Sodium Chloride (Heparin 63246 Units/250ml 1/2 Normal Saline)  

250 mls @ 13.63 mls/hr IV .[id number] WAQAR; 12 UNITS/KG/HR


   PRN Reason: Protocol


   Last Admin: 03/29/17 21:03 Dose:  12.494 mls/hr


Meropenem 1g/NS 100mL IVPB (Meropenem 1g/Ns 100ml Ivpb)  100 mls @ 100 mls/hr 

IVPB Q12 WAQAR


   PRN Reason: Protocol


   Stop: 04/04/17 08:31


   Last Admin: 03/29/17 21:44 Dose:  100 mls/hr


Metoprolol Tartrate (Lopressor)  50 mg PO 0800,1800 [facility]


   Last Admin: 03/29/17 17:34 Dose:  50 mg


Morphine Sulfate (Morphine)  1 mg IVP Q4H PRN


   PRN Reason: Pain, moderate (4-7)


   Last Admin: 03/28/17 23:16 Dose:  1 mg


Pantoprazole Sodium (Protonix Ec Tab)  40 mg PO 0630 [facility]


   Last Admin: 03/30/17 07:18 Dose:  40 mg


Silver Sulfadiazine (Silvadene 1% 20 Gm)  0 ea TOP DAILY [facility]


   Last Admin: 03/29/17 09:09 Dose:  1 applic











- Labs


Labs: 


 





 03/30/17 06:20 





 03/30/17 06:20 





 











PT  12.1 Seconds (9.9-11.8)  H  03/25/17  06:15    


 


INR  1.12  (0.93-1.08)  H  03/25/17  06:15    


 


APTT  87.8 Seconds (23.7-30.8)  H*  03/30/17  06:20    














Assessment and Plan





- Assessment and Plan (Free Text)


Plan: 


Assessment:





Respiratory failure following PICC line and IV Ativan.


R/O MI.


Fall at home, details unknown/Right Knee Pain


Acute rhabdo., improving


Acute renal failure, improving


Initial + trop in setting of acute on chronic kidney disease and acute rhabdo., 

probably not acute MI


Sepsis/Grp G strept bacteremia


PAF


Cellulitis


Dementia


HBP


RBBB


IMI on ECG, probbably old


Former Smoker


Renal Stones


Cataracts


Diminished hearing








Plan:





AB, as per ID, Intensivists, Dr. Arguello, Dr. Browne


PO metoprolol


Monitor: I/O, labs, Renal fx., cultures, sats., etc


Tel. bed.

## 2017-03-30 NOTE — PN
DATE: 03/30/2017



SUBJECTIVE:  The patient is lying in bed.  He denies chest pain or shortness of breath.  He remains c
onfused and having some visual and auditory hallucinations at the present time.  He is not agitated.



OBJECTIVE:

VITAL SIGNS:  Blood pressure 138/60, pulse 108, temperature 98.6, respiratory rate 24.

LUNGS:  Show a few crepitations at the bases.

HEART:  Regular rate and rhythm.  There is hepatojugular reflux present.

ABDOMEN:  Soft, nontender.  Bowel sounds are normoactive.

EXTREMITIES:  With trace bipedal and dependent edema to the sacrum.  Chronic venous stasis ulcer dres
sings are intact.

NEUROLOGIC:  The patient is awake and confused without focal sensory or motor deficits.

SKIN:  Warm and dry.



LABORATORY DATA:  WBC is 14.7, hemoglobin 11.1, hematocrit 33.7, sodium 147, potassium 4.0, chloride 
108, CO2 29, BUN 48, creatinine 1.3, glucose 118.



IMPRESSION:

1.  Hypertensive cardiovascular disease and congestive heart failure.

2.  Status post acute rhabdomyolysis with acute renal failure superimposed on chronic kidney disease,
 improved.

3.  Chronic venous stasis ulcers of the lower extremities with cellulitis and sepsis, on IV meropenem
.  

4.  Paroxysmal atrial fibrillation with intermittent rapid ventricular rate.

5.  Dementia with superimposed delirium secondary to underlying metabolic disorder.  

6.  Immobility secondary to severe degenerative joint disease and obesity with deconditioning.



PLAN:  Continue present treatment.  Check BNP level.  Consider cautious diuresis if BNP is elevated. 
 Out of bed as tolerated.  Continue cardiology, pulmonary, infectious disease followup.





__________________________________________

Ariel Browne JD, MD







cc:



DD: 03/30/2017 16:29:23  353

TT: 03/30/2017 16:56:19

Confirmation # [id number]

Dictation # [id number]

tn

## 2017-03-30 NOTE — RAD
PROCEDURE:  Chest portable



HISTORY:

intubated



COMPARISON:

Multiple serial examinations preceding the most recent study: March 29, 2017. 



TECHNIQUE:

Multiple serial examinations preceding the most recent study: 05:23.



FINDINGS:

Cardiomegaly, improving CHF. PICC line in satisfactory position  

unchanged compared to prior studies. 



IMPRESSION:

Improving congestive heart failure.

## 2017-03-30 NOTE — CP.PCM.PN
<Rebecca Song - Last Filed: 03/30/17 09:20>





Subjective





- Date & Time of Evaluation


Date of Evaluation: 03/30/17


Time of Evaluation: 09:20





- Subjective


Subjective: 


82 y/o male seen at bedside with attending Dr. Howard for lower extremity 

ulcerations. Patient seen in the CCU. patient is extubated and able to answer 

questions. Patient appears in NAD and AAOx3. 





Objective





- Vital Signs/Intake and Output


Vital Signs (last 24 hours): 


 











Temp Pulse Resp BP Pulse Ox


 


 98.8 F   110 H  14   166/70 H  100 


 


 03/30/17 08:00  03/30/17 08:38  03/30/17 08:38  03/30/17 08:13  03/30/17 08:38








Intake and Output: 


 











 03/30/17 03/30/17





 06:59 18:59


 


Intake Total 450 


 


Output Total 1000 


 


Balance -550 














- Medications


Medications: 


 Current Medications





Acetaminophen (Tylenol 325mg Tab)  650 mg PO Q4 PRN


   PRN Reason: Fever >100.4 F


   Last Admin: 03/23/17 09:15 Dose:  650 mg


Albuterol/Ipratropium (Duoneb 3 Mg/0.5 Mg (3 Ml) Ud)  3 ml IH [id number] [facility]


   Last Admin: 03/30/17 07:08 Dose:  3 ml


Alprazolam (Xanax)  0.25 mg PO Q6 PRN; Protocol


   PRN Reason: Anxiety


   Stop: 04/03/17 12:01


   Last Admin: 03/29/17 21:45 Dose:  0.25 mg


Alprazolam (Xanax)  0.25 mg PO Q12 [facility]


   PRN Reason: Protocol


   Stop: 04/06/17 10:01


Amlodipine Besylate (Norvasc)  10 mg PO DAILY [facility]


   Last Admin: 03/29/17 09:04 Dose:  10 mg


Aspirin (Aspirin)  325 mg PO DAILY [facility]


   Last Admin: 03/29/17 09:04 Dose:  325 mg


Clotrimazole (Lotrimin 1%)  0 gm TOP BID [facility]


   Last Admin: 03/29/17 17:34 Dose:  1 applic


Heparin Sodium (Porcine) (Heparin)  5,000 units SC Q12 [facility]


   PRN Reason: Protocol


Hydralazine HCl (Apresoline)  10 mg IVP Q6 PRN


   PRN Reason: Systolic Blood Pressure


   Last Admin: 03/29/17 23:22 Dose:  10 mg


Dextrose (Dextrose 5% In Water 1000 Ml)  1,000 mls @ 50 mls/hr IV .Q20H [facility]


   Stop: 04/01/17 01:14


Ceftriaxone Sodium (Rocephin 2 Gm Ivpb)  100 mls @ 100 mls/hr IVPB DAILY [facility]


   PRN Reason: Protocol


   Stop: 04/19/17 10:01


Metoprolol Tartrate (Lopressor)  50 mg PO 0800,1800 [facility]


   Last Admin: 03/30/17 08:13 Dose:  50 mg


Morphine Sulfate (Morphine)  1 mg IVP Q4H PRN


   PRN Reason: Pain, moderate (4-7)


   Last Admin: 03/28/17 23:16 Dose:  1 mg


Pantoprazole Sodium (Protonix Ec Tab)  40 mg PO 0630 [facility]


   Last Admin: 03/30/17 07:18 Dose:  40 mg


Risperidone (Risperdal Tab)  0.5 mg PO DAILY [facility]


   PRN Reason: Protocol


Silver Sulfadiazine (Silvadene 1% 20 Gm)  0 ea TOP DAILY [facility]


   Last Admin: 03/29/17 09:09 Dose:  1 applic


Vitamin A (Vitamin A & D Oint Ud Foilpak)  1 ea TOP BID PRN


   PRN Reason: chapped lips











- Labs


Labs: 


 





 03/30/17 06:20 





 03/30/17 06:20 





 











PT  12.1 Seconds (9.9-11.8)  H  03/25/17  06:15    


 


INR  1.12  (0.93-1.08)  H  03/25/17  06:15    


 


APTT  87.8 Seconds (23.7-30.8)  H*  03/30/17  06:20    














- Constitutional


Appears: Well, Non-toxic, No Acute Distress





- Extremities Exam


Additional comments: 


Vasc: nonpalpable pedal pulses bilaterally, TG wnl, CFT < 3 sec to all digits


neuro: grossly diminished


derm: no open wounds, open fissure at plantar 1st MPJ, no fluctuance, no pus, 

no ascending cellulitis, resolved erythema, no edema


legs have 2 ulcerations superficial open blisters


ortho: no pain on palpation of foot or legs bilaterally





- Neurological Exam


Neurological Exam: Alert, Awake





- Psychiatric Exam


Psychiatric exam: Normal Affect, Normal Mood





Assessment and Plan





- Assessment and Plan (Free Text)


Assessment: 


82 y/o male seen at bedside in CCU for small blisters on his lower legs and 

fissure of his foot. 


Plan: 


patient evaluated and seen at bedside with attending Dr. Howard


labs and vitals reviewed


continue IV abx


applied allevyn heel pads bilaterally


applied multipodus offloading boots to patients heels


patient to continue wearing boots in bed at all times


podiatry will continue to monitor while patient remains in house 





<Priya Howard - Last Filed: 04/02/17 12:25>





Objective





- Vital Signs/Intake and Output


Vital Signs (last 24 hours): 


 











Temp Pulse Resp BP Pulse Ox


 


 97.7 F   75   23   164/58 H  95 


 


 04/02/17 04:00  04/02/17 09:56  04/02/17 04:14  04/02/17 09:56  04/02/17 04:14








Intake and Output: 


 











 04/02/17 04/02/17





 06:59 18:59


 


Intake Total 600 


 


Output Total 200 


 


Balance 400 














- Medications


Medications: 


 Current Medications





Acetaminophen (Tylenol 325mg Tab)  650 mg PO Q4 PRN


   PRN Reason: Fever >100.4 F


   Last Admin: 03/23/17 09:15 Dose:  650 mg


Albuterol/Ipratropium (Duoneb 3 Mg/0.5 Mg (3 Ml) Ud)  3 ml IH [id number] [facility]


   Last Admin: 04/02/17 07:20 Dose:  3 ml


Amlodipine Besylate (Norvasc)  10 mg PO DAILY [facility]


   Last Admin: 04/02/17 09:56 Dose:  10 mg


Aspirin (Aspirin)  325 mg PO DAILY [facility]


   Last Admin: 04/02/17 09:56 Dose:  325 mg


Clotrimazole (Lotrimin 1%)  0 gm TOP BID [facility]


   Last Admin: 04/02/17 10:25 Dose:  1 applic


Furosemide (Lasix)  40 mg IVP DAILY [facility]


   Last Admin: 04/01/17 09:00 Dose:  40 mg


Heparin Sodium (Porcine) (Heparin)  5,000 units SC Q12 [facility]


   PRN Reason: Protocol


   Last Admin: 04/02/17 09:57 Dose:  5,000 units


Hydralazine HCl (Apresoline)  10 mg IVP Q6 PRN


   PRN Reason: Systolic Blood Pressure


   Last Admin: 04/02/17 09:56 Dose:  10 mg


Ceftriaxone Sodium (Rocephin 2 Gm Ivpb)  100 mls @ 100 mls/hr IVPB DAILY [facility]


   PRN Reason: Protocol


   Stop: 04/19/17 10:01


   Last Admin: 04/02/17 09:59 Dose:  100 mls/hr


Metoprolol Tartrate (Lopressor)  50 mg PO 0800,1800 [facility]


   Last Admin: 04/02/17 08:04 Dose:  50 mg


Pantoprazole Sodium (Protonix Inj)  40 mg IVP 0630 [facility]


   Last Admin: 04/02/17 05:30 Dose:  40 mg


Risperidone (Risperdal Tab)  0.5 mg PO DAILY [facility]


   PRN Reason: Protocol


   Last Admin: 04/02/17 09:57 Dose:  0.5 mg


Silver Sulfadiazine (Silvadene 1% 20 Gm)  0 ea TOP DAILY [facility]


   Last Admin: 04/02/17 10:25 Dose:  1 applic


Vitamin A (Vitamin A & D Oint Ud Foilpak)  1 ea TOP BID PRN


   PRN Reason: chapped lips


   Last Admin: 04/02/17 09:56 Dose:  1 ea











- Labs


Labs: 


 





 04/02/17 06:00 





 04/02/17 06:00 





 











PT  12.1 Seconds (9.9-11.8)  H  03/25/17  06:15    


 


INR  1.12  (0.93-1.08)  H  03/25/17  06:15    


 


APTT  87.8 Seconds (23.7-30.8)  H*  03/30/17  06:20    














Attending/Attestation





- Attestation


I have personally seen and examined this patient.: Yes


I have fully participated in the care of the patient.: Yes


I have reviewed all pertinent clinical information, including history, physical 

exam and plan: Yes

## 2017-03-31 LAB
ABG MECHANICAL RATE: 15
ADD MANUAL DIFF?: YES
ALBUMIN/GLOB SERPL: 0.8 {RATIO} (ref 1.1–1.8)
ALP SERPL-CCNC: 79 U/L (ref 38–133)
ALT SERPL-CCNC: 95 U/L (ref 7–56)
AST SERPL-CCNC: 101 U/L (ref 15–59)
ATERIAL BLOOD GAS PEEP: 5
BILIRUB SERPL-MCNC: 0.8 MG/DL (ref 0.2–1.3)
BUN SERPL-MCNC: 40 MG/DL (ref 7–21)
CALCIUM SERPL-MCNC: 9.7 MG/DL (ref 8.4–10.5)
CHLORIDE SERPL-SCNC: 106 MMOL/L (ref 98–107)
CO2 SERPL-SCNC: 31 MMOL/L (ref 21–33)
COHGB MFR BLD: 2 % (ref 0.5–1.5)
COHGB MFR BLD: 2.3 % (ref 0.5–1.5)
ERYTHROCYTE [DISTWIDTH] IN BLOOD BY AUTOMATED COUNT: 14.4 % (ref 11.5–14.5)
GLOBULIN SER-MCNC: 3.8 GM/DL
GLUCOSE SERPL-MCNC: 153 MG/DL (ref 70–110)
HCO3 BLDA-SCNC: 26 MMOL/L (ref 21–28)
HCO3 BLDA-SCNC: 26.9 MMOL/L (ref 21–28)
HCO3 BLDA-SCNC: 28.5 MMOL/L (ref 21–28)
HCT VFR BLD CALC: 37.6 % (ref 42–52)
HHB: 0.8 % (ref 0–5)
HHB: 1 % (ref 0–5)
MCH RBC QN AUTO: 27.7 PG (ref 25–35)
MCHC RBC AUTO-ENTMCNC: 32.4 G/DL (ref 31–37)
MCV RBC AUTO: 85.5 FL (ref 80–105)
METHGB MFR BLD: 1 % (ref 0–3)
METHGB MFR BLD: 1.2 % (ref 0–3)
NEUTROPHILS NFR BLD AUTO: 85 % (ref 50–70)
NEUTS BAND NFR BLD: 4 % (ref 0–2)
O2 CAP BLDA-SCNC: 15.7 ML/DL (ref 16–24)
O2 CAP BLDA-SCNC: 16.8 ML/DL (ref 16–24)
O2 CT BLDA-SCNC: 15.6 ML/DL (ref 15–23)
O2 CT BLDA-SCNC: 16.6 ML/DL (ref 15–23)
PH BLDA: 7.29 [PH] (ref 7.35–7.45)
PH BLDA: 7.3 [PH] (ref 7.35–7.45)
PH BLDA: 7.38 [PH] (ref 7.35–7.45)
PLATELET # BLD EST: NORMAL 10*3/UL
PLATELET # BLD: 333 10^3/UL (ref 120–450)
PMV BLD AUTO: 9.5 FL (ref 7–11)
PO2 BLDA: 152 MM/HG (ref 80–100)
PO2 BLDA: 206 MM/HG (ref 80–100)
PO2 BLDA: 93 MM/HG (ref 80–100)
POTASSIUM SERPL-SCNC: 4.2 MMOL/L (ref 3.6–5)
PROT SERPL-MCNC: 6.8 G/DL (ref 5.8–8.3)
SAO2 % BLDA: 95.8 % (ref 95–98)
SAO2 % BLDA: 96 % (ref 95–98)
SODIUM SERPL-SCNC: 145 MMOL/L (ref 132–148)
TROPONIN I SERPL-MCNC: 1 NG/ML
WBC # BLD AUTO: 25.4 10^3/UL (ref 4.5–11)

## 2017-03-31 PROCEDURE — 0BH18EZ INSERTION OF ENDOTRACHEAL AIRWAY INTO TRACHEA, VIA NATURAL OR ARTIFICIAL OPENING ENDOSCOPIC: ICD-10-PCS | Performed by: INTERNAL MEDICINE

## 2017-03-31 PROCEDURE — 5A1935Z RESPIRATORY VENTILATION, LESS THAN 24 CONSECUTIVE HOURS: ICD-10-PCS | Performed by: INTERNAL MEDICINE

## 2017-03-31 RX ADMIN — PANTOPRAZOLE SODIUM SCH: 40 TABLET, DELAYED RELEASE ORAL at 06:08

## 2017-03-31 RX ADMIN — SILVER SULFADIAZINE SCH APPLIC: 10 CREAM TOPICAL at 10:31

## 2017-03-31 RX ADMIN — IPRATROPIUM BROMIDE AND ALBUTEROL SULFATE SCH ML: .5; 3 SOLUTION RESPIRATORY (INHALATION) at 20:00

## 2017-03-31 RX ADMIN — IPRATROPIUM BROMIDE AND ALBUTEROL SULFATE SCH ML: .5; 3 SOLUTION RESPIRATORY (INHALATION) at 08:00

## 2017-03-31 RX ADMIN — CLOTRIMAZOLE SCH APPLIC: 1 CREAM TOPICAL at 10:32

## 2017-03-31 RX ADMIN — CLOTRIMAZOLE SCH APPLIC: 1 CREAM TOPICAL at 18:26

## 2017-03-31 RX ADMIN — IPRATROPIUM BROMIDE AND ALBUTEROL SULFATE SCH ML: .5; 3 SOLUTION RESPIRATORY (INHALATION) at 13:44

## 2017-03-31 RX ADMIN — IPRATROPIUM BROMIDE AND ALBUTEROL SULFATE SCH: .5; 3 SOLUTION RESPIRATORY (INHALATION) at 02:10

## 2017-03-31 NOTE — RAD
HISTORY:

s/p intubation, ETT, OGT  



COMPARISON:

Earlier same day 



FINDINGS:



LUNGS:

The endotracheal and nasogastric tubes are in satisfactory position. 

There is a slight decrease in vascular congestion



PLEURA:

No significant pleural effusion identified, no pneumothorax apparent.



CARDIOVASCULAR:

Normal.



OSSEOUS STRUCTURES:

No significant abnormalities.



VISUALIZED UPPER ABDOMEN:

Normal.



OTHER FINDINGS:

None.



IMPRESSION:

The endotracheal and nasogastric tubes are in satisfactory position. 

There is a slight decrease in vascular congestion

## 2017-03-31 NOTE — CP.PCM.PN
Subjective





- Date & Time of Evaluation


Date of Evaluation: 03/31/17


Time of Evaluation: 07:45





- Subjective


Subjective: 


CC- Acute resp. distress and hypoxia 


HPI - 81 yr old male with PMHX of HTN, dementia s/p fall at home admitted for 

acute rhabdomyolysis, acute renal failure ,sepsis  s/p intubation  for acute 

resp. failure  and extubated on 3/28 ,  transferred out  from to the unit  to 

[facility] on 3/30 .


As per RN pt develop acute resp. distress and sp02 drops to 80,s . Pt was 

confused and agitated at night and received 0.5 mg ativan, pt o2 sat with 

oxygen nasal canula was 92% throughout.  


Pt seen and examined at bedside , in resp. distress , o2 sat 100 %on NBM ,

tacypnic , tacycardic rate - 109  and diaphoretic 





Objective





- Vital Signs/Intake and Output


Vital Signs (last 24 hours): 


 











Temp Pulse Resp BP Pulse Ox


 


 98.3 F   109 H  18   154/91 H  92 L


 


 03/31/17 06:00  03/31/17 08:18  03/31/17 06:00  03/31/17 06:00  03/31/17 06:00








Intake and Output: 


 











 03/31/17 03/31/17





 06:59 18:59


 


Intake Total 820 


 


Output Total 200 


 


Balance 620 














- Medications


Medications: 


 Current Medications





Acetaminophen (Tylenol 325mg Tab)  650 mg PO Q4 PRN


   PRN Reason: Fever >100.4 F


   Last Admin: 03/23/17 09:15 Dose:  650 mg


Albuterol/Ipratropium (Duoneb 3 Mg/0.5 Mg (3 Ml) Ud)  3 ml IH [id number] [facility]


   Last Admin: 03/31/17 02:10 Dose:  Not Given


Amlodipine Besylate (Norvasc)  10 mg PO DAILY [facility]


   Last Admin: 03/30/17 09:53 Dose:  10 mg


Aspirin (Aspirin)  325 mg PO DAILY [facility]


   Last Admin: 03/30/17 09:53 Dose:  325 mg


Clotrimazole (Lotrimin 1%)  0 gm TOP BID [facility]


   Last Admin: 03/30/17 18:53 Dose:  1 applic


Heparin Sodium (Porcine) (Heparin)  5,000 units SC Q12 [facility]


   PRN Reason: Protocol


   Last Admin: 03/30/17 21:26 Dose:  5,000 units


Hydralazine HCl (Apresoline)  10 mg IVP Q6 PRN


   PRN Reason: Systolic Blood Pressure


   Last Admin: 03/29/17 23:22 Dose:  10 mg


Dextrose (Dextrose 5% In Water 1000 Ml)  1,000 mls @ 50 mls/hr IV .Q20H [facility]


   Stop: 04/01/17 01:14


   Last Admin: 03/31/17 06:16 Dose:  50 mls/hr


Ceftriaxone Sodium (Rocephin 2 Gm Ivpb)  100 mls @ 100 mls/hr IVPB DAILY [facility]


   PRN Reason: Protocol


   Stop: 04/19/17 10:01


   Last Admin: 03/30/17 09:55 Dose:  100 mls/hr


Metoprolol Tartrate (Lopressor)  50 mg PO 0800,1800 [facility]


   Last Admin: 03/30/17 18:52 Dose:  50 mg


Pantoprazole Sodium (Protonix Ec Tab)  40 mg PO 0630 [facility]


   Last Admin: 03/31/17 06:08 Dose:  Not Given


Risperidone (Risperdal Tab)  0.5 mg PO DAILY [facility]


   PRN Reason: Protocol


   Last Admin: 03/30/17 09:52 Dose:  0.5 mg


Silver Sulfadiazine (Silvadene 1% 20 Gm)  0 ea TOP DAILY [facility]


   Last Admin: 03/30/17 09:54 Dose:  1 applic


Vitamin A (Vitamin A & D Oint Ud Foilpak)  1 ea TOP BID PRN


   PRN Reason: chapped lips


   Last Admin: 03/30/17 09:54 Dose:  1 ea











- Labs


Labs: 


 





 03/30/17 06:20 





 03/30/17 06:20 





 











PT  12.1 Seconds (9.9-11.8)  H  03/25/17  06:15    


 


INR  1.12  (0.93-1.08)  H  03/25/17  06:15    


 


APTT  87.8 Seconds (23.7-30.8)  H*  03/30/17  06:20    














- Constitutional


Appears: In Acute Distress





- ENT Exam


ENT Exam: Mucous Membranes Dry





- Respiratory Exam


Respiratory Exam: Accessory Muscle Use, Rales, Rhonchi, Respiratory Distress





- Cardiovascular Exam


Cardiovascular Exam: Tachycardia





- Extremities Exam


Extremities Exam: Joint Swelling (b/l )





- Skin


Skin Exam: Diaphoretic, Normal Color, Warm





Assessment and Plan





- Assessment and Plan (Free Text)


Assessment: 


ASSESSMENT /PLAN 


  Acute resp. distress / Hypoxia (  possible  sepsis/ acute MI )


  100 % NBM - o2 sat at !00% 


   stat ABG - 


   stat CXR 


   stat, cbc, cmp, troponin 


   BIPAP 


   D/W with resident and Intensivist and Dr. Fall    


   Pt transferred to ICU

## 2017-03-31 NOTE — CP.CCUPN
<Lucero Lopez - Last Filed: 03/31/17 10:55>





CCU Subjective





- Physician Review


Events Since Last Encounter (Free Text): 





03/31/17 10:55


RRT called for respiratory distress on telemetry floor. Patient was seen and 

examined, in moderate respiratory distress with accessory muscle use, 

tachypnic. ABG shows pH 7.3, resp acidosis with CO2 retention (58). Patient was 

placed on BiPAP and transferred to ICU for monitoring of respiratory status. 

Followup ABG showed pH 7.29, CO2 56. Patient continued to be tachypnic, 

slightly somnolent, was intubated for hypercarbic respiratory failure. 


Critical Care Time Spent (in minutes): 60





CCU Objective





- Vital Signs / Intake & Output


Vital Signs (Last 4 hours): 


Vital Signs











  Temp Pulse Resp BP Pulse Ox


 


 03/31/17 08:18   109 H   


 


 03/31/17 06:00  98.3 F  108 H  18  154/91 H  92 L











Intake and Output (Last 8hrs): 


 Intake & Output











 03/30/17 03/31/17 03/31/17





 22:59 06:59 14:59


 


Intake Total  820 


 


Output Total  200 


 


Balance  620 


 


Intake:   


 


  IV  600 


 


    Left Upper arm  600 


 


  Oral  220 


 


Output:   


 


  Urine  200 


 


    Urethral (Bill)  200 


 


Other:   


 


  Voiding Method   Indwelling Catheter


 


  # Bowel Movements  0 














- Physical Exam


Head: Positive for: Atraumatic, Normocephalic.  Negative for: Abrasion, 

Laceration


Pupils: Positive for: PERRL


Extroacular Muscles: Positive for: EOMI.  Negative for: Entrapment


Conjunctiva: Positive for: Normal.  Negative for: Injected


Mouth: Positive for: Moist Mucous Membranes, Normal Teeth (poor dentition)


Respiratory/Chest: Positive for: Clear to Auscultation, Good Air Exchange, 

Rales (mild ), Tachypneic, Other (PRVC 450/15/50/5).  Negative for: Respiratory 

Distress, Accessory Muscle Use


Cardiovascular: Positive for: Regular Rate and Rhythm, Normal S1, S2.  Negative 

for: Murmurs


Abdomen: Positive for: Normal Bowel Sounds.  Negative for: Tenderness, 

Distention, Peritoneal Signs


Upper Extremity: Positive for: Normal Inspection, NORMAL PULSES, 

Neurovascularly Intact.  Negative for: Cyanosis, Edema


Lower Extremity: Positive for: Normal Inspection, Tenderness, Neurovascularly 

Intact, Other (abrasion right shin, chronic venous stasis changes foot and 

ankle B/L).  Negative for: Edema, NORMAL PULSES (diminished PT and DP pulse B/L)

, Swelling, Deformity


Neurological: Positive for: GCS=15, CN II-XII Intact


Skin: Positive for: Warm, Dry, Abrasion


Psychiatric: Positive for: Alert, Other (intubated )





- Medications


Active Medications: 


Active Medications











Generic Name Dose Route Start Last Admin





  Trade Name Freq  PRN Reason Stop Dose Admin


 


Acetaminophen  650 mg 03/22/17 11:33 03/23/17 09:15





  Tylenol 325mg Tab  PO   650 mg





  Q4 PRN   Administration





  Fever >100.4 F   


 


Albuterol/Ipratropium  3 ml 03/28/17 08:00 03/31/17 02:10





  Duoneb 3 Mg/0.5 Mg (3 Ml) Ud  IH   Not Given





  [id number] WAQAR   


 


Amlodipine Besylate  10 mg 03/29/17 10:00 03/30/17 09:53





  Norvasc  PO   10 mg





  DAILY WAQAR   Administration


 


Aspirin  325 mg 03/25/17 10:00 03/30/17 09:53





  Aspirin  PO   325 mg





  DAILY WAQAR   Administration


 


Clotrimazole  0 gm 03/22/17 18:00 03/30/17 18:53





  Lotrimin 1%  TOP   1 applic





  BID WAQAR   Administration


 


Heparin Sodium (Porcine)  5,000 units 03/30/17 10:00 03/30/17 21:26





  Heparin  SC   5,000 units





  Q12 WAQAR   Administration





  Protocol   


 


Hydralazine HCl  10 mg 03/28/17 18:01 03/29/17 23:22





  Apresoline  IVP   10 mg





  Q6 PRN   Administration





  Systolic Blood Pressure   


 


Dextrose  1,000 mls @ 50 mls/hr 03/30/17 09:15 03/31/17 06:16





  Dextrose 5% In Water 1000 Ml  IV 04/01/17 01:14  50 mls/hr





  .Q20H WAQAR   Administration


 


Ceftriaxone Sodium  100 mls @ 100 mls/hr 03/30/17 10:00 03/30/17 09:55





  Rocephin 2 Gm Ivpb  IVPB 04/19/17 10:01  100 mls/hr





  DAILY WAQAR   Administration





  Protocol   


 


Metoprolol Tartrate  50 mg 03/29/17 18:00 03/30/17 18:52





  Lopressor  PO   50 mg





  0800,1800 WAQAR   Administration


 


Pantoprazole Sodium  40 mg 03/30/17 06:30 03/31/17 06:08





  Protonix Ec Tab  PO   Not Given





  0630 WAQAR   


 


Risperidone  0.5 mg 03/30/17 10:00 03/30/17 09:52





  Risperdal Tab  PO   0.5 mg





  DAILY WAQAR   Administration





  Protocol   


 


Silver Sulfadiazine  0 ea 03/25/17 10:00 03/30/17 09:54





  Silvadene 1% 20 Gm  TOP   1 applic





  DAILY WAQAR   Administration


 


Vitamin A  1 ea 03/30/17 07:41 03/30/17 09:54





  Vitamin A & D Oint Ud Foilpak  TOP   1 ea





  BID PRN   Administration





  chapped lips   














- Patient Studies


Lab Studies: 


 Microbiology Studies











 03/27/17 16:00 Blood Culture - Preliminary





 Blood-Venous    NO GROWTH AFTER 3 DAYS


 


 03/27/17 15:30 Blood Culture - Preliminary





 Blood-Venous    NO GROWTH AFTER 3 DAYS








 Lab Studies











  03/31/17 03/31/17 03/31/17 Range/Units





  08:33 08:00 07:15 


 


WBC   25.4 H* D   (4.5-11.0)  10^3/ul


 


RBC   4.40   (3.5-6.1)  10^6/uL


 


Hgb   12.2 L   (14.0-18.0)  gm/dL


 


Hct   37.6 L   (42.0-52.0)  %


 


MCV   85.5   (80.0-105.0)  fL


 


MCH   27.7   (25.0-35.0)  pg


 


MCHC   32.4   (31.0-37.0)  g/dl


 


RDW   14.4   (11.5-14.5)  %


 


Plt Count   333   (120.0-450.0)  10^3/uL


 


MPV   9.5   (7.0-11.0)  fl


 


pCO2  56 H   58 H  (35-45)  mm/Hg


 


pO2  152.0 H   206.0 H  ()  mm/Hg


 


HCO3  26.9   28.5 H  (21-28)  mmol/L


 


ABG pH  7.29 L   7.30 L  (7.35-7.45)  


 


ABG Total CO2  28.6 H   30.3 H  (22-28)  mmol.L


 


ABG O2 Saturation  99.2 H   99.0 H  (95-98)  %


 


ABG O2 Content  15.6   16.6  (15-23)  ML/dl


 


ABG Base Excess  -0.4   1.0  (-2.0-3.0)  mmol/L


 


ABG Hemoglobin  11.3 L   12.0  (11.7-17.4)  g/dL


 


ABG Carboxyhemoglobin  2.3 H   2.0 H  (0.5-1.5)  %


 


POC ABG HHb (Measured)  0.8   1.0  (0-5)  %


 


ABG Methemoglobin  1.0   1.2  (0.0-3.0)  %


 


ABG O2 Capacity  15.7 L   16.8  (16-24)  mL/dl


 


Hgb O2 Saturation  96.0   95.8  (95.0-98.0)  %


 


FiO2  50.0   100.0  %


 


Sodium   145   (132-148)  mmol/L


 


Potassium   4.2   (3.6-5.0)  mmol/L


 


Chloride   106   ()  mmol/L


 


Carbon Dioxide   31   (21-33)  mmol/L


 


Anion Gap   12   (10-20)  


 


BUN   40 H   (7-21)  mg/dL


 


Creatinine   1.2   (0.5-1.4)  mg/dL


 


Est GFR (African Amer)   > 60   


 


Est GFR (Non-Af Amer)   58   


 


Random Glucose   153 H   ()  mg/dL


 


Calcium   9.7   (8.4-10.5)  mg/dL


 


Total Bilirubin   0.8   (0.2-1.3)  mg/dL


 


AST   101 H   (15-59)  U/L


 


ALT   95 H   (7-56)  U/L


 


Alkaline Phosphatase   79   ()  U/L


 


Troponin I   1.00 H* D   ng/mL


 


NT-Pro-B Natriuret Pep     (0-450)  pg/mL


 


Total Protein   6.8   (5.8-8.3)  g/dL


 


Albumin   3.0   (3.0-4.8)  g/dL


 


Globulin   3.8   gm/dL


 


Albumin/Globulin Ratio   0.8 L   (1.1-1.8)  














  03/30/17 Range/Units





  06:20 


 


WBC   (4.5-11.0)  10^3/ul


 


RBC   (3.5-6.1)  10^6/uL


 


Hgb   (14.0-18.0)  gm/dL


 


Hct   (42.0-52.0)  %


 


MCV   (80.0-105.0)  fL


 


MCH   (25.0-35.0)  pg


 


MCHC   (31.0-37.0)  g/dl


 


RDW   (11.5-14.5)  %


 


Plt Count   (120.0-450.0)  10^3/uL


 


MPV   (7.0-11.0)  fl


 


pCO2   (35-45)  mm/Hg


 


pO2   ()  mm/Hg


 


HCO3   (21-28)  mmol/L


 


ABG pH   (7.35-7.45)  


 


ABG Total CO2   (22-28)  mmol.L


 


ABG O2 Saturation   (95-98)  %


 


ABG O2 Content   (15-23)  ML/dl


 


ABG Base Excess   (-2.0-3.0)  mmol/L


 


ABG Hemoglobin   (11.7-17.4)  g/dL


 


ABG Carboxyhemoglobin   (0.5-1.5)  %


 


POC ABG HHb (Measured)   (0-5)  %


 


ABG Methemoglobin   (0.0-3.0)  %


 


ABG O2 Capacity   (16-24)  mL/dl


 


Hgb O2 Saturation   (95.0-98.0)  %


 


FiO2   %


 


Sodium   (132-148)  mmol/L


 


Potassium   (3.6-5.0)  mmol/L


 


Chloride   ()  mmol/L


 


Carbon Dioxide   (21-33)  mmol/L


 


Anion Gap   (10-20)  


 


BUN   (7-21)  mg/dL


 


Creatinine   (0.5-1.4)  mg/dL


 


Est GFR (African Amer)   


 


Est GFR (Non-Af Amer)   


 


Random Glucose   ()  mg/dL


 


Calcium   (8.4-10.5)  mg/dL


 


Total Bilirubin   (0.2-1.3)  mg/dL


 


AST   (15-59)  U/L


 


ALT   (7-56)  U/L


 


Alkaline Phosphatase   ()  U/L


 


Troponin I   ng/mL


 


NT-Pro-B Natriuret Pep  6430 H  (0-450)  pg/mL


 


Total Protein   (5.8-8.3)  g/dL


 


Albumin   (3.0-4.8)  g/dL


 


Globulin   gm/dL


 


Albumin/Globulin Ratio   (1.1-1.8)  








 Laboratory Results - last 24 hr











  03/30/17 03/31/17 03/31/17





  06:20 07:15 08:00


 


WBC    25.4 H* D


 


RBC    4.40


 


Hgb    12.2 L


 


Hct    37.6 L


 


MCV    85.5


 


MCH    27.7


 


MCHC    32.4


 


RDW    14.4


 


Plt Count    333


 


MPV    9.5


 


pCO2   58 H 


 


pO2   206.0 H 


 


HCO3   28.5 H 


 


ABG pH   7.30 L 


 


ABG Total CO2   30.3 H 


 


ABG O2 Saturation   99.0 H 


 


ABG O2 Content   16.6 


 


ABG Base Excess   1.0 


 


ABG Hemoglobin   12.0 


 


ABG Carboxyhemoglobin   2.0 H 


 


POC ABG HHb (Measured)   1.0 


 


ABG Methemoglobin   1.2 


 


ABG O2 Capacity   16.8 


 


Hgb O2 Saturation   95.8 


 


FiO2   100.0 


 


Sodium    145


 


Potassium    4.2


 


Chloride    106


 


Carbon Dioxide    31


 


Anion Gap    12


 


BUN    40 H


 


Creatinine    1.2


 


Est GFR ( Amer)    > 60


 


Est GFR (Non-Af Amer)    58


 


Random Glucose    153 H


 


Calcium    9.7


 


Total Bilirubin    0.8


 


AST    101 H


 


ALT    95 H


 


Alkaline Phosphatase    79


 


Troponin I    1.00 H* D


 


NT-Pro-B Natriuret Pep  6430 H  


 


Total Protein    6.8


 


Albumin    3.0


 


Globulin    3.8


 


Albumin/Globulin Ratio    0.8 L














  03/31/17





  08:33


 


WBC 


 


RBC 


 


Hgb 


 


Hct 


 


MCV 


 


MCH 


 


MCHC 


 


RDW 


 


Plt Count 


 


MPV 


 


pCO2  56 H


 


pO2  152.0 H


 


HCO3  26.9


 


ABG pH  7.29 L


 


ABG Total CO2  28.6 H


 


ABG O2 Saturation  99.2 H


 


ABG O2 Content  15.6


 


ABG Base Excess  -0.4


 


ABG Hemoglobin  11.3 L


 


ABG Carboxyhemoglobin  2.3 H


 


POC ABG HHb (Measured)  0.8


 


ABG Methemoglobin  1.0


 


ABG O2 Capacity  15.7 L


 


Hgb O2 Saturation  96.0


 


FiO2  50.0


 


Sodium 


 


Potassium 


 


Chloride 


 


Carbon Dioxide 


 


Anion Gap 


 


BUN 


 


Creatinine 


 


Est GFR ( Amer) 


 


Est GFR (Non-Af Amer) 


 


Random Glucose 


 


Calcium 


 


Total Bilirubin 


 


AST 


 


ALT 


 


Alkaline Phosphatase 


 


Troponin I 


 


NT-Pro-B Natriuret Pep 


 


Total Protein 


 


Albumin 


 


Globulin 


 


Albumin/Globulin Ratio 











EKG/Cardiology Studies: 


Cardiology / EKG Studies





03/31/17


EKG [ELECTROCARDIOGRAM] Urgent 


   Comment: 


   Reason For Exam: rrt














Review of Systems





- Review of Systems


Systems not reviewed;Unavailable: Intubated





Critical Care Progress Note





- Ventilator Checklist


PUD Prophalyxis: Yes


DVT Prophylaxis: Yes





- Prophylaxis GI


Prophylaxis GI: PPI





- Prophylaxis DVT


Prophylaxis DVT: Heparin SQ





- Nutrition


Nutrition: 


 Nutrition











 Category Date Time Status


 


 Heart Healthy Diet [DIET] Diets  03/29/17 Lunch Ordered














Assessment/Plan





- Assessment and Plan (Free Text)


Assessment: 


80 yo M w h/o HTN, COPD, obesity, dementia initially admitted to [facility] with Group 

G strep bacteremia, L foot MSSA infection initially transferred to ICU s/p RRT 

due to hypoxic, hypercarbic RF requiring intubation and subsequently extubated, 

now s/p new RRT and re-intubated 2/2 hypercarbic RF


Plan: 


Neuro: Intubated, sedated on Fentanyl gtt and Versed PRN. DC propofol


   Maintain normothermia


   Patient has ATIVAN ALLERGY that has resulted in acute respiratory failure 

twice requiring intubation





Pulm: Hypercarbic RF 2/2 Ativan with hypoventilation, patient was difficult to 

arouse, GCS worrisome for protecting airway requiring intubation 


   ABG reviewed - resp acidosis resolved. Daily ABG while intubated


   CXR reviewed - ETT and OGT in proper positions. Daily CXR while intubated


   Duonebs Q6hr


   Currently on PRVC 450/15/50/5


   Titrate to maintain spo2>90, pao2>60


   Protective lung ventilation strategy


   HOB >35





CV: HD stable. s/p 1L NS bolus. Contineu to monitor, maintain MAP >65


   Troponin down to 1.0 from 5.25 EKG and 2D ECHO show no new changes. Off 

heparin gtt. Management as per cardiology service. 


   Continue cardiac meds as per cardio service, monitor hemodynamics, adjust 

meds as needed 


   


GI: GI ppx


   


Renal: CARLOS (unknown baseline) improved. Continue to monitor renal function and I

&Os


   Acute rhabdomyolysis on admission improving





Endo:  No acute issues. Maintain euglycemia 140-180





ID: Significant leukocytosis today 25.4 from 14.7 yesterday. Low-grade temp 

overnight 100.0


   Continue Rocephin for L foot MSSA cellulitis, Group G Strep bacteremia. 

Procalcitonin trending down 7.36 from 31.55. Repeat in AM


   Repeat Blood cultures have been negative thus far since 3/23


   Continue to monitor, ID recs appreciated


   


Heme: Hb stable. No signs of bleeding. 





DVT/GI ppx: SQH, protonix, NPO





- Date & Time


Date: 03/31/17


Time: 09:24





<Kentrell Lira - Last Filed: 03/31/17 14:49>





CCU Objective





- Vital Signs / Intake & Output


Vital Signs (Last 4 hours): 


Vital Signs











  Temp Pulse Resp BP Pulse Ox


 


 03/31/17 14:00   67   


 


 03/31/17 12:16   67   


 


 03/31/17 11:43  100.2 F H  66  16  119/78  99











Intake and Output (Last 8hrs): 


 Intake & Output











 03/30/17 03/31/17 03/31/17





 22:59 06:59 14:59


 


Intake Total  820 


 


Output Total  200 


 


Balance  620 


 


Intake:   


 


  IV  600 


 


    Left Upper arm  600 


 


  Oral  220 


 


Output:   


 


  Urine  200 


 


    Urethral (Bill)  200 


 


Other:   


 


  Voiding Method   Indwelling Catheter


 


  # Bowel Movements  0 














- Medications


Active Medications: 


Active Medications











Generic Name Dose Route Start Last Admin





  Trade Name Freq  PRN Reason Stop Dose Admin


 


Acetaminophen  650 mg 03/22/17 11:33 03/23/17 09:15





  Tylenol 325mg Tab  PO   650 mg





  Q4 PRN   Administration





  Fever >100.4 F   


 


Albuterol/Ipratropium  3 ml 03/28/17 08:00 03/31/17 13:44





  Duoneb 3 Mg/0.5 Mg (3 Ml) Ud  IH   3 ml





  [id number] WAQAR   Administration


 


Amlodipine Besylate  10 mg 03/29/17 10:00 03/31/17 10:07





  Norvasc  PO   Not Given





  DAILY WAQAR   


 


Aspirin  325 mg 03/25/17 10:00 03/31/17 10:50





  Aspirin  PO   325 mg





  DAILY WAQAR   Administration


 


Clotrimazole  0 gm 03/22/17 18:00 03/31/17 10:32





  Lotrimin 1%  TOP   1 applic





  BID WAQAR   Administration


 


Furosemide  40 mg 04/01/17 10:00  





  Lasix  IVP   





  DAILY WAQAR   


 


Heparin Sodium (Porcine)  5,000 units 03/30/17 10:00 03/31/17 10:28





  Heparin  SC   5,000 units





  Q12 WAQAR   Administration





  Protocol   


 


Hydralazine HCl  10 mg 03/28/17 18:01 03/29/17 23:22





  Apresoline  IVP   10 mg





  Q6 PRN   Administration





  Systolic Blood Pressure   


 


Dextrose  1,000 mls @ 50 mls/hr 03/30/17 09:15 03/31/17 06:16





  Dextrose 5% In Water 1000 Ml  IV 04/01/17 01:14  50 mls/hr





  .Q20H WAQAR   Administration


 


Ceftriaxone Sodium  100 mls @ 100 mls/hr 03/30/17 10:00 03/31/17 11:16





  Rocephin 2 Gm Ivpb  IVPB 04/19/17 10:01  100 mls/hr





  DAILY WAQAR   Administration





  Protocol   


 


Fentanyl Citrate  100 mls @ 2 mls/hr 03/31/17 10:09 03/31/17 10:49





  Fentanyl Citrate/Sodium Chloride 1 Mg/100 Ml  IV   2 mls/hr





  .Q24H PRN   Administration





  TITRATE PER MD ORDER   





  Protocol   





  20 MCG/HR   


 


Metoprolol Tartrate  50 mg 03/29/17 18:00 03/31/17 10:17





  Lopressor  PO   Not Given





  0800,1800 WAQAR   


 


Midazolam HCl  2 mg 03/31/17 10:27 03/31/17 11:18





  Versed Inj  IVP   2 mg





  Q4 PRN   Administration





  Agitation   


 


Pantoprazole Sodium  40 mg 03/30/17 06:30 03/31/17 06:08





  Protonix Ec Tab  PO   Not Given





  0630 WAQAR   


 


Risperidone  0.5 mg 03/30/17 10:00 03/31/17 10:07





  Risperdal Tab  PO   Not Given





  DAILY WAQAR   





  Protocol   


 


Silver Sulfadiazine  0 ea 03/25/17 10:00 03/31/17 10:31





  Silvadene 1% 20 Gm  TOP   1 applic





  DAILY WAQAR   Administration


 


Vitamin A  1 ea 03/30/17 07:41 03/30/17 09:54





  Vitamin A & D Oint Ud Foilpak  TOP   1 ea





  BID PRN   Administration





  chapped lips   














- Patient Studies


Lab Studies: 


 Microbiology Studies











 03/27/17 16:00 Blood Culture - Preliminary





 Blood-Venous    NO GROWTH AFTER 3 DAYS


 


 03/27/17 15:30 Blood Culture - Preliminary





 Blood-Venous    NO GROWTH AFTER 3 DAYS








 Lab Studies











  03/31/17 03/31/17 03/31/17 Range/Units





  10:35 08:33 08:00 


 


WBC    25.4 H* D  (4.5-11.0)  10^3/ul


 


RBC    4.40  (3.5-6.1)  10^6/uL


 


Hgb    12.2 L  (14.0-18.0)  gm/dL


 


Hct    37.6 L  (42.0-52.0)  %


 


MCV    85.5  (80.0-105.0)  fL


 


MCH    27.7  (25.0-35.0)  pg


 


MCHC    32.4  (31.0-37.0)  g/dl


 


RDW    14.4  (11.5-14.5)  %


 


Plt Count    333  (120.0-450.0)  10^3/uL


 


MPV    9.5  (7.0-11.0)  fl


 


Neutrophils % (Manual)    85 H  (50.0-70.0)  %


 


Band Neutrophils %    4 H  (0-2)  %


 


Lymphocytes % (Manual)    4 L  (22.0-35.0)  %


 


Monocytes % (Manual)    7 H  (1.0-6.0)  %


 


Platelet Evaluation    Normal  (NORMAL)  


 


pCO2  44  56 H   (35-45)  mm/Hg


 


pO2  93.0  152.0 H   ()  mm/Hg


 


HCO3  26.0  26.9   (21-28)  mmol/L


 


ABG pH  7.38  7.29 L   (7.35-7.45)  


 


ABG Total CO2  27.4  28.6 H   (22-28)  mmol.L


 


ABG O2 Saturation  98.2 H  99.2 H   (95-98)  %


 


ABG O2 Content   15.6   (15-23)  ML/dl


 


ABG Base Excess  0.5  -0.4   (-2.0-3.0)  mmol/L


 


ABG Hemoglobin   11.3 L   (11.7-17.4)  g/dL


 


ABG Carboxyhemoglobin   2.3 H   (0.5-1.5)  %


 


POC ABG HHb (Measured)   0.8   (0-5)  %


 


ABG Methemoglobin   1.0   (0.0-3.0)  %


 


ABG O2 Capacity   15.7 L   (16-24)  mL/dl


 


ABG Potassium  3.9    (3.6-5.2)  mmol/L


 


Hgb O2 Saturation   96.0   (95.0-98.0)  %


 


Sodium  146.0   145  (132-148)  mmol/L


 


Chloride  117.0 H   106  ()  mmol/L


 


Glucose  141 H    ()  mg/dl


 


Lactate  0.8    (0.7-2.1)  mmol/L


 


Mechanical Rate  15    


 


FiO2  50.0  50.0   %


 


Tidal Volume  450    


 


PEEP  5    


 


Potassium    4.2  (3.6-5.0)  mmol/L


 


Carbon Dioxide    31  (21-33)  mmol/L


 


Anion Gap    12  (10-20)  


 


BUN    40 H  (7-21)  mg/dL


 


Creatinine    1.2  (0.5-1.4)  mg/dL


 


Est GFR (African Amer)    > 60  


 


Est GFR (Non-Af Amer)    58  


 


Random Glucose    153 H  ()  mg/dL


 


Calcium    9.7  (8.4-10.5)  mg/dL


 


Total Bilirubin    0.8  (0.2-1.3)  mg/dL


 


AST    101 H  (15-59)  U/L


 


ALT    95 H  (7-56)  U/L


 


Alkaline Phosphatase    79  ()  U/L


 


Troponin I    1.00 H* D  ng/mL


 


NT-Pro-B Natriuret Pep     (0-450)  pg/mL


 


Total Protein    6.8  (5.8-8.3)  g/dL


 


Albumin    3.0  (3.0-4.8)  g/dL


 


Globulin    3.8  gm/dL


 


Albumin/Globulin Ratio    0.8 L  (1.1-1.8)  


 


Arterial Blood Potassium  3.9    (3.6-5.2)  mmol/L














  03/31/17 03/30/17 Range/Units





  07:15 06:20 


 


WBC    (4.5-11.0)  10^3/ul


 


RBC    (3.5-6.1)  10^6/uL


 


Hgb    (14.0-18.0)  gm/dL


 


Hct    (42.0-52.0)  %


 


MCV    (80.0-105.0)  fL


 


MCH    (25.0-35.0)  pg


 


MCHC    (31.0-37.0)  g/dl


 


RDW    (11.5-14.5)  %


 


Plt Count    (120.0-450.0)  10^3/uL


 


MPV    (7.0-11.0)  fl


 


Neutrophils % (Manual)    (50.0-70.0)  %


 


Band Neutrophils %    (0-2)  %


 


Lymphocytes % (Manual)    (22.0-35.0)  %


 


Monocytes % (Manual)    (1.0-6.0)  %


 


Platelet Evaluation    (NORMAL)  


 


pCO2  58 H   (35-45)  mm/Hg


 


pO2  206.0 H   ()  mm/Hg


 


HCO3  28.5 H   (21-28)  mmol/L


 


ABG pH  7.30 L   (7.35-7.45)  


 


ABG Total CO2  30.3 H   (22-28)  mmol.L


 


ABG O2 Saturation  99.0 H   (95-98)  %


 


ABG O2 Content  16.6   (15-23)  ML/dl


 


ABG Base Excess  1.0   (-2.0-3.0)  mmol/L


 


ABG Hemoglobin  12.0   (11.7-17.4)  g/dL


 


ABG Carboxyhemoglobin  2.0 H   (0.5-1.5)  %


 


POC ABG HHb (Measured)  1.0   (0-5)  %


 


ABG Methemoglobin  1.2   (0.0-3.0)  %


 


ABG O2 Capacity  16.8   (16-24)  mL/dl


 


ABG Potassium    (3.6-5.2)  mmol/L


 


Hgb O2 Saturation  95.8   (95.0-98.0)  %


 


Sodium    (132-148)  mmol/L


 


Chloride    ()  mmol/L


 


Glucose    ()  mg/dl


 


Lactate    (0.7-2.1)  mmol/L


 


Mechanical Rate    


 


FiO2  100.0   %


 


Tidal Volume    


 


PEEP    


 


Potassium    (3.6-5.0)  mmol/L


 


Carbon Dioxide    (21-33)  mmol/L


 


Anion Gap    (10-20)  


 


BUN    (7-21)  mg/dL


 


Creatinine    (0.5-1.4)  mg/dL


 


Est GFR (African Amer)    


 


Est GFR (Non-Af Amer)    


 


Random Glucose    ()  mg/dL


 


Calcium    (8.4-10.5)  mg/dL


 


Total Bilirubin    (0.2-1.3)  mg/dL


 


AST    (15-59)  U/L


 


ALT    (7-56)  U/L


 


Alkaline Phosphatase    ()  U/L


 


Troponin I    ng/mL


 


NT-Pro-B Natriuret Pep   6430 H  (0-450)  pg/mL


 


Total Protein    (5.8-8.3)  g/dL


 


Albumin    (3.0-4.8)  g/dL


 


Globulin    gm/dL


 


Albumin/Globulin Ratio    (1.1-1.8)  


 


Arterial Blood Potassium    (3.6-5.2)  mmol/L








 Laboratory Results - last 24 hr











  03/30/17 03/31/17 03/31/17





  06:20 07:15 08:00


 


WBC    25.4 H* D


 


RBC    4.40


 


Hgb    12.2 L


 


Hct    37.6 L


 


MCV    85.5


 


MCH    27.7


 


MCHC    32.4


 


RDW    14.4


 


Plt Count    333


 


MPV    9.5


 


Neutrophils % (Manual)    85 H


 


Band Neutrophils %    4 H


 


Lymphocytes % (Manual)    4 L


 


Monocytes % (Manual)    7 H


 


Platelet Evaluation    Normal


 


pCO2   58 H 


 


pO2   206.0 H 


 


HCO3   28.5 H 


 


ABG pH   7.30 L 


 


ABG Total CO2   30.3 H 


 


ABG O2 Saturation   99.0 H 


 


ABG O2 Content   16.6 


 


ABG Base Excess   1.0 


 


ABG Hemoglobin   12.0 


 


ABG Carboxyhemoglobin   2.0 H 


 


POC ABG HHb (Measured)   1.0 


 


ABG Methemoglobin   1.2 


 


ABG O2 Capacity   16.8 


 


ABG Potassium   


 


Hgb O2 Saturation   95.8 


 


Glucose   


 


Lactate   


 


Mechanical Rate   


 


FiO2   100.0 


 


Tidal Volume   


 


PEEP   


 


Sodium    145


 


Potassium    4.2


 


Chloride    106


 


Carbon Dioxide    31


 


Anion Gap    12


 


BUN    40 H


 


Creatinine    1.2


 


Est GFR ( Amer)    > 60


 


Est GFR (Non-Af Amer)    58


 


Random Glucose    153 H


 


Calcium    9.7


 


Total Bilirubin    0.8


 


AST    101 H


 


ALT    95 H


 


Alkaline Phosphatase    79


 


Troponin I    1.00 H* D


 


NT-Pro-B Natriuret Pep  6430 H  


 


Total Protein    6.8


 


Albumin    3.0


 


Globulin    3.8


 


Albumin/Globulin Ratio    0.8 L


 


Arterial Blood Potassium   














  03/31/17 03/31/17





  08:33 10:35


 


WBC  


 


RBC  


 


Hgb  


 


Hct  


 


MCV  


 


MCH  


 


MCHC  


 


RDW  


 


Plt Count  


 


MPV  


 


Neutrophils % (Manual)  


 


Band Neutrophils %  


 


Lymphocytes % (Manual)  


 


Monocytes % (Manual)  


 


Platelet Evaluation  


 


pCO2  56 H  44


 


pO2  152.0 H  93.0


 


HCO3  26.9  26.0


 


ABG pH  7.29 L  7.38


 


ABG Total CO2  28.6 H  27.4


 


ABG O2 Saturation  99.2 H  98.2 H


 


ABG O2 Content  15.6 


 


ABG Base Excess  -0.4  0.5


 


ABG Hemoglobin  11.3 L 


 


ABG Carboxyhemoglobin  2.3 H 


 


POC ABG HHb (Measured)  0.8 


 


ABG Methemoglobin  1.0 


 


ABG O2 Capacity  15.7 L 


 


ABG Potassium   3.9


 


Hgb O2 Saturation  96.0 


 


Glucose   141 H


 


Lactate   0.8


 


Mechanical Rate   15


 


FiO2  50.0  50.0


 


Tidal Volume   450


 


PEEP   5


 


Sodium   146.0


 


Potassium  


 


Chloride   117.0 H


 


Carbon Dioxide  


 


Anion Gap  


 


BUN  


 


Creatinine  


 


Est GFR ( Amer)  


 


Est GFR (Non-Af Amer)  


 


Random Glucose  


 


Calcium  


 


Total Bilirubin  


 


AST  


 


ALT  


 


Alkaline Phosphatase  


 


Troponin I  


 


NT-Pro-B Natriuret Pep  


 


Total Protein  


 


Albumin  


 


Globulin  


 


Albumin/Globulin Ratio  


 


Arterial Blood Potassium   3.9











EKG/Cardiology Studies: 


Cardiology / EKG Studies





03/31/17


EKG [ELECTROCARDIOGRAM] Urgent 


   Comment: 


   Reason For Exam: rrt














Critical Care Progress Note





- Nutrition


Nutrition: 


 Nutrition











 Category Date Time Status


 


 Heart Healthy Diet [DIET] Diets  03/29/17 Lunch Ordered


 


 NPO Diet [DIET] Diets  03/31/17 Dinner Ordered














Addendum


Addendum: 





03/31/17 14:49


patient was seen and examined with ICU resident Dr. Pattie Campo. Please see Dr. Lira note

## 2017-03-31 NOTE — CP.PCM.PN
Subjective





- Date & Time of Evaluation


Date of Evaluation: 03/31/17


Time of Evaluation: 08:20





- Subjective


Subjective: 


Patient placed on BiPAP because of some shortness of breath at rest. No fevers 

overnight. No diarrhea, no nausea.





Objective





- Vital Signs/Intake and Output


Vital Signs (last 24 hours): 


 











Temp Pulse Resp BP Pulse Ox


 


 98.3 F   109 H  18   154/91 H  92 L


 


 03/31/17 06:00  03/31/17 08:18  03/31/17 06:00  03/31/17 06:00  03/31/17 06:00








Intake and Output: 


 











 03/31/17 03/31/17





 06:59 18:59


 


Intake Total 820 


 


Output Total 200 


 


Balance 620 














- Medications


Medications: 


 Current Medications





Acetaminophen (Tylenol 325mg Tab)  650 mg PO Q4 PRN


   PRN Reason: Fever >100.4 F


   Last Admin: 03/23/17 09:15 Dose:  650 mg


Albuterol/Ipratropium (Duoneb 3 Mg/0.5 Mg (3 Ml) Ud)  3 ml IH [id number] [facility]


   Last Admin: 03/31/17 02:10 Dose:  Not Given


Amlodipine Besylate (Norvasc)  10 mg PO DAILY [facility]


   Last Admin: 03/30/17 09:53 Dose:  10 mg


Aspirin (Aspirin)  325 mg PO DAILY [facility]


   Last Admin: 03/30/17 09:53 Dose:  325 mg


Clotrimazole (Lotrimin 1%)  0 gm TOP BID [facility]


   Last Admin: 03/30/17 18:53 Dose:  1 applic


Heparin Sodium (Porcine) (Heparin)  5,000 units SC Q12 WAQAR


   PRN Reason: Protocol


   Last Admin: 03/30/17 21:26 Dose:  5,000 units


Hydralazine HCl (Apresoline)  10 mg IVP Q6 PRN


   PRN Reason: Systolic Blood Pressure


   Last Admin: 03/29/17 23:22 Dose:  10 mg


Dextrose (Dextrose 5% In Water 1000 Ml)  1,000 mls @ 50 mls/hr IV .Q20H [facility]


   Stop: 04/01/17 01:14


   Last Admin: 03/31/17 06:16 Dose:  50 mls/hr


Ceftriaxone Sodium (Rocephin 2 Gm Ivpb)  100 mls @ 100 mls/hr IVPB DAILY [facility]


   PRN Reason: Protocol


   Stop: 04/19/17 10:01


   Last Admin: 03/30/17 09:55 Dose:  100 mls/hr


Metoprolol Tartrate (Lopressor)  50 mg PO 0800,1800 [facility]


   Last Admin: 03/30/17 18:52 Dose:  50 mg


Pantoprazole Sodium (Protonix Ec Tab)  40 mg PO 0630 [facility]


   Last Admin: 03/31/17 06:08 Dose:  Not Given


Risperidone (Risperdal Tab)  0.5 mg PO DAILY [facility]


   PRN Reason: Protocol


   Last Admin: 03/30/17 09:52 Dose:  0.5 mg


Silver Sulfadiazine (Silvadene 1% 20 Gm)  0 ea TOP DAILY [facility]


   Last Admin: 03/30/17 09:54 Dose:  1 applic


Vitamin A (Vitamin A & D Oint Ud Foilpak)  1 ea TOP BID PRN


   PRN Reason: chapped lips


   Last Admin: 03/30/17 09:54 Dose:  1 ea











- Labs


Labs: 


 





 03/31/17 08:00 





 03/31/17 08:00 





 











PT  12.1 Seconds (9.9-11.8)  H  03/25/17  06:15    


 


INR  1.12  (0.93-1.08)  H  03/25/17  06:15    


 


APTT  87.8 Seconds (23.7-30.8)  H*  03/30/17  06:20    














- Constitutional


Appears: Other (On BiPAP with mild respiratory distress)





- Head Exam


Head Exam: NORMAL INSPECTION





- Neck Exam


Neck Exam: absent: Meningismus





- Respiratory Exam


Respiratory Exam: Decreased Breath Sounds





- Cardiovascular Exam


Cardiovascular Exam: +S1, +S2





- GI/Abdominal Exam


GI & Abdominal Exam: Soft.  absent: Tenderness





Assessment and Plan





- Assessment and Plan (Free Text)


Plan: 


Assessment


severe sepsis S/P shock S/P ventilator-dependent respiratory failure with acute 

on chronic renal failure due to Group G strep bacteremia, probably secondary to 

bilateral lower extremities skin and skin structure infection; also with MSSA 

from the wound cultures; so far no evidence of new infection; patient is 

clinically improving but has been placed on BiPAP


acute rhabdomyolysis


consider acute NSTEMI


HTN


chronic renal failure


dementia


obesity with BMI 38





Plan


continue Rocephin (day 9 from first negative blood cx); repeat septic work up 

so far negative, PCT is improving; CXR does not show focal infiltrates from 3 

days ago but will follow up repeat CXR today; reviewed Dr. Fall's note


2D echocardiogram does not show vegetations


will continue to monitor clinically

## 2017-03-31 NOTE — CARD
--------------- APPROVED REPORT --------------





EKG Measurement

Heart [id number]

[place] [id number]

[id number]

[id number]

[id number]



<Conclusion>

Sinus rhythm with 1st degree AV block

Right bundle branch block

Inferior infarct, age undetermined

T wave abnormality, consider lateral ischemia

Abnormal ECG

## 2017-03-31 NOTE — PROCN
DATE: 03/31/2017



PROCEDURE:  Endotracheal intubation.



INDICATION:  Hypercapnic respiratory failure, inability to protect airways, 
altered mental status.



The patient was preoxygenated with 100% FiO2 via Ambu bag.  Two liters IV fluid 
started wide open , patient was sedated with 5 mL of propofol.  Direct 
laryngoscopy performed with MAC 3 curved blade.  Vocal cords visualized.  
Trachea was cannulated with 7.5 Serbian endotracheal tube.  Secured at 25 cm at 
lips.  Position confirmed by change of the color of the end tidal CO2 monitor, 
gastric auscultation, and bilateral lung auscultation.  Subsequent chest x-ray 
also confirmed correct position of the ET as well.  The patient tolerated 
procedure well.  Vital signs were monitored throughout the procedure and were 
wrote stable.





__________________________________________

Kentrell Lira MD







cc:   



DD: 03/31/2017 11:55:12  [id number]

TT: 03/31/2017 12:12:46

Confirmation # [id number]

Dictation # [id number]

en

MTDD

## 2017-03-31 NOTE — PN
DATE: 03/31/2017



The patient seen and examined at bedside.  He was admitted to ICU after 
substantial somnolence noted during presentation at rapid response.  The 
patient initially was seen on the floor where he was obtunded and hard to 
arouse.  No more preceding history is available except for the fact that he 
received p.r.n. dose of Ativan for agitation.  BiPAP tried; however, could not 
succeed in improving his blood gas and mental status.  The patient was 
transferred to ICU and decision was made to intubate the patient.  No nausea, 
no vomiting, no diarrhea, no constipation.



PHYSICAL EXAMINATION:

VITAL SIGNS:  Temperature 100.2, blood pressure 119/78, respiratory rate 16, 
oxygen saturation 98% on 50% FIO2, ventilator settings 450/15/5/50%.  The 
patient is on fentanyl drip and Versed p.r.n.

HEAD AND NECK:  Atraumatic.

LUNGS:  Clear to auscultation bilaterally.

HEART:  Regular rate and rhythm, S1, S2 normal.

ABDOMEN:  Soft, nontender, nondistended.

MUSCULOSKELETAL:  No C/C/E.

NEUROLOGIC:  The patient was observed slightly moving all extremities 
spontaneously.

SKIN:  Moist.

PSYCHIATRIC:  The patient is intubated and sedated.



LABORATORY DATA:  ABG before intubation 7.29/56/152.  ABG about 1 hour after 
intubation 7.38/44/93, pO2 93.  WBC 24.5, hemoglobin 12.2, platelet count 333.  
Sodium 145, potassium 4.2, chloride 106, carbon dioxide 31, BUN 40, creatinine 
1.2 down from 1.3, glucose 153.  Troponin 1 down from 5.25.  Procalcitonin is 
pending.



MEDICATIONS:  DuoNeb every 6, hydralazine p.r.n., aspirin, D5 in water, 
fentanyl drip, heparin 5000 subQ q. 12, Lasix, metoprolol, amlodipine, Protonix
, ceftriaxone, Tylenol p.r.n., Versed p.r.n., topical vitamin D.



ASSESSMENT AND PLAN:  This is an 81-year-old gentleman with now cleared group G 
Streptococcus bacteremia and now resolved septic shock presented with 
hypecapnic respiratory failure, likely multifactorial, but might have been 
tipped over by use of BZD.  The patient had multiorgan dysfunction syndrome 
which also improved concomittantly with resolution of septic shock.  His acute 
kidney injury almost resolved.  His troponin is going down.  It is likely that 
component of his hypercarbic respiratory failure may be attributed to dose of 
Ativan and we will avoid benzodiazepines, opiates when extubated.  It is 
possible that the patient is delirious and his agitation may have been related 
to delirium due to critical illness.  Instead of benzodiazepines and opiates, I 
would use antipsychotics p.r.n. for agitation in this patient.  Meanwhile we 
will order procalcitonin to see if it is trending down to know whether it is 
worthwhile to escalate antibiotic therapy.  Infectious disease service is 
following the patient as well.  Chest x-ray looks somewhat better (more aerated)
, but it may be due to PPV.  We will continue with protective lung ventilation 
strategy to avoid ventilator-induced lung injury, conservative fluid and oxygen 
management.  We will continue with antibiotics, head of bed elevated at more 
than 35 degrees.  We will continue to maintain euvolemia, euglycemia, 
normothermia and oxygen saturation more than 90%.  We will continue with 
bronchodilators, pulmonary toilet.  We will continue with maintaining blood 
glucose within 140-180 area.  We will continue with deep venous thrombosis and 
gastrointestinal prophylaxis.  We will continue with enteral nutrition.



ccm time 40 min





__________________________________________

Kentrell Lira MD







cc:   



DD: 03/31/2017 13:30:58  [id number]

TT: 03/31/2017 14:23:31

Confirmation # [id number]

Dictation # [id number]

tn

MTDD

## 2017-03-31 NOTE — RAD
HISTORY:

difficulty breathing  



COMPARISON:

03/30/2017 



FINDINGS:



LUNGS:

No active pulmonary disease.



PLEURA:

No significant pleural effusion identified, no pneumothorax apparent.



CARDIOVASCULAR:

Mild cardiomegaly.  Mild vascular congestion



OSSEOUS STRUCTURES:

No significant abnormalities.



VISUALIZED UPPER ABDOMEN:

Normal.



OTHER FINDINGS:

None.



IMPRESSION:

Mild cardiomegaly and mild vascular congestion

## 2017-03-31 NOTE — PN
DATE: 03/31/2017



SUBJECTIVE:  The patient has been reintubated secondary to recurrent respiratory failure.  He is ted
adrian on propofol on the ventilator.



OBJECTIVE:

VITAL SIGNS:  Blood pressure 119/78, temperature 100.2 axillary, pulse 66.  Ventilator settings are a
ssist control rate 15, tidal volume 450, FiO2 50%, PEEP 3.

LUNGS:  Show good air entry bilaterally with scattered crepitations.  Endotracheal tube is intact.

HEART:  Regular rate and rhythm.

ABDOMEN:  Soft, nontender, bowel sounds are hypoactive.

EXTREMITIES:  With dependent edema to the sacrum.  Wound dressings of the lower extremities are intac
t.

NEUROLOGIC:  The patient is sedated.

SKIN:  Warm and dry.



LABORATORY DATA:  WBCs 25.4, hemoglobin 12.2, hematocrit 37.6.  Sodium 145, potassium 4.2, chloride 1
06, CO2 31, BUN 40, creatinine 1.2, glucose 153.  Troponin is 1.0.  BNP 6430.  Arterial blood gas ana maria
ws a pH of 7.38, pCO2 44, pO2 93, bicarbonate 26, O2 saturation 98.2% on the above ventilator setting
s.  Chest x-ray shows pulmonary vascular congestion, which is decreased from previous.



IMPRESSION:

1.  Ventilatory failure, possibly due to multifactorial causes including congestive heart failure wit
h excessive sedation, rule out underlying cardiac ischemia or acute infarct.

2.  Paroxysmal atrial fibrillation with intermittent rapid ventricular rate, on anticoagulation.

3.  Status post rhabdomyolysis with acute renal failure superimposed on mild chronic kidney disease.

4.  Coronary artery disease, rule out ischemia/infarct.

5.  Dementia with superimposed delirium, secondary to underlying metabolic disorder.

6.  Immobility, secondary to severe degenerative joint disease and obesity with deconditioning.



PLAN:  The patient has been started on IV Lasix.  Will check serial troponin and BNP levels.  Would a
void IV sedation.  Would avoid physical and chemical restraints and start 1:1 observation if needed. 
 Continue cardiology, pulmonary, infectious disease followup.  Consider central venous or [place] c
atheter placement if there is a question regarding fluid status.  Prognosis is guarded.  Family is aw
are.





__________________________________________

Ariel Browne JD, MD







cc:



DD: 03/31/2017 14:04:45  [id number]

TT: 03/31/2017 14:21:59

Confirmation # [id number]

Dictation # [id number]

en

## 2017-03-31 NOTE — PN
DATE: 03/31/2017



SUBJECTIVE:  The patient appears comfortable this morning.  He is not short of 
breath at rest.



OBJECTIVE:

VITAL SIGNS:  Temperature is 98.3, pulse 105, respirations 18, blood pressure 
154/91.  Oxygen saturation on nasal cannula ranges between 92-99%.

HEENT:  Normocephalic, atraumatic.  No JVD.

CARDIOVASCULAR:  Systolic ejection murmur at the lower left sternal border.  No 
S3 gallop.

LUNGS:  Minimal bilateral rhonchi.  No wheezing.

EXTREMITIES:  Mild edema.  No cyanosis, no clubbing.  Calves are nontender to 
palpation.

GASTROINTESTINAL:  Abdomen is soft, nontender, nondistended.  Bowel sounds are 
positive.

SKIN:  Improving cellulitis - lower extremities (anterior aspects).

NEUROLOGIC:  Limited at the present time.



IMPRESSION:

1.  Hypercarbic respiratory failure.

2.  Severe sepsis.

3.  Bilateral cellulitis.

4.  Chronic obstructive pulmonary disease.

5.  Renal insufficiency.

6.  Acute myocardial infarction.

7.  Mild anemia.



PLAN:  The patient appears comfortable this morning.  He is not short of breath 
at rest.  He states he is feeling much better overall.  I did discuss the case 
with the night nurse at length.  The night nurse stated that the patient had a 
pretty good night.  On physical exam, there is only minimal bronchospasm noted.
  I will continue with the current nebulizer treatments and aspiration 
precautions for now.  I have also ordered a repeat chest x-ray for this morning 
- not done yet.  I would continue with the treatment for sepsis as per 
infectious disease.  Input by Dr. Ham is noted.  Temperatures are decreasing.
  I would continue with the treatment for acute myocardial infarction as per 
cardiology.  Inputs by Dr. Wallace and Dr. Arias are noted.  Again, the 
clinical status of the patient is significantly improved - compared to last 
week.  However, again, the overall status/prognosis of this patient remains 
very guarded.  I will discuss the above with the attending physician.





__________________________________________

Balwinder Fall MD







cc:   



DD: 03/31/2017 07:04:18  [id number]

TT: 03/31/2017 08:35:18

Confirmation # [id number]

Dictation # [id number]

abelardo JOYCE

## 2017-03-31 NOTE — PN
DATE: 03/31/2017



SUBJECTIVE:  The patient myron respiratory distress



PHYSICAL EXAMINATION:

VITAL SIGNS:  Temperature is 100+, pulse is 96, blood pressure is 134/57, 
respiration is 30.

GENERAL:   The patient comfortable, in no acute distress.

HEENT:   Anicteric sclerae.  Moist mucosa.

NECK:   No JVD or adenopathy.

CARDIAC:   S1/S2.  No murmurs.  No rubs.  Regular.

RESPIRATORY:   Clear to auscultation bilaterally.  No wheezes, rales, or 
rhonchi.  Good air entry.

ABDOMEN:   Bowel sounds are positive, soft, nontender, and nondistended.

EXTREMITIES:   No edema.  Has 1+ pulses.



LABORATORIES:  Creatinine is 1.3.  Yesterday's white count is 14.7



ASSESSMENT:

1.  Sepsis.

2.  Acute kidney injury, secondary to rhabdomyolysis, resolved.

3.  Hypoxic respiratory failure, status post extubation.

4.  Delirium, improving.

5.  Proteinuria, 2 grams per day.

6.  Non-ST elevation myocardial infarction.

7.  Hyperphosphatemia, secondary to acute kidney injury, improved.

8.  Transaminitis, improving.

9.  Hypernatremia.

10.  Obesity with a body mass index of 37.

11.  Right knee pain, secondary to degenerative joint disease.

12.  Paroxysmal atrial fibrillation.

13.  Left foot wound with Staphylococcus aureus infection.

14.  Hematuria, resolved.



PLAN:  The patient is currently comfortable.  He is on aspirin for his non-ST 
elevation myocardial infarction.  I spoke with Dr. Arias and he does not 
require further intervention.  The patient is going to have conservative 
treatment with aspirin and beta blockers, which he is on.  The patient is on 
heparin for deep venous thrombosis prophylaxis.  He does not have any hematuria
, but he is at risk of deep venous thrombosis.  The patient is on amlodipine 
for hypertension.  He is on risperidone, which was started yesterday by me, as 
well as a scheduled dose of Xanax because of his delirium.  He is a danger to 
himself and trying to get out of bed.  The risks and benefits were weighed and 
we will try to minimize his use of antipsychotics due to the complications that 
it can cause including stroke.  The patient is currently on the telemetry floor
, is being followed by multiple consultants.  I did speak to the patient's wife 
yesterday to give her an update on the patient's diagnosis and plan of care.  
Overall, prognosis is guarded, but he has made significant improvement while 
being hospitalized.  He will need subacute rehab once he is stable, enough to 
be transferred.  He still has episodes of confusion.  He is receiving IV 
antibiotics.  He is hypernatremic.  That is the reason he was given free 
water.He may be septic from hospital acquired infection. Will be transferred to 
the ICU.





__________________________________________

Jose Martin rAguello MD







cc:   



DD: 03/31/2017 06:11:40  [id number]

TT: 03/31/2017 08:13:46

Confirmation # [id number]

Dictation # [id number]

en

MTDD

## 2017-04-01 LAB
ALBUMIN/GLOB SERPL: 0.7 {RATIO} (ref 1.1–1.8)
ALP SERPL-CCNC: 56 U/L (ref 38–133)
ALT SERPL-CCNC: 62 U/L (ref 7–56)
AST SERPL-CCNC: 60 U/L (ref 15–59)
BILIRUB SERPL-MCNC: 0.6 MG/DL (ref 0.2–1.3)
BUN SERPL-MCNC: 55 MG/DL (ref 7–21)
CALCIUM SERPL-MCNC: 8.9 MG/DL (ref 8.4–10.5)
CHLORIDE SERPL-SCNC: 109 MMOL/L (ref 98–107)
CO2 SERPL-SCNC: 29 MMOL/L (ref 21–33)
COHGB MFR BLD: 1.7 % (ref 0.5–1.5)
ERYTHROCYTE [DISTWIDTH] IN BLOOD BY AUTOMATED COUNT: 14.6 % (ref 11.5–14.5)
GLOBULIN SER-MCNC: 3.2 GM/DL
GLUCOSE SERPL-MCNC: 111 MG/DL (ref 70–110)
HCO3 BLDA-SCNC: 25.6 MMOL/L (ref 21–28)
HCT VFR BLD CALC: 28.5 % (ref 42–52)
HHB: 1 % (ref 0–5)
MAGNESIUM SERPL-MCNC: 1.9 MG/DL (ref 1.7–2.2)
MCH RBC QN AUTO: 28.1 PG (ref 25–35)
MCHC RBC AUTO-ENTMCNC: 33 G/DL (ref 31–37)
MCV RBC AUTO: 85.3 FL (ref 80–105)
METHGB MFR BLD: 0.9 % (ref 0–3)
O2 CAP BLDA-SCNC: 16 ML/DL (ref 16–24)
O2 CT BLDA-SCNC: 15.8 ML/DL (ref 15–23)
PH BLDA: 7.46 [PH] (ref 7.35–7.45)
PHOSPHATE SERPL-MCNC: 4.8 MG/DL (ref 2.5–4.5)
PLATELET # BLD: 245 10^3/UL (ref 120–450)
PMV BLD AUTO: 9.4 FL (ref 7–11)
PO2 BLDA: 127 MM/HG (ref 80–100)
POTASSIUM SERPL-SCNC: 3.8 MMOL/L (ref 3.6–5)
PROT SERPL-MCNC: 5.5 G/DL (ref 5.8–8.3)
SAO2 % BLDA: 96.4 % (ref 95–98)
SODIUM SERPL-SCNC: 145 MMOL/L (ref 132–148)
WBC # BLD AUTO: 11.6 10^3/UL (ref 4.5–11)

## 2017-04-01 RX ADMIN — CLOTRIMAZOLE SCH APPLIC: 1 CREAM TOPICAL at 17:17

## 2017-04-01 RX ADMIN — IPRATROPIUM BROMIDE AND ALBUTEROL SULFATE SCH ML: .5; 3 SOLUTION RESPIRATORY (INHALATION) at 07:11

## 2017-04-01 RX ADMIN — VITAMIN A AND VITAMIN D PRN EA: 929.3 OINTMENT TOPICAL at 06:18

## 2017-04-01 RX ADMIN — IPRATROPIUM BROMIDE AND ALBUTEROL SULFATE SCH ML: .5; 3 SOLUTION RESPIRATORY (INHALATION) at 20:36

## 2017-04-01 RX ADMIN — IPRATROPIUM BROMIDE AND ALBUTEROL SULFATE SCH ML: .5; 3 SOLUTION RESPIRATORY (INHALATION) at 01:24

## 2017-04-01 RX ADMIN — CLOTRIMAZOLE SCH APPLIC: 1 CREAM TOPICAL at 09:01

## 2017-04-01 RX ADMIN — IPRATROPIUM BROMIDE AND ALBUTEROL SULFATE SCH ML: .5; 3 SOLUTION RESPIRATORY (INHALATION) at 13:18

## 2017-04-01 RX ADMIN — SILVER SULFADIAZINE SCH APPLIC: 10 CREAM TOPICAL at 09:01

## 2017-04-01 NOTE — PN
DATE: 04/01/2017



SUBJECTIVE:  The patient has been extubated.  He is out of bed in a chair, awake, and responsive.  Th
ere is no shortness of breath, no chest pain.



OBJECTIVE:

VITAL SIGNS:  Blood pressure 119/56, temperature 97.6, pulse 69, respiratory rate 20.

LUNGS:  Show a few bibasilar crackles.

HEART:  Regular rate and rhythm.

ABDOMEN:  Soft, nontender.  Bowel sounds are normoactive.

EXTREMITIES:  Show some dependent and presacral edema.

NEUROLOGIC:  The patient is awake and responsive, less confused without focal sensory or motor defici
ts.

SKIN:  Warm and dry.



LABORATORY DATA:  WBC is 11.6, hemoglobin 9.4, hematocrit 28.5.  Sodium 145, potassium 3.8, chloride 
109, CO2 of 29, BUN 55, creatinine 1.7, glucose 111.



IMPRESSION:

1.  Status post ventilatory failure.

2.  Hypertension, hypertensive cardiovascular disease, and congestive heart failure.

3.  Coronary artery disease status post probable non-ST segment elevation myocardial infarction.

4.  Status post acute rhabdomyolysis with acute renal failure superimposed on mild chronic kidney dis
ease.

5.  Dementia with superimposed delirium secondary to underlying metabolic disorder, improving.

6.  Immobility secondary to a severe degenerative joint disease and obesity with deconditioning.

7.  Anemia, rule out gastrointestinal bleed.



PLAN:  Continue present care.  Would continue IV Lasix.  Check troponin and BNP levels.  Continue car
diology, pulmonary, and infectious disease followup.  Physical therapy evaluation and treatment.  We 
will check stool for occult blood.  Social work for discharge planning.





__________________________________________

Ariel Browne JD, MD







cc:



DD: 04/01/2017 12:23:49  [id number]

TT: 04/01/2017 12:42:32

Confirmation # [id number]

Dictation # [id number]

jannie

## 2017-04-01 NOTE — CP.CCUPN
<Lucero Lopez - Last Filed: 04/01/17 11:43>





CCU Subjective





- Physician Review


Events Since Last Encounter (Free Text): 


04/01/17 07:01


Patient seen and examined bedside. No acute events overnight. Patient is 

relatively restless, asking for water. Placed on PS 5/5 @35%, tolerating well. 





04/01/17 10:02


Patient tolerated PS, successfully extubated to 3L NC. Denies CP, SOB, abd pain

, n/v, fevers, chills. Tolerating CLD without nausea, vomiting, abd pain. Good 

cough, able to protect airway. 


Critical Care Time Spent (in minutes): 40





CCU Objective





- Vital Signs / Intake & Output


Vital Signs (Last 4 hours): 


Vital Signs











  Temp Pulse Resp BP Pulse Ox


 


 04/01/17 07:38   76   


 


 04/01/17 07:21    25 H   99


 


 04/01/17 07:00   75   164/68 H  97


 


 04/01/17 06:00  99.9 F H  71  22  164/68 H  96


 


 04/01/17 05:00   64   129/47 L  95


 


 04/01/17 04:00   72   135/55 L  96


 


 04/01/17 03:57   76    97











Intake and Output (Last 8hrs): 


 Intake & Output











 03/31/17 04/01/17 04/01/17





 22:59 06:59 14:59


 


Intake Total 1122 120 


 


Output Total 775 425 


 


Balance 347 -305 


 


Intake:   


 


  IV 1122 120 


 


    .9NS 1000 60 


 


    Antibiotic 100  


 


    Fentanyl drip 22 60 


 


  Oral  0 


 


  Tube Feeding  0 


 


  TPN/PPN  0 


 


  Blood Product  0 


 


  Lipid  0 


 


  Albumin  0 


 


  Other  0 


 


Output:   


 


  Gastric Drainage  0 


 


    Urethral (Noe)  0 


 


  Urine 775 425 


 


    Urethral (Noe) 775 425 


 


  Stool  0 


 


    Urethral (Noe)  0 


 


  Urine/Stool Mix  0 


 


    Urethral (Noe)  0 


 


  Emesis  0 


 


    Urethral (Noe)  0 


 


  Oral Regurgitation  0 


 


    Urethral (Noe)  0 


 


  Other  0 


 


    Urethral (Noe)  0 


 


Other:   


 


  Voiding Method Indwelling Catheter  


 


  # Voids   


 


    Urethral (Noe)  0 


 


  # Bowel Movements   


 


    Urethral (Noe)  0 














- Physical Exam


Head: Positive for: Atraumatic, Normocephalic.  Negative for: Abrasion, 

Laceration


Pupils: Positive for: PERRL


Extroacular Muscles: Positive for: EOMI.  Negative for: Entrapment


Conjunctiva: Positive for: Normal.  Negative for: Injected


Mouth: Positive for: Dry, Normal Teeth (poor dentition)


Respiratory/Chest: Positive for: Clear to Auscultation, Good Air Exchange.  

Negative for: Respiratory Distress, Accessory Muscle Use, Rales, Tachypneic


Cardiovascular: Positive for: Regular Rate and Rhythm, Normal S1, S2.  Negative 

for: Murmurs


Abdomen: Positive for: Normal Bowel Sounds.  Negative for: Tenderness, 

Distention, Peritoneal Signs


Upper Extremity: Positive for: Normal Inspection, NORMAL PULSES, 

Neurovascularly Intact.  Negative for: Cyanosis, Edema


Lower Extremity: Positive for: Normal Inspection, Tenderness, Neurovascularly 

Intact, Other (abrasion right shin, chronic venous stasis changes foot and 

ankle B/L).  Negative for: Edema, NORMAL PULSES (diminished PT and DP pulse B/L)

, Swelling, Deformity


Neurological: Positive for: GCS=15, CN II-XII Intact


Skin: Positive for: Warm, Dry, Abrasion


Psychiatric: Positive for: Alert, Oriented x 3





- Medications


Active Medications: 


Active Medications











Generic Name Dose Route Start Last Admin





  Trade Name Freq  PRN Reason Stop Dose Admin


 


Acetaminophen  650 mg 03/22/17 11:33 03/23/17 09:15





  Tylenol 325mg Tab  PO   650 mg





  Q4 PRN   Administration





  Fever >100.4 F   


 


Albuterol/Ipratropium  3 ml 03/28/17 08:00 04/01/17 07:11





  Duoneb 3 Mg/0.5 Mg (3 Ml) Ud  IH   3 ml





  [id number] WAQAR   Administration


 


Amlodipine Besylate  10 mg 03/29/17 10:00 03/31/17 10:07





  Norvasc  PO   Not Given





  DAILY WAQAR   


 


Aspirin  325 mg 03/25/17 10:00 03/31/17 10:50





  Aspirin  PO   325 mg





  DAILY WAQAR   Administration


 


Clotrimazole  0 gm 03/22/17 18:00 03/31/17 18:26





  Lotrimin 1%  TOP   1 applic





  BID WAQAR   Administration


 


Furosemide  40 mg 04/01/17 10:00  





  Lasix  IVP   





  DAILY WAQAR   


 


Heparin Sodium (Porcine)  5,000 units 03/30/17 10:00 03/31/17 21:07





  Heparin  SC   5,000 units





  Q12 WAQAR   Administration





  Protocol   


 


Hydralazine HCl  10 mg 03/28/17 18:01 03/29/17 23:22





  Apresoline  IVP   10 mg





  Q6 PRN   Administration





  Systolic Blood Pressure   


 


Ceftriaxone Sodium  100 mls @ 100 mls/hr 03/30/17 10:00 03/31/17 11:16





  Rocephin 2 Gm Ivpb  IVPB 04/19/17 10:01  100 mls/hr





  DAILY WAQAR   Administration





  Protocol   


 


Fentanyl Citrate  100 mls @ 2 mls/hr 03/31/17 10:09 04/01/17 07:05





  Fentanyl Citrate/Sodium Chloride 1 Mg/100 Ml  IV   0 mcg/hr





  .Q24H PRN   Titration





  TITRATE PER MD ORDER   





  Protocol   





  20 MCG/HR   


 


Metoprolol Tartrate  50 mg 03/29/17 18:00 03/31/17 18:24





  Lopressor  PO   50 mg





  0800,1800 WAQAR   Administration


 


Midazolam HCl  2 mg 03/31/17 10:27 03/31/17 16:40





  Versed Inj  IVP   2 mg





  Q4 PRN   Administration





  Agitation   


 


Pantoprazole Sodium  40 mg 04/01/17 06:30 04/01/17 06:18





  Protonix Inj  IVP   40 mg





  0630 WAQAR   Administration


 


Risperidone  0.5 mg 03/30/17 10:00 03/31/17 10:07





  Risperdal Tab  PO   Not Given





  DAILY WAQAR   





  Protocol   


 


Silver Sulfadiazine  0 ea 03/25/17 10:00 03/31/17 10:31





  Silvadene 1% 20 Gm  TOP   1 applic





  DAILY WAQAR   Administration


 


Vitamin A  1 ea 03/30/17 07:41 04/01/17 06:18





  Vitamin A & D Oint Ud Foilpak  TOP   1 ea





  BID PRN   Administration





  chapped lips   














- Patient Studies


Lab Studies: 


 Microbiology Studies











 03/27/17 16:00 Blood Culture - Preliminary





 Blood-Venous    NO GROWTH AFTER 4 DAYS


 


 03/27/17 15:30 Blood Culture - Preliminary





 Blood-Venous    NO GROWTH AFTER 4 DAYS








 Lab Studies











  04/01/17 04/01/17 03/31/17 Range/Units





  05:15 05:00 18:33 


 


WBC  11.6 H D    (4.5-11.0)  10^3/ul


 


RBC  3.34 L    (3.5-6.1)  10^6/uL


 


Hgb  9.4 L    (14.0-18.0)  gm/dL


 


Hct  28.5 L    (42.0-52.0)  %


 


MCV  85.3    (80.0-105.0)  fL


 


MCH  28.1    (25.0-35.0)  pg


 


MCHC  33.0    (31.0-37.0)  g/dl


 


RDW  14.6 H    (11.5-14.5)  %


 


Plt Count  245    (120.0-450.0)  10^3/uL


 


MPV  9.4    (7.0-11.0)  fl


 


Neutrophils % (Manual)     (50.0-70.0)  %


 


Band Neutrophils %     (0-2)  %


 


Lymphocytes % (Manual)     (22.0-35.0)  %


 


Monocytes % (Manual)     (1.0-6.0)  %


 


Platelet Evaluation     (NORMAL)  


 


pCO2   36   (35-45)  mm/Hg


 


pO2   127.0 H   ()  mm/Hg


 


HCO3   25.6   (21-28)  mmol/L


 


ABG pH   7.46 H   (7.35-7.45)  


 


ABG Total CO2   26.7   (22-28)  mmol.L


 


ABG O2 Saturation   99.0 H   (95-98)  %


 


ABG O2 Content   15.8   (15-23)  ML/dl


 


ABG Base Excess   1.9   (-2.0-3.0)  mmol/L


 


ABG Hemoglobin   11.5 L   (11.7-17.4)  g/dL


 


ABG Carboxyhemoglobin   1.7 H   (0.5-1.5)  %


 


POC ABG HHb (Measured)   1.0   (0-5)  %


 


ABG Methemoglobin   0.9   (0.0-3.0)  %


 


ABG O2 Capacity   16.0   (16-24)  mL/dl


 


ABG Potassium     (3.6-5.2)  mmol/L


 


Hgb O2 Saturation   96.4   (95.0-98.0)  %


 


Glucose     ()  mg/dl


 


Lactate     (0.7-2.1)  mmol/L


 


Mechanical Rate     


 


FiO2   40.0   %


 


Tidal Volume     


 


PEEP     


 


Sodium  145    (132-148)  mmol/L


 


Potassium  3.8    (3.6-5.0)  mmol/L


 


Chloride  109 H    ()  mmol/L


 


Carbon Dioxide  29    (21-33)  mmol/L


 


Anion Gap  11    (10-20)  


 


BUN  55 H    (7-21)  mg/dL


 


Creatinine  1.7 H    (0.5-1.4)  mg/dL


 


Est GFR ( Amer)  47    


 


Est GFR (Non-Af Amer)  39    


 


Random Glucose  111 H    ()  mg/dL


 


Calcium  8.9    (8.4-10.5)  mg/dL


 


Phosphorus  4.8 H    (2.5-4.5)  mg/dL


 


Magnesium  1.9    (1.7-2.2)  mg/dL


 


Total Bilirubin  0.6    (0.2-1.3)  mg/dL


 


AST  60 H    (15-59)  U/L


 


ALT  62 H    (7-56)  U/L


 


Alkaline Phosphatase  56    ()  U/L


 


Troponin I     ng/mL


 


Total Protein  5.5 L    (5.8-8.3)  g/dL


 


Albumin  2.3 L    (3.0-4.8)  g/dL


 


Globulin  3.2    gm/dL


 


Albumin/Globulin Ratio  0.7 L    (1.1-1.8)  


 


Procalcitonin    1.23 H  (0.19-0.49)  NG/ML


 


Arterial Blood Potassium     (3.6-5.2)  mmol/L














  03/31/17 03/31/17 03/31/17 Range/Units





  10:35 08:33 08:00 


 


WBC    25.4 H* D  (4.5-11.0)  10^3/ul


 


RBC    4.40  (3.5-6.1)  10^6/uL


 


Hgb    12.2 L  (14.0-18.0)  gm/dL


 


Hct    37.6 L  (42.0-52.0)  %


 


MCV    85.5  (80.0-105.0)  fL


 


MCH    27.7  (25.0-35.0)  pg


 


MCHC    32.4  (31.0-37.0)  g/dl


 


RDW    14.4  (11.5-14.5)  %


 


Plt Count    333  (120.0-450.0)  10^3/uL


 


MPV    9.5  (7.0-11.0)  fl


 


Neutrophils % (Manual)    85 H  (50.0-70.0)  %


 


Band Neutrophils %    4 H  (0-2)  %


 


Lymphocytes % (Manual)    4 L  (22.0-35.0)  %


 


Monocytes % (Manual)    7 H  (1.0-6.0)  %


 


Platelet Evaluation    Normal  (NORMAL)  


 


pCO2  44  56 H   (35-45)  mm/Hg


 


pO2  93.0  152.0 H   ()  mm/Hg


 


HCO3  26.0  26.9   (21-28)  mmol/L


 


ABG pH  7.38  7.29 L   (7.35-7.45)  


 


ABG Total CO2  27.4  28.6 H   (22-28)  mmol.L


 


ABG O2 Saturation  98.2 H  99.2 H   (95-98)  %


 


ABG O2 Content   15.6   (15-23)  ML/dl


 


ABG Base Excess  0.5  -0.4   (-2.0-3.0)  mmol/L


 


ABG Hemoglobin   11.3 L   (11.7-17.4)  g/dL


 


ABG Carboxyhemoglobin   2.3 H   (0.5-1.5)  %


 


POC ABG HHb (Measured)   0.8   (0-5)  %


 


ABG Methemoglobin   1.0   (0.0-3.0)  %


 


ABG O2 Capacity   15.7 L   (16-24)  mL/dl


 


ABG Potassium  3.9    (3.6-5.2)  mmol/L


 


Hgb O2 Saturation   96.0   (95.0-98.0)  %


 


Glucose  141 H    ()  mg/dl


 


Lactate  0.8    (0.7-2.1)  mmol/L


 


Mechanical Rate  15    


 


FiO2  50.0  50.0   %


 


Tidal Volume  450    


 


PEEP  5    


 


Sodium  146.0   145  (132-148)  mmol/L


 


Potassium    4.2  (3.6-5.0)  mmol/L


 


Chloride  117.0 H   106  ()  mmol/L


 


Carbon Dioxide    31  (21-33)  mmol/L


 


Anion Gap    12  (10-20)  


 


BUN    40 H  (7-21)  mg/dL


 


Creatinine    1.2  (0.5-1.4)  mg/dL


 


Est GFR (African Amer)    > 60  


 


Est GFR (Non-Af Amer)    58  


 


Random Glucose    153 H  ()  mg/dL


 


Calcium    9.7  (8.4-10.5)  mg/dL


 


Phosphorus     (2.5-4.5)  mg/dL


 


Magnesium     (1.7-2.2)  mg/dL


 


Total Bilirubin    0.8  (0.2-1.3)  mg/dL


 


AST    101 H  (15-59)  U/L


 


ALT    95 H  (7-56)  U/L


 


Alkaline Phosphatase    79  ()  U/L


 


Troponin I    1.00 H* D  ng/mL


 


Total Protein    6.8  (5.8-8.3)  g/dL


 


Albumin    3.0  (3.0-4.8)  g/dL


 


Globulin    3.8  gm/dL


 


Albumin/Globulin Ratio    0.8 L  (1.1-1.8)  


 


Procalcitonin     (0.19-0.49)  NG/ML


 


Arterial Blood Potassium  3.9    (3.6-5.2)  mmol/L








 Laboratory Results - last 24 hr











  03/31/17 03/31/17 03/31/17





  08:00 08:33 10:35


 


WBC  25.4 H* D  


 


RBC  4.40  


 


Hgb  12.2 L  


 


Hct  37.6 L  


 


MCV  85.5  


 


MCH  27.7  


 


MCHC  32.4  


 


RDW  14.4  


 


Plt Count  333  


 


MPV  9.5  


 


Neutrophils % (Manual)  85 H  


 


Band Neutrophils %  4 H  


 


Lymphocytes % (Manual)  4 L  


 


Monocytes % (Manual)  7 H  


 


Platelet Evaluation  Normal  


 


pCO2   56 H  44


 


pO2   152.0 H  93.0


 


HCO3   26.9  26.0


 


ABG pH   7.29 L  7.38


 


ABG Total CO2   28.6 H  27.4


 


ABG O2 Saturation   99.2 H  98.2 H


 


ABG O2 Content   15.6 


 


ABG Base Excess   -0.4  0.5


 


ABG Hemoglobin   11.3 L 


 


ABG Carboxyhemoglobin   2.3 H 


 


POC ABG HHb (Measured)   0.8 


 


ABG Methemoglobin   1.0 


 


ABG O2 Capacity   15.7 L 


 


ABG Potassium    3.9


 


Hgb O2 Saturation   96.0 


 


Glucose    141 H


 


Lactate    0.8


 


Mechanical Rate    15


 


FiO2   50.0  50.0


 


Tidal Volume    450


 


PEEP    5


 


Sodium  145   146.0


 


Potassium  4.2  


 


Chloride  106   117.0 H


 


Carbon Dioxide  31  


 


Anion Gap  12  


 


BUN  40 H  


 


Creatinine  1.2  


 


Est GFR ( Amer)  > 60  


 


Est GFR (Non-Af Amer)  58  


 


Random Glucose  153 H  


 


Calcium  9.7  


 


Phosphorus   


 


Magnesium   


 


Total Bilirubin  0.8  


 


AST  101 H  


 


ALT  95 H  


 


Alkaline Phosphatase  79  


 


Troponin I  1.00 H* D  


 


Total Protein  6.8  


 


Albumin  3.0  


 


Globulin  3.8  


 


Albumin/Globulin Ratio  0.8 L  


 


Procalcitonin   


 


Arterial Blood Potassium    3.9














  03/31/17 04/01/17 04/01/17





  18:33 05:00 05:15


 


WBC    11.6 H D


 


RBC    3.34 L


 


Hgb    9.4 L


 


Hct    28.5 L


 


MCV    85.3


 


MCH    28.1


 


MCHC    33.0


 


RDW    14.6 H


 


Plt Count    245


 


MPV    9.4


 


Neutrophils % (Manual)   


 


Band Neutrophils %   


 


Lymphocytes % (Manual)   


 


Monocytes % (Manual)   


 


Platelet Evaluation   


 


pCO2   36 


 


pO2   127.0 H 


 


HCO3   25.6 


 


ABG pH   7.46 H 


 


ABG Total CO2   26.7 


 


ABG O2 Saturation   99.0 H 


 


ABG O2 Content   15.8 


 


ABG Base Excess   1.9 


 


ABG Hemoglobin   11.5 L 


 


ABG Carboxyhemoglobin   1.7 H 


 


POC ABG HHb (Measured)   1.0 


 


ABG Methemoglobin   0.9 


 


ABG O2 Capacity   16.0 


 


ABG Potassium   


 


Hgb O2 Saturation   96.4 


 


Glucose   


 


Lactate   


 


Mechanical Rate   


 


FiO2   40.0 


 


Tidal Volume   


 


PEEP   


 


Sodium    145


 


Potassium    3.8


 


Chloride    109 H


 


Carbon Dioxide    29


 


Anion Gap    11


 


BUN    55 H


 


Creatinine    1.7 H


 


Est GFR ( Amer)    47


 


Est GFR (Non-Af Amer)    39


 


Random Glucose    111 H


 


Calcium    8.9


 


Phosphorus    4.8 H


 


Magnesium    1.9


 


Total Bilirubin    0.6


 


AST    60 H


 


ALT    62 H


 


Alkaline Phosphatase    56


 


Troponin I   


 


Total Protein    5.5 L


 


Albumin    2.3 L


 


Globulin    3.2


 


Albumin/Globulin Ratio    0.7 L


 


Procalcitonin  1.23 H  


 


Arterial Blood Potassium   














Review of Systems





- Constitutional


Constitutional: absent: Fever, Chills





- EENT


Eyes: absent: Change in Vision


Ears: absent: Dizziness





- Cardiovascular


Cardiovascular: absent: Chest Pain, Diaphoresis, Dyspnea, Palpitations





- Respiratory


Respiratory: absent: Cough, Dyspnea, Pain on Inspiration





- Gastrointestinal


Gastrointestinal: absent: Abdominal Pain, Diarrhea, Nausea, Vomiting





- Genitourinary


Genitourinary: absent: Flank Pain





- Integumentary


Integumentary: absent: New Lesions





- Neurological


Neurological: absent: Focal Weakness





- Endocrine


Endocrine: absent: Palpitations





Critical Care Progress Note





- Ventilator Checklist


PUD Prophalyxis: Yes


DVT Prophylaxis: Yes





- Nutrition


Nutrition: 


 Nutrition











 Category Date Time Status


 


 NPO Diet [DIET] Diets  03/31/17 Dinner Ordered














Assessment/Plan





- Assessment and Plan (Free Text)


Assessment: 


82 yo M w h/o HTN, COPD, obesity, dementia initially admitted to [facility] [place] with Group 

G strep bacteremia, L foot MSSA infectionnow resolved initially transferred to 

ICU s/p RRT due to multifactorial hypoxic, hypercarbic RF exacerbated by Benzo 

administration, requiring intubation and subsequently extubated x2


Plan: 


Neuro: AAOx3, NAD, s/p successful extubation this AM. Good cough, gag reflex, 

protecting airway


   Maintain normothermia


   Patient has ATIVAN ALLERGY that has resulted in acute respiratory failure 

twice requiring intubation


   If patient becomes delirious, recommend haldol or geodon. NO ativan or benzos

, no narcotics


   Risperidone as per primary team





Pulm: s/p hypercarbic RF 2/2 Ativan with hypoventilation, patient was difficult 

to arouse, GCS worrisome for protecting airway requiring intubation x2


   ABG reviewed - resp acidosis resolved


   Duonebs Q6hr


   Tolerating 3L NC. Titrate to maintain spo2>90, pao2>60


   Aspiration precautions. HOB >35





CV: HD stable. Continue to monitor, maintain MAP >65


   Troponin down to 1.0 from 5.25. EKG shows T wave inversions leads V2-V5, II, 

III, aVF, no new changes. Patient and family deny h/o previous MI or stening. 

Management as per cardiology


   Continue cardiac meds as per cardio service, monitor hemodynamics, adjust 

meds as needed 


   Hold lasix for now due to mild clinical hypovolemia. Direct observed therapy 

with oral hydration. 


   


GI: GI ppx





: DC noe catheter, post-void trial


   


Renal: CARLOS 1.7 from 1.2 today. Hold lasix for now due to mild clinical 

hypovolemia. Direct observed therapy with oral hydration.


   Continue to monitor renal function and I&Os


   Acute rhabdomyolysis on admission improving





Endo:  No acute issues. Maintain euglycemia 140-180





ID: Significant leukocytosis yesterday 25.4 to 11.6 today. Low-grade temp Tmax 

overnight 100.2F


   Continue Rocephin to finish Group G Strep bacteremia therapy . Procalcitonin 

trending down 1.23 from 7.36, from 31.55 at admission. 


   Repeat Blood cultures have been negative thus far since 3/23


   Will DC noe catheter


      


Heme: Hb stable. No signs of bleeding. 





DVT/GI ppx: SQH, PT, protonix, CLD





- Date & Time


Date: 04/01/17


Time: 11:45





<Kentrell Lira - Last Filed: 04/01/17 12:53>





CCU Objective





- Vital Signs / Intake & Output


Vital Signs (Last 4 hours): 


Vital Signs











  Temp Pulse Resp BP Pulse Ox


 


 04/01/17 12:00  97.6 F  69  20  119/56 L  97


 


 04/01/17 11:07   69  28 H   99


 


 04/01/17 11:00   72  17  119/56 L  99


 


 04/01/17 10:00   65  23  119/41 L  99


 


 04/01/17 09:00   101 H  27 H  130/60  95











Intake and Output (Last 8hrs): 


 Intake & Output











 03/31/17 04/01/17 04/01/17





 22:59 06:59 14:59


 


Intake Total 1122 120 


 


Output Total 775 425 


 


Balance 347 -305 


 


Weight  247 lb 1.6 oz 


 


Intake:   


 


  IV 1122 120 


 


    .9NS 1000 60 


 


    Antibiotic 100  


 


    Fentanyl drip 22 60 


 


  Oral  0 


 


  Tube Feeding  0 


 


  TPN/PPN  0 


 


  Blood Product  0 


 


  Lipid  0 


 


  Albumin  0 


 


  Other  0 


 


Output:   


 


  Gastric Drainage  0 


 


    Urethral (Noe)  0 


 


  Urine 775 425 


 


    Urethral (Noe) 775 425 


 


  Stool  0 


 


    Urethral (Noe)  0 


 


  Urine/Stool Mix  0 


 


    Urethral (Noe)  0 


 


  Emesis  0 


 


    Urethral (Noe)  0 


 


  Oral Regurgitation  0 


 


    Urethral (Noe)  0 


 


  Other  0 


 


    Urethral (Noe)  0 


 


Other:   


 


  Voiding Method Indwelling Catheter  Indwelling Catheter


 


  # Voids   


 


    Urethral (Noe)  0 


 


  # Bowel Movements   


 


    Urethral (Noe)  0 














- Medications


Active Medications: 


Active Medications











Generic Name Dose Route Start Last Admin





  Trade Name Freq  PRN Reason Stop Dose Admin


 


Acetaminophen  650 mg 03/22/17 11:33 03/23/17 09:15





  Tylenol 325mg Tab  PO   650 mg





  Q4 PRN   Administration





  Fever >100.4 F   


 


Albuterol/Ipratropium  3 ml 03/28/17 08:00 04/01/17 07:11





  Duoneb 3 Mg/0.5 Mg (3 Ml) Ud  IH   3 ml





  [id number] WAQAR   Administration


 


Amlodipine Besylate  10 mg 03/29/17 10:00 04/01/17 09:00





  Norvasc  PO   10 mg





  DAILY WAQAR   Administration


 


Aspirin  325 mg 03/25/17 10:00 04/01/17 08:59





  Aspirin  PO   325 mg





  DAILY WAQAR   Administration


 


Clotrimazole  0 gm 03/22/17 18:00 04/01/17 09:01





  Lotrimin 1%  TOP   1 applic





  BID WAQAR   Administration


 


Furosemide  40 mg 04/01/17 10:00 04/01/17 09:00





  Lasix  IVP   40 mg





  DAILY WAQAR   Administration


 


Heparin Sodium (Porcine)  5,000 units 03/30/17 10:00 04/01/17 09:01





  Heparin  SC   5,000 units





  Q12 WAQAR   Administration





  Protocol   


 


Hydralazine HCl  10 mg 03/28/17 18:01 03/29/17 23:22





  Apresoline  IVP   10 mg





  Q6 PRN   Administration





  Systolic Blood Pressure   


 


Ceftriaxone Sodium  100 mls @ 100 mls/hr 03/30/17 10:00 04/01/17 09:03





  Rocephin 2 Gm Ivpb  IVPB 04/19/17 10:01  100 mls/hr





  DAILY WAQAR   Administration





  Protocol   


 


Metoprolol Tartrate  50 mg 03/29/17 18:00 04/01/17 09:00





  Lopressor  PO   50 mg





  0800,1800 WAQAR   Administration


 


Pantoprazole Sodium  40 mg 04/01/17 06:30 04/01/17 06:18





  Protonix Inj  IVP   40 mg





  0630 WAQAR   Administration


 


Risperidone  0.5 mg 03/30/17 10:00 04/01/17 09:00





  Risperdal Tab  PO   0.5 mg





  DAILY WAQAR   Administration





  Protocol   


 


Silver Sulfadiazine  0 ea 03/25/17 10:00 04/01/17 09:01





  Silvadene 1% 20 Gm  TOP   1 applic





  DAILY WAQAR   Administration


 


Vitamin A  1 ea 03/30/17 07:41 04/01/17 06:18





  Vitamin A & D Oint Ud Foilpak  TOP   1 ea





  BID PRN   Administration





  chapped lips   














- Patient Studies


Lab Studies: 


 Microbiology Studies











 03/27/17 16:00 Blood Culture - Preliminary





 Blood-Venous    NO GROWTH AFTER 4 DAYS


 


 03/27/17 15:30 Blood Culture - Preliminary





 Blood-Venous    NO GROWTH AFTER 4 DAYS








 Lab Studies











  04/01/17 04/01/17 03/31/17 Range/Units





  05:15 05:00 18:33 


 


WBC  11.6 H D    (4.5-11.0)  10^3/ul


 


RBC  3.34 L    (3.5-6.1)  10^6/uL


 


Hgb  9.4 L    (14.0-18.0)  gm/dL


 


Hct  28.5 L    (42.0-52.0)  %


 


MCV  85.3    (80.0-105.0)  fL


 


MCH  28.1    (25.0-35.0)  pg


 


MCHC  33.0    (31.0-37.0)  g/dl


 


RDW  14.6 H    (11.5-14.5)  %


 


Plt Count  245    (120.0-450.0)  10^3/uL


 


MPV  9.4    (7.0-11.0)  fl


 


pCO2   36   (35-45)  mm/Hg


 


pO2   127.0 H   ()  mm/Hg


 


HCO3   25.6   (21-28)  mmol/L


 


ABG pH   7.46 H   (7.35-7.45)  


 


ABG Total CO2   26.7   (22-28)  mmol.L


 


ABG O2 Saturation   99.0 H   (95-98)  %


 


ABG O2 Content   15.8   (15-23)  ML/dl


 


ABG Base Excess   1.9   (-2.0-3.0)  mmol/L


 


ABG Hemoglobin   11.5 L   (11.7-17.4)  g/dL


 


ABG Carboxyhemoglobin   1.7 H   (0.5-1.5)  %


 


POC ABG HHb (Measured)   1.0   (0-5)  %


 


ABG Methemoglobin   0.9   (0.0-3.0)  %


 


ABG O2 Capacity   16.0   (16-24)  mL/dl


 


Hgb O2 Saturation   96.4   (95.0-98.0)  %


 


FiO2   40.0   %


 


Sodium  145    (132-148)  mmol/L


 


Potassium  3.8    (3.6-5.0)  mmol/L


 


Chloride  109 H    ()  mmol/L


 


Carbon Dioxide  29    (21-33)  mmol/L


 


Anion Gap  11    (10-20)  


 


BUN  55 H    (7-21)  mg/dL


 


Creatinine  1.7 H    (0.5-1.4)  mg/dL


 


Est GFR ( Amer)  47    


 


Est GFR (Non-Af Amer)  39    


 


Random Glucose  111 H    ()  mg/dL


 


Calcium  8.9    (8.4-10.5)  mg/dL


 


Phosphorus  4.8 H    (2.5-4.5)  mg/dL


 


Magnesium  1.9    (1.7-2.2)  mg/dL


 


Total Bilirubin  0.6    (0.2-1.3)  mg/dL


 


AST  60 H    (15-59)  U/L


 


ALT  62 H    (7-56)  U/L


 


Alkaline Phosphatase  56    ()  U/L


 


Total Protein  5.5 L    (5.8-8.3)  g/dL


 


Albumin  2.3 L    (3.0-4.8)  g/dL


 


Globulin  3.2    gm/dL


 


Albumin/Globulin Ratio  0.7 L    (1.1-1.8)  


 


Procalcitonin    1.23 H  (0.19-0.49)  NG/ML








 Laboratory Results - last 24 hr











  03/31/17 04/01/17 04/01/17





  18:33 05:00 05:15


 


WBC    11.6 H D


 


RBC    3.34 L


 


Hgb    9.4 L


 


Hct    28.5 L


 


MCV    85.3


 


MCH    28.1


 


MCHC    33.0


 


RDW    14.6 H


 


Plt Count    245


 


MPV    9.4


 


pCO2   36 


 


pO2   127.0 H 


 


HCO3   25.6 


 


ABG pH   7.46 H 


 


ABG Total CO2   26.7 


 


ABG O2 Saturation   99.0 H 


 


ABG O2 Content   15.8 


 


ABG Base Excess   1.9 


 


ABG Hemoglobin   11.5 L 


 


ABG Carboxyhemoglobin   1.7 H 


 


POC ABG HHb (Measured)   1.0 


 


ABG Methemoglobin   0.9 


 


ABG O2 Capacity   16.0 


 


Hgb O2 Saturation   96.4 


 


FiO2   40.0 


 


Sodium    145


 


Potassium    3.8


 


Chloride    109 H


 


Carbon Dioxide    29


 


Anion Gap    11


 


BUN    55 H


 


Creatinine    1.7 H


 


Est GFR ( Amer)    47


 


Est GFR (Non-Af Amer)    39


 


Random Glucose    111 H


 


Calcium    8.9


 


Phosphorus    4.8 H


 


Magnesium    1.9


 


Total Bilirubin    0.6


 


AST    60 H


 


ALT    62 H


 


Alkaline Phosphatase    56


 


Total Protein    5.5 L


 


Albumin    2.3 L


 


Globulin    3.2


 


Albumin/Globulin Ratio    0.7 L


 


Procalcitonin  1.23 H  














Critical Care Progress Note





- Nutrition


Nutrition: 


 Nutrition











 Category Date Time Status


 


 Liquid Diet [DIET] Diets  04/01/17 Lunch Ordered














Addendum


Addendum: 





04/01/17 12:41


patient was seen, examined and discussed with Dr. Lopez shoulder to shoulder 

at bedside. Her note reflects my exam, assessment and plan except as below. Meds

/Labs/ONE reviewed





82 yo male admitted to ICU with now resolved hypercapnic respiratory failure, 

likely due to hypoventilation in the setting of BZD PRN for agitation, while on 

the med/surg [place]. Patient required intubation, but was extubated today and 

doing well. I would recommend to avoid BZD for agitated delirium and use 

antipsychotics PRN instead (if not contra-indicated) along with optimization of 

circadian and sleep patterns (may consider seroquel at bedtime as well), 

frequent re-orientation in time, place and person, minimizing sleep disruption 

and optimization pain control (preferrably with non-opiates, as they are also 

deliriogenic). 





ccm time 40 min

## 2017-04-01 NOTE — CP.PCM.PN
Subjective





- Date & Time of Evaluation


Date of Evaluation: 04/01/17


Time of Evaluation: 09:30





- Subjective


Subjective: 


Patient was intubated yesterday but now extubated, sitting up in a chair, not 

in distress, feels thirsty, no fevers this morning.





Objective





- Vital Signs/Intake and Output


Vital Signs (last 24 hours): 


 











Temp Pulse Resp BP Pulse Ox


 


 100 F H  68   25 H  137/46 L  99 


 


 04/01/17 00:01  04/01/17 03:20  04/01/17 07:21  04/01/17 03:00  04/01/17 07:21











- Medications


Medications: 


 Current Medications





Acetaminophen (Tylenol 325mg Tab)  650 mg PO Q4 PRN


   PRN Reason: Fever >100.4 F


   Last Admin: 03/23/17 09:15 Dose:  650 mg


Albuterol/Ipratropium (Duoneb 3 Mg/0.5 Mg (3 Ml) Ud)  3 ml IH [id number] [facility]


   Last Admin: 04/01/17 07:11 Dose:  3 ml


Amlodipine Besylate (Norvasc)  10 mg PO DAILY [facility]


   Last Admin: 03/31/17 10:07 Dose:  Not Given


Aspirin (Aspirin)  325 mg PO DAILY [facility]


   Last Admin: 03/31/17 10:50 Dose:  325 mg


Clotrimazole (Lotrimin 1%)  0 gm TOP BID [facility]


   Last Admin: 03/31/17 18:26 Dose:  1 applic


Furosemide (Lasix)  40 mg IVP DAILY [facility]


Heparin Sodium (Porcine) (Heparin)  5,000 units SC Q12 [facility]


   PRN Reason: Protocol


   Last Admin: 03/31/17 21:07 Dose:  5,000 units


Hydralazine HCl (Apresoline)  10 mg IVP Q6 PRN


   PRN Reason: Systolic Blood Pressure


   Last Admin: 03/29/17 23:22 Dose:  10 mg


Ceftriaxone Sodium (Rocephin 2 Gm Ivpb)  100 mls @ 100 mls/hr IVPB DAILY [facility]


   PRN Reason: Protocol


   Stop: 04/19/17 10:01


   Last Admin: 03/31/17 11:16 Dose:  100 mls/hr


Fentanyl Citrate (Fentanyl Citrate/Sodium Chloride 1 Mg/100 Ml)  100 mls @ 2 mls

/hr IV .Q24H PRN; Protocol; 20 MCG/HR


   PRN Reason: TITRATE PER MD ORDER


   Last Titration: 04/01/17 07:05 Dose:  0 mcg/hr


Metoprolol Tartrate (Lopressor)  50 mg PO 0800,1800 [facility]


   Last Admin: 03/31/17 18:24 Dose:  50 mg


Midazolam HCl (Versed Inj)  2 mg IVP Q4 PRN


   PRN Reason: Agitation


   Last Admin: 03/31/17 16:40 Dose:  2 mg


Pantoprazole Sodium (Protonix Inj)  40 mg IVP 0630 [facility]


   Last Admin: 04/01/17 06:18 Dose:  40 mg


Risperidone (Risperdal Tab)  0.5 mg PO DAILY [facility]


   PRN Reason: Protocol


   Last Admin: 03/31/17 10:07 Dose:  Not Given


Silver Sulfadiazine (Silvadene 1% 20 Gm)  0 ea TOP DAILY [facility]


   Last Admin: 03/31/17 10:31 Dose:  1 applic


Vitamin A (Vitamin A & D Oint Ud Foilpak)  1 ea TOP BID PRN


   PRN Reason: chapped lips


   Last Admin: 04/01/17 06:18 Dose:  1 ea











- Labs


Labs: 


 





 04/01/17 05:15 





 04/01/17 05:15 





 











PT  12.1 Seconds (9.9-11.8)  H  03/25/17  06:15    


 


INR  1.12  (0.93-1.08)  H  03/25/17  06:15    


 


APTT  87.8 Seconds (23.7-30.8)  H*  03/30/17  06:20    














- Constitutional


Appears: Non-toxic, No Acute Distress





- Head Exam


Head Exam: NORMAL INSPECTION





- Neck Exam


Neck Exam: absent: Lymphadenopathy, Meningismus





- Respiratory Exam


Respiratory Exam: Decreased Breath Sounds





- Cardiovascular Exam


Cardiovascular Exam: +S1, +S2





- GI/Abdominal Exam


GI & Abdominal Exam: Soft.  absent: Tenderness





Assessment and Plan





- Assessment and Plan (Free Text)


Plan: 


Assessment


severe sepsis S/P shock S/P ventilator-dependent respiratory failure with acute 

on chronic renal failure due to Group G strep bacteremia, probably secondary to 

bilateral lower extremities skin and skin structure infection; also with MSSA 

from the wound cultures; so far no evidence of new infection; patient is 

clinically improving; he was intubated for hypercarbic respiratory failure and 

congestion, no evidence of pneumonia currently


acute rhabdomyolysis


consider acute NSTEMI


HTN


chronic renal failure


dementia


obesity with BMI 38





Plan


continue Rocephin (day 10 from first negative blood cx); repeat septic work up 

so far negative, PCT is improving; CXR does not show focal infiltrates; 

reviewed Dr. Fall's note and discussed today with Dr. Cabrera; would recommend 

28 days of antibiotics


2D echocardiogram does not show vegetations


will continue to monitor clinically

## 2017-04-01 NOTE — RAD
HISTORY:

 f/u 



COMPARISON:

No prior. 



FINDINGS:



LUNGS:

No active pulmonary disease.



PLEURA:

No significant pleural effusion identified, no pneumothorax apparent.



CARDIOVASCULAR:

Question small left pleural effusion.



OSSEOUS STRUCTURES:

No significant abnormalities.



VISUALIZED UPPER ABDOMEN:

Normal.



OTHER FINDINGS:

NG tube in stomach.



IMPRESSION:

Question small left pleural effusion.

## 2017-04-01 NOTE — CP.PCM.PN
Objective





- Vital Signs/Intake and Output


Vital Signs (last 24 hours): 


 











Temp Pulse Resp BP Pulse Ox


 


 99.9 F H  76   25 H  164/68 H  99 


 


 04/01/17 06:00  04/01/17 07:38  04/01/17 07:21  04/01/17 07:00  04/01/17 07:21








Intake and Output: 


 











 04/01/17 04/01/17





 06:59 18:59


 


Intake Total 120 


 


Output Total 425 


 


Balance -305 














- Medications


Medications: 


 Current Medications





Acetaminophen (Tylenol 325mg Tab)  650 mg PO Q4 PRN


   PRN Reason: Fever >100.4 F


   Last Admin: 03/23/17 09:15 Dose:  650 mg


Albuterol/Ipratropium (Duoneb 3 Mg/0.5 Mg (3 Ml) Ud)  3 ml IH [id number] [facility]


   Last Admin: 04/01/17 07:11 Dose:  3 ml


Amlodipine Besylate (Norvasc)  10 mg PO DAILY [facility]


   Last Admin: 03/31/17 10:07 Dose:  Not Given


Aspirin (Aspirin)  325 mg PO DAILY [facility]


   Last Admin: 03/31/17 10:50 Dose:  325 mg


Clotrimazole (Lotrimin 1%)  0 gm TOP BID [facility]


   Last Admin: 03/31/17 18:26 Dose:  1 applic


Furosemide (Lasix)  40 mg IVP DAILY [facility]


Heparin Sodium (Porcine) (Heparin)  5,000 units SC Q12 [facility]


   PRN Reason: Protocol


   Last Admin: 03/31/17 21:07 Dose:  5,000 units


Hydralazine HCl (Apresoline)  10 mg IVP Q6 PRN


   PRN Reason: Systolic Blood Pressure


   Last Admin: 03/29/17 23:22 Dose:  10 mg


Ceftriaxone Sodium (Rocephin 2 Gm Ivpb)  100 mls @ 100 mls/hr IVPB DAILY [facility]


   PRN Reason: Protocol


   Stop: 04/19/17 10:01


   Last Admin: 03/31/17 11:16 Dose:  100 mls/hr


Fentanyl Citrate (Fentanyl Citrate/Sodium Chloride 1 Mg/100 Ml)  100 mls @ 2 mls

/hr IV .Q24H PRN; Protocol; 20 MCG/HR


   PRN Reason: TITRATE PER MD ORDER


   Last Titration: 04/01/17 07:05 Dose:  0 mcg/hr


Metoprolol Tartrate (Lopressor)  50 mg PO 0800,1800 [facility]


   Last Admin: 03/31/17 18:24 Dose:  50 mg


Midazolam HCl (Versed Inj)  2 mg IVP Q4 PRN


   PRN Reason: Agitation


   Last Admin: 03/31/17 16:40 Dose:  2 mg


Pantoprazole Sodium (Protonix Inj)  40 mg IVP 0630 [facility]


   Last Admin: 04/01/17 06:18 Dose:  40 mg


Risperidone (Risperdal Tab)  0.5 mg PO DAILY [facility]


   PRN Reason: Protocol


   Last Admin: 03/31/17 10:07 Dose:  Not Given


Silver Sulfadiazine (Silvadene 1% 20 Gm)  0 ea TOP DAILY [facility]


   Last Admin: 03/31/17 10:31 Dose:  1 applic


Vitamin A (Vitamin A & D Oint Ud Foilpak)  1 ea TOP BID PRN


   PRN Reason: chapped lips


   Last Admin: 04/01/17 06:18 Dose:  1 ea











- Labs


Labs: 


 





 04/01/17 05:15 





 04/01/17 05:15 





 











PT  12.1 Seconds (9.9-11.8)  H  03/25/17  06:15    


 


INR  1.12  (0.93-1.08)  H  03/25/17  06:15    


 


APTT  87.8 Seconds (23.7-30.8)  H*  03/30/17  06:20

## 2017-04-01 NOTE — PN
DATE: 04/01/2017



The patient was seen and examined at bedside in intensive care unit.  He was just extubated, and we a
re watching him after extubation.  So far, he is doing well, with no significant respiratory distress
.  Arterial blood gases are pending.  He received Ativan yesterday on the floor and was on BiPAP, how
ever, very required endotracheal intubation, and transferred back to intensive care unit.



PHYSICAL EXAMINATION:  

Today, his temperature is 99.1, blood pressure 119/78, respiratory rate is currently 22, oxygen satur
ation is 95%.

EXAMINATION OF HEAD, EARS, NOSE, AND THROAT:  Within normal limits.

LUNGS:  A few rhonchi, no wheezing.

CARDIOVASCULAR:  Regular rate and rhythm.

GASTROINTESTINAL:  Abdomen soft, nontender, nondistended.

MUSCULOSKELETAL:  No peripheral edema.

NEUROLOGIC:  Limited at present time.

SKIN:  Moist, no rash.



I reviewed his laboratory data  His preintubation, arterial blood gas showed minimal CO2 retention of
 56, and pO2 of 152.  After intubation his pH improved to 738, and pO2 to 93.  He was not retaining c
arbon bone dioxide.



ASSESSMENT AND PLAN:  

1.  Hypercarbic respiratory failure secondary to sedation.  

2.  Status post extubation.  

3.  Congestive heart failure.

4.  Chronic obstructive pulmonary disease.



PLAN:  Today's arterial blood gas shows pH of 7.46, pCO2 of 36, and pO2 of 127 pre-extubation.  The r
est of the laboratory reveals elevated BUN to 55, elevated creatinine to 1.7, his procalcitonin is sl
ightly elevated.  His WBCs are 11.6, hemoglobin of 9.4.  



He is doing well so far after extubation.  Will continue following him very closely.  I discussed it 
extensively with respiratory, as well as intensivist, Dr. Lira.





__________________________________________

Zackery Cabrera MD







cc:



DD: 04/01/2017 09:39:41  [id number]

TT: 04/01/2017 10:47:16

Confirmation # [id number]

Dictation # [id number]

jannie

## 2017-04-02 LAB
ADD MANUAL DIFF?: NO
ALBUMIN/GLOB SERPL: 0.7 {RATIO} (ref 1.1–1.8)
ALP SERPL-CCNC: 59 U/L (ref 38–133)
ALT SERPL-CCNC: 57 U/L (ref 7–56)
AST SERPL-CCNC: 55 U/L (ref 15–59)
BASOPHILS # BLD AUTO: 0.02 K/MM3 (ref 0–2)
BASOPHILS NFR BLD: 0.2 % (ref 0–3)
BILIRUB SERPL-MCNC: 0.7 MG/DL (ref 0.2–1.3)
BUN SERPL-MCNC: 46 MG/DL (ref 7–21)
CALCIUM SERPL-MCNC: 8.9 MG/DL (ref 8.4–10.5)
CHLORIDE SERPL-SCNC: 107 MMOL/L (ref 98–107)
CO2 SERPL-SCNC: 28 MMOL/L (ref 21–33)
COHGB MFR BLD: 1.7 % (ref 0.5–1.5)
EOSINOPHIL # BLD: 0.2 10*3/UL (ref 0–0.7)
EOSINOPHIL NFR BLD: 1.7 % (ref 1.5–5)
ERYTHROCYTE [DISTWIDTH] IN BLOOD BY AUTOMATED COUNT: 14.2 % (ref 11.5–14.5)
GLOBULIN SER-MCNC: 3.5 GM/DL
GLUCOSE SERPL-MCNC: 124 MG/DL (ref 70–110)
GRANULOCYTES # BLD: 8.98 10*3/UL (ref 1.4–6.5)
GRANULOCYTES NFR BLD: 82.2 % (ref 50–68)
HCO3 BLDA-SCNC: 27.4 MMOL/L (ref 21–28)
HCT VFR BLD CALC: 31.4 % (ref 42–52)
HHB: 2.6 % (ref 0–5)
LYMPHOCYTES # BLD: 1.1 10*3/UL (ref 1.2–3.4)
LYMPHOCYTES NFR BLD AUTO: 9.8 % (ref 22–35)
MAGNESIUM SERPL-MCNC: 1.8 MG/DL (ref 1.7–2.2)
MCH RBC QN AUTO: 27.2 PG (ref 25–35)
MCHC RBC AUTO-ENTMCNC: 32.2 G/DL (ref 31–37)
MCV RBC AUTO: 84.4 FL (ref 80–105)
METHGB MFR BLD: 0.6 % (ref 0–3)
MONOCYTES # BLD AUTO: 0.7 10*3/UL (ref 0.1–0.6)
MONOCYTES NFR BLD: 6.1 % (ref 1–6)
O2 CAP BLDA-SCNC: 15.3 ML/DL (ref 16–24)
O2 CT BLDA-SCNC: 14.9 ML/DL (ref 15–23)
PH BLDA: 7.49 [PH] (ref 7.35–7.45)
PHOSPHATE SERPL-MCNC: 3.9 MG/DL (ref 2.5–4.5)
PLATELET # BLD: 286 10^3/UL (ref 120–450)
PMV BLD AUTO: 9.1 FL (ref 7–11)
PO2 BLDA: 78 MM/HG (ref 80–100)
POTASSIUM SERPL-SCNC: 3.7 MMOL/L (ref 3.6–5)
PROT SERPL-MCNC: 6.1 G/DL (ref 5.8–8.3)
SAO2 % BLDA: 95 % (ref 95–98)
SODIUM SERPL-SCNC: 141 MMOL/L (ref 132–148)
TROPONIN I SERPL-MCNC: 0.58 NG/ML
WBC # BLD AUTO: 10.9 10^3/UL (ref 4.5–11)

## 2017-04-02 RX ADMIN — SILVER SULFADIAZINE SCH APPLIC: 10 CREAM TOPICAL at 10:25

## 2017-04-02 RX ADMIN — IPRATROPIUM BROMIDE AND ALBUTEROL SULFATE SCH ML: .5; 3 SOLUTION RESPIRATORY (INHALATION) at 07:20

## 2017-04-02 RX ADMIN — IPRATROPIUM BROMIDE AND ALBUTEROL SULFATE SCH ML: .5; 3 SOLUTION RESPIRATORY (INHALATION) at 21:24

## 2017-04-02 RX ADMIN — VITAMIN A AND VITAMIN D PRN EA: 929.3 OINTMENT TOPICAL at 09:56

## 2017-04-02 RX ADMIN — CLOTRIMAZOLE SCH APPLIC: 1 CREAM TOPICAL at 17:54

## 2017-04-02 RX ADMIN — IPRATROPIUM BROMIDE AND ALBUTEROL SULFATE SCH ML: .5; 3 SOLUTION RESPIRATORY (INHALATION) at 13:18

## 2017-04-02 RX ADMIN — CLOTRIMAZOLE SCH APPLIC: 1 CREAM TOPICAL at 10:25

## 2017-04-02 NOTE — PN
DATE: 04/02/2017



SUBJECTIVE:  The patient is lying in bed, in no acute distress.  He has been transferred to the [facility]
et unit from the intensive care unit.  He is awake and responsive.  There is no shortness of breath
 or complaints of chest pain.



OBJECTIVE:

VITAL SIGNS:  Blood pressure 142/48, temperature 98.6, pulse 70, respiratory rate 30.

LUNGS:  Show bibasilar rales.

HEART:  Regular rate and rhythm.  Hepatojugular reflux is present.

ABDOMEN:  Soft, nontender, bowel sounds are normoactive.

EXTREMITIES:  Show dependent and presacral edema.

NEUROLOGIC:  The patient is awake and responsive, less confused than previous without sensory or lien
r deficits.

SKIN:  Warm and dry.



LABORATORY DATA:  WBCs 10.9, hemoglobin 10.1, hematocrit 31.4.  Sodium 141, potassium 3.7, chloride 1
07, CO2 28, BUN 10, creatinine 1.2, glucose 124.  BNP is elevated at 7200.  Troponin 0.58.



IMPRESSION:

1.  Hypertension, hypertensive cardiovascular disease and congestive heart failure.

2.  Coronary artery disease, status post probable non-ST segment elevation myocardial infarction.

3.  Status post acute rhabdomyolysis with acute renal failure superimposed on mild chronic kidney dis
ease.

4.  Status post sepsis, presumed secondary to cellulitis of the lower extremities with chronic venous
 stasis ulcers.

5.  Dementia with mild superimposed delirium, secondary to underlying metabolic disorder.

6.  Immobility, secondary to severe degenerative joint disease and obesity with deconditioning.



PLAN:  Continue diuresis with IV Lasix.  Continue cardiology, infectious disease and pulmonary follow
up.  Physical therapy evaluation and treatment.  Social work for discharge planning.





__________________________________________

Ariel Browne JD, MD







cc:



DD: 04/02/2017 14:18:13  [id number]

TT: 04/02/2017 14:41:29

Confirmation # [id number]

Dictation # [id number]

en

## 2017-04-02 NOTE — RAD
PROCEDURE:  CHEST RADIOGRAPH, 1 VIEW



HISTORY:

f/u



COMPARISON:

None available.



FINDINGS:



LUNGS:

Clear.



PLEURA:

No pneumothorax or pleural fluid seen.



CARDIOVASCULAR:

Normal.



OSSEOUS STRUCTURES:

No significant abnormalities.



VISUALIZED UPPER ABDOMEN:

Normal.



OTHER FINDINGS:

Left PICC line in place. 



IMPRESSION:

No active disease.

## 2017-04-02 NOTE — CP.CCUPN
<Lucero Lopez - Last Filed: 04/02/17 09:53>





CCU Subjective





- Physician Review


Events Since Last Encounter (Free Text): 


04/02/17 07:11


Patient seen and examined bedside. No acute events overnight. Patient 

comfortable on NC. Denies CP, SOB, abd pain, n/d, fevers, chills. CARLOS resolved. 

Troponins continue to trend down. 


Critical Care Time Spent (in minutes): 30





CCU Objective





- Vital Signs / Intake & Output


Vital Signs (Last 4 hours): 


Vital Signs











  Temp Pulse Resp BP Pulse Ox


 


 04/02/17 04:14   71  23  180/66 H  95


 


 04/02/17 04:00  97.7 F  71  27 H   97











Intake and Output (Last 8hrs): 


 Intake & Output











 04/01/17 04/02/17 04/02/17





 22:59 06:59 14:59


 


Intake Total 2946 600 


 


Output Total 1900 200 


 


Balance 1046 400 


 


Weight  258 lb 


 


Intake:   


 


   100 


 


    Antibiotic 100 100 


 


  Oral 2846 500 


 


  Tube Feeding  0 


 


  TPN/PPN  0 


 


  Blood Product  0 


 


  Lipid  0 


 


  Albumin  0 


 


  Other  0 


 


Output:   


 


  Urine 1900 200 


 


    Urine, Voided  200 


 


    Urethral (Bill) 1900  


 


  Stool  0 


 


  Oral Regurgitation  0 


 


Other:   


 


  # Voids   


 


    Urine, Voided  3 


 


  # Bowel Movements  0 














- Physical Exam


Head: Positive for: Atraumatic, Normocephalic.  Negative for: Abrasion, 

Laceration


Pupils: Positive for: PERRL


Extroacular Muscles: Positive for: EOMI.  Negative for: Entrapment


Conjunctiva: Positive for: Normal.  Negative for: Injected


Mouth: Positive for: Dry, Normal Teeth (poor dentition)


Respiratory/Chest: Positive for: Clear to Auscultation, Good Air Exchange.  

Negative for: Respiratory Distress, Accessory Muscle Use, Rales, Tachypneic


Cardiovascular: Positive for: Regular Rate and Rhythm, Normal S1, S2.  Negative 

for: Murmurs


Abdomen: Positive for: Normal Bowel Sounds.  Negative for: Tenderness, 

Distention, Peritoneal Signs


Upper Extremity: Positive for: Normal Inspection, NORMAL PULSES, 

Neurovascularly Intact.  Negative for: Cyanosis, Edema


Lower Extremity: Positive for: Normal Inspection, Tenderness, Neurovascularly 

Intact, Other (abrasion right shin, chronic venous stasis changes foot and 

ankle B/L).  Negative for: Edema, NORMAL PULSES (diminished PT and DP pulse B/L)

, Swelling, Deformity


Neurological: Positive for: GCS=15, CN II-XII Intact


Skin: Positive for: Warm, Dry, Abrasion


Psychiatric: Positive for: Alert, Oriented x 3, Normal Insight, Normal 

Concentration





- Medications


Active Medications: 


Active Medications











Generic Name Dose Route Start Last Admin





  Trade Name Freq  PRN Reason Stop Dose Admin


 


Acetaminophen  650 mg 03/22/17 11:33 03/23/17 09:15





  Tylenol 325mg Tab  PO   650 mg





  Q4 PRN   Administration





  Fever >100.4 F   


 


Albuterol/Ipratropium  3 ml 03/28/17 08:00 04/01/17 20:36





  Duoneb 3 Mg/0.5 Mg (3 Ml) Ud  IH   3 ml





  [id number] WAQAR   Administration


 


Amlodipine Besylate  10 mg 03/29/17 10:00 04/01/17 09:00





  Norvasc  PO   10 mg





  DAILY WAQAR   Administration


 


Aspirin  325 mg 03/25/17 10:00 04/01/17 08:59





  Aspirin  PO   325 mg





  DAILY WAQAR   Administration


 


Clotrimazole  0 gm 03/22/17 18:00 04/01/17 17:17





  Lotrimin 1%  TOP   1 applic





  BID WAQAR   Administration


 


Furosemide  40 mg 04/01/17 10:00 04/01/17 09:00





  Lasix  IVP   40 mg





  DAILY WAQAR   Administration


 


Heparin Sodium (Porcine)  5,000 units 03/30/17 10:00 04/01/17 21:27





  Heparin  SC   5,000 units





  Q12 WAQAR   Administration





  Protocol   


 


Hydralazine HCl  10 mg 03/28/17 18:01 04/02/17 05:27





  Apresoline  IVP   10 mg





  Q6 PRN   Administration





  Systolic Blood Pressure   


 


Ceftriaxone Sodium  100 mls @ 100 mls/hr 03/30/17 10:00 04/01/17 09:03





  Rocephin 2 Gm Ivpb  IVPB 04/19/17 10:01  100 mls/hr





  DAILY WAQAR   Administration





  Protocol   


 


Metoprolol Tartrate  50 mg 03/29/17 18:00 04/01/17 17:17





  Lopressor  PO   50 mg





  0800,1800 WAQAR   Administration


 


Pantoprazole Sodium  40 mg 04/01/17 06:30 04/02/17 05:30





  Protonix Inj  IVP   40 mg





  0630 WAQAR   Administration


 


Risperidone  0.5 mg 03/30/17 10:00 04/01/17 09:00





  Risperdal Tab  PO   0.5 mg





  DAILY WAQAR   Administration





  Protocol   


 


Silver Sulfadiazine  0 ea 03/25/17 10:00 04/01/17 09:01





  Silvadene 1% 20 Gm  TOP   1 applic





  DAILY WAQAR   Administration


 


Vitamin A  1 ea 03/30/17 07:41 04/01/17 06:18





  Vitamin A & D Oint Ud Foilpak  TOP   1 ea





  BID PRN   Administration





  chapped lips   














- Patient Studies


Lab Studies: 


 Microbiology Studies











 03/31/17 18:33 Blood Culture - Preliminary





 Blood    NO GROWTH AFTER 24 HOURS


 


 03/27/17 16:00 Blood Culture - Final





 Blood-Venous    NO GROWTH AFTER 5 DAYS





 Gram Stain - Final





    TEST NOT PERFORMED


 


 03/27/17 15:30 Blood Culture - Final





 Blood-Venous    NO GROWTH AFTER 5 DAYS





 Gram Stain - Final





    TEST NOT PERFORMED








 Lab Studies











  04/02/17 04/02/17 04/01/17 Range/Units





  06:35 06:00 06:30 


 


WBC   10.9   (4.5-11.0)  10^3/ul


 


RBC   3.72   (3.5-6.1)  10^6/uL


 


Hgb   10.1 L   (14.0-18.0)  gm/dL


 


Hct   31.4 L   (42.0-52.0)  %


 


MCV   84.4   (80.0-105.0)  fL


 


MCH   27.2   (25.0-35.0)  pg


 


MCHC   32.2   (31.0-37.0)  g/dl


 


RDW   14.2   (11.5-14.5)  %


 


Plt Count   286   (120.0-450.0)  10^3/uL


 


MPV   9.1   (7.0-11.0)  fl


 


Gran %   82.2 H   (50.0-68.0)  %


 


Lymph % (Auto)   9.8 L   (22.0-35.0)  %


 


Mono % (Auto)   6.1 H   (1.0-6.0)  %


 


Eos % (Auto)   1.7   (1.5-5.0)  %


 


Baso % (Auto)   0.2   (0.0-3.0)  %


 


Gran #   8.98 H   (1.4-6.5)  


 


Lymph #   1.1 L   (1.2-3.4)  


 


Mono #   0.7 H   (0.1-0.6)  


 


Eos #   0.2   (0.0-0.7)  


 


Baso #   0.02   (0.0-2.0)  K/mm3


 


pCO2  36    (35-45)  mm/Hg


 


pO2  78.0 L    ()  mm/Hg


 


HCO3  27.4    (21-28)  mmol/L


 


ABG pH  7.49 H    (7.35-7.45)  


 


ABG Total CO2  28.5 H    (22-28)  mmol.L


 


ABG O2 Saturation  97.3    (95-98)  %


 


ABG O2 Content  14.9 L    (15-23)  ML/dl


 


ABG Base Excess  4.0 H    (-2.0-3.0)  mmol/L


 


ABG Hemoglobin  11.1 L    (11.7-17.4)  g/dL


 


ABG Carboxyhemoglobin  1.7 H    (0.5-1.5)  %


 


POC ABG HHb (Measured)  2.6    (0-5)  %


 


ABG Methemoglobin  0.6    (0.0-3.0)  %


 


ABG O2 Capacity  15.3 L    (16-24)  mL/dl


 


Hgb O2 Saturation  95.0    (95.0-98.0)  %


 


FiO2  32.0    %


 


Sodium   141   (132-148)  mmol/L


 


Potassium   3.7   (3.6-5.0)  mmol/L


 


Chloride   107   ()  mmol/L


 


Carbon Dioxide   28   (21-33)  mmol/L


 


Anion Gap   10   (10-20)  


 


BUN   46 H   (7-21)  mg/dL


 


Creatinine   1.2   (0.5-1.4)  mg/dL


 


Est GFR (African Amer)   > 60   


 


Est GFR (Non-Af Amer)   58   


 


Random Glucose   124 H   ()  mg/dL


 


Calcium   8.9   (8.4-10.5)  mg/dL


 


Phosphorus   3.9   (2.5-4.5)  mg/dL


 


Magnesium   1.8   (1.7-2.2)  mg/dL


 


Total Bilirubin   0.7   (0.2-1.3)  mg/dL


 


AST   55   (15-59)  U/L


 


ALT   57 H   (7-56)  U/L


 


Alkaline Phosphatase   59   ()  U/L


 


NT-Pro-B Natriuret Pep    7200 H  (0-450)  pg/mL


 


Total Protein   6.1   (5.8-8.3)  g/dL


 


Albumin   2.6 L   (3.0-4.8)  g/dL


 


Globulin   3.5   gm/dL


 


Albumin/Globulin Ratio   0.7 L   (1.1-1.8)  


 


Procalcitonin     (0.19-0.49)  NG/ML














  04/01/17 Range/Units





  05:15 


 


WBC   (4.5-11.0)  10^3/ul


 


RBC   (3.5-6.1)  10^6/uL


 


Hgb   (14.0-18.0)  gm/dL


 


Hct   (42.0-52.0)  %


 


MCV   (80.0-105.0)  fL


 


MCH   (25.0-35.0)  pg


 


MCHC   (31.0-37.0)  g/dl


 


RDW   (11.5-14.5)  %


 


Plt Count   (120.0-450.0)  10^3/uL


 


MPV   (7.0-11.0)  fl


 


Gran %   (50.0-68.0)  %


 


Lymph % (Auto)   (22.0-35.0)  %


 


Mono % (Auto)   (1.0-6.0)  %


 


Eos % (Auto)   (1.5-5.0)  %


 


Baso % (Auto)   (0.0-3.0)  %


 


Gran #   (1.4-6.5)  


 


Lymph #   (1.2-3.4)  


 


Mono #   (0.1-0.6)  


 


Eos #   (0.0-0.7)  


 


Baso #   (0.0-2.0)  K/mm3


 


pCO2   (35-45)  mm/Hg


 


pO2   ()  mm/Hg


 


HCO3   (21-28)  mmol/L


 


ABG pH   (7.35-7.45)  


 


ABG Total CO2   (22-28)  mmol.L


 


ABG O2 Saturation   (95-98)  %


 


ABG O2 Content   (15-23)  ML/dl


 


ABG Base Excess   (-2.0-3.0)  mmol/L


 


ABG Hemoglobin   (11.7-17.4)  g/dL


 


ABG Carboxyhemoglobin   (0.5-1.5)  %


 


POC ABG HHb (Measured)   (0-5)  %


 


ABG Methemoglobin   (0.0-3.0)  %


 


ABG O2 Capacity   (16-24)  mL/dl


 


Hgb O2 Saturation   (95.0-98.0)  %


 


FiO2   %


 


Sodium   (132-148)  mmol/L


 


Potassium   (3.6-5.0)  mmol/L


 


Chloride   ()  mmol/L


 


Carbon Dioxide   (21-33)  mmol/L


 


Anion Gap   (10-20)  


 


BUN   (7-21)  mg/dL


 


Creatinine   (0.5-1.4)  mg/dL


 


Est GFR (African Amer)   


 


Est GFR (Non-Af Amer)   


 


Random Glucose   ()  mg/dL


 


Calcium   (8.4-10.5)  mg/dL


 


Phosphorus   (2.5-4.5)  mg/dL


 


Magnesium   (1.7-2.2)  mg/dL


 


Total Bilirubin   (0.2-1.3)  mg/dL


 


AST   (15-59)  U/L


 


ALT   (7-56)  U/L


 


Alkaline Phosphatase   ()  U/L


 


NT-Pro-B Natriuret Pep   (0-450)  pg/mL


 


Total Protein   (5.8-8.3)  g/dL


 


Albumin   (3.0-4.8)  g/dL


 


Globulin   gm/dL


 


Albumin/Globulin Ratio   (1.1-1.8)  


 


Procalcitonin  1.15 H  (0.19-0.49)  NG/ML








 Laboratory Results - last 24 hr











  04/01/17 04/01/17 04/02/17





  05:15 06:30 06:00


 


WBC    10.9


 


RBC    3.72


 


Hgb    10.1 L


 


Hct    31.4 L


 


MCV    84.4


 


MCH    27.2


 


MCHC    32.2


 


RDW    14.2


 


Plt Count    286


 


MPV    9.1


 


Gran %    82.2 H


 


Lymph % (Auto)    9.8 L


 


Mono % (Auto)    6.1 H


 


Eos % (Auto)    1.7


 


Baso % (Auto)    0.2


 


Gran #    8.98 H


 


Lymph #    1.1 L


 


Mono #    0.7 H


 


Eos #    0.2


 


Baso #    0.02


 


pCO2   


 


pO2   


 


HCO3   


 


ABG pH   


 


ABG Total CO2   


 


ABG O2 Saturation   


 


ABG O2 Content   


 


ABG Base Excess   


 


ABG Hemoglobin   


 


ABG Carboxyhemoglobin   


 


POC ABG HHb (Measured)   


 


ABG Methemoglobin   


 


ABG O2 Capacity   


 


Hgb O2 Saturation   


 


FiO2   


 


Sodium    141


 


Potassium    3.7


 


Chloride    107


 


Carbon Dioxide    28


 


Anion Gap    10


 


BUN    46 H


 


Creatinine    1.2


 


Est GFR ( Amer)    > 60


 


Est GFR (Non-Af Amer)    58


 


Random Glucose    124 H


 


Calcium    8.9


 


Phosphorus    3.9


 


Magnesium    1.8


 


Total Bilirubin    0.7


 


AST    55


 


ALT    57 H


 


Alkaline Phosphatase    59


 


NT-Pro-B Natriuret Pep   7200 H 


 


Total Protein    6.1


 


Albumin    2.6 L


 


Globulin    3.5


 


Albumin/Globulin Ratio    0.7 L


 


Procalcitonin  1.15 H  














  04/02/17





  06:35


 


WBC 


 


RBC 


 


Hgb 


 


Hct 


 


MCV 


 


MCH 


 


MCHC 


 


RDW 


 


Plt Count 


 


MPV 


 


Gran % 


 


Lymph % (Auto) 


 


Mono % (Auto) 


 


Eos % (Auto) 


 


Baso % (Auto) 


 


Gran # 


 


Lymph # 


 


Mono # 


 


Eos # 


 


Baso # 


 


pCO2  36


 


pO2  78.0 L


 


HCO3  27.4


 


ABG pH  7.49 H


 


ABG Total CO2  28.5 H


 


ABG O2 Saturation  97.3


 


ABG O2 Content  14.9 L


 


ABG Base Excess  4.0 H


 


ABG Hemoglobin  11.1 L


 


ABG Carboxyhemoglobin  1.7 H


 


POC ABG HHb (Measured)  2.6


 


ABG Methemoglobin  0.6


 


ABG O2 Capacity  15.3 L


 


Hgb O2 Saturation  95.0


 


FiO2  32.0


 


Sodium 


 


Potassium 


 


Chloride 


 


Carbon Dioxide 


 


Anion Gap 


 


BUN 


 


Creatinine 


 


Est GFR ( Amer) 


 


Est GFR (Non-Af Amer) 


 


Random Glucose 


 


Calcium 


 


Phosphorus 


 


Magnesium 


 


Total Bilirubin 


 


AST 


 


ALT 


 


Alkaline Phosphatase 


 


NT-Pro-B Natriuret Pep 


 


Total Protein 


 


Albumin 


 


Globulin 


 


Albumin/Globulin Ratio 


 


Procalcitonin 














Review of Systems





- Constitutional


Constitutional: absent: Fever, Chills





- EENT


Eyes: absent: Change in Vision


Ears: absent: Dizziness





- Cardiovascular


Cardiovascular: absent: Chest Pain, Diaphoresis, Dyspnea, Palpitations





- Respiratory


Respiratory: absent: Cough, Dyspnea





- Gastrointestinal


Gastrointestinal: absent: Abdominal Pain, Diarrhea, Nausea, Vomiting





- Genitourinary


Genitourinary: absent: Dysuria





- Neurological


Neurological: absent: Dizziness





Critical Care Progress Note





- Ventilator Checklist


PUD Prophalyxis: Yes


DVT Prophylaxis: Yes





- Prophylaxis GI


Prophylaxis GI: PPI





- Prophylaxis DVT


Prophylaxis DVT: Heparin SQ





- Nutrition


Nutrition: 


 Nutrition











 Category Date Time Status


 


 Liquid Diet [DIET] Diets  04/01/17 Lunch Ordered














Assessment/Plan





- Assessment and Plan (Free Text)


Assessment: 


80 yo M w h/o HTN, COPD, obesity, dementia initially admitted to [facility] with Group 

G strep bacteremia, L foot MSSA infectionnow resolved initially transferred to 

ICU s/p RRT due to multifactorial hypoxic, hypercarbic RF exacerbated by Benzo 

administration, requiring intubation and subsequently extubated x2


Plan: 


Neuro: AAOx3, NAD, s/p successful extubation this AM. Good cough, gag reflex, 

protecting airway


   Maintain normothermia


   Patient has ATIVAN ALLERGY that has resulted in acute respiratory failure 

twice requiring intubation


   Would avoid benzos for hyperactive delirium, rec PRN antipsychotics if not 

contraindicated, optimization of circadian and sleep patterns (may consider 

seroquel at bedtime as well), frequent re-orientation in time, place and person

, minimizing sleep disruption and optimization pain control (preferrably with 

non-opiates, as they are also deliriogenic)


   Currently on Risperidone





Pulm: s/p hypercarbic RF 2/2 Ativan with hypoventilation, patient was difficult 

to arouse, GCS worrisome for protecting airway requiring intubation x2


   ABG reviewed - resp acidosis resolved


   Duonebs Q6hr


   Tolerating 3L NC. Titrate to maintain spo2>90, pao2>60


   Aspiration precautions. HOB >35





CV: HD stable. Continue to monitor, maintain MAP >65


   Troponin down to 0.58 from 5.25. EKG shows T wave inversions leads V2-V5, II

, III, aVF, no new changes. Patient and family deny h/o previous MI or stening. 

Management as per cardiology


   Continue cardiac meds as per cardio service, monitor hemodynamics, adjust 

meds as needed 


   Resume lasix as per cardio 


   


GI: GI ppx





Renal: CARLOS resolved


   Continue to monitor renal function and I&Os


   Acute rhabdomyolysis on admission improving





Endo:  No acute issues. Maintain euglycemia 140-180





ID: Leukocytosis resolved.  Low-grade temps resolved. Has been afebrile over 

past 24 hours


   Continue Rocephin to finish Group G Strep bacteremia therapy. Procalcitonin 

trending down 1.23 from 7.36, from 31.55 at admission. 


   Repeat Blood cultures have been negative thus far since 3/23


         


Heme: Hb stable. No signs of bleeding. 





DVT/GI ppx: SQH, PT, protonix, HHD





Dispo: Transfer to telemetry





- Date & Time


Date: 04/02/17


Time: 07:37





<Kentrell Lira - Last Filed: 04/02/17 18:59>





CCU Objective





- Vital Signs / Intake & Output


Vital Signs (Last 4 hours): 


Vital Signs











  Pulse BP


 


 04/02/17 18:00  75 


 


 04/02/17 17:54  68  151/57 H











Intake and Output (Last 8hrs): 


 Intake & Output











 04/02/17 04/02/17 04/02/17





 06:59 14:59 22:59


 


Intake Total 600 460 


 


Output Total 200 100 


 


Balance 400 360 


 


Weight 258 lb 258 lb 14.4 oz 


 


Intake:   


 


   100 


 


    Left Upper arm  100 


 


    Antibiotic 100  


 


  Oral 500 360 


 


  Tube Feeding 0  


 


  TPN/PPN 0  


 


  Blood Product 0  


 


  Lipid 0  


 


  Albumin 0  


 


  Other 0  


 


Output:   


 


  Urine 200 100 


 


    Urine, Voided 200 100 


 


  Stool 0  


 


  Oral Regurgitation 0  


 


Other:   


 


  Voiding Method  Urinal 


 


  # Voids   


 


    Urine, Voided 3 3 


 


  # Bowel Movements 0 0 














- Medications


Active Medications: 


Active Medications











Generic Name Dose Route Start Last Admin





  Trade Name Freq  PRN Reason Stop Dose Admin


 


Acetaminophen  650 mg 03/22/17 11:33 03/23/17 09:15





  Tylenol 325mg Tab  PO   650 mg





  Q4 PRN   Administration





  Fever >100.4 F   


 


Albuterol/Ipratropium  3 ml 03/28/17 08:00 04/02/17 13:18





  Duoneb 3 Mg/0.5 Mg (3 Ml) Ud  IH   3 ml





  [id number] WAQAR   Administration


 


Amlodipine Besylate  10 mg 03/29/17 10:00 04/02/17 09:56





  Norvasc  PO   10 mg





  DAILY WAQAR   Administration


 


Aspirin  325 mg 03/25/17 10:00 04/02/17 09:56





  Aspirin  PO   325 mg





  DAILY WAQAR   Administration


 


Clotrimazole  0 gm 03/22/17 18:00 04/02/17 17:54





  Lotrimin 1%  TOP   1 applic





  BID WAQAR   Administration


 


Furosemide  40 mg 04/01/17 10:00 04/01/17 09:00





  Lasix  IVP   40 mg





  DAILY WAQAR   Administration


 


Heparin Sodium (Porcine)  5,000 units 03/30/17 10:00 04/02/17 09:57





  Heparin  SC   5,000 units





  Q12 WAQAR   Administration





  Protocol   


 


Hydralazine HCl  10 mg 03/28/17 18:01 04/02/17 09:56





  Apresoline  IVP   10 mg





  Q6 PRN   Administration





  Systolic Blood Pressure   


 


Ceftriaxone Sodium  100 mls @ 100 mls/hr 03/30/17 10:00 04/02/17 09:59





  Rocephin 2 Gm Ivpb  IVPB 04/19/17 10:01  100 mls/hr





  DAILY WAQAR   Administration





  Protocol   


 


Metoprolol Tartrate  50 mg 03/29/17 18:00 04/02/17 17:54





  Lopressor  PO   50 mg





  0800,1800 WAQAR   Administration


 


Pantoprazole Sodium  40 mg 04/01/17 06:30 04/02/17 05:30





  Protonix Inj  IVP   40 mg





  0630 WAQAR   Administration


 


Risperidone  0.5 mg 03/30/17 10:00 04/02/17 09:57





  Risperdal Tab  PO   0.5 mg





  DAILY WAQAR   Administration





  Protocol   


 


Silver Sulfadiazine  0 ea 03/25/17 10:00 04/02/17 10:25





  Silvadene 1% 20 Gm  TOP   1 applic





  DAILY WAQAR   Administration


 


Vitamin A  1 ea 03/30/17 07:41 04/02/17 09:56





  Vitamin A & D Oint Ud Foilpak  TOP   1 ea





  BID PRN   Administration





  chapped lips   














- Patient Studies


Lab Studies: 


 Microbiology Studies











 03/31/17 18:33 Blood Culture - Preliminary





 Blood    NO GROWTH AFTER 48 HOURS


 


 03/27/17 16:00 Blood Culture - Final





 Blood-Venous    NO GROWTH AFTER 5 DAYS





 Gram Stain - Final





    TEST NOT PERFORMED


 


 03/27/17 15:30 Blood Culture - Final





 Blood-Venous    NO GROWTH AFTER 5 DAYS





 Gram Stain - Final





    TEST NOT PERFORMED








 Lab Studies











  04/02/17 04/02/17 Range/Units





  06:35 06:00 


 


WBC   10.9  (4.5-11.0)  10^3/ul


 


RBC   3.72  (3.5-6.1)  10^6/uL


 


Hgb   10.1 L  (14.0-18.0)  gm/dL


 


Hct   31.4 L  (42.0-52.0)  %


 


MCV   84.4  (80.0-105.0)  fL


 


MCH   27.2  (25.0-35.0)  pg


 


MCHC   32.2  (31.0-37.0)  g/dl


 


RDW   14.2  (11.5-14.5)  %


 


Plt Count   286  (120.0-450.0)  10^3/uL


 


MPV   9.1  (7.0-11.0)  fl


 


Gran %   82.2 H  (50.0-68.0)  %


 


Lymph % (Auto)   9.8 L  (22.0-35.0)  %


 


Mono % (Auto)   6.1 H  (1.0-6.0)  %


 


Eos % (Auto)   1.7  (1.5-5.0)  %


 


Baso % (Auto)   0.2  (0.0-3.0)  %


 


Gran #   8.98 H  (1.4-6.5)  


 


Lymph #   1.1 L  (1.2-3.4)  


 


Mono #   0.7 H  (0.1-0.6)  


 


Eos #   0.2  (0.0-0.7)  


 


Baso #   0.02  (0.0-2.0)  K/mm3


 


pCO2  36   (35-45)  mm/Hg


 


pO2  78.0 L   ()  mm/Hg


 


HCO3  27.4   (21-28)  mmol/L


 


ABG pH  7.49 H   (7.35-7.45)  


 


ABG Total CO2  28.5 H   (22-28)  mmol.L


 


ABG O2 Saturation  97.3   (95-98)  %


 


ABG O2 Content  14.9 L   (15-23)  ML/dl


 


ABG Base Excess  4.0 H   (-2.0-3.0)  mmol/L


 


ABG Hemoglobin  11.1 L   (11.7-17.4)  g/dL


 


ABG Carboxyhemoglobin  1.7 H   (0.5-1.5)  %


 


POC ABG HHb (Measured)  2.6   (0-5)  %


 


ABG Methemoglobin  0.6   (0.0-3.0)  %


 


ABG O2 Capacity  15.3 L   (16-24)  mL/dl


 


Hgb O2 Saturation  95.0   (95.0-98.0)  %


 


FiO2  32.0   %


 


Sodium   141  (132-148)  mmol/L


 


Potassium   3.7  (3.6-5.0)  mmol/L


 


Chloride   107  ()  mmol/L


 


Carbon Dioxide   28  (21-33)  mmol/L


 


Anion Gap   10  (10-20)  


 


BUN   46 H  (7-21)  mg/dL


 


Creatinine   1.2  (0.5-1.4)  mg/dL


 


Est GFR (African Amer)   > 60  


 


Est GFR (Non-Af Amer)   58  


 


Random Glucose   124 H  ()  mg/dL


 


Calcium   8.9  (8.4-10.5)  mg/dL


 


Phosphorus   3.9  (2.5-4.5)  mg/dL


 


Magnesium   1.8  (1.7-2.2)  mg/dL


 


Total Bilirubin   0.7  (0.2-1.3)  mg/dL


 


AST   55  (15-59)  U/L


 


ALT   57 H  (7-56)  U/L


 


Alkaline Phosphatase   59  ()  U/L


 


Troponin I   0.58 H* D  ng/mL


 


Total Protein   6.1  (5.8-8.3)  g/dL


 


Albumin   2.6 L  (3.0-4.8)  g/dL


 


Globulin   3.5  gm/dL


 


Albumin/Globulin Ratio   0.7 L  (1.1-1.8)  








 Laboratory Results - last 24 hr











  04/02/17 04/02/17





  06:00 06:35


 


WBC  10.9 


 


RBC  3.72 


 


Hgb  10.1 L 


 


Hct  31.4 L 


 


MCV  84.4 


 


MCH  27.2 


 


MCHC  32.2 


 


RDW  14.2 


 


Plt Count  286 


 


MPV  9.1 


 


Gran %  82.2 H 


 


Lymph % (Auto)  9.8 L 


 


Mono % (Auto)  6.1 H 


 


Eos % (Auto)  1.7 


 


Baso % (Auto)  0.2 


 


Gran #  8.98 H 


 


Lymph #  1.1 L 


 


Mono #  0.7 H 


 


Eos #  0.2 


 


Baso #  0.02 


 


pCO2   36


 


pO2   78.0 L


 


HCO3   27.4


 


ABG pH   7.49 H


 


ABG Total CO2   28.5 H


 


ABG O2 Saturation   97.3


 


ABG O2 Content   14.9 L


 


ABG Base Excess   4.0 H


 


ABG Hemoglobin   11.1 L


 


ABG Carboxyhemoglobin   1.7 H


 


POC ABG HHb (Measured)   2.6


 


ABG Methemoglobin   0.6


 


ABG O2 Capacity   15.3 L


 


Hgb O2 Saturation   95.0


 


FiO2   32.0


 


Sodium  141 


 


Potassium  3.7 


 


Chloride  107 


 


Carbon Dioxide  28 


 


Anion Gap  10 


 


BUN  46 H 


 


Creatinine  1.2 


 


Est GFR ( Amer)  > 60 


 


Est GFR (Non-Af Amer)  58 


 


Random Glucose  124 H 


 


Calcium  8.9 


 


Phosphorus  3.9 


 


Magnesium  1.8 


 


Total Bilirubin  0.7 


 


AST  55 


 


ALT  57 H 


 


Alkaline Phosphatase  59 


 


Troponin I  0.58 H* D 


 


Total Protein  6.1 


 


Albumin  2.6 L 


 


Globulin  3.5 


 


Albumin/Globulin Ratio  0.7 L 














Critical Care Progress Note





- Nutrition


Nutrition: 


 Nutrition











 Category Date Time Status


 


 Heart Healthy Diet [DIET] Diets  04/02/17 Breakfast Ordered














Addendum


Addendum: 





04/02/17 18:57


patient was seen, examined and discussed shoulder to shoulder at bedside with 

Dr. Lopez. Her note reflects my exam, assessment and plan, except as below. 

Meds/Labs/ONE reviewed





80 yo with resolved HCRF. Hemodynamically and respiratory stable. Particiapte 

in PT, OOb to chair. +Oral nutrition. Ok to downgrade to tele





ccm time 40 min

## 2017-04-02 NOTE — PN
DATE: 04/02/2017



The patient is seen earlier this morning in the , bed 6.  The patient is in bed, awake, alert.
  He is now extubated.  He is comfortable.  There have been no fevers and no chills.



PHYSICAL EXAMINATION:

VITAL SIGNS:  The patient's temperature is 97 and he has been afebrile all night with a blood pressur
e of 159/50, respiratory rate of 23, heart rate of 97.

HEENT:  Unremarkable.

NECK:  Supple.

LUNGS:  Have decreased breath sounds.

HEART:  Normal S1, S2.

ABDOMEN:  Soft, nontender.



LABORATORY EXAMINATION:  Reveals the white count is 10.9, hemoglobin of 10, platelets of 286.  BUN of
 46, creatinine of 1.2.  Troponin is 0.58.  The BNP is 7200.  The patient's procalcitonin is 1.15 yes
terday.  Microbiology reveals all the cultures are negative, except for the initial blood cultures we
re group G strep.



Currently, medications include the patient to be on ceftriaxone 2 grams daily.



ASSESSMENT AND PLAN:  An 81-year-old male with severe sepsis, status post shock and status post venti
latory dependent respiratory failure, acute on chronic renal failure with group G Strep bacteremia, p
robably secondary to lower extremity skin and skin soft tissue infection.  Also with methicillin sens
itive Staphylococcus aureus from the wound culture and now legs have improved with acute rhabdomyolys
is and acute non-ST elevation myocardial infarction with hypertension on top of acute on chronic marleny
l failure and dementia and obesity with a body mass index of 38.  Now, on ceftriaxone day #11.  Would
 complete 28 days with a CBC, SMA-18, sed rate, C-reactive protein once weekly.  The patient did have
 an echo, which showed no vegetations.  That is the second echo.  The patient had an echo on the 23rd
 and another echo on 3/28, read by Dr. Golden.  There is tricuspid valve thickening, opens well, no veg
etations are noted.  We will follow closely with you.





__________________________________________

Lj Baca MD







cc:



DD: 04/02/2017 09:03:45  350

TT: 04/02/2017 10:03:18

Confirmation # [id number]

Dictation # [id number]

en

## 2017-04-02 NOTE — PN
DATE: 04/02/2017



The patient was seen and examined in the ICU.  He is markedly improved, even compared to yesterday.  
He is not in any respiratory distress.  Currently, he is on nasal cannula with oxygen saturation of 9
5%.



PHYSICAL EXAMINATION:

VITAL SIGNS:  His temperature is 98.4, pulse 84, respirations 20, blood pressure is 117/78.

HEAD:  Normocephalic and atraumatic.

NECK:  Supple with 1+ jugular vein distention.

CHEST:  Symmetrical.

HEART:  S1, S2.  No S3.  Regular.

LUNGS:  Diminished breath sounds at both bases with few rhonchi, no wheezing.

GASTROINTESTINAL:  Soft, nontender with no organomegaly.

EXTREMITIES:  1+ pedal edema.

SKIN:  Clear with no skin rashes, no cyanosis.

NEUROLOGIC:  No focal deficits.



I reviewed today's chest x-ray, which reveals a left PICC line in place, clear lungs, no signs of con
gestive heart failure and no infiltrates.



ASSESSMENT:  The patient continues to improve.  He is status post hypercarbic respiratory failure sec
ondary to sedation, he is status post intubation x 2, congestive heart failure is present, non-ST roger
vation myocardial infarction is one of the problems on current admission and chronic obstructive pulm
onary disease, which has improved.



PLAN:  The patient's condition continues to improve.  The plan is to transfer him to telemetry today.
  I would avoid any form of sedation because patient suffered hypercarbic respiratory failure seconda
ry to sedation.  He is exceedingly sensitive to sedation and narcotic analgesics.  Today's arterial b
lood gas shows pH of 7.49, pCO2 of 36 and pO2 of 78, which is completely normal, so this is very good
 progress.  We will continue with same intervention.





__________________________________________

Zackery Cabrera MD







cc:



DD: 04/02/2017 10:38:33  [id number]

TT: 04/02/2017 11:37:14

Confirmation # [id number]

Dictation # [id number]

en

## 2017-04-03 LAB
ADD MANUAL DIFF?: NO
ALBUMIN/GLOB SERPL: 0.7 {RATIO} (ref 1.1–1.8)
ALP SERPL-CCNC: 62 U/L (ref 38–133)
ALT SERPL-CCNC: 65 U/L (ref 7–56)
AST SERPL-CCNC: 70 U/L (ref 15–59)
BASOPHILS # BLD AUTO: 0.03 K/MM3 (ref 0–2)
BASOPHILS NFR BLD: 0.2 % (ref 0–3)
BILIRUB SERPL-MCNC: 0.6 MG/DL (ref 0.2–1.3)
BUN SERPL-MCNC: 37 MG/DL (ref 7–21)
CALCIUM SERPL-MCNC: 9.6 MG/DL (ref 8.4–10.5)
CHLORIDE SERPL-SCNC: 105 MMOL/L (ref 95–110)
CO2 SERPL-SCNC: 31 MMOL/L (ref 21–33)
EOSINOPHIL # BLD: 0.1 10*3/UL (ref 0–0.7)
EOSINOPHIL NFR BLD: 1.1 % (ref 1.5–5)
ERYTHROCYTE [DISTWIDTH] IN BLOOD BY AUTOMATED COUNT: 14 % (ref 11.5–14.5)
GLOBULIN SER-MCNC: 3.5 GM/DL
GLUCOSE SERPL-MCNC: 151 MG/DL (ref 70–110)
GRANULOCYTES # BLD: 9.94 10*3/UL (ref 1.4–6.5)
GRANULOCYTES NFR BLD: 82.6 % (ref 50–68)
HCT VFR BLD CALC: 30.6 % (ref 42–52)
LYMPHOCYTES # BLD: 1 10*3/UL (ref 1.2–3.4)
LYMPHOCYTES NFR BLD AUTO: 8.1 % (ref 22–35)
MAGNESIUM SERPL-MCNC: 1.7 MG/DL (ref 1.7–2.2)
MCH RBC QN AUTO: 27.3 PG (ref 25–35)
MCHC RBC AUTO-ENTMCNC: 32.4 G/DL (ref 31–37)
MCV RBC AUTO: 84.3 FL (ref 80–105)
MONOCYTES # BLD AUTO: 1 10*3/UL (ref 0.1–0.6)
MONOCYTES NFR BLD: 8 % (ref 1–6)
PHOSPHATE SERPL-MCNC: 4.1 MG/DL (ref 2.5–4.5)
PLATELET # BLD: 310 10^3/UL (ref 120–450)
PMV BLD AUTO: 9.2 FL (ref 7–11)
POTASSIUM SERPL-SCNC: 4.1 MMOL/L (ref 3.6–5)
PROT SERPL-MCNC: 6 G/DL (ref 5.8–8.3)
SODIUM SERPL-SCNC: 143 MMOL/L (ref 132–148)
WBC # BLD AUTO: 12 10^3/UL (ref 4.5–11)

## 2017-04-03 RX ADMIN — IPRATROPIUM BROMIDE AND ALBUTEROL SULFATE SCH ML: .5; 3 SOLUTION RESPIRATORY (INHALATION) at 20:08

## 2017-04-03 RX ADMIN — IPRATROPIUM BROMIDE AND ALBUTEROL SULFATE SCH ML: .5; 3 SOLUTION RESPIRATORY (INHALATION) at 14:03

## 2017-04-03 RX ADMIN — CLOTRIMAZOLE SCH APPLIC: 1 CREAM TOPICAL at 17:21

## 2017-04-03 RX ADMIN — IPRATROPIUM BROMIDE AND ALBUTEROL SULFATE SCH ML: .5; 3 SOLUTION RESPIRATORY (INHALATION) at 07:11

## 2017-04-03 RX ADMIN — SILVER SULFADIAZINE SCH APPLIC: 10 CREAM TOPICAL at 10:03

## 2017-04-03 RX ADMIN — IPRATROPIUM BROMIDE AND ALBUTEROL SULFATE SCH: .5; 3 SOLUTION RESPIRATORY (INHALATION) at 02:47

## 2017-04-03 RX ADMIN — CLOTRIMAZOLE SCH APPLIC: 1 CREAM TOPICAL at 10:03

## 2017-04-03 NOTE — PN
DATE: 04/03/2017



SUBJECTIVE:  The patient appears comfortable this morning.  He is not short of 
breath at rest.



PHYSICAL EXAMINATION:

VITAL SIGNS:  Temperature is 98.6, pulse 91, respirations 18/20, blood pressure 
179/61.  Oxygen saturation on nasal cannula is %.

HEENT:  Normocephalic, atraumatic.  No JVD.

CARDIOVASCULAR:  Systolic ejection murmur at the lower left sternal border.  No 
S3 gallop.

LUNGS:  Minimal/less rhonchi.  No wheezing.

EXTREMITIES:  Mild edema.  No cyanosis, no clubbing.  Calves are nontender to 
palpation.

GASTROINTESTINAL:  Abdomen is soft, nontender, nondistended.  Bowel sounds are 
positive.

SKIN:  Improving cellulitis -- lower extremities (anterior aspects).

NEUROLOGIC:  Limited at the present time.



PERTINENT LABORATORY DATA:  Chest x-ray was repeated yesterday and reviewed.  
There is no active disease present.



IMPRESSION:

1.  Status post respiratory failure (multiple episodes).

2.  Chronic obstructive pulmonary disease.

3.  Bilateral cellulitis.

4.  Severe sepsis.

5.  Acute myocardial infarction.

6.  Renal insufficiency.

7.  Mild anemia.



PLAN:  The patient appears very comfortable this morning.  He is not short of 
breath at rest.  He states he is feeling much better overall.  On physical exam
, only minimal bronchospasm is noted.  In addition, the oxygen saturation on 
nasal cannula is 96%-100%.  I will continue with the current nebulizer 
treatments and aspiration precautions for now.  The patient remains on 
antibiotic therapy -- as per infectious disease.  Temperatures have resolved.  
The leukocytosis has also fully resolved.  I would continue with the treatment 
for acute myocardial infarction as per cardiology.  Clinical status of the 
patient is significantly improved -- compared to the end of last week.  However
, again, the overall status/prognosis of this patient remains very guarded.  I 
will discuss the above with the attending physician.





__________________________________________

Balwinder Fall MD







cc:   



DD: 04/03/2017 06:43:08  [id number]

TT: 04/03/2017 07:34:49

Confirmation # [id number]

Dictation # [id number]

en

MTDD

## 2017-04-03 NOTE — PN
DATE: 04/03/2017



SUBJECTIVE:  The patient is lying in bed in no acute distress.  There is no chest pain or shortness o
f breath.



OBJECTIVE:

VITAL SIGNS:  Blood pressure 179/65, pulse 101, temperature 99, respiratory rate 22.

LUNGS:  Show bibasilar crackles.

HEART:  Regular rate and rhythm.  Hepatojugular reflux is present.

ABDOMEN:  Soft, nontender.  Bowel sounds are normoactive.  

EXTREMITIES:  With dependent and presacral edema.

NEUROLOGIC:  The patient is awake and responsive without focal sensory or motor deficits.

SKIN:  Warm and dry.



LABORATORY DATA:  WBC is 12.0, hemoglobin 9.9, hematocrit 30.6.



IMPRESSION:

1.  Hypertension, hypertensive cardiovascular disease, and congestive heart failure.

2.  Coronary artery disease status post non-ST segment elevation myocardial infarction.

3.  Status post acute rhabdomyolysis with acute renal failure superimposed on mild chronic kidney dis
ease.

4.  Status post sepsis, presumed secondary to cellulitis of the lower extremities with chronic venous
 stasis ulcers.

5.  Paroxysmal atrial fibrillation with intermittent rapid ventricular response.

6.  Dementia with mild superimposed delirium secondary to underlying metabolic disorder.  

7.  Immobility secondary to severe degenerative joint disease and obesity with deconditioning.



PLAN:  We will continue diuresis with IV Lasix.  Consider start angiotensin receptor blocker, possibl
y discontinue amlodipine secondary to edema.  Physical therapy evaluation and treatment.  Social work
 for discharge planning.  Continue cardiology, infectious disease, and pulmonary followup.





__________________________________________

Ariel Browne JD, MD







cc:



DD: 04/03/2017 10:26:26  [id number]

TT: 04/03/2017 10:54:09

Confirmation # [id number]

Dictation # [id number]

jn

## 2017-04-03 NOTE — PN
DATE: 04/03/2017



SUBJECTIVE:  The patient has no complaints of any chest pain or shortness of breath, no headaches.



PHYSICAL EXAMINATION:    

VITAL SIGNS:   Temperature is 98.6, pulse of 81, blood pressure 179/61, respirations 21.  

GENERAL:   The patient comfortable, in no acute distress.

HEENT:   Anicteric sclerae.  Moist mucosa.

NECK:   No JVD or adenopathy.

CARDIAC:   S1/S2.  No murmurs.  No rubs.  Regular.

RESPIRATORY:   Clear to auscultation bilaterally.  No wheezes, rales, or rhonchi.  Good air entry.

ABDOMEN:   Bowel sounds are positive, soft, nontender, and nondistended.

EXTREMITIES:   No edema.  Has 1+ pulses.



ASSESSMENT:    

1.  Delirium.

2.  Sepsis.

3.  Acute kidney injury secondary to rhabdomyolysis, resolved.

4.  Hypoxic respiratory failure, status post extubation.

5.  Proteinuria, 2 grams per day.

6.  Non-ST-segment elevation myocardial infarction. 

7.  Hypophosphatemia. 

8.  Transaminitis, improving.

9.  Hypernatremia, improved.  

10.  Right knee pain secondary to degenerative joint disease. 

11.  Left foot wound with staphylococcus infection.

12.  Hematuria, resolved. 

 

PLAN:   The patient is currently comfortable.  His kidney function is back to normal.  Last creatinin
e was 1.2.  His white count also has been back to normal.  He is confused.  He had liver functions th
at have also normalized.  His blood culture has been negative.  His urine cultures have been negative
.  He is on nebulized treatments.  He is receiving metoprolol and aspirin for his non-ST-segment elev
ation myocardial infarction.  The patient had Protonix IV.  The patient is going to continue on Rispe
rdal for his delirium.  We will continue to follow up closely.  He will most likely need physical the
rapy at a subacute rehab facility.  I will speak to the patient's wife to give her an update on the p
venecia's diagnosis and plan of care.





__________________________________________

Jose Martin Arguello MD







cc:



DD: 04/03/2017 06:30:09  [id number]

TT: 04/03/2017 07:38:55

Confirmation # [id number]

Dictation # [id number]

mn

## 2017-04-03 NOTE — CP.PCM.PN
Subjective





- Date & Time of Evaluation


Date of Evaluation: 04/03/17


Time of Evaluation: 00:37





- Subjective


Subjective: 


Patient was seen at bedside.


Trying to get out of bed.Restless.


Has no complaints.


States that he is in [place] in his own building.


Medical record was reviewed.


This 81 year old white male was admitted with right knee pain, sepsis, CARLOS.


 Has PMH of dementia, HTN, nephrolithiasis, hearing imipairment, cataract, 

obesity.


FSBS:131 mg %.


Pulse ox 96% on 3L/min.





Objective





- Vital Signs/Intake and Output


Vital Signs (last 24 hours): 


 











Temp Pulse Resp BP Pulse Ox


 


 98.7 F   71   16   162/58 H  98 


 


 04/02/17 18:00  04/02/17 18:00  04/02/17 18:00  04/02/17 18:00  04/02/17 18:00








Intake and Output: 


 











 04/02/17 04/03/17





 18:59 06:59


 


Intake Total 460 


 


Output Total 100 


 


Balance 360 














- Medications


Medications: 


 Current Medications





Acetaminophen (Tylenol 325mg Tab)  650 mg PO Q4 PRN


   PRN Reason: Fever >100.4 F


   Last Admin: 03/23/17 09:15 Dose:  650 mg


Albuterol/Ipratropium (Duoneb 3 Mg/0.5 Mg (3 Ml) Ud)  3 ml IH [id number] [facility]


   Last Admin: 04/02/17 21:24 Dose:  3 ml


Amlodipine Besylate (Norvasc)  10 mg PO DAILY [facility]


   Last Admin: 04/02/17 09:56 Dose:  10 mg


Aspirin (Aspirin)  325 mg PO DAILY [facility]


   Last Admin: 04/02/17 09:56 Dose:  325 mg


Clotrimazole (Lotrimin 1%)  0 gm TOP BID [facility]


   Last Admin: 04/02/17 17:54 Dose:  1 applic


Furosemide (Lasix)  40 mg IVP DAILY [facility]


   Last Admin: 04/01/17 09:00 Dose:  40 mg


Heparin Sodium (Porcine) (Heparin)  5,000 units SC Q12 [facility]


   PRN Reason: Protocol


   Last Admin: 04/02/17 22:57 Dose:  Not Given


Hydralazine HCl (Apresoline)  10 mg IVP Q6 PRN


   PRN Reason: Systolic Blood Pressure


   Last Admin: 04/02/17 09:56 Dose:  10 mg


Ceftriaxone Sodium (Rocephin 2 Gm Ivpb)  100 mls @ 100 mls/hr IVPB DAILY [facility]


   PRN Reason: Protocol


   Stop: 04/19/17 10:01


   Last Admin: 04/02/17 09:59 Dose:  100 mls/hr


Lorazepam (Ativan)  0.5 mg IVP STAT STA


   PRN Reason: Protocol


   Stop: 04/03/17 00:34


Metoprolol Tartrate (Lopressor)  50 mg PO 0800,1800 [facility]


   Last Admin: 04/02/17 17:54 Dose:  50 mg


Pantoprazole Sodium (Protonix Inj)  40 mg IVP 0630 [facility]


   Last Admin: 04/02/17 05:30 Dose:  40 mg


Risperidone (Risperdal Tab)  0.5 mg PO DAILY [facility]


   PRN Reason: Protocol


   Last Admin: 04/02/17 09:57 Dose:  0.5 mg


Silver Sulfadiazine (Silvadene 1% 20 Gm)  0 ea TOP DAILY [facility]


   Last Admin: 04/02/17 10:25 Dose:  1 applic


Vitamin A (Vitamin A & D Oint Ud Foilpak)  1 ea TOP BID PRN


   PRN Reason: chapped lips


   Last Admin: 04/02/17 09:56 Dose:  1 ea











- Labs


Labs: 


 





 04/02/17 06:00 





 04/02/17 06:00 





 











PT  12.1 Seconds (9.9-11.8)  H  03/25/17  06:15    


 


INR  1.12  (0.93-1.08)  H  03/25/17  06:15    


 


APTT  87.8 Seconds (23.7-30.8)  H*  03/30/17  06:20    














- Constitutional


Appears: Well, No Acute Distress





- Head Exam


Head Exam: ATRAUMATIC, NORMAL INSPECTION, NORMOCEPHALIC





- Eye Exam


Eye Exam: Normal appearance





- ENT Exam


ENT Exam: Mucous Membranes Moist





- Neck Exam


Neck Exam: Normal Inspection





- Respiratory Exam


Respiratory Exam: NORMAL BREATHING PATTERN





- Cardiovascular Exam


Cardiovascular Exam: absent: JVD





- GI/Abdominal Exam


GI & Abdominal Exam: absent: Distended





- Rectal Exam


Rectal Exam: Deferred





- Extremities Exam


Extremities Exam: Normal Inspection





- Back Exam


Back Exam: NORMAL INSPECTION





- Neurological Exam


Neurological Exam: Altered (Mental status.)





- Psychiatric Exam


Psychiatric exam: Normal Affect, Normal Mood





- Skin


Skin Exam: Normal Color





Assessment and Plan





- Assessment and Plan (Free Text)


Assessment: 


A/P : Restlessness.


        Agitation.


        Dementia.


        HTN.


        Obesity.


        Pulse ox stat-96% on 3L/min.


        FSBS STAT-131 mg %.


        Benadryl 50 mg IV STAT.


        Move him closer to nursing station.

## 2017-04-03 NOTE — CON
DATE: 04/03/2017



HISTORY OF PRESENT ILLNESS:  The patient is an 81-year-old male.  I reviewed his chart, spoke to nurs
ing staff, spoke with patient.  Patient was brought to the Emergency Room with complaints of right kn
ee pain.  Has a history of hypertension, dementia.  The patient stated he fell at home, but he is not
 a good historian.  The patient has been noted by family according to chart, he is having short term 
memory loss.  The patient has had while in the hospital, treatment for acute kidney injury, sepsis.  
He has had hypophosphatemia.  He was found to have degenerative joint disease.  He had elevated tropo
july levels, probable non-ST wave MI, and proteinuria and obesity.



PAST MEDICAL HISTORY:  He had a past history of hypertension, dementia, falls.  The patient has coron
aakash artery disease.  He has a history of COPD.  The patient has had rhabdomyolysis, obesity and decon
ditioning.



CURRENT LABORATORY DATA:  His CBC, his white count is 12,000, it was as high as 25,400, hemoglobin 9.
9, platelet count 310,000.  His metabolic profile show his electrolytes have been within normal range
.  BUN is 37, creatinine 1.3, estimated GFR of 53.  On admission, his estimated GFR was 39.  The adrián
ent's hemoglobin A1c was 167.  His random glucose today 151.  His troponin 1 level yesterday was 0.58
, BNP was 7200, albumin 2.5.  The patient had a CT scan of the head on 03/22/2017, showing a large campos
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  There is also perive
ntricular white matter and scattered chronic bilateral basilar nuclear ischemic changes.



PERSONAL HISTORY:  He is .  He lives with his wife.



CURRENT MEDICATIONS:  Include:  Apresoline, aspirin, DuoNeb treatments, heparin, Lasix, Lopressor, Lo
trimin, Norvasc, Protonix.  He is receiving vitamin A, Rocephin IVPB.



PHYSICAL EXAMINATION:

VITAL SIGNS:  His blood pressure is 179/65, pulse 101, respirations 20 per minute, O2 saturation 94% 
on nasal cannula.

PSYCHIATRIC MENTAL STATUS:  He is awake.  He is sitting in a chair.  Speech is slightly dysarthric, s
omewhat disorganized.  He is confused, disoriented to place, date, thinks it is January.  Patient's r
ecent memory is clouded as is his insight and judgment.  Claims he is seeing animals, specifically, s
quirrels in his room as we currently were talking.  Recent and intermediate memory is poor.  He has c
onfabulatory thinking.



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
al hallucinations.  He has status post non-ST wave myocardial infarction, rhabdomyolysis, sepsis.  Th
e patient has had a frontoparietal atrophy of the brain.  He has paroxysmal atrial fibrillation, obes
ity, chronic obstructive pulmonary disease, congestive heart failure, hypertensive cardiovascular dis
ease, degenerative joint disease, and deconditioning.



PLAN:  We will increase Risperdal to 0.25 mg b.i.d. and 0.5 mg at bedtime.  We will leave a p.r.n. or
bobbi for ziprasidone 10 mg IM q. 6 hours p.r.n. for agitation.  Discussed the case at length with nurs
ing staff.  The patient's bed is right in front nurses' station but should his room change, he should
 have 1 to 1 observation.  We will continue to closely monitor his mental status and follow up tomorr
ow.





__________________________________________

Jamey Mckinley MD







cc:



DD: 04/03/2017 12:02:16  [id number]

TT: 04/03/2017 13:49:26

Confirmation # [id number]

Dictation # [id number]

jn

## 2017-04-04 LAB
ADD MANUAL DIFF?: NO
ALBUMIN/GLOB SERPL: 0.7 {RATIO} (ref 1.1–1.8)
ALP SERPL-CCNC: 63 U/L (ref 38–133)
ALT SERPL-CCNC: 48 U/L (ref 7–56)
AST SERPL-CCNC: 53 U/L (ref 15–59)
BASOPHILS # BLD AUTO: 0.03 K/MM3 (ref 0–2)
BASOPHILS NFR BLD: 0.3 % (ref 0–3)
BILIRUB SERPL-MCNC: 0.8 MG/DL (ref 0.2–1.3)
BUN SERPL-MCNC: 31 MG/DL (ref 7–21)
CALCIUM SERPL-MCNC: 10 MG/DL (ref 8.4–10.5)
CHLORIDE SERPL-SCNC: 104 MMOL/L (ref 98–107)
CO2 SERPL-SCNC: 31 MMOL/L (ref 21–33)
EOSINOPHIL # BLD: 0.1 10*3/UL (ref 0–0.7)
EOSINOPHIL NFR BLD: 1.4 % (ref 1.5–5)
ERYTHROCYTE [DISTWIDTH] IN BLOOD BY AUTOMATED COUNT: 13.9 % (ref 11.5–14.5)
GLOBULIN SER-MCNC: 3.8 GM/DL
GLUCOSE SERPL-MCNC: 128 MG/DL (ref 70–110)
GRANULOCYTES # BLD: 8.43 10*3/UL (ref 1.4–6.5)
GRANULOCYTES NFR BLD: 81.6 % (ref 50–68)
HCT VFR BLD CALC: 28.4 % (ref 42–52)
LYMPHOCYTES # BLD: 0.7 10*3/UL (ref 1.2–3.4)
LYMPHOCYTES NFR BLD AUTO: 6.9 % (ref 22–35)
MCH RBC QN AUTO: 27.2 PG (ref 25–35)
MCHC RBC AUTO-ENTMCNC: 32 G/DL (ref 31–37)
MCV RBC AUTO: 84.8 FL (ref 80–105)
MONOCYTES # BLD AUTO: 1 10*3/UL (ref 0.1–0.6)
MONOCYTES NFR BLD: 9.8 % (ref 1–6)
PLATELET # BLD: 302 10^3/UL (ref 120–450)
PMV BLD AUTO: 9.1 FL (ref 7–11)
POTASSIUM SERPL-SCNC: 3.9 MMOL/L (ref 3.6–5)
PROT SERPL-MCNC: 6.4 G/DL (ref 5.8–8.3)
SODIUM SERPL-SCNC: 142 MMOL/L (ref 132–148)
WBC # BLD AUTO: 10.3 10^3/UL (ref 4.5–11)

## 2017-04-04 RX ADMIN — CLOTRIMAZOLE SCH APPLIC: 1 CREAM TOPICAL at 12:58

## 2017-04-04 RX ADMIN — IPRATROPIUM BROMIDE AND ALBUTEROL SULFATE SCH ML: .5; 3 SOLUTION RESPIRATORY (INHALATION) at 08:24

## 2017-04-04 RX ADMIN — VITAMIN A AND VITAMIN D PRN EA: 929.3 OINTMENT TOPICAL at 21:43

## 2017-04-04 RX ADMIN — IPRATROPIUM BROMIDE AND ALBUTEROL SULFATE SCH ML: .5; 3 SOLUTION RESPIRATORY (INHALATION) at 14:43

## 2017-04-04 RX ADMIN — CLOTRIMAZOLE SCH: 1 CREAM TOPICAL at 17:43

## 2017-04-04 RX ADMIN — SILVER SULFADIAZINE SCH APPLIC: 10 CREAM TOPICAL at 12:58

## 2017-04-04 RX ADMIN — IPRATROPIUM BROMIDE AND ALBUTEROL SULFATE SCH ML: .5; 3 SOLUTION RESPIRATORY (INHALATION) at 01:47

## 2017-04-04 RX ADMIN — IPRATROPIUM BROMIDE AND ALBUTEROL SULFATE SCH ML: .5; 3 SOLUTION RESPIRATORY (INHALATION) at 19:30

## 2017-04-04 RX ADMIN — PANTOPRAZOLE SODIUM SCH MG: 40 TABLET, DELAYED RELEASE ORAL at 08:47

## 2017-04-04 NOTE — PN
DATE: 04/04/2017



The patient is an 81-year-old male asked to be evaluated due to confusion and agitation.  The patient
 initially evaluated by me yesterday.  Found to be having visual hallucinations and confusion and dis
organized thinking.  I spoke to patient's wife at bedside.  I reviewed nurse's notes.  The patient wa
s awake at 2 a.m., was confused with some restlessness.  The patient was sleeping again at 4 a.m., aw
vazquez at 6 a.m. The patient's wife stated that she did not want patient on any antipsychotic medicine. 
 I explained that he has been having visual hallucinations.  She felt that that was irrelevant.  



CURRENT MENTAL STATUS:  He is awake, he is confused.  He is aware that he is in the hospital.  Disori
ented to month, year and day.  He seems somewhat apathetic, but awake.  He does not appear to be havi
ng any ____ from any psychotropic medication.  The patient denies having any visual phenomenon.  In r
eview of the medications, he ____ any psychotropic medications and he did not receive any Risperdal y
esterday, probably at the urging of his wife.



CURRENT OTHER MEDICATIONS:  Include Rocephin IV.  The patient is receiving Protonix, Norvasc, Lopress
or, Lasix, heparin, DuoNeb treatment, aspirin, Apresoline.



CURRENT LABORATORY DATA:  His white count is 10,300, hemoglobin of 9.1, platelet count of 302,000.  M
etabolic profile:  His electrolytes were all within normal range.  The BUN is 31, creatinine 1.2, est
imated GFR 58, glucose 128.  His albumin was 2.7.  The rest of parameters were all unremarkable.



VITAL SIGNS:  Blood pressure 133/50, pulse 89, respirations 18 per minute.



IMPRESSION:  The patient has resolving delirium, he has severe dementia with recent visual hallucinat
ions, none today; status post non-ST-wave myocardial infarction, mild rhabdomyolysis, sepsis.  He has
 frontoparietal atrophy of the brain.  He has paroxysmal atrial fibrillation, history of obesity, his
tory of chronic obstructive pulmonary disease, history of chronic kidney disease.



PLAN:  Will discuss the case with the consulting physician, Dr. Arguello.  Will discontinue Risperdal
 during the day; leave the order for tonight, and leave a p.r.n. order for Geodon in case the patient
 gets very agitated.  Continue to monitor mental status.





__________________________________________

Jamey Mckinley MD







cc:



DD: 04/04/2017 11:40:48  [id number]

TT: 04/04/2017 12:06:43

Confirmation # [id number]

Dictation # [id number]

mn

## 2017-04-04 NOTE — PN
DATE: 04/04/2017



SUBJECTIVE:  The patient appears comfortable this morning.  He is not short of 
breath at rest.



PHYSICAL EXAMINATION:

VITAL SIGNS:  Temperature is 98.1, pulse on the monitor is 84, respiratory rate 
18/20, blood pressure 157/66.  Oxygen saturation on nasal cannula is 96%.

HEENT:  Normocephalic, atraumatic.  No JVD.

CARDIOVASCULAR:  Systolic ejection murmur at the lower left sternal border.  No 
S3 gallop.

LUNGS:  Minimal bilateral rhonchi.  No wheezing.

EXTREMITIES:  Mild edema.  No cyanosis, no clubbing.  Calves are nontender to 
palpation.

GASTROINTESTINAL:  Abdomen is soft, nontender, nondistended.  Bowel sounds are 
positive.

SKIN:  Resolving cellulitis - lower extremities (anterior aspects).

NEUROLOGIC:  Limited at the present time.



IMPRESSION:

1.  Status post respiratory failure (multiple episodes).

2.  Chronic obstructive pulmonary disease.

3.  Bilateral cellulitis.

4.  Severe sepsis.

5.  Acute myocardial infarction.

6.  Renal insufficiency.

7.  Mild anemia.



PLAN:  The patient appears very comfortable this morning.  He is not short of 
breath at rest.  He states he is feeling better overall.  On physical exam, 
only minimal bronchospasm is noted.  In addition, the oxygen saturation on 
nasal cannula is now 96%.  I will continue with the current nebulizer 
treatments and aspiration precautions.  I would continue with the antibiotic 
coverage as per infectious disease.  Input by Dr. Baca is noted.  I would 
continue with the treatment for acute myocardial infarction as per cardiology.  
Again, clinically, the patient is significantly improved - compared to last 
week.  However, again, the overall status/prognosis of this patient remains 
very guarded.  I will discuss the above with the attending physician.





__________________________________________

Balwinder Fall MD







cc:   



DD: 04/04/2017 06:36:43  [id number]

TT: 04/04/2017 09:16:15

Confirmation # [id number]

Dictation # [id number]

abelardo JOYCE

## 2017-04-04 NOTE — CON
DATE: 04/04/2017



HISTORY OF PRESENT ILLNESS:  I was asked to see the patient because the patient had pulled out a Fole
y catheter approximately a week ago and there was some question of some periurethral bleeding.  He is
 incontinent.  His wife is at the bedside.  According to her, he has been going in large quantities w
ithout any problems.  There was no blood on any of the Depends that I saw.  He has no suprapubic full
ness. He does not appear to be uncomfortable at all. The patient was admitted for sepsis and was grow
ing gram-positive organisms.  Does not appear to be urologic in origin.  He was admitted with a histo
ry of severe pain in his right knee.  He had elevated troponins.  He has multiple lesions on his feet
 and legs, which may be the source of his positive cultures.



PAST MEDICAL HISTORY:  Significant that he is under the care of Dr. Howard for his skin lesions and 
his healing of his ulcerated areas.



ALLERGIES:  He has no allergies.



MEDICATIONS:  At home, he is on Cozaar and Lasix.



SOCIAL HISTORY:  Noncontributory.



FAMILY HISTORY:  Noncontributory.



REVIEW OF SYMPTOMS:  Difficult to obtain, but no apparent symptoms referable to the head, eyes, ears,
 nose or throat.  No cardiac or respiratory symptoms at this time.



PHYSICAL EXAMINATION:

VITAL SIGNS:  Shows him to be afebrile, pulse 92, blood pressure 178/68, respirations 20.

ABDOMEN:  Completely soft.  No rebound or guarding.

GENITALIA:  Penis, scrotum, cord, epididymis normal.  There is no evidence of any urethritis or any l
esions at the urethral meatus.  



He appears to be voiding in large amounts.  I asked the nurse to scan him just to make sure that his 
bladder was empty and if so, there is no need for any further urologic involvement.





__________________________________________

Emmanuel Doran MD







cc:



DD: 04/04/2017 19:10:10  [id number]

TT: 04/04/2017 20:16:35

Confirmation # [id number]

Dictation # [id number]

berenice

## 2017-04-04 NOTE — CP.PCM.PN
Subjective





- Date & Time of Evaluation


Date of Evaluation: 04/04/17


Time of Evaluation: 08:40





- Subjective


Subjective: 


Comfortable in bed, no shortness of breath at rest, no fevers overnight, not in 

distress.





Objective





- Vital Signs/Intake and Output


Vital Signs (last 24 hours): 


 











Temp Pulse Resp BP Pulse Ox


 


 97.1 F L  85   19   150/54 L  96 


 


 04/04/17 12:00  04/04/17 12:00  04/04/17 12:00  04/04/17 12:00  04/04/17 06:00








Intake and Output: 


 











 04/04/17 04/04/17





 06:59 18:59


 


Intake Total 340 


 


Balance 340 














- Medications


Medications: 


 Current Medications





Acetaminophen (Tylenol 325mg Tab)  650 mg PO Q4 PRN


   PRN Reason: Fever >100.4 F


   Last Admin: 03/23/17 09:15 Dose:  650 mg


Albuterol/Ipratropium (Duoneb 3 Mg/0.5 Mg (3 Ml) Ud)  3 ml IH [id number] [facility]


   Last Admin: 04/04/17 08:24 Dose:  3 ml


Albuterol/Ipratropium (Duoneb 3 Mg/0.5 Mg (3 Ml) Ud)  3 ml IH Q2H PRN


   PRN Reason: Shortness of Breath


Aspirin (Aspirin)  325 mg PO DAILY [facility]


   Last Admin: 04/04/17 11:17 Dose:  325 mg


Clotrimazole (Lotrimin 1%)  0 gm TOP BID [facility]


   Last Admin: 04/04/17 12:58 Dose:  1 applic


Furosemide (Lasix)  80 mg PO DAILY [facility]


Heparin Sodium (Porcine) (Heparin)  5,000 units SC Q12 [facility]


   PRN Reason: Protocol


   Last Admin: 04/04/17 11:17 Dose:  5,000 units


Hydralazine HCl (Apresoline)  10 mg IVP Q6 PRN


   PRN Reason: Systolic Blood Pressure


   Last Admin: 04/02/17 09:56 Dose:  10 mg


Ceftriaxone Sodium (Rocephin 2 Gm Ivpb)  100 mls @ 100 mls/hr IVPB DAILY [facility]


   PRN Reason: Protocol


   Stop: 04/19/17 10:01


   Last Admin: 04/04/17 11:19 Dose:  100 mls/hr


Losartan Potassium (Cozaar)  25 mg PO DAILY [facility]


Metoprolol Tartrate (Lopressor)  50 mg PO 0800,1800 [facility]


   Last Admin: 04/04/17 08:47 Dose:  50 mg


Pantoprazole Sodium (Protonix Ec Tab)  40 mg PO ACB [facility]


   Last Admin: 04/04/17 08:47 Dose:  40 mg


Silver Sulfadiazine (Silvadene 1% 20 Gm)  0 ea TOP DAILY [facility]


   Last Admin: 04/04/17 12:58 Dose:  1 applic


Vitamin A (Vitamin A & D Oint Ud Foilpak)  1 ea TOP BID PRN


   PRN Reason: chapped lips


   Last Admin: 04/02/17 09:56 Dose:  1 ea











- Labs


Labs: 


 





 04/04/17 06:25 





 04/04/17 06:25 





 











PT  12.1 Seconds (9.9-11.8)  H  03/25/17  06:15    


 


INR  1.12  (0.93-1.08)  H  03/25/17  06:15    


 


APTT  87.8 Seconds (23.7-30.8)  H*  03/30/17  06:20    














- Constitutional


Appears: Non-toxic, No Acute Distress





- Head Exam


Head Exam: NORMAL INSPECTION





- ENT Exam


ENT Exam: Mucous Membranes Moist





- Neck Exam


Neck Exam: absent: Lymphadenopathy, Meningismus





- Respiratory Exam


Respiratory Exam: Decreased Breath Sounds





- Cardiovascular Exam


Cardiovascular Exam: +S1, +S2





- GI/Abdominal Exam


GI & Abdominal Exam: Soft.  absent: Tenderness





- Extremities Exam


Additional comments: 


decreased swelling of the lower extremities, decreased erythema





Assessment and Plan





- Assessment and Plan (Free Text)


Plan: 


Assessment


severe sepsis S/P shock S/P ventilator-dependent respiratory failure with acute 

on chronic renal failure due to Group G strep bacteremia, probably secondary to 

bilateral lower extremities skin and skin structure infection; also with MSSA 

from the wound cultures; so far no evidence of new infection; patient is 

clinically improving; he was intubated for hypercarbic respiratory failure and 

congestion, no evidence of pneumonia currently


acute rhabdomyolysis


consider acute NSTEMI


HTN


chronic renal failure


dementia


obesity with BMI 38





Plan


continue Rocephin (day 11 from first negative blood cx); repeat septic work up 

so far negative, PCT is improving; CXR does not show focal infiltrates; patient 

should finish 28 days of antibiotics


2D echocardiogram didnot show vegetations


will continue to monitor clinically

## 2017-04-04 NOTE — PN
DATE: 04/04/2017



SUBJECTIVE:  The patient is lying in bed in no acute distress.  There is no chest pain, no shortness 
breath.  He remains confused, not agitated.



OBJECTIVE:

VITAL SIGNS:  Blood pressure 150/54, temperature 97.1, pulse 85, respiratory rate 19.

LUNGS:  Show a few bibasilar rales.

HEART:  Regular rate and rhythm.  Hepatojugular reflux is present.

ABDOMEN:  Soft, nontender, bowel sounds are normoactive.

EXTREMITIES:  With marked dependent edema.

NEUROLOGIC:  The patient is awake and responsive without focal sensory or motor deficits.  He remains
 confused.

SKIN:  Warm and dry.



LABORATORY DATA:  WBC is 10.3, hemoglobin 9.1, hematocrit 28.4, sodium 142, potassium 3.9, chloride 1
04, CO2 31, BUN 31, creatinine 1.2, glucose 128.



IMPRESSION:

1.  Hypertension, hypertensive cardiovascular disease and congestive heart failure.

2.  Coronary artery disease status post non-ST-segment elevation myocardial infarction.

3.  Status post acute rhabdomyolysis with acute renal failure superimposed on mild chronic kidney dis
ease.

4.  Status post sepsis presumed secondary to cellulitis of the lower extremities with chronic venous 
stasis ulcers.

5.  Paroxysmal atrial fibrillation with intermittent rapid ventricular response.

6.  Dementia with mild superimposed delirium secondary to underlying medical problem.

7.  Immobility secondary to severe degenerative joint disease with obesity and deconditioning.



PLAN:  Case was discussed with patient's wife.  She is adamantly opposed to the use of any physical r
estraints and/or neuroleptic medications.  Will order 1:1 observation and discontinue Geodon and Risp
erdal.  Will discontinue IV Lasix and start Lasix 80 mg p.o. daily.  Will discontinue amlodipine seco
ndary to the peripheral edema and start losartan 50 mg daily.  The patient is for possible transfer t
o subacute care at [facility] tomorrow.





__________________________________________

Ariel Browne JD, MD







cc:



DD: 04/04/2017 14:17:00  [id number]

TT: 04/04/2017 14:35:51

Confirmation # [id number]

Dictation # [id number]

mn

## 2017-04-04 NOTE — PN
DATE: 04/04/2017



SUBJECTIVE:  This has episodes of confusion.  He has no complaints of headache or dizziness.  He says
 he would like to go home.



PHYSICAL EXAMINATION:

VITAL SIGNS:  Temperature is 99.1, pulse of 80, blood pressure is 136/42, respirations 22.

GENERAL:   The patient comfortable, in no acute distress.

HEENT:   Anicteric sclerae.  Moist mucosa.

NECK:   No JVD or adenopathy.

CARDIAC:   S1/S2.  No murmurs.  No rubs.  Regular.

RESPIRATORY:   Clear to auscultation bilaterally.  No wheezes, rales, or rhonchi.  Good air entry.

ABDOMEN:   Bowel sounds are positive, soft, nontender, and nondistended.

EXTREMITIES:   No edema.  Has 1+ pulses.



LABORATORY:  White count of 12.0, hemoglobin 9.9 _____.



ASSESSMENT:

1.  Hematuria.

2.  Delirium.

3.  Sepsis.

4.  Acute kidney injury secondary to rhabdomyolysis, resolved.

5.  Hypoxic respiratory failure, resolved.

6.  Proteinuria.  

7.  Non-ST elevation myocardial infarction, resolved.

8.  Hypophosphatemia, improved.

9.  Transaminitis, resolved.

10.  Hypernatremia, resolved.

11.  Right knee pain secondary to degenerative joint disease.

12.  Left foot wound with Staphylococcus infection, improved.



PLAN:  The patient is currently comfortable.  He needs to be in restraints to prevent self-harm.  The
 patient was seen by Dr. Mckinley for his agitation.  He had increased his Risperdal 0.25 b.i.d. and 0.
5 at bedtime.  He is on aspirin for his coronary artery disease.  He is receiving nebulizer treatment
s.  The patient is on ziprasidone for agitation p.r.n.  He is on heparin for DVT prophylaxis.  The parviz batres is receiving metoprolol for his non-ST elevation MI.  He is on Norvasc for hypertension.  He is
 receiving Protonix daily.  The patient is on Rocephin for antibiotics.  Overall prognosis is guarded
.  Unable to discharge until the patient's mental has improved.  I did speak to the patient's wife ye
sterday and I will give her an update on patient's diagnosis and plan of care.





__________________________________________

Jose Martin Arguello MD







cc:



DD: 04/04/2017 06:07:28  [id number]

TT: 04/04/2017 09:17:38

Confirmation # [id number]

Dictation # [id number]

mn

## 2017-04-05 LAB
ADD MANUAL DIFF?: NO
ALBUMIN/GLOB SERPL: 0.6 {RATIO} (ref 1.1–1.8)
ALP SERPL-CCNC: 55 U/L (ref 38–133)
ALT SERPL-CCNC: 57 U/L (ref 7–56)
APTT BLD: 33.2 SECONDS (ref 23.7–30.8)
AST SERPL-CCNC: 74 U/L (ref 15–59)
BASOPHILS # BLD AUTO: 0.03 K/MM3 (ref 0–2)
BASOPHILS NFR BLD: 0.3 % (ref 0–3)
BILIRUB SERPL-MCNC: 0.5 MG/DL (ref 0.2–1.3)
BUN SERPL-MCNC: 31 MG/DL (ref 7–21)
CALCIUM SERPL-MCNC: 9.9 MG/DL (ref 8.4–10.5)
CHLORIDE SERPL-SCNC: 101 MMOL/L (ref 98–107)
CO2 SERPL-SCNC: 35 MMOL/L (ref 21–33)
EOSINOPHIL # BLD: 0.2 10*3/UL (ref 0–0.7)
EOSINOPHIL NFR BLD: 2.3 % (ref 1.5–5)
ERYTHROCYTE [DISTWIDTH] IN BLOOD BY AUTOMATED COUNT: 13.8 % (ref 11.5–14.5)
GLOBULIN SER-MCNC: 3.9 GM/DL
GLUCOSE SERPL-MCNC: 127 MG/DL (ref 70–110)
GRANULOCYTES # BLD: 7.54 10*3/UL (ref 1.4–6.5)
GRANULOCYTES NFR BLD: 75.9 % (ref 50–68)
HCT VFR BLD CALC: 27.3 % (ref 42–52)
INR PPP: 1.27 (ref 0.93–1.08)
LYMPHOCYTES # BLD: 1.2 10*3/UL (ref 1.2–3.4)
LYMPHOCYTES NFR BLD AUTO: 12.2 % (ref 22–35)
MCH RBC QN AUTO: 27.4 PG (ref 25–35)
MCHC RBC AUTO-ENTMCNC: 32.2 G/DL (ref 31–37)
MCV RBC AUTO: 85 FL (ref 80–105)
MONOCYTES # BLD AUTO: 0.9 10*3/UL (ref 0.1–0.6)
MONOCYTES NFR BLD: 9.3 % (ref 1–6)
PLATELET # BLD: 301 10^3/UL (ref 120–450)
PMV BLD AUTO: 9 FL (ref 7–11)
POTASSIUM SERPL-SCNC: 3.8 MMOL/L (ref 3.6–5)
PROT SERPL-MCNC: 6.4 G/DL (ref 5.8–8.3)
SODIUM SERPL-SCNC: 143 MMOL/L (ref 132–148)
WBC # BLD AUTO: 9.9 10^3/UL (ref 4.5–11)

## 2017-04-05 RX ADMIN — IPRATROPIUM BROMIDE AND ALBUTEROL SULFATE SCH ML: .5; 3 SOLUTION RESPIRATORY (INHALATION) at 20:01

## 2017-04-05 RX ADMIN — IPRATROPIUM BROMIDE AND ALBUTEROL SULFATE PRN ML: .5; 3 SOLUTION RESPIRATORY (INHALATION) at 06:42

## 2017-04-05 RX ADMIN — IPRATROPIUM BROMIDE AND ALBUTEROL SULFATE SCH ML: .5; 3 SOLUTION RESPIRATORY (INHALATION) at 08:19

## 2017-04-05 RX ADMIN — CLOTRIMAZOLE SCH APPLIC: 1 CREAM TOPICAL at 11:12

## 2017-04-05 RX ADMIN — IPRATROPIUM BROMIDE AND ALBUTEROL SULFATE SCH ML: .5; 3 SOLUTION RESPIRATORY (INHALATION) at 02:03

## 2017-04-05 RX ADMIN — PANTOPRAZOLE SODIUM SCH MG: 40 TABLET, DELAYED RELEASE ORAL at 06:37

## 2017-04-05 RX ADMIN — CLOTRIMAZOLE SCH APPLIC: 1 CREAM TOPICAL at 18:45

## 2017-04-05 RX ADMIN — IPRATROPIUM BROMIDE AND ALBUTEROL SULFATE SCH ML: .5; 3 SOLUTION RESPIRATORY (INHALATION) at 13:37

## 2017-04-05 RX ADMIN — SILVER SULFADIAZINE SCH APPLIC: 10 CREAM TOPICAL at 11:13

## 2017-04-05 NOTE — PN
DATE: 04/05/2017



The patient appears very comfortable this morning.  He is not short of breath 
at rest.



PHYSICAL EXAMINATION:

VITAL SIGNS:  Temperature is 97.3, pulse 64, respirations 18, blood pressure 138
/56.  Oxygen saturation on nasal cannula is 96%-99%.

HEENT:  Normocephalic, atraumatic.  No JVD.

CARDIOVASCULAR:  Systolic ejection murmur at the lower left sternal border.  No 
S3 gallop.

LUNGS:  Very minimal/less rhonchi.  No wheezing.

EXTREMITIES:  Mild edema.  No cyanosis, no clubbing.  Calves are nontender to 
palpation.

GASTROINTESTINAL:  Abdomen is soft, nontender, nondistended.  Bowel sounds are 
positive.

SKIN:  Resolving cellulitis -- lower extremities (anterior aspects).

NEUROLOGIC:  Limited at the present time.



IMPRESSION:

1.  Status post respiratory failure (multiple episodes).

2.  Chronic obstructive pulmonary disease.

3.  Bilateral cellulitis.

4.  Severe sepsis.

5.  Acute myocardial infarction.

6.  Renal insufficiency.

7.  Mild anemia.



PLAN:  The patient appears very comfortable this morning.  He is not short of 
breath at rest.  He states he is feeling much better overall.  On physical exam
, his bronchospasm continues to resolve.  In addition, the arterial-gradient 
also continues to resolve.  Oxygen saturation on nasal cannula is now 96%-99%.  
I will continue with the current nebulizer treatments for now.  The patient 
remains on antibiotic therapy -- as per infectious disease.  Temperatures have 
resolved.  The leukocytosis has resolved.  Clinical status of the patient is 
certainly improved -- compared to last week.  However, again, the overall status
/prognosis of this elderly gentleman remains guarded.  I will discuss the above 
with the attending physician.





__________________________________________

Balwinder Fall MD







cc:   



DD: 04/05/2017 07:36:55  [id number]

TT: 04/05/2017 08:43:40

Confirmation # [id number]

Dictation # [id number]

en

MTDD

## 2017-04-05 NOTE — PN
DATE: 04/05/2017



SUBJECTIVE:  The patient is lying in bed in no acute distress.  He denies chest pain or shortness of 
breath.  He is less confused than previous and not agitated.



OBJECTIVE:

VITAL SIGNS:  Blood pressure 138/56, pulse 65, temperature 97.3, respiratory rate 18.

LUNGS:  Clear.

HEART:  Regular rate and rhythm.

ABDOMEN:  Soft, nontender, bowel sounds are normoactive.

EXTREMITIES:  Decreased dependent edema.

NEUROLOGIC:  The patient is awake and responsive without focal sensory or motor deficits.  Remains so
mewhat confused.

SKIN:  Warm and dry.



LABORATORY DATA:  WBC is 9.9, hemoglobin 8.8, hematocrit 27.3.  Sodium 143, potassium 3.8, chloride 1
01, CO2 of 35, BUN 31, creatinine 1.2, glucose 127.



IMPRESSION:

1.  Hypertension, hypertensive cardiovascular disease and congestive heart failure.

2.  Coronary artery disease status post non-ST segment elevation myocardial infarction.

3.  Status post acute rhabdomyolysis with acute renal failure superimposed on mild chronic kidney dis
ease.

4.  Status post sepsis, presumed secondary to cellulitis of the lower extremities with chronic venous
 stasis ulcers.

5.  Paroxysmal atrial fibrillation with intermittent rapid ventricular response.

6.  Dementia with mild superimposed delirium secondary to underlying metabolic problem.

7.  Immobility secondary to severe degenerative joint disease with obesity and deconditioning.

8.  Anemia, probably secondary to repeated phlebotomy.  Rule out gastrointestinal bleed.

9.  Hyperglycemia.



PLAN:  The patient appears medically stable at the present time and he is for possible transfer to [facility] today.



Thank you for this consultation.





__________________________________________

Ariel Browne JD, MD







cc:



DD: 04/05/2017 12:44:16  353

TT: 04/05/2017 13:10:30

Confirmation # [id number]

Dictation # [id number]

sn

## 2017-04-05 NOTE — PN
DATE: 04/05/2017



SUBJECTIVE:  The patient has no complaints of any chest pain, no shortness of breath, no headaches, n
o dizziness.

 

PHYSICAL EXAMINATION:    

VITAL SIGNS:  Temperature is 98.5, pulse is 90, blood pressure is 136/55, respirations 20, O2 saturat
ion 99%.   

GENERAL:   The patient comfortable, in no acute distress.

HEENT:   Anicteric sclerae.  Moist mucosa.

NECK:   No JVD or adenopathy.

CARDIAC:   S1/S2.  No murmurs.  No rubs.  Regular.

RESPIRATORY:   Clear to auscultation bilaterally.  No wheezes, rales, or rhonchi.  Good air entry.

ABDOMEN:   Bowel sounds are positive, soft, nontender, and nondistended.

EXTREMITIES:   Lower extremity 1+ edema.  Has 1+ pulses.



ASSESSMENT:    

1.  Hematuria.

2.  Delirium, improved.

3.  Sepsis, resolved.

4.  Acute kidney injury due to rhabdomyolysis, resolved.

5.  Hypoxic respiratory failure, resolved.

6.  Non-ST elevation myocardial infarction, resolved.

7.  Hypophosphatemia, improved.

8.  Transaminitis, resolved.

9.  Hypernatremia, resolved.

10.  Right knee pain, secondary to degenerative joint disease.

11.  Left wound with staph infection, improved.

12.  Left peripherally inserted central catheter line.

 

PLAN:

The patient is currently comfortable.  He has had multiple blood cultures that have been negative.  T
he patient's wife states that he had a staph infection.  I tried to update her regarding the infectio
n.  I did let her know that he had a staph infection previously, but that has improved and multiple b
lood cultures have been negative.  She was under the impression that infection is currently active.  
I spoke to her at length yesterday and stated that the infection is under control with the treatment 
that we have given him.  He is currently on Rocephin and this is being continued.  The patient was ha
ving hallucinations, so Dr. Mckinley was asked to evaluate the patient for management of the delusions.
  The patient's supplements have been on hold because of hematuria.  The patient has lower extremity 
edema.  The patient has received a significant amount of fluid.  He is on Lasix.  Dr. Browne has incre
ased his Lasix to 80 mg.  The patient is ready to be discharged to [facility].  The patie
nt's wife was agreeable yesterday.  The patient is on 1:1 and we will need to have him go off the 1:1
 before he can be discharged.  The patient is currently comfortable and is currently improved.  The p
atbreanna does have a peripherally inserted central catheter line and is receiving IV antibiotics.  He i
s on day #12 of antibiotics out of 28.





__________________________________________

Jose Martin Arguello MD







cc:



DD: 04/05/2017 06:19:52  358

TT: 04/05/2017 08:20:11

Confirmation # [id number]

Dictation # [id number]

en

## 2017-04-05 NOTE — CP.PCM.PN
Subjective





- Date & Time of Evaluation


Date of Evaluation: 04/05/17


Time of Evaluation: 00:23





- Subjective


Subjective: 


"Has blood in urine, there is clot also."


  Patient was seen at bedside.


  Has no complaints.


  I was able to see some clots at meatus and nearby on diaper.


  Pertinent medical record was reviewed.


  There is no hematemesis, no hemoptysis or hematochezia.


   came earlier.


  's note was reviewed.


  This 81 year old white male was admitted with right knee pain, paroxysmal 

atrial fibrillation.


  Has PMH of  HTN, dementia, Obesity, cataract, herniorrhaphy, nephrolithiasis.








Objective





- Vital Signs/Intake and Output


Vital Signs (last 24 hours): 


 











Temp Pulse Resp BP Pulse Ox


 


 99.1 F   65   20   178/68 H  96 


 


 04/04/17 18:00  04/04/17 22:00  04/04/17 18:00  04/04/17 18:04  04/04/17 06:00











- Medications


Medications: 


 Current Medications





Acetaminophen (Tylenol 325mg Tab)  650 mg PO Q4 PRN


   PRN Reason: Fever >100.4 F


   Last Admin: 03/23/17 09:15 Dose:  650 mg


Albuterol/Ipratropium (Duoneb 3 Mg/0.5 Mg (3 Ml) Ud)  3 ml IH [id number] [facility]


   Last Admin: 04/04/17 19:30 Dose:  3 ml


Albuterol/Ipratropium (Duoneb 3 Mg/0.5 Mg (3 Ml) Ud)  3 ml IH Q2H PRN


   PRN Reason: Shortness of Breath


Aspirin (Aspirin)  325 mg PO DAILY [facility]


   Last Admin: 04/04/17 11:17 Dose:  325 mg


Clotrimazole (Lotrimin 1%)  0 gm TOP BID [facility]


   Last Admin: 04/04/17 17:43 Dose:  Not Given


Furosemide (Lasix)  80 mg PO DAILY [facility]


   Last Admin: 04/04/17 15:13 Dose:  80 mg


Heparin Sodium (Porcine) (Heparin)  5,000 units SC Q12 [facility]


   PRN Reason: Protocol


   Last Admin: 04/04/17 21:43 Dose:  5,000 units


Hydralazine HCl (Apresoline)  10 mg IVP Q6 PRN


   PRN Reason: Systolic Blood Pressure


   Last Admin: 04/02/17 09:56 Dose:  10 mg


Ceftriaxone Sodium (Rocephin 2 Gm Ivpb)  100 mls @ 100 mls/hr IVPB DAILY [facility]


   PRN Reason: Protocol


   Stop: 04/19/17 10:01


   Last Admin: 04/04/17 11:19 Dose:  100 mls/hr


Losartan Potassium (Cozaar)  25 mg PO DAILY [facility]


   Last Admin: 04/04/17 15:12 Dose:  25 mg


Metoprolol Tartrate (Lopressor)  50 mg PO 0800,1800 [facility]


   Last Admin: 04/04/17 18:04 Dose:  50 mg


Pantoprazole Sodium (Protonix Ec Tab)  40 mg PO ACB [facility]


   Last Admin: 04/04/17 08:47 Dose:  40 mg


Silver Sulfadiazine (Silvadene 1% 20 Gm)  0 ea TOP DAILY [facility]


   Last Admin: 04/04/17 12:58 Dose:  1 applic


Vitamin A (Vitamin A & D Oint Ud Foilpak)  1 ea TOP BID PRN


   PRN Reason: chapped lips


   Last Admin: 04/04/17 21:43 Dose:  1 ea











- Labs


Labs: 


 





 04/04/17 06:25 





 04/04/17 06:25 





 











PT  12.1 Seconds (9.9-11.8)  H  03/25/17  06:15    


 


INR  1.12  (0.93-1.08)  H  03/25/17  06:15    


 


APTT  87.8 Seconds (23.7-30.8)  H*  03/30/17  06:20    














- Constitutional


Appears: Well, No Acute Distress





- Head Exam


Head Exam: ATRAUMATIC, NORMAL INSPECTION, NORMOCEPHALIC


Additional comments: 


Obese person.





- Eye Exam


Eye Exam: Normal appearance





- ENT Exam


ENT Exam: Normal External Ear Exam





- Neck Exam


Neck Exam: Normal Inspection





- Respiratory Exam


Respiratory Exam: NORMAL BREATHING PATTERN





- Cardiovascular Exam


Cardiovascular Exam: absent: JVD





- GI/Abdominal Exam


GI & Abdominal Exam: absent: Distended





- Rectal Exam


Rectal Exam: Deferred





- Extremities Exam


Extremities Exam: Normal Inspection





- Back Exam


Back Exam: NORMAL INSPECTION





- Neurological Exam


Neurological Exam: Altered (mental status.)





- Psychiatric Exam


Psychiatric exam: Agitated (intermittently.)





- Skin


Additional comments: 


Right Infra axillary large old echymotic patch present.





Assessment and Plan





- Assessment and Plan (Free Text)


Assessment: 


A/P: Hematuria with blood clots.


       AMS.


       Obesity.


       Nephrolithiasis.


       Dementia.


       Hold heparin.


       CBC, PT/INR/PTT in AM.


       Nurse will call  in AM.

## 2017-04-05 NOTE — CP.PCM.PN
Subjective





- Date & Time of Evaluation


Date of Evaluation: 04/05/17


Time of Evaluation: 08:50





- Subjective


Subjective: 


Comfortable in bed, no leg pain, breathing well, not in distress. Afebrile 

overnight.





Objective





- Vital Signs/Intake and Output


Vital Signs (last 24 hours): 


 











Temp Pulse Resp BP Pulse Ox


 


 97.1 F L  92 H  19   181/68 H  96 


 


 04/05/17 12:00  04/05/17 12:00  04/05/17 12:00  04/05/17 12:00  04/05/17 06:00








Intake and Output: 


 











 04/05/17 04/05/17





 06:59 18:59


 


Intake Total 600 


 


Output Total 200 


 


Balance 400 














- Medications


Medications: 


 Current Medications





Acetaminophen (Tylenol 325mg Tab)  650 mg PO Q4 PRN


   PRN Reason: Fever >100.4 F


   Last Admin: 03/23/17 09:15 Dose:  650 mg


Albuterol/Ipratropium (Duoneb 3 Mg/0.5 Mg (3 Ml) Ud)  3 ml IH [id number] [facility]


   Last Admin: 04/05/17 13:37 Dose:  3 ml


Albuterol/Ipratropium (Duoneb 3 Mg/0.5 Mg (3 Ml) Ud)  3 ml IH Q2H PRN


   PRN Reason: Shortness of Breath


   Last Admin: 04/05/17 06:42 Dose:  3 ml


Aspirin (Aspirin)  325 mg PO DAILY [facility]


   Last Admin: 04/05/17 11:09 Dose:  325 mg


Clotrimazole (Lotrimin 1%)  0 gm TOP BID [facility]


   Last Admin: 04/05/17 11:12 Dose:  1 applic


Furosemide (Lasix)  80 mg PO DAILY [facility]


   Last Admin: 04/05/17 11:09 Dose:  80 mg


Heparin Sodium (Porcine) (Heparin)  5,000 units SC Q12 [facility]


   PRN Reason: Protocol


   Last Admin: 04/04/17 21:43 Dose:  5,000 units


Hydralazine HCl (Apresoline)  10 mg IVP Q6 PRN


   PRN Reason: Systolic Blood Pressure


   Last Admin: 04/02/17 09:56 Dose:  10 mg


Ceftriaxone Sodium (Rocephin 2 Gm Ivpb)  100 mls @ 100 mls/hr IVPB DAILY [facility]


   PRN Reason: Protocol


   Stop: 04/19/17 10:01


   Last Admin: 04/04/17 11:19 Dose:  100 mls/hr


Losartan Potassium (Cozaar)  25 mg PO DAILY [facility]


   Last Admin: 04/05/17 11:10 Dose:  25 mg


Metoprolol Tartrate (Lopressor)  50 mg PO 0800,1800 [facility]


   Last Admin: 04/05/17 11:09 Dose:  50 mg


Pantoprazole Sodium (Protonix Ec Tab)  40 mg PO ACB [facility]


   Last Admin: 04/05/17 06:37 Dose:  40 mg


Silver Sulfadiazine (Silvadene 1% 20 Gm)  0 ea TOP DAILY [facility]


   Last Admin: 04/05/17 11:13 Dose:  1 applic


Vitamin A (Vitamin A & D Oint Ud Foilpak)  1 ea TOP BID PRN


   PRN Reason: chapped lips


   Last Admin: 04/04/17 21:43 Dose:  1 ea











- Labs


Labs: 


 





 04/05/17 06:25 





 04/05/17 06:25 





 











PT  13.7 Seconds (9.9-11.8)  H  04/05/17  06:25    


 


INR  1.27  (0.93-1.08)  H  04/05/17  06:25    


 


APTT  33.2 Seconds (23.7-30.8)  H  04/05/17  06:25    














- Constitutional


Appears: Non-toxic, No Acute Distress





- Head Exam


Head Exam: NORMAL INSPECTION





- ENT Exam


ENT Exam: Mucous Membranes Moist





- Neck Exam


Neck Exam: absent: Lymphadenopathy, Meningismus





- Respiratory Exam


Respiratory Exam: Decreased Breath Sounds





- Cardiovascular Exam


Cardiovascular Exam: +S1, +S2





- GI/Abdominal Exam


GI & Abdominal Exam: Soft.  absent: Tenderness





- Extremities Exam


Additional comments: 


much improved swelling and erythema over both lower extremities





Assessment and Plan





- Assessment and Plan (Free Text)


Plan: 


Assessment


severe sepsis S/P shock S/P ventilator-dependent respiratory failure with acute 

on chronic renal failure due to Group G strep bacteremia, probably secondary to 

bilateral lower extremities skin and skin structure infection; also with MSSA 

from the wound cultures; so far no evidence of new infection; patient is 

clinically improving; he was intubated for hypercarbic respiratory failure and 

congestion, no evidence of pneumonia currently


acute rhabdomyolysis


consider acute NSTEMI


HTN


chronic renal failure


dementia


obesity with BMI 38





Plan


continue Rocephin (day 12 from first negative blood cx); repeat septic work up 

so far negative, PCT is improving; CXR does not show focal infiltrates; patient 

should finish 28 days of antibiotics


2D echocardiogram didnot show vegetations


will continue to monitor clinically

## 2017-04-05 NOTE — CP.PCM.PN
Subjective





- Date & Time of Evaluation


Date of Evaluation: 04/06/17


Time of Evaluation: 02:00





- Subjective


Subjective: 


Patient was seen earlier for wheezing and congestion.


 Has no complaints.


 Denies chest pain, sob.


 This 81 year old white male was admitted with right knee pain, paroxysmal 

atrial fibrillation.


  Has PMH of  HTN, dementia, Obesity, cataract, herniorrhaphy, nephrolithiasis.





Objective





- Vital Signs/Intake and Output


Vital Signs (last 24 hours): 


 











Temp Pulse Resp BP Pulse Ox


 


 100.1 F H  92 H  20   139/66   96 


 


 04/05/17 18:00  04/05/17 18:49  04/05/17 18:00  04/05/17 18:49  04/05/17 06:00











- Medications


Medications: 


 Current Medications





Acetaminophen (Tylenol 325mg Tab)  650 mg PO Q4 PRN


   PRN Reason: Fever >100.4 F


   Last Admin: 04/05/17 17:10 Dose:  650 mg


Albuterol/Ipratropium (Duoneb 3 Mg/0.5 Mg (3 Ml) Ud)  3 ml IH [id number] [facility]


   Last Admin: 04/05/17 20:01 Dose:  3 ml


Albuterol/Ipratropium (Duoneb 3 Mg/0.5 Mg (3 Ml) Ud)  3 ml IH Q2H PRN


   PRN Reason: Shortness of Breath


   Last Admin: 04/05/17 06:42 Dose:  3 ml


Albuterol/Ipratropium (Duoneb 3 Mg/0.5 Mg (3 Ml) Ud)  3 ml IH STAT STA


   Stop: 04/05/17 21:00


Aspirin (Aspirin)  325 mg PO DAILY [facility]


   Last Admin: 04/05/17 11:09 Dose:  325 mg


Clotrimazole (Lotrimin 1%)  0 gm TOP BID [facility]


   Last Admin: 04/05/17 18:45 Dose:  1 applic


Furosemide (Lasix)  80 mg PO DAILY [facility]


   Last Admin: 04/05/17 11:09 Dose:  80 mg


Furosemide (Lasix)  80 mg IVP STAT STA


   Stop: 04/05/17 21:00


Heparin Sodium (Porcine) (Heparin)  5,000 units SC Q12 [facility]


   PRN Reason: Protocol


   Last Admin: 04/04/17 21:43 Dose:  5,000 units


Hydralazine HCl (Apresoline)  10 mg IVP Q6 PRN


   PRN Reason: Systolic Blood Pressure


   Last Admin: 04/02/17 09:56 Dose:  10 mg


Ceftriaxone Sodium (Rocephin 2 Gm Ivpb)  100 mls @ 100 mls/hr IVPB DAILY [facility]


   PRN Reason: Protocol


   Stop: 04/19/17 10:01


   Last Admin: 04/05/17 15:48 Dose:  100 mls/hr


Losartan Potassium (Cozaar)  25 mg PO DAILY [facility]


   Last Admin: 04/05/17 11:10 Dose:  25 mg


Metoprolol Tartrate (Lopressor)  50 mg PO 0800,1800 [facility]


   Last Admin: 04/05/17 18:49 Dose:  50 mg


Pantoprazole Sodium (Protonix Ec Tab)  40 mg PO ACB [facility]


   Last Admin: 04/05/17 06:37 Dose:  40 mg


Silver Sulfadiazine (Silvadene 1% 20 Gm)  0 ea TOP DAILY [facility]


   Last Admin: 04/05/17 11:13 Dose:  1 applic


Vitamin A (Vitamin A & D Oint Ud Foilpak)  1 ea TOP BID PRN


   PRN Reason: chapped lips


   Last Admin: 04/04/17 21:43 Dose:  1 ea











- Labs


Labs: 


 





 04/05/17 06:25 





 04/05/17 06:25 





 











PT  13.7 Seconds (9.9-11.8)  H  04/05/17  06:25    


 


INR  1.27  (0.93-1.08)  H  04/05/17  06:25    


 


APTT  33.2 Seconds (23.7-30.8)  H  04/05/17  06:25    














- Constitutional


Appears: No Acute Distress





- Head Exam


Head Exam: ATRAUMATIC, NORMAL INSPECTION, NORMOCEPHALIC





- Eye Exam


Eye Exam: Normal appearance





- ENT Exam


ENT Exam: Normal External Ear Exam





- Neck Exam


Neck Exam: Normal Inspection





- Respiratory Exam


Respiratory Exam: Rales (+), Wheezes (+)





- Cardiovascular Exam


Cardiovascular Exam: REGULAR RHYTHM.  absent: JVD





- GI/Abdominal Exam


GI & Abdominal Exam: absent: Distended





- Rectal Exam


Rectal Exam: Deferred





- Extremities Exam


Extremities Exam: Pedal Edema (+)





- Back Exam


Back Exam: NORMAL INSPECTION





- Neurological Exam


Neurological Exam: Altered





- Psychiatric Exam


Psychiatric exam: Normal Affect, Normal Mood





- Skin


Skin Exam: Normal Color





Assessment and Plan





- Assessment and Plan (Free Text)


Assessment: 


A/P:Wheezing.


      Chest congestion.


      CHF?


      Lasix 80 mg IV stat.


      Duoneb treatmetn stat.





Later on, Nurse Bettie told that patient was agitated,tried to scratch her.


Has placed him on restraints.

## 2017-04-05 NOTE — PN
DATE: 04/05/2017



SUBJECTIVE:  The patient is seen lying in bed, on telemetry.  He is comfortable at rest.  His confusi
on appears improved.



CURRENT MEDICATIONS:  Include aspirin, Cozaar 25 mg daily, DuoNeb inhaler, subcutaneous heparin, Lasi
x 80 mg daily, metoprolol 50 mg b.i.d., Protonix 40 mg daily, Rocephin, Silvadene cream.



OBJECTIVE:

GENERAL:  He is an elderly man, appears comfortable at the present time.

VITAL SIGNS:  His blood pressure is 136/56 with a pulse of 64 in sinus, respirations are 14.

HEENT:  No JVD.

CHEST:  A few scattered rhonchi heard.

HEART:  PMI displaced laterally with a systolic murmur at the left sternal border.

ABDOMEN:  Soft, nontender, normoactive bowel sounds.

EXTREMITIES:  His lower extremity wounds are dressed and his erythematous changes appear improved.



Potassium 3.8, BUN and creatinine ARE 31 and 1.2.  Hemoglobin and hematocrit 8.8 and 27.3 with a whit
e count of 9.9, platelet count of 301,000.



IMPRESSION:

1.  Rhabdomyolysis, resolved.

2.  Acute renal failure, improved as well.

3.  Cellulitis, improving with local treatment.

4.  Probable coronary artery disease, stable at present.



RECOMMENDATIONS:  His current medications should be continued.  Conservative cardiac management is ad
vised.  We will be happy to follow along as needed.





__________________________________________

Cortez Wallace MD







cc:



DD: 04/05/2017 09:31:18  [id number]

TT: 04/05/2017 09:49:51

Confirmation # [id number]

Dictation # [id number]

en

## 2017-04-06 LAB
ADD MANUAL DIFF?: NO
ALBUMIN/GLOB SERPL: 0.7 {RATIO} (ref 1.1–1.8)
ALP SERPL-CCNC: 72 U/L (ref 38–133)
ALT SERPL-CCNC: 56 U/L (ref 7–56)
AST SERPL-CCNC: 65 U/L (ref 15–59)
BASOPHILS # BLD AUTO: 0.04 K/MM3 (ref 0–2)
BASOPHILS NFR BLD: 0.3 % (ref 0–3)
BILIRUB SERPL-MCNC: 0.7 MG/DL (ref 0.2–1.3)
BUN SERPL-MCNC: 35 MG/DL (ref 7–21)
CALCIUM SERPL-MCNC: 10.3 MG/DL (ref 8.4–10.5)
CHLORIDE SERPL-SCNC: 97 MMOL/L (ref 98–107)
CO2 SERPL-SCNC: 38 MMOL/L (ref 21–33)
COHGB MFR BLD: 1.9 % (ref 0.5–1.5)
EOSINOPHIL # BLD: 0.2 10*3/UL (ref 0–0.7)
EOSINOPHIL NFR BLD: 1.3 % (ref 1.5–5)
ERYTHROCYTE [DISTWIDTH] IN BLOOD BY AUTOMATED COUNT: 13.6 % (ref 11.5–14.5)
GLOBULIN SER-MCNC: 4.4 GM/DL
GLUCOSE SERPL-MCNC: 125 MG/DL (ref 70–110)
GRANULOCYTES # BLD: 11.38 10*3/UL (ref 1.4–6.5)
GRANULOCYTES NFR BLD: 79.8 % (ref 50–68)
HCO3 BLDA-SCNC: 35.5 MMOL/L (ref 21–28)
HCO3 BLDA-SCNC: 37 MMOL/L (ref 21–28)
HCT VFR BLD CALC: 30.5 % (ref 42–52)
HHB: 1.2 % (ref 0–5)
LYMPHOCYTES # BLD: 1.7 10*3/UL (ref 1.2–3.4)
LYMPHOCYTES NFR BLD AUTO: 11.9 % (ref 22–35)
MCH RBC QN AUTO: 27.6 PG (ref 25–35)
MCHC RBC AUTO-ENTMCNC: 32.5 G/DL (ref 31–37)
MCV RBC AUTO: 85 FL (ref 80–105)
METHGB MFR BLD: 0.9 % (ref 0–3)
MONOCYTES # BLD AUTO: 1 10*3/UL (ref 0.1–0.6)
MONOCYTES NFR BLD: 6.7 % (ref 1–6)
O2 CAP BLDA-SCNC: 13.2 ML/DL (ref 16–24)
O2 CT BLDA-SCNC: 13 ML/DL (ref 15–23)
PH BLDA: 7.42 [PH] (ref 7.35–7.45)
PH BLDA: 7.59 [PH] (ref 7.35–7.45)
PLATELET # BLD: 379 10^3/UL (ref 120–450)
PMV BLD AUTO: 8.8 FL (ref 7–11)
PO2 BLDA: 101 MM/HG (ref 80–100)
PO2 BLDA: 118 MM/HG (ref 80–100)
POTASSIUM SERPL-SCNC: 3.9 MMOL/L (ref 3.6–5)
PROT SERPL-MCNC: 7.4 G/DL (ref 5.8–8.3)
SAO2 % BLDA: 95.9 % (ref 95–98)
SODIUM SERPL-SCNC: 141 MMOL/L (ref 132–148)
WBC # BLD AUTO: 14.3 10^3/UL (ref 4.5–11)

## 2017-04-06 RX ADMIN — BUDESONIDE SCH MG: 0.5 SUSPENSION RESPIRATORY (INHALATION) at 19:25

## 2017-04-06 RX ADMIN — IPRATROPIUM BROMIDE AND ALBUTEROL SULFATE SCH ML: .5; 3 SOLUTION RESPIRATORY (INHALATION) at 14:20

## 2017-04-06 RX ADMIN — CLOTRIMAZOLE SCH APPLIC: 1 CREAM TOPICAL at 17:46

## 2017-04-06 RX ADMIN — IPRATROPIUM BROMIDE AND ALBUTEROL SULFATE SCH ML: .5; 3 SOLUTION RESPIRATORY (INHALATION) at 19:25

## 2017-04-06 RX ADMIN — PANTOPRAZOLE SODIUM SCH MG: 40 TABLET, DELAYED RELEASE ORAL at 08:42

## 2017-04-06 RX ADMIN — IPRATROPIUM BROMIDE AND ALBUTEROL SULFATE SCH ML: .5; 3 SOLUTION RESPIRATORY (INHALATION) at 01:44

## 2017-04-06 RX ADMIN — IPRATROPIUM BROMIDE AND ALBUTEROL SULFATE SCH ML: .5; 3 SOLUTION RESPIRATORY (INHALATION) at 08:23

## 2017-04-06 RX ADMIN — IPRATROPIUM BROMIDE AND ALBUTEROL SULFATE PRN ML: .5; 3 SOLUTION RESPIRATORY (INHALATION) at 05:34

## 2017-04-06 RX ADMIN — SILVER SULFADIAZINE SCH APPLIC: 10 CREAM TOPICAL at 09:40

## 2017-04-06 RX ADMIN — CLOTRIMAZOLE SCH APPLIC: 1 CREAM TOPICAL at 18:00

## 2017-04-06 NOTE — PN
DATE: 04/06/2017



SUBJECTIVE:   

The patient had an episode of hypoxia early this morning and had secretions with wheezing.  The patie
nt was placed on BiPAP, had nebulizer treatment and Lasix, has improved.  The patient has no complain
ts of any headaches or dizziness.  He is confused.  The patient's wife at the bedside.



PHYSICAL EXAMINATION:

VITAL SIGNS:  Temperature is 97.6, pulse of 109, blood pressure 142/60, respirations 20.

GENERAL:   The patient comfortable, in no acute distress.

HEENT:   Anicteric sclerae.  Moist mucosa.

NECK:   No JVD or adenopathy.

CARDIAC:   S1/S2.  No murmurs.  No rubs.  Regular.

RESPIRATORY:   Clear to auscultation bilaterally.  No wheezes, rales, or rhonchi.  Good air entry.

ABDOMEN:   Bowel sounds are positive, soft, nontender, and nondistended.

EXTREMITIES:   No edema.  Has 1+ pulses.



LABORATORY DATA:  Chest x-ray done shows bibasilar segmental atelectasis.



ASSESSMENT:

1.  Delirium.

2.  Hypoxia, improved.

3.  Acute kidney injury, new onset.

4.  Non-ST elevation, resolved.

5.  Hypophosphatemia, improved.

6.  Transaminitis, resolved.

7.  Hypernatremia, improved.

8.  Right knee injury secondary to degenerative joint disease, improved.

9.  Left arm PICC line.



PLAN:  The patient is better now than a few hours ago.  I spoke to Dr. Lagunas, the house physician.  T
he patient has a BiPAP machine on and will be able to take this off as the patient is uncomfortable w
ith the machine on.  I had a long discussion with the patient's wife.  She is open to the idea of hav
ing the patient be on a mild sedative like Xanax.  The patient has been placed on Xanax 0.25 mg.  The
 patient's wife does not wish to have him on some antipsychotics.  I did advise her that it may be ne
cessary given that he is not allowing for proper treatment, not able to get good physical therapy, no
t able to get the patient out of bed into the chair.  The patient also scratched the overnight nurse 
and becomes agitated.  He does hallucinate at times.  I am concerned that we are not advancing his ca
re and giving him proper physical therapy while he is in the hospital because of his confusion.  The 
patient is on nebulizer treatments.  He is on losartan for hypertension.  The patient is on Lasix jaxson
ly.  I will hold the patient's Lasix because of the elevated creatinine.  The patient is on Rocephin 
for antibiotics.  Overall, prognosis is guarded.





__________________________________________

Jose Martin Arguello MD







cc:



DD: 04/06/2017 09:46:34  [id number]

TT: 04/06/2017 10:08:13

Confirmation # [id number]

Dictation # [id number]

an

## 2017-04-06 NOTE — CARD
--------------- APPROVED REPORT --------------





EKG Measurement

Heart [id number]

[id number]

[id number]

[id number]



<Conclusion>

Sinus tachycardia with occasional premature ventricular complexes

Right bundle branch block

Minimal voltage criteria for LVH, may be normal variant

Inferior infarct, age undetermined

Abnormal ECG

## 2017-04-06 NOTE — PN
DATE: 04/06/2017



SUBJECTIVE:  The patient is currently on BiPAP.  He is mildly short of breath, 
but in no acute distress.



PHYSICAL EXAMINATION:

VITAL SIGNS:  Temperature is 97.6, pulse on the monitor is 92, respiratory rate 
20/22, blood pressure 142/68.  Oxygen saturation on BiPAP is 100%.

HEENT:  Normocephalic, atraumatic.  No JVD.

CARDIOVASCULAR:  Systolic ejection murmur at the lower left sternal border.  No 
S3 gallop.

LUNGS:  Minimal bilateral rhonchi.  No wheezing.

EXTREMITIES:  Mild edema.  No cyanosis, no clubbing.  Calves are nontender to 
palpation.

GASTROINTESTINAL:  Abdomen is soft, nontender, nondistended.  Bowel sounds are 
positive.

SKIN:  Resolving cellulitis -- lower extremities (anterior aspects).

NEUROLOGIC:  Limited at the present time.



PERTINENT LABORATORY DATA:  Chest x-ray was repeated this morning and reviewed.
  The x-ray is not significantly changed from the previous film.  Arterial 
blood gas was done on nasal cannula.  Results are:  pH 7.42, pCO2 57, pO2 of 
118.



IMPRESSION:

1.  Status post respiratory failure (multiple episodes).

2.  Chronic obstructive pulmonary disease.

3.  Bilateral cellulitis.

4.  Severe sepsis.

5.  Acute myocardial infarction.

6.  Renal insufficiency.

7.  Mild anemia.



PLAN:  I did discuss the case with the night nurse at length.  Apparently, at 
approximately 5 a.m. this morning, the patient experienced shortness of breath 
with oxygen desaturation.  The patient was given a stat respiratory treatment 
and a stat dose of Lasix.  The nurse states that he is improved at the time of 
my examination.  I did review the x-ray as above.  The x-ray reveals no 
significant change from the previous films.  I have also reviewed the arterial 
blood gas.  There is CO2 retention, but with normal pH.  The oxygenation is 
also quite adequate.  On physical exam, the patient remains in mild 
bronchospasm.  I will continue with the current nebulizer treatments and add 
inhaled steroids this morning.  I would continue with the cardiology evaluation 
as per Dr. Wallace.  His input is noted.  Repeat a.m. labs are pending.  
Clinical status of the patient is somewhat improved -- compared to last week.  
However, again, the overall status/prognosis of this elderly patient remains 
very guarded at best.  I will discuss the above with the attending physician.





__________________________________________

Balwinder Fall MD







cc:   



DD: 04/06/2017 07:36:23  [id number]

TT: 04/06/2017 08:42:33

Confirmation # [id number]

Dictation # [id number]

en

MTDD

## 2017-04-06 NOTE — CP.PCM.PN
<Rabia Bernal - Last Filed: 04/06/17 10:44>





Subjective





- Date & Time of Evaluation


Date of Evaluation: 04/06/17


Time of Evaluation: 06:00





- Subjective


Subjective: 


80 y/o male seen at bedside this morning concerning bilateral lower extremity 

ulcerations. Patient was resting comfortably in bed with 1:1 watch. Patient's 

family were also present at the time of visit. Dressing to bilateral legs were 

clean dry and itnact. patient is extubated and able to answer questions. 

Patient appears in NAD and AAOx3. 





Objective





- Vital Signs/Intake and Output


Vital Signs (last 24 hours): 


 











Temp Pulse Resp BP Pulse Ox


 


 97.6 F   109 H  20   142/68   100 


 


 04/06/17 06:30  04/06/17 09:39  04/06/17 06:30  04/06/17 09:39  04/06/17 06:30








Intake and Output: 


 











 04/06/17 04/06/17





 06:59 18:59


 


Intake Total 2320 


 


Balance 2320 














- Medications


Medications: 


 Current Medications





Acetaminophen (Tylenol 325mg Tab)  650 mg PO Q4 PRN


   PRN Reason: Fever >100.4 F


   Last Admin: 04/05/17 17:10 Dose:  650 mg


Albuterol/Ipratropium (Duoneb 3 Mg/0.5 Mg (3 Ml) Ud)  3 ml IH [id number] [facility]


   Last Admin: 04/06/17 08:23 Dose:  3 ml


Albuterol/Ipratropium (Duoneb 3 Mg/0.5 Mg (3 Ml) Ud)  3 ml IH Q2H PRN


   PRN Reason: Shortness of Breath


   Last Admin: 04/06/17 05:34 Dose:  3 ml


Alprazolam (Xanax)  0.25 mg PO BID [facility]


   Stop: 04/13/17 10:01


Aspirin (Aspirin)  325 mg PO DAILY [facility]


   Last Admin: 04/06/17 09:39 Dose:  325 mg


Budesonide (Pulmicort Respules)  0.5 mg IH [id number] [facility]


Clotrimazole (Lotrimin 1%)  0 gm TOP BID [facility]


   Last Admin: 04/05/17 18:45 Dose:  1 applic


Heparin Sodium (Porcine) (Heparin)  5,000 units SC Q12 [facility]


   PRN Reason: Protocol


   Last Admin: 04/04/17 21:43 Dose:  5,000 units


Hydralazine HCl (Apresoline)  10 mg IVP Q6 PRN


   PRN Reason: Systolic Blood Pressure


   Last Admin: 04/02/17 09:56 Dose:  10 mg


Ceftriaxone Sodium (Rocephin 2 Gm Ivpb)  100 mls @ 100 mls/hr IVPB DAILY [facility]


   PRN Reason: Protocol


   Stop: 04/19/17 10:01


   Last Admin: 04/06/17 09:40 Dose:  100 mls/hr


Losartan Potassium (Cozaar)  25 mg PO DAILY [facility]


   Last Admin: 04/06/17 09:39 Dose:  25 mg


Metoprolol Tartrate (Lopressor)  50 mg PO 0800,1800 [facility]


   Last Admin: 04/06/17 08:42 Dose:  50 mg


Pantoprazole Sodium (Protonix Ec Tab)  40 mg PO ACB [facility]


   Last Admin: 04/06/17 08:42 Dose:  40 mg


Silver Sulfadiazine (Silvadene 1% 20 Gm)  0 ea TOP DAILY [facility]


   Last Admin: 04/06/17 09:40 Dose:  1 applic


Vitamin A (Vitamin A & D Oint Ud Foilpak)  1 ea TOP BID PRN


   PRN Reason: chapped lips


   Last Admin: 04/04/17 21:43 Dose:  1 ea











- Labs


Labs: 


 





 04/06/17 05:45 





 04/06/17 05:45 





 











PT  13.7 Seconds (9.9-11.8)  H  04/05/17  06:25    


 


INR  1.27  (0.93-1.08)  H  04/05/17  06:25    


 


APTT  33.2 Seconds (23.7-30.8)  H  04/05/17  06:25    














- Constitutional


Appears: Well, No Acute Distress





- Extremities Exam


Additional comments: 


Bilateral lower extremities exam





DERM: Two Superficial ulcerations noted to Right leg located to anterior aspect 

of shin and one on lateral aspect of leg, each measuring 2cm x 2cm x 0.1cm. No 

drainage noted, no purulent discharge noted. No erythema noted around the 

wound. No sign of acute infection is noted.





One superficial ulceration noted to Left leg Cornelius-lateral aspect measuring 

2cmx  2cm x 0.1cm with granular base. No drainage noted. No pus noted. No mal-

odor noted. No erythema is ntoed around the wound. No sign of infection is 

noted.





VASC: nonpalpable pedal pulses bilaterally, TG wnl, CFT < 3 sec to all digits


NEURO: grossly diminished


ORTHO: no pain on palpation of foot or legs bilaterally





- Neurological Exam


Neurological Exam: Alert, Awake





- Psychiatric Exam


Psychiatric exam: Normal Affect





- Skin


Skin Exam: Normal Color, Warm





Assessment and Plan





- Assessment and Plan (Free Text)


Assessment: 


80 y/o male presents with superficial ulcerations to bilateral lower extremity 


Plan: 


patient evaluated and seen at bedside


labs and vitals reviewed


continue IV abx


applied allevyn heel pads bilaterally


applied multipodus offloading boots to patients heels


patient to continue wearing boots in bed at all times


podiatry will continue to monitor while patient remains in house 





<Rodrigo Do - Last Filed: 04/08/17 08:15>





Objective





- Vital Signs/Intake and Output


Vital Signs (last 24 hours): 


 











Temp Pulse Resp BP Pulse Ox


 


 97.3 F L  85   25 H  144/69   99 


 


 04/08/17 07:55  04/08/17 07:55  04/08/17 07:55  04/08/17 07:55  04/08/17 07:55








Intake and Output: 


 











 04/08/17 04/08/17





 06:59 18:59


 


Intake Total 120 


 


Balance 120 














- Medications


Medications: 


 Current Medications





Acetaminophen (Tylenol 325mg Tab)  650 mg PO Q4 PRN


   PRN Reason: Fever >100.4 F


   Last Admin: 04/05/17 17:10 Dose:  650 mg


Albuterol/Ipratropium (Duoneb 3 Mg/0.5 Mg (3 Ml) Ud)  3 ml IH [id number] [facility]


   Last Admin: 04/08/17 07:12 Dose:  3 ml


Albuterol/Ipratropium (Duoneb 3 Mg/0.5 Mg (3 Ml) Ud)  3 ml IH Q2H PRN


   PRN Reason: Shortness of Breath


   Last Admin: 04/06/17 05:34 Dose:  3 ml


Aspirin (Aspirin)  325 mg PO DAILY [facility]


   Last Admin: 04/07/17 10:32 Dose:  325 mg


Budesonide (Pulmicort Respules)  0.5 mg IH [id number] [facility]


   Last Admin: 04/08/17 07:12 Dose:  0.5 mg


Clotrimazole (Lotrimin 1%)  0 gm TOP BID [facility]


   Last Admin: 04/07/17 19:51 Dose:  Not Given


Heparin Sodium (Porcine) (Heparin)  5,000 units SC Q12 [facility]


   PRN Reason: Protocol


   Last Admin: 04/04/17 21:43 Dose:  5,000 units


Hydralazine HCl (Apresoline)  10 mg IVP Q6 PRN


   PRN Reason: Systolic Blood Pressure


   Last Admin: 04/02/17 09:56 Dose:  10 mg


Ceftriaxone Sodium (Rocephin 2 Gm Ivpb)  100 mls @ 100 mls/hr IVPB DAILY [facility]


   PRN Reason: Protocol


   Stop: 04/19/17 10:01


   Last Admin: 04/07/17 10:35 Dose:  100 mls/hr


Losartan Potassium (Cozaar)  25 mg PO DAILY [facility]


   Last Admin: 04/07/17 10:32 Dose:  25 mg


Methylprednisolone (Solu-Medrol)  40 mg IVP Q8H [facility]


   Last Admin: 04/08/17 03:04 Dose:  40 mg


Metoprolol Tartrate (Lopressor)  50 mg PO 0800,1800 [facility]


   Last Admin: 04/07/17 19:51 Dose:  Not Given


Pantoprazole Sodium (Protonix Ec Tab)  40 mg PO ACB [facility]


   Last Admin: 04/07/17 08:24 Dose:  40 mg


Risperidone (Risperdal Tab)  0.5 mg PO [facility] PRN; Protocol


   PRN Reason: Agitation


Silver Sulfadiazine (Silvadene 1% 20 Gm)  0 ea TOP DAILY [facility]


   Last Admin: 04/07/17 10:33 Dose:  1 applic


Vitamin A (Vitamin A & D Oint Ud Foilpak)  1 ea TOP BID PRN


   PRN Reason: chapped lips


   Last Admin: 04/07/17 10:32 Dose:  1 ea











- Labs


Labs: 


 





 04/08/17 06:47 





 04/08/17 06:47 





 











PT  13.7 Seconds (9.9-11.8)  H  04/05/17  06:25    


 


INR  1.27  (0.93-1.08)  H  04/05/17  06:25    


 


APTT  33.2 Seconds (23.7-30.8)  H  04/05/17  06:25    














Attending/Attestation





- Attestation


I have personally seen and examined this patient.: Yes


I have fully participated in the care of the patient.: Yes


I have reviewed all pertinent clinical information, including history, physical 

exam and plan: Yes

## 2017-04-06 NOTE — PN
DATE: 04/06/2017



HISTORY OF PRESENT ILLNESS:  The patient is an 81-year-old male who is currently being treated on the
 medical service for hypoxia which is improved, acute kidney injury, recent non-ST wave MI.  The adrián
ent also has severe delirium.  He has had recent visual hallucinations.  He is quite confused.  He ha
s a grossly normal CT scan of the head with atrophy in the frontoparietal region.  I attempted to int
erview the patient.  The patient's wife was at bedside.  She refuses to have me interview her 
 and she also has been refusing patient to receive any antipsychotic medicine even though I tried to 
explain at length the rationale for such.  Therefore, due to wife's adamant decision, I can no longer
 evaluate this patient and treat this patient.  Should his wife change her mind, I will gladly reeval
uate the patient.





__________________________________________

Jamey Mckinley MD







cc:



DD: 04/06/2017 12:11:51  [id number]

TT: 04/06/2017 12:37:03

Confirmation # [id number]

Dictation # [id number]

tn

## 2017-04-06 NOTE — RAD
HISTORY:

rapid response  



COMPARISON:

4/1/2017 



FINDINGS:



LUNGS:

Bibasilar subsegmental atelectasis. No ruddy pulmonary infiltrate.



PLEURA:

Slight blunting of left costophrenic angle may reflect small pleural 

effusion. This is unchanged. No right pleural effusion. No 

pneumothorax.



CARDIOVASCULAR:

Left PICC catheter unchanged, terminating in the region of the right 

atrium.  Endotracheal tube and nasogastric tube have been removed.



OSSEOUS STRUCTURES:

No significant abnormalities.



VISUALIZED UPPER ABDOMEN:

Normal.



OTHER FINDINGS:

None.



IMPRESSION:

Bibasilar subsegmental atelectasis.  Removal of endotracheal tube and 

nasogastric tube. Possible small left pleural effusion.

## 2017-04-06 NOTE — CP.PCM.PN
Subjective





- Date & Time of Evaluation


Date of Evaluation: 04/06/17


Time of Evaluation: 05:48





- Subjective


Subjective: 


Responded to RAPID RESPONSE announcement promptly.


 It was called because he was hypoxic and had chest congestion.


 Has sob, denies chest pain,nausea,sweating.


 Patient had been restless and was trying to pull tubings earlier.


 This 81 year old white male was admitted with right knee pain, paroxysmal 

atrial fibrillation.


 PMH: HTN, dementia, Obesity, cataract, herniorrhaphy, nephrolithiasis.


 /83.T97.3*F, 24/min, 110/min, pulse ox 84% 3L/min + breathing treatment.








Objective





- Vital Signs/Intake and Output


Vital Signs (last 24 hours): 


 











Temp Pulse Resp BP Pulse Ox


 


 97.7 F   87   24   144/55 L  95 


 


 04/06/17 00:01  04/06/17 02:00  04/06/17 00:01  04/05/17 21:07  04/06/17 00:01











- Medications


Medications: 


 Current Medications





Acetaminophen (Tylenol 325mg Tab)  650 mg PO Q4 PRN


   PRN Reason: Fever >100.4 F


   Last Admin: 04/05/17 17:10 Dose:  650 mg


Albuterol/Ipratropium (Duoneb 3 Mg/0.5 Mg (3 Ml) Ud)  3 ml IH [id number] [facility]


   Last Admin: 04/06/17 01:44 Dose:  3 ml


Albuterol/Ipratropium (Duoneb 3 Mg/0.5 Mg (3 Ml) Ud)  3 ml IH Q2H PRN


   PRN Reason: Shortness of Breath


   Last Admin: 04/06/17 05:34 Dose:  3 ml


Aspirin (Aspirin)  325 mg PO DAILY [facility]


   Last Admin: 04/05/17 11:09 Dose:  325 mg


Clotrimazole (Lotrimin 1%)  0 gm TOP BID [facility]


   Last Admin: 04/05/17 18:45 Dose:  1 applic


Furosemide (Lasix)  80 mg PO DAILY [facility]


   Last Admin: 04/05/17 11:09 Dose:  80 mg


Heparin Sodium (Porcine) (Heparin)  5,000 units SC Q12 [facility]


   PRN Reason: Protocol


   Last Admin: 04/04/17 21:43 Dose:  5,000 units


Hydralazine HCl (Apresoline)  10 mg IVP Q6 PRN


   PRN Reason: Systolic Blood Pressure


   Last Admin: 04/02/17 09:56 Dose:  10 mg


Ceftriaxone Sodium (Rocephin 2 Gm Ivpb)  100 mls @ 100 mls/hr IVPB DAILY [facility]


   PRN Reason: Protocol


   Stop: 04/19/17 10:01


   Last Admin: 04/05/17 15:48 Dose:  100 mls/hr


Losartan Potassium (Cozaar)  25 mg PO DAILY [facility]


   Last Admin: 04/05/17 11:10 Dose:  25 mg


Metoprolol Tartrate (Lopressor)  50 mg PO 0800,1800 [facility]


   Last Admin: 04/05/17 18:49 Dose:  50 mg


Pantoprazole Sodium (Protonix Ec Tab)  40 mg PO ACB [facility]


   Last Admin: 04/05/17 06:37 Dose:  40 mg


Silver Sulfadiazine (Silvadene 1% 20 Gm)  0 ea TOP DAILY [facility]


   Last Admin: 04/05/17 11:13 Dose:  1 applic


Vitamin A (Vitamin A & D Oint Ud Foilpak)  1 ea TOP BID PRN


   PRN Reason: chapped lips


   Last Admin: 04/04/17 21:43 Dose:  1 ea











- Labs


Labs: 


 





 04/05/17 06:25 





 04/05/17 06:25 





 











PT  13.7 Seconds (9.9-11.8)  H  04/05/17  06:25    


 


INR  1.27  (0.93-1.08)  H  04/05/17  06:25    


 


APTT  33.2 Seconds (23.7-30.8)  H  04/05/17  06:25    














- Constitutional


Appears: Agitated





- Head Exam


Head Exam: ATRAUMATIC, NORMAL INSPECTION, NORMOCEPHALIC





- Eye Exam


Eye Exam: Normal appearance





- ENT Exam


ENT Exam: Normal External Ear Exam





- Neck Exam


Neck Exam: Normal Inspection





- Respiratory Exam


Respiratory Exam: Rales (Bilateral basal.), Wheezes (Bialteral.), Stridor (

Minimal intermittent.)





- Cardiovascular Exam


Cardiovascular Exam: Tachycardia, REGULAR RHYTHM





- GI/Abdominal Exam


GI & Abdominal Exam: absent: Distended





- Rectal Exam


Rectal Exam: Deferred





- Extremities Exam


Extremities Exam: Pedal Edema (+)





- Back Exam


Back Exam: NORMAL INSPECTION





- Neurological Exam


Neurological Exam: Altered





- Psychiatric Exam


Psychiatric exam: Agitated





- Skin


Skin Exam: Normal Color





Assessment and Plan





- Assessment and Plan (Free Text)


Assessment: 


A/P:Hypoxia.


     Agitation.


     Wheezing.


     Pulmonary edema.


     CHF.


     CBC,CMP,troponin,ABG.


      EKG,CXR.


     Lasix 120 mg IV stat.


     NTP 1 " to ACW.


     


CC time spent 30 minutes.

## 2017-04-06 NOTE — CP.PCM.PN
Subjective





- Date & Time of Evaluation


Date of Evaluation: 04/06/17


Time of Evaluation: 08:50





- Subjective


Subjective: 


Comfortable in bed, not in distress, no fevers overnight, no pain in the legs.





Objective





- Vital Signs/Intake and Output


Vital Signs (last 24 hours): 


 











Temp Pulse Resp BP Pulse Ox


 


 98.5 F   88   18   163/60 H  100 


 


 04/06/17 12:00  04/06/17 19:06  04/06/17 12:00  04/06/17 19:06  04/06/17 06:30








Intake and Output: 


 











 04/06/17 04/07/17





 18:59 06:59


 


Intake Total 1080 


 


Balance 1080 














- Medications


Medications: 


 Current Medications





Acetaminophen (Tylenol 325mg Tab)  650 mg PO Q4 PRN


   PRN Reason: Fever >100.4 F


   Last Admin: 04/05/17 17:10 Dose:  650 mg


Albuterol/Ipratropium (Duoneb 3 Mg/0.5 Mg (3 Ml) Ud)  3 ml IH [id number] [facility]


   Last Admin: 04/06/17 19:25 Dose:  3 ml


Albuterol/Ipratropium (Duoneb 3 Mg/0.5 Mg (3 Ml) Ud)  3 ml IH Q2H PRN


   PRN Reason: Shortness of Breath


   Last Admin: 04/06/17 05:34 Dose:  3 ml


Alprazolam (Xanax)  0.25 mg PO BID [facility]


   Stop: 04/13/17 10:01


   Last Admin: 04/06/17 20:04 Dose:  0.25 mg


Aspirin (Aspirin)  325 mg PO DAILY [facility]


   Last Admin: 04/06/17 09:39 Dose:  325 mg


Budesonide (Pulmicort Respules)  0.5 mg IH [id number] [facility]


   Last Admin: 04/06/17 19:25 Dose:  0.5 mg


Clotrimazole (Lotrimin 1%)  0 gm TOP BID [facility]


   Last Admin: 04/06/17 18:00 Dose:  1 applic


Heparin Sodium (Porcine) (Heparin)  5,000 units SC Q12 [facility]


   PRN Reason: Protocol


   Last Admin: 04/04/17 21:43 Dose:  5,000 units


Hydralazine HCl (Apresoline)  10 mg IVP Q6 PRN


   PRN Reason: Systolic Blood Pressure


   Last Admin: 04/02/17 09:56 Dose:  10 mg


Ceftriaxone Sodium (Rocephin 2 Gm Ivpb)  100 mls @ 100 mls/hr IVPB DAILY [facility]


   PRN Reason: Protocol


   Stop: 04/19/17 10:01


   Last Admin: 04/06/17 09:40 Dose:  100 mls/hr


Losartan Potassium (Cozaar)  25 mg PO DAILY [facility]


   Last Admin: 04/06/17 09:39 Dose:  25 mg


Metoprolol Tartrate (Lopressor)  50 mg PO 0800,1800 [facility]


   Last Admin: 04/06/17 19:06 Dose:  50 mg


Pantoprazole Sodium (Protonix Ec Tab)  40 mg PO ACB [facility]


   Last Admin: 04/06/17 08:42 Dose:  40 mg


Silver Sulfadiazine (Silvadene 1% 20 Gm)  0 ea TOP DAILY [facility]


   Last Admin: 04/06/17 09:40 Dose:  1 applic


Vitamin A (Vitamin A & D Oint Ud Foilpak)  1 ea TOP BID PRN


   PRN Reason: chapped lips


   Last Admin: 04/04/17 21:43 Dose:  1 ea











- Labs


Labs: 


 





 04/06/17 05:45 





 04/06/17 05:45 





 











PT  13.7 Seconds (9.9-11.8)  H  04/05/17  06:25    


 


INR  1.27  (0.93-1.08)  H  04/05/17  06:25    


 


APTT  33.2 Seconds (23.7-30.8)  H  04/05/17  06:25    














- Constitutional


Appears: Non-toxic, No Acute Distress





- Head Exam


Head Exam: NORMAL INSPECTION





- ENT Exam


ENT Exam: Mucous Membranes Moist





- Neck Exam


Neck Exam: absent: Lymphadenopathy, Meningismus





- Respiratory Exam


Respiratory Exam: Decreased Breath Sounds





- Cardiovascular Exam


Cardiovascular Exam: +S1, +S2





- GI/Abdominal Exam


GI & Abdominal Exam: Soft.  absent: Tenderness





Assessment and Plan





- Assessment and Plan (Free Text)


Plan: 


Assessment


severe sepsis S/P shock S/P ventilator-dependent respiratory failure with acute 

on chronic renal failure due to Group G strep bacteremia, probably secondary to 

bilateral lower extremities skin and skin structure infection; also with MSSA 

from the wound cultures; so far no evidence of new infection; patient is 

clinically improving; he was intubated for hypercarbic respiratory failure and 

congestion, no evidence of pneumonia currently


acute rhabdomyolysis


consider acute NSTEMI


HTN


chronic renal failure


dementia


obesity with BMI 38





Plan


continue Rocephin (day 13 from first negative blood cx); repeat septic work up 

so far negative, PCT is improving; CXR does not show focal infiltrates; patient 

should finish 28 days of antibiotics


2D echocardiogram didnot show vegetations


will continue to monitor clinically

## 2017-04-07 LAB
ADD MANUAL DIFF?: NO
ALBUMIN/GLOB SERPL: 0.7 {RATIO} (ref 1.1–1.8)
ALP SERPL-CCNC: 61 U/L (ref 38–133)
ALT SERPL-CCNC: 50 U/L (ref 7–56)
AST SERPL-CCNC: 56 U/L (ref 15–59)
BASOPHILS # BLD AUTO: 0.02 K/MM3 (ref 0–2)
BASOPHILS NFR BLD: 0.2 % (ref 0–3)
BILIRUB SERPL-MCNC: 0.6 MG/DL (ref 0.2–1.3)
BUN SERPL-MCNC: 46 MG/DL (ref 7–21)
BUN SERPL-MCNC: 46 MG/DL (ref 7–21)
CALCIUM SERPL-MCNC: 10.1 MG/DL (ref 8.4–10.5)
CALCIUM SERPL-MCNC: 9.8 MG/DL (ref 8.4–10.5)
CHLORIDE SERPL-SCNC: 94 MMOL/L (ref 98–107)
CHLORIDE SERPL-SCNC: 95 MMOL/L (ref 98–107)
CO2 SERPL-SCNC: 38 MMOL/L (ref 21–33)
CO2 SERPL-SCNC: 41 MMOL/L (ref 21–33)
COHGB MFR BLD: 2.1 % (ref 0.5–1.5)
EOSINOPHIL # BLD: 0.2 10*3/UL (ref 0–0.7)
EOSINOPHIL NFR BLD: 1.9 % (ref 1.5–5)
ERYTHROCYTE [DISTWIDTH] IN BLOOD BY AUTOMATED COUNT: 13.9 % (ref 11.5–14.5)
GLOBULIN SER-MCNC: 4 GM/DL
GLUCOSE SERPL-MCNC: 123 MG/DL (ref 70–110)
GLUCOSE SERPL-MCNC: 128 MG/DL (ref 70–110)
GRANULOCYTES # BLD: 7.42 10*3/UL (ref 1.4–6.5)
GRANULOCYTES NFR BLD: 78.9 % (ref 50–68)
HCO3 BLDA-SCNC: 39.4 MMOL/L (ref 21–28)
HCT VFR BLD CALC: 26.4 % (ref 42–52)
HHB: 1.6 % (ref 0–5)
LYMPHOCYTES # BLD: 1 10*3/UL (ref 1.2–3.4)
LYMPHOCYTES NFR BLD AUTO: 10.1 % (ref 22–35)
MAGNESIUM SERPL-MCNC: 1.9 MG/DL (ref 1.7–2.2)
MCH RBC QN AUTO: 27.5 PG (ref 25–35)
MCHC RBC AUTO-ENTMCNC: 32.2 G/DL (ref 31–37)
MCV RBC AUTO: 85.4 FL (ref 80–105)
METHGB MFR BLD: 1.3 % (ref 0–3)
MONOCYTES # BLD AUTO: 0.8 10*3/UL (ref 0.1–0.6)
MONOCYTES NFR BLD: 8.9 % (ref 1–6)
O2 CAP BLDA-SCNC: 12.1 ML/DL (ref 16–24)
O2 CT BLDA-SCNC: 11.9 ML/DL (ref 15–23)
PH BLDA: 7.29 [PH] (ref 7.35–7.45)
PLATELET # BLD: 316 10^3/UL (ref 120–450)
PMV BLD AUTO: 9 FL (ref 7–11)
PO2 BLDA: 120 MM/HG (ref 80–100)
POTASSIUM SERPL-SCNC: 3.6 MMOL/L (ref 3.6–5)
POTASSIUM SERPL-SCNC: 4 MMOL/L (ref 3.6–5)
PROT SERPL-MCNC: 6.7 G/DL (ref 5.8–8.3)
SAO2 % BLDA: 95.1 % (ref 95–98)
SODIUM SERPL-SCNC: 139 MMOL/L (ref 132–148)
SODIUM SERPL-SCNC: 140 MMOL/L (ref 132–148)
TROPONIN I SERPL-MCNC: 0.11 NG/ML
WBC # BLD AUTO: 9.4 10^3/UL (ref 4.5–11)

## 2017-04-07 RX ADMIN — CLOTRIMAZOLE SCH: 1 CREAM TOPICAL at 19:51

## 2017-04-07 RX ADMIN — BUDESONIDE SCH MG: 0.5 SUSPENSION RESPIRATORY (INHALATION) at 20:23

## 2017-04-07 RX ADMIN — IPRATROPIUM BROMIDE AND ALBUTEROL SULFATE SCH ML: .5; 3 SOLUTION RESPIRATORY (INHALATION) at 01:00

## 2017-04-07 RX ADMIN — IPRATROPIUM BROMIDE AND ALBUTEROL SULFATE SCH ML: .5; 3 SOLUTION RESPIRATORY (INHALATION) at 07:59

## 2017-04-07 RX ADMIN — SILVER SULFADIAZINE SCH APPLIC: 10 CREAM TOPICAL at 10:33

## 2017-04-07 RX ADMIN — CLOTRIMAZOLE SCH APPLIC: 1 CREAM TOPICAL at 10:34

## 2017-04-07 RX ADMIN — IPRATROPIUM BROMIDE AND ALBUTEROL SULFATE SCH ML: .5; 3 SOLUTION RESPIRATORY (INHALATION) at 13:47

## 2017-04-07 RX ADMIN — BUDESONIDE SCH MG: 0.5 SUSPENSION RESPIRATORY (INHALATION) at 08:00

## 2017-04-07 RX ADMIN — IPRATROPIUM BROMIDE AND ALBUTEROL SULFATE SCH ML: .5; 3 SOLUTION RESPIRATORY (INHALATION) at 20:23

## 2017-04-07 RX ADMIN — VITAMIN A AND VITAMIN D PRN EA: 929.3 OINTMENT TOPICAL at 10:32

## 2017-04-07 RX ADMIN — METHYLPREDNISOLONE SODIUM SUCCINATE SCH MG: 40 INJECTION, POWDER, FOR SOLUTION INTRAMUSCULAR; INTRAVENOUS at 19:57

## 2017-04-07 RX ADMIN — PANTOPRAZOLE SODIUM SCH MG: 40 TABLET, DELAYED RELEASE ORAL at 08:24

## 2017-04-07 NOTE — PN
DATE: 04/07/2017



The patient has no complaints of any chest pain, no shortness of breath, no headaches.  He had _____ 
 confusion overnight.  



Temperature is 99, pulse is 76, blood pressure is 140/54, respirations 20. 



PHYSICAL EXAMINATION:    

GENERAL:   The patient comfortable, in no acute distress.

HEENT:   Anicteric sclerae.  Moist mucosa.

NECK:   No JVD or adenopathy.

CARDIAC:   S1/S2.  No murmurs.  No rubs.  Regular.

RESPIRATORY:   Clear to auscultation bilaterally.  No wheezes, rales, or rhonchi.  Good air entry.

ABDOMEN:   Bowel sounds are positive, soft, nontender, and nondistended.

EXTREMITIES:   No edema.  Has 1+ pulses.



LABS:  Creatinine is 1.6, white count was 14.3 last.

 

ASSESSMENT:

1.  Delirium.

2.  Hypoxia, improved.

3.  Acute kidney injury.

4.  Non-ST elevation myocardial infarction, resolved.

5.  Hypophosphatemia, resolved.

6.  Transaminitis, resolved.

7.  Hypernatremia, resolved.

8.  Right knee injury secondary to degenerative joint disease, improved.

9.  Left arm PICC line.

10.  Sepsis, improved.

 

PLAN:  The patient is currently comfortable.  He continues to be agitated.  The patient's family is n
ot allowing for psychiatric comanagement of this patient's delirium and agitation.  He continues to b
e difficult to manage.  The patient had scratched the nurse 2 nights ago.  Last night the patient spa
t on the nurse.  



Overnight the patient was started on Xanax yesterday.  He is on Rocephin for antibiotic.  He is recei
ving Tylenol.  He is on Protonix daily.  The patient is on metoprolol, he is receiving heparin for DV
T prophylaxis.  He is on albuterol.  The patient is on aspirin for his MI.  



The physical therapist tried to work with the patient.  The patient was confused.  He needs maximal a
ssistance of 2 people.  He would only take a few steps.  He was placed in the chair.  He continues to
 decline, and has difficulty, and is severely deconditioned.





__________________________________________

Jose Martin Arguello MD







cc:



DD: 04/07/2017 05:33:05  [id number]

TT: 04/07/2017 08:47:23

Confirmation # [id number]

Dictation # [id number]

jn

## 2017-04-07 NOTE — CP.PCM.PN
Subjective





- Date & Time of Evaluation


Date of Evaluation: 04/07/17


Time of Evaluation: 00:33





- Subjective


Subjective: 


Patient was seen at bedside .He was restless.Spat on nurse, trying to pull 

tubing .


 Awake, alert.At one point he said he had chest pain.


 Denied sob.


 Admitted with knee pain, had NSTEMI.


 HTN, dementia, Obesity, cataract, herniorrhaphy, nephrolithiasis.





Objective





- Vital Signs/Intake and Output


Vital Signs (last 24 hours): 


 











Temp Pulse Resp BP Pulse Ox


 


 99.0 F   95 H  20   140/54 L  100 


 


 04/07/17 00:00  04/07/17 00:00  04/07/17 00:00  04/07/17 00:00  04/06/17 06:30








Intake and Output: 


 











 04/06/17 04/07/17





 18:59 06:59


 


Intake Total 1080 


 


Output Total  200


 


Balance 1080 -200














- Medications


Medications: 


 Current Medications





Acetaminophen (Tylenol 325mg Tab)  650 mg PO Q4 PRN


   PRN Reason: Fever >100.4 F


   Last Admin: 04/05/17 17:10 Dose:  650 mg


Albuterol/Ipratropium (Duoneb 3 Mg/0.5 Mg (3 Ml) Ud)  3 ml IH [id number] [facility]


   Last Admin: 04/06/17 19:25 Dose:  3 ml


Albuterol/Ipratropium (Duoneb 3 Mg/0.5 Mg (3 Ml) Ud)  3 ml IH Q2H PRN


   PRN Reason: Shortness of Breath


   Last Admin: 04/06/17 05:34 Dose:  3 ml


Alprazolam (Xanax)  0.25 mg PO BID [facility]


   Stop: 04/13/17 10:01


   Last Admin: 04/06/17 20:04 Dose:  0.25 mg


Aspirin (Aspirin)  325 mg PO DAILY [facility]


   Last Admin: 04/06/17 09:39 Dose:  325 mg


Budesonide (Pulmicort Respules)  0.5 mg IH [id number] [facility]


   Last Admin: 04/06/17 19:25 Dose:  0.5 mg


Clotrimazole (Lotrimin 1%)  0 gm TOP BID [facility]


   Last Admin: 04/06/17 18:00 Dose:  1 applic


Heparin Sodium (Porcine) (Heparin)  5,000 units SC Q12 [facility]


   PRN Reason: Protocol


   Last Admin: 04/04/17 21:43 Dose:  5,000 units


Hydralazine HCl (Apresoline)  10 mg IVP Q6 PRN


   PRN Reason: Systolic Blood Pressure


   Last Admin: 04/02/17 09:56 Dose:  10 mg


Ceftriaxone Sodium (Rocephin 2 Gm Ivpb)  100 mls @ 100 mls/hr IVPB DAILY [facility]


   PRN Reason: Protocol


   Stop: 04/19/17 10:01


   Last Admin: 04/06/17 09:40 Dose:  100 mls/hr


Losartan Potassium (Cozaar)  25 mg PO DAILY [facility]


   Last Admin: 04/06/17 09:39 Dose:  25 mg


Metoprolol Tartrate (Lopressor)  50 mg PO 0800,1800 [facility]


   Last Admin: 04/06/17 19:06 Dose:  50 mg


Pantoprazole Sodium (Protonix Ec Tab)  40 mg PO ACB [facility]


   Last Admin: 04/06/17 08:42 Dose:  40 mg


Silver Sulfadiazine (Silvadene 1% 20 Gm)  0 ea TOP DAILY [facility]


   Last Admin: 04/06/17 09:40 Dose:  1 applic


Vitamin A (Vitamin A & D Oint Ud Foilpak)  1 ea TOP BID PRN


   PRN Reason: chapped lips


   Last Admin: 04/04/17 21:43 Dose:  1 ea











- Labs


Labs: 


 





 04/06/17 05:45 





 04/06/17 05:45 





 











PT  13.7 Seconds (9.9-11.8)  H  04/05/17  06:25    


 


INR  1.27  (0.93-1.08)  H  04/05/17  06:25    


 


APTT  33.2 Seconds (23.7-30.8)  H  04/05/17  06:25    














- Constitutional


Appears: Agitated





- Head Exam


Head Exam: ATRAUMATIC, NORMAL INSPECTION, NORMOCEPHALIC


Additional comments: 


Obese.





- Eye Exam


Eye Exam: Normal appearance





- ENT Exam


ENT Exam: Normal External Ear Exam





- Neck Exam


Neck Exam: Normal Inspection





- Respiratory Exam


Respiratory Exam: Wheezes (+ bilateral.), NORMAL BREATHING PATTERN





- GI/Abdominal Exam


GI & Abdominal Exam: absent: Distended





- Rectal Exam


Rectal Exam: Deferred





- Neurological Exam


Neurological Exam: Altered (mental status.)





- Psychiatric Exam


Psychiatric exam: Agitated





- Skin


Skin Exam: Normal Color





Assessment and Plan





- Assessment and Plan (Free Text)


Assessment: 


A/P:Agitation.


      Restlessness.


      AMS.


      Obesity.


      S/P NSTEMI.


      Dementia.


      Wrist restraint was applied.


      Geodon 20 mg IM was given.


      Resperidol 0.5 mg PO was ordered.

## 2017-04-07 NOTE — CP.PCM.PN
<Rabia Bernal - Last Filed: 04/07/17 16:32>





Subjective





- Date & Time of Evaluation


Date of Evaluation: 04/07/17


Time of Evaluation: 10:00





- Subjective


Subjective: 


80 y/o male patient was seen at bedside this morning concerning multiple 

superficial bilateral lower extremity ulcerations. Patient was resting 

comfortably in bed this morning. Dressing to bilateral legs were clean dry and 

intact. patient is extubated and able to answer questions. Patient appears in 

NAD and AAOx3. 





Objective





- Vital Signs/Intake and Output


Vital Signs (last 24 hours): 


 











Temp Pulse Resp BP Pulse Ox


 


 97 F L  63   19   127/97 H  95 


 


 04/07/17 12:00  04/07/17 14:00  04/07/17 12:00  04/07/17 12:00  04/07/17 06:00








Intake and Output: 


 











 04/07/17 04/07/17





 06:59 18:59


 


Intake Total 360 120


 


Output Total 200 


 


Balance 160 120














- Medications


Medications: 


 Current Medications





Acetaminophen (Tylenol 325mg Tab)  650 mg PO Q4 PRN


   PRN Reason: Fever >100.4 F


   Last Admin: 04/05/17 17:10 Dose:  650 mg


Albuterol/Ipratropium (Duoneb 3 Mg/0.5 Mg (3 Ml) Ud)  3 ml IH [id number] [facility]


   Last Admin: 04/07/17 13:47 Dose:  3 ml


Albuterol/Ipratropium (Duoneb 3 Mg/0.5 Mg (3 Ml) Ud)  3 ml IH Q2H PRN


   PRN Reason: Shortness of Breath


   Last Admin: 04/06/17 05:34 Dose:  3 ml


Alprazolam (Xanax)  0.5 mg PO BID [facility]


   Stop: 04/13/17 10:01


   Last Admin: 04/07/17 10:41 Dose:  0.5 mg


Aspirin (Aspirin)  325 mg PO DAILY [facility]


   Last Admin: 04/07/17 10:32 Dose:  325 mg


Budesonide (Pulmicort Respules)  0.5 mg IH [id number] [facility]


   Last Admin: 04/07/17 08:00 Dose:  0.5 mg


Clotrimazole (Lotrimin 1%)  0 gm TOP BID [facility]


   Last Admin: 04/07/17 10:34 Dose:  1 applic


Heparin Sodium (Porcine) (Heparin)  5,000 units SC Q12 [facility]


   PRN Reason: Protocol


   Last Admin: 04/04/17 21:43 Dose:  5,000 units


Hydralazine HCl (Apresoline)  10 mg IVP Q6 PRN


   PRN Reason: Systolic Blood Pressure


   Last Admin: 04/02/17 09:56 Dose:  10 mg


Ceftriaxone Sodium (Rocephin 2 Gm Ivpb)  100 mls @ 100 mls/hr IVPB DAILY [facility]


   PRN Reason: Protocol


   Stop: 04/19/17 10:01


   Last Admin: 04/07/17 10:35 Dose:  100 mls/hr


Losartan Potassium (Cozaar)  25 mg PO DAILY [facility]


   Last Admin: 04/07/17 10:32 Dose:  25 mg


Metoprolol Tartrate (Lopressor)  50 mg PO 0800,1800 [facility]


   Last Admin: 04/07/17 08:24 Dose:  50 mg


Pantoprazole Sodium (Protonix Ec Tab)  40 mg PO ACB [facility]


   Last Admin: 04/07/17 08:24 Dose:  40 mg


Risperidone (Risperdal Tab)  0.5 mg PO [facility]


   PRN Reason: Protocol


Silver Sulfadiazine (Silvadene 1% 20 Gm)  0 ea TOP DAILY [facility]


   Last Admin: 04/07/17 10:33 Dose:  1 applic


Vitamin A (Vitamin A & D Oint Ud Foilpak)  1 ea TOP BID PRN


   PRN Reason: chapped lips


   Last Admin: 04/07/17 10:32 Dose:  1 ea











- Labs


Labs: 


 





 04/07/17 07:45 





 04/07/17 07:45 





 











PT  13.7 Seconds (9.9-11.8)  H  04/05/17  06:25    


 


INR  1.27  (0.93-1.08)  H  04/05/17  06:25    


 


APTT  33.2 Seconds (23.7-30.8)  H  04/05/17  06:25    














- Constitutional


Appears: Well, Non-toxic, No Acute Distress





- Extremities Exam


Additional comments: 


Bilateral lower extremities exam





DERM: Two Superficial ulcerations noted to Left leg located to anterior aspect 

of shin and one on lateral aspect of leg, each measuring 2cm x 2cm x 0.1cm. No 

drainage noted, no purulent discharge noted. No erythema noted around the 

wound. No sign of acute infection is noted.





One superficial ulceration noted to Right leg Cornelius-lateral aspect measuring 

2cmx  2cm x 0.1cm with granular base. No drainage noted. No pus noted. No mal-

odor noted. No erythema is noted around the wound. No sign of infection is 

noted.





VASC: nonpalpable pedal pulses bilaterally, TG wnl, CFT < 3 sec to all digits


NEURO: grossly diminished


ORTHO: no pain on palpation of foot or legs bilaterally








- Neurological Exam


Neurological Exam: Alert, Awake, Oriented x3





- Psychiatric Exam


Psychiatric exam: Normal Affect, Normal Mood





- Skin


Skin Exam: Normal Color, Warm





Assessment and Plan





- Assessment and Plan (Free Text)


Assessment: 


80 y/o male presents with superficial ulcerations to bilateral lower extremity


Plan: 


patient evaluated and seen at bedside


labs and vitals reviewed


continue IV abx


applied allevyn heel pads bilaterally


applied multipodus offloading boots to patients heels


patient to continue wearing boots in bed at all times


podiatry will continue to monitor while patient remains in house 





<Rodrigo Do - Last Filed: 04/08/17 08:18>





Objective





- Vital Signs/Intake and Output


Vital Signs (last 24 hours): 


 











Temp Pulse Resp BP Pulse Ox


 


 97.3 F L  85   25 H  144/69   99 


 


 04/08/17 07:55  04/08/17 07:55  04/08/17 07:55  04/08/17 07:55  04/08/17 07:55








Intake and Output: 


 











 04/08/17 04/08/17





 06:59 18:59


 


Intake Total 120 


 


Balance 120 














- Medications


Medications: 


 Current Medications





Acetaminophen (Tylenol 325mg Tab)  650 mg PO Q4 PRN


   PRN Reason: Fever >100.4 F


   Last Admin: 04/05/17 17:10 Dose:  650 mg


Albuterol/Ipratropium (Duoneb 3 Mg/0.5 Mg (3 Ml) Ud)  3 ml IH [id number] [facility]


   Last Admin: 04/08/17 07:12 Dose:  3 ml


Albuterol/Ipratropium (Duoneb 3 Mg/0.5 Mg (3 Ml) Ud)  3 ml IH Q2H PRN


   PRN Reason: Shortness of Breath


   Last Admin: 04/06/17 05:34 Dose:  3 ml


Aspirin (Aspirin)  325 mg PO DAILY [facility]


   Last Admin: 04/07/17 10:32 Dose:  325 mg


Budesonide (Pulmicort Respules)  0.5 mg IH [id number] [facility]


   Last Admin: 04/08/17 07:12 Dose:  0.5 mg


Clotrimazole (Lotrimin 1%)  0 gm TOP BID [facility]


   Last Admin: 04/07/17 19:51 Dose:  Not Given


Heparin Sodium (Porcine) (Heparin)  5,000 units SC Q12 [facility]


   PRN Reason: Protocol


   Last Admin: 04/04/17 21:43 Dose:  5,000 units


Hydralazine HCl (Apresoline)  10 mg IVP Q6 PRN


   PRN Reason: Systolic Blood Pressure


   Last Admin: 04/02/17 09:56 Dose:  10 mg


Ceftriaxone Sodium (Rocephin 2 Gm Ivpb)  100 mls @ 100 mls/hr IVPB DAILY [facility]


   PRN Reason: Protocol


   Stop: 04/19/17 10:01


   Last Admin: 04/07/17 10:35 Dose:  100 mls/hr


Losartan Potassium (Cozaar)  25 mg PO DAILY [facility]


   Last Admin: 04/07/17 10:32 Dose:  25 mg


Methylprednisolone (Solu-Medrol)  40 mg IVP Q8H [facility]


   Last Admin: 04/08/17 03:04 Dose:  40 mg


Metoprolol Tartrate (Lopressor)  50 mg PO 0800,1800 [facility]


   Last Admin: 04/07/17 19:51 Dose:  Not Given


Pantoprazole Sodium (Protonix Ec Tab)  40 mg PO ACB [facility]


   Last Admin: 04/07/17 08:24 Dose:  40 mg


Risperidone (Risperdal Tab)  0.5 mg PO [facility] PRN; Protocol


   PRN Reason: Agitation


Silver Sulfadiazine (Silvadene 1% 20 Gm)  0 ea TOP DAILY [facility]


   Last Admin: 04/07/17 10:33 Dose:  1 applic


Vitamin A (Vitamin A & D Oint Ud Foilpak)  1 ea TOP BID PRN


   PRN Reason: chapped lips


   Last Admin: 04/07/17 10:32 Dose:  1 ea











- Labs


Labs: 


 





 04/08/17 06:47 





 04/08/17 06:47 





 











PT  13.7 Seconds (9.9-11.8)  H  04/05/17  06:25    


 


INR  1.27  (0.93-1.08)  H  04/05/17  06:25    


 


APTT  33.2 Seconds (23.7-30.8)  H  04/05/17  06:25    














Attending/Attestation





- Attestation


I have personally seen and examined this patient.: Yes


I have fully participated in the care of the patient.: Yes


I have reviewed all pertinent clinical information, including history, physical 

exam and plan: Yes

## 2017-04-07 NOTE — CP.PCM.PN
Subjective





- Date & Time of Evaluation


Date of Evaluation: 04/07/17


Time of Evaluation: 17:18





- Subjective


Subjective: 


Responded stat to RRT call. 


Patient was noted to be obtunded and was barely responding to verbal commands,

hence RRT was called.


Review of his chart reveals he was given Xanax 0.5mg at 10:40 am ;geodon 20 mg 

was given at 1:00am; Risperdal 0.5mg at 1:40am, and then again @ 10:40am . Pt 

has been on bipap (15/8, 40%). He was just transferred from tele unit at 4:58pm 

today.


As noted above,pt  was noted to be obtunded hence RRT was called. 


Appears to breathing alright on bipap. Due to lethargic state he is unable to 

state any complaints. 





Pt was admitted because of sepsis, also noted to have elevated troponin and 

acute kidney injury. 





/76 HR 64 RR 24 sP02 95% Accucheck 137





PMH - Hypertension , Nephrolithiasis, Cataracts, Hearing impairment, Dementia, 

Right knee pain resulting in falls. 





Objective





- Vital Signs/Intake and Output


Vital Signs (last 24 hours): 


 











Temp Pulse Resp BP Pulse Ox


 


 97 F L  63   19   127/97 H  95 


 


 04/07/17 12:00  04/07/17 14:00  04/07/17 12:00  04/07/17 12:00  04/07/17 06:00








Intake and Output: 


 











 04/07/17 04/07/17





 06:59 18:59


 


Intake Total 360 120


 


Output Total 200 


 


Balance 160 120














- Medications


Medications: 


 Current Medications





Acetaminophen (Tylenol 325mg Tab)  650 mg PO Q4 PRN


   PRN Reason: Fever >100.4 F


   Last Admin: 04/05/17 17:10 Dose:  650 mg


Albuterol/Ipratropium (Duoneb 3 Mg/0.5 Mg (3 Ml) Ud)  3 ml IH [id number] WQAAR


   Last Admin: 04/07/17 13:47 Dose:  3 ml


Albuterol/Ipratropium (Duoneb 3 Mg/0.5 Mg (3 Ml) Ud)  3 ml IH Q2H PRN


   PRN Reason: Shortness of Breath


   Last Admin: 04/06/17 05:34 Dose:  3 ml


Alprazolam (Xanax)  0.5 mg PO BID WAQAR


   Stop: 04/13/17 10:01


   Last Admin: 04/07/17 10:41 Dose:  0.5 mg


Aspirin (Aspirin)  325 mg PO DAILY [facility]


   Last Admin: 04/07/17 10:32 Dose:  325 mg


Budesonide (Pulmicort Respules)  0.5 mg IH [id number] WAQAR


   Last Admin: 04/07/17 08:00 Dose:  0.5 mg


Clotrimazole (Lotrimin 1%)  0 gm TOP BID [facility]


   Last Admin: 04/07/17 10:34 Dose:  1 applic


Heparin Sodium (Porcine) (Heparin)  5,000 units SC Q12 WAQAR


   PRN Reason: Protocol


   Last Admin: 04/04/17 21:43 Dose:  5,000 units


Hydralazine HCl (Apresoline)  10 mg IVP Q6 PRN


   PRN Reason: Systolic Blood Pressure


   Last Admin: 04/02/17 09:56 Dose:  10 mg


Ceftriaxone Sodium (Rocephin 2 Gm Ivpb)  100 mls @ 100 mls/hr IVPB DAILY [facility]


   PRN Reason: Protocol


   Stop: 04/19/17 10:01


   Last Admin: 04/07/17 10:35 Dose:  100 mls/hr


Losartan Potassium (Cozaar)  25 mg PO DAILY [facility]


   Last Admin: 04/07/17 10:32 Dose:  25 mg


Metoprolol Tartrate (Lopressor)  50 mg PO 0800,1800 [facility]


   Last Admin: 04/07/17 08:24 Dose:  50 mg


Pantoprazole Sodium (Protonix Ec Tab)  40 mg PO ACB [facility]


   Last Admin: 04/07/17 08:24 Dose:  40 mg


Risperidone (Risperdal Tab)  0.5 mg PO AMHS WAQAR


   PRN Reason: Protocol


Silver Sulfadiazine (Silvadene 1% 20 Gm)  0 ea TOP DAILY [facility]


   Last Admin: 04/07/17 10:33 Dose:  1 applic


Vitamin A (Vitamin A & D Oint Ud Foilpak)  1 ea TOP BID PRN


   PRN Reason: chapped lips


   Last Admin: 04/07/17 10:32 Dose:  1 ea











- Labs


Labs: 


 





 04/07/17 07:45 





 04/07/17 07:45 





 











PT  13.7 Seconds (9.9-11.8)  H  04/05/17  06:25    


 


INR  1.27  (0.93-1.08)  H  04/05/17  06:25    


 


APTT  33.2 Seconds (23.7-30.8)  H  04/05/17  06:25    














- Constitutional


Appears: Other (Lethargic , is on Bipap)





- Head Exam


Head Exam: ATRAUMATIC, NORMAL INSPECTION, NORMOCEPHALIC





- Eye Exam


Eye Exam: PERRL





- ENT Exam


ENT Exam: Mucous Membranes Moist





- Neck Exam


Neck Exam: Normal Inspection





- Respiratory Exam


Respiratory Exam: Decreased Breath Sounds


Additional comments: 


Patient is on bipap.





- Cardiovascular Exam


Cardiovascular Exam: Irregular Rhythm


Additional comments: 


PACs noted on ekg done stat.





- GI/Abdominal Exam


GI & Abdominal Exam: Soft, Normal Bowel Sounds (abdomen is obese)





- Extremities Exam


Additional comments: 


multiple wounds with dressings on them noted on lower legs.





- Neurological Exam


Neurological Exam: Altered (arousable on painful stimuli, follows simple 

commands at times)





- Skin


Skin Exam: Diaphoretic, Warm





Assessment and Plan





- Assessment and Plan (Free Text)


Assessment: 


Stupor secondary to medications given since last night . 


Respiratory failure





Plan: 


EKG done stat .It shows NSR with 1st degree AV block frequent PACs and RBBB.(

findings similar to prior EKG in the Chart.)


Pt to be monitored closely till effect of drugs wear off.


ABG was done on current bipap setting, CO2 retention was noted. Dr Arguello and 

Dr Lira were notified.


Patient was evaluated by Dr Lira.Since pt is likely to be intubated, he 

discussed with the family. They made a decision to place patient on DNR/DNI 

status.  As such patient is to be continued on bipap and Xanax and Geodon are 

to be discontinued and Risperdal to be given cautiously.


Pt will remain on the medical floor.








Total critical care time spent on this pt was:45 mins.

## 2017-04-07 NOTE — CP.PCM.PN
Subjective





- Date & Time of Evaluation


Date of Evaluation: 04/07/17


Time of Evaluation: 08:30





- Subjective


Subjective: 


Noted patient to be restless at night. No fevers overnight, no abdominal pain, 

currently no shortness of breath at rest.





Objective





- Vital Signs/Intake and Output


Vital Signs (last 24 hours): 


 











Temp Pulse Resp BP Pulse Ox


 


 97 F L  63   19   127/97 H  95 


 


 04/07/17 12:00  04/07/17 14:00  04/07/17 12:00  04/07/17 12:00  04/07/17 06:00








Intake and Output: 


 











 04/07/17 04/08/17





 18:59 06:59


 


Intake Total 120 0


 


Balance 120 0














- Medications


Medications: 


 Current Medications





Acetaminophen (Tylenol 325mg Tab)  650 mg PO Q4 PRN


   PRN Reason: Fever >100.4 F


   Last Admin: 04/05/17 17:10 Dose:  650 mg


Albuterol/Ipratropium (Duoneb 3 Mg/0.5 Mg (3 Ml) Ud)  3 ml IH [id number] [facility]


   Last Admin: 04/07/17 20:23 Dose:  3 ml


Albuterol/Ipratropium (Duoneb 3 Mg/0.5 Mg (3 Ml) Ud)  3 ml IH Q2H PRN


   PRN Reason: Shortness of Breath


   Last Admin: 04/06/17 05:34 Dose:  3 ml


Aspirin (Aspirin)  325 mg PO DAILY [facility]


   Last Admin: 04/07/17 10:32 Dose:  325 mg


Budesonide (Pulmicort Respules)  0.5 mg IH [id number] [facility]


   Last Admin: 04/07/17 20:23 Dose:  0.5 mg


Clotrimazole (Lotrimin 1%)  0 gm TOP BID [facility]


   Last Admin: 04/07/17 19:51 Dose:  Not Given


Heparin Sodium (Porcine) (Heparin)  5,000 units SC Q12 [facility]


   PRN Reason: Protocol


   Last Admin: 04/04/17 21:43 Dose:  5,000 units


Hydralazine HCl (Apresoline)  10 mg IVP Q6 PRN


   PRN Reason: Systolic Blood Pressure


   Last Admin: 04/02/17 09:56 Dose:  10 mg


Ceftriaxone Sodium (Rocephin 2 Gm Ivpb)  100 mls @ 100 mls/hr IVPB DAILY [facility]


   PRN Reason: Protocol


   Stop: 04/19/17 10:01


   Last Admin: 04/07/17 10:35 Dose:  100 mls/hr


Losartan Potassium (Cozaar)  25 mg PO DAILY [facility]


   Last Admin: 04/07/17 10:32 Dose:  25 mg


Methylprednisolone (Solu-Medrol)  40 mg IVP Q8H [facility]


   Last Admin: 04/07/17 19:57 Dose:  40 mg


Metoprolol Tartrate (Lopressor)  50 mg PO 0800,1800 [facility]


   Last Admin: 04/07/17 19:51 Dose:  Not Given


Pantoprazole Sodium (Protonix Ec Tab)  40 mg PO ACB [facility]


   Last Admin: 04/07/17 08:24 Dose:  40 mg


Risperidone (Risperdal Tab)  0.5 mg PO AMHS PRN; Protocol


   PRN Reason: Agitation


Silver Sulfadiazine (Silvadene 1% 20 Gm)  0 ea TOP DAILY [facility]


   Last Admin: 04/07/17 10:33 Dose:  1 applic


Vitamin A (Vitamin A & D Oint Ud Foilpak)  1 ea TOP BID PRN


   PRN Reason: chapped lips


   Last Admin: 04/07/17 10:32 Dose:  1 ea











- Labs


Labs: 


 





 04/07/17 07:45 





 04/07/17 17:30 





 











PT  13.7 Seconds (9.9-11.8)  H  04/05/17  06:25    


 


INR  1.27  (0.93-1.08)  H  04/05/17  06:25    


 


APTT  33.2 Seconds (23.7-30.8)  H  04/05/17  06:25    














- Constitutional


Appears: Non-toxic, No Acute Distress





- Head Exam


Head Exam: NORMAL INSPECTION





- ENT Exam


ENT Exam: Mucous Membranes Moist





- Neck Exam


Neck Exam: absent: Lymphadenopathy, Meningismus





- Respiratory Exam


Respiratory Exam: Decreased Breath Sounds





- Cardiovascular Exam


Cardiovascular Exam: +S1, +S2





- GI/Abdominal Exam


GI & Abdominal Exam: Soft.  absent: Tenderness





- Extremities Exam


Additional comments: 


much improved swelling and erythema of both legs





Assessment and Plan





- Assessment and Plan (Free Text)


Plan: 


Assessment


severe sepsis S/P shock S/P ventilator-dependent respiratory failure with acute 

on chronic renal failure due to Group G strep bacteremia, probably secondary to 

bilateral lower extremities skin and skin structure infection; also with MSSA 

from the wound cultures; so far no evidence of new infection; patient is 

clinically improving; he was intubated for hypercarbic respiratory failure and 

congestion, no evidence of pneumonia currently


acute rhabdomyolysis


consider acute NSTEMI


HTN


chronic renal failure


dementia


obesity with BMI 38





Plan


continue Rocephin (day 14 from first negative blood cx) - patient should finish 

28 days of antibiotics


2D echocardiogram did not show vegetations


will continue to monitor clinically

## 2017-04-07 NOTE — PN
DATE: 04/07/2017



The patient was seen and examined at bedside.  He is on BiPAP.  He is arousable 
on strong touch stimuli; however, fairly somnolent.  



His ABG is 7.29, pCO2 82, pO2 120 on BiPAP 20/8 with FiO2 of 40% and the rate 
25.



PHYSICAL EXAMINATION: 

VITAL SIGNS:  Temperature 97, blood pressure 127/97, heart rate 86, respiratory 
rate 19.

HEAD AND NECK:  Atraumatic.

LUNGS:  Clear to auscultation bilaterally.

HEART:  Regular rate and rhythm.  S1, S2 distant.

ABDOMEN:  Soft, nontender, nondistended.

MUSCULOSKELETAL:  Has 1+ bilateral pedal and ankle edema.

NEUROLOGIC:  The patient moves upper extremities on command.

SKIN:  The patient has chronic cellulitic changes in both lower extremities.

PSYCHIATRIC:  The patient is very somnolent.



LABORATORY DATA:  WBC 9.4, hemoglobin 8.5, platelet count 316.  Sodium 140, 
potassium 4, chloride 95, carbon dioxide 41, BUN 46, creatinine 1.8 down from 
2.  Glucose 128.  Troponin 0.11.



MEDICATIONS:  DuoNeb every 6, hydralazine p.r.n., aspirin, Cozaar, DuoNeb p.r.n.
, Geodon, heparin subQ, metoprolol, Lotrimin cream, nitroglycerin cream, 
Protonix, Pulmicort, risperidone, ceftriaxone, Solu-Medrol 40 mg IV q.8 (
started just now by me), Tylenol p.r.n., vitamin A and D topical.



ASSESSMENT AND PLAN:  This is an 81-year-old gentleman with acute respiratory 
acidosis secondary to hypercarbic respiratory failure.  The patient is 
currently on BiPAP.  This is the third episode when the patient presented to us 
with hypercapnic respiratory failure.  I spoke and had a lengthy discussion 
with the patient's family who requested the patient to be a DNR/DNI and not put 
on the ventilator.  This part of conversation was witnessed by 2 nurses as 
well.  At the present time, I will continue with steroid taper, bronchodilators
, antibiotics, BiPAP and serial ABG.  Will continue with deep venous thrombosis 
and gastrointestinal prophylaxis.  The patient also appears to have delirium 
with fluctuating consciousness, which may explain his agitation during the 
night.  Psychiatry consult is appreciated as well.  I will continue to target 
euvolemia, euglycemia, normothermia and oxygen saturation more than 90%.



ccm time 40 min





__________________________________________

Kentrell Lira MD







cc:   



DD: 04/07/2017 18:50:56  [id number]

TT: 04/07/2017 20:31:23

Confirmation # [id number]

Dictation # [id number]

dn

MARIA DEL CARMEN

## 2017-04-07 NOTE — PN
DATE: 04/07/2017



SUBJECTIVE:  The patient appears comfortable this morning.  He is mildly short 
of breath, but in no acute distress.



VITALS:  Temperature is 99.4, pulse 90, respirations 20/22, blood pressure 116/
58.  Oxygen saturation on nasal cannula is 95%.

HENT:  Normocephalic, atraumatic.  No JVD.

CARDIOVASCULAR:  Systolic ejection murmur at the lower left sternal border.  No 
S3 gallop.

LUNGS:  Minimal/less rhonchi.  No wheezing.

EXTREMITIES:  Mild edema.  No cyanosis, no clubbing.  Calves are nontender to 
palpation.

GASTROINTESTINAL:  Abdomen is soft, nontender, nondistended.  Bowel sounds are 
positive.

SKIN:  Resolving cellulitis -- lower extremities (anterior aspects).

NEUROLOGIC EXAMINATION:  Limited at the present time.



IMPRESSION:

1. Status post respiratory failure (multiple episodes).

2. Chronic obstructive pulmonary disease.

3. Bilateral cellulitis.

4. Severe sepsis.

5. Acute myocardial infarction.

6. Renal insufficiency.

7. Mild anemia.



PLAN:  The patient appears comfortable this morning.  He is mildly short of 
breath at rest, but certainly in no acute distress.  I did discuss the case 
with the night nurse at length.  The night nurse stated that the patient had a 
much better night last night.  On physical exam, there is less bronchospasm 
noted.  I will continue with the current nebulizer treatments and inhaled 
steroids for now.  The patient remains on antibiotic therapy -- as per 
infectious disease.  I would continue with the cardiology evaluation and 
treatment.  Input by Dr. Wallace is noted.  Clinical status of the patient is 
certainly improved this morning.  However, again, the overall status/prognosis 
of this patient remains very guarded.  I will discuss the above with the 
attending physician.





__________________________________________

Balwinder Fall MD







cc:   



DD: 04/07/2017 07:24:47  [id number]

TT: 04/07/2017 09:43:35

Confirmation # [id number]

Dictation # [id number]

jn

MARIA DEL CARMEN

## 2017-04-07 NOTE — CON
DATE: 04/07/2017



HISTORY OF PRESENT ILLNESS:  Shortly, the patient is an 81-year-old  male with multiple medi
kingsley issues, hypertension, falls patient also had sepsis, status post septic shock, status post ventil
ator dependent respiratory failure with acute on chronic renal failure.  The patient also has group G
 step bacteremia.  The patient also has bilateral lower extremities skin infected.  The patient had r
habdomyolsis, rule out acute N-STEMI, hypertension, chronic renal failure, possible dementia, obesity
.  The patient was admitted on the medical floor for the above named problems.  Psych consult was kingsley
led initially for evaluation of altered mental status as well as medication management for that.  Thomas aguirre, patient was seen by Dr. Mckinley.  The patient's family was not in agreement with the treatment
 plan offered by psychiatry.  A second opinion was called.  This writer evaluated the patient today a
t the morning time.  The patient presented to be confused, there is no option to have a meaningful co
nversation.  The patient was just ____, seems to have difficulty to breath, at the same time, oxygen 
saturation was 95.  Collateral information was obtained from the nursing staff.  The patient had epis
odes of screaming, yelling, trying to pull his IV.  The patient also needed to be in restraints.  Thi
s writer gave a call to patient's wife, Beata, had prolonged conversation with her about treatmen
t options.  The patient's wife was educated about possible diagnosis of delirium which is related to 
multiple medical issues, sepsis.  The patient's wife verbalized understanding.  The patient's wife de
nied any memory problems of the patient anytime recently.  The patient was driving up until recently,
 does not have any memory problems, but as per wife he is "stubborn".  After education about antipsyc
hotic medication and diagnosis, the patient's wife was in agreement to start small dose of antipsycho
tic medication for the patient.  Risks, benefits and alternatives were explained to the patient's wif
e, she was in agreement with that.



This writer reviewed vital signs.  Temperature 97, pulse is 86, blood pressure 127/97, respirations 1
9, oxygen saturation is 95.



MEDICATIONS:  Reviewed.  The patient is on Tylenol, DuoNeb.  The patient was started on Xanax 0.5 mg 
twice a day scheduled, aspirin 325 mg daily, Pulmicort.  The patient is on IV antibiotics Rocephin 2 
grams daily, clotrimazole, heparin, hydralazine, Cozaar, Lopressor twice a day 50 mg, Protonix.  This
 writer also started patient on Risperdal liquid form.  The patient was provided with stat dose of 0.
5 mg of Risperdal earlier today.  The patient also is on Silvadene and vitamin A.



LABORATORY DATA:  Reviewed.  WBC 9.4 today.  Hemoglobin and hematocrit 8.5 and 26.4.  Granulocytes 7.
42.  Chemistry reviewed.  Troponin was 0.14, chloride 94, carbon dioxide 38.  Toxicology of vancomyci
n trough was 15.7.  Microbiology is reviewed.  



Reports reviewed.  Discussed with the attending, Dr. Arguello.



PAST PSYCHIATRIC HISTORY:  None.



MENTAL STATUS EXAMINATION:  The patient appears to be confused, communicated mostly with ____ .  The 
patient is mumbling something, intermittent eye contact.  Speech was incoherent.  Thought process is 
disorganized.  This writer was not able to assess thought content.  Insight and judgment are impaired
 at present moment.  Impulses are not predictable.



IMPRESSION:  The patient is in acute delirium stage, which is related to sepsis.  Also, status post a
cute rhabdomyolysis, coronary artery disease status post non-ST segment elevation myocardial infarcti
on, hypertension.  Per medical team patient has history of dementia, but as per wife, the patient had
 good memory.  Immobility due to degenerative joint disease.



PLAN:  Continue current management.  Continue current medications.  This writer implemented Risperdal
 0.5 mg liquid form twice a day at the morning time and the nighttime.  Continue Xanax.  Continue all
 of current medications.  The patient is seen by ID team, also nephrology, also pulmonology and podia
try.  Prolonged conversation took place today with the patient's wife, Beata.  Risks, benefits, a
lternatives of the medication discussed with the patient's wife.  Dr. Figueroa will follow patient up ov
er the weekend to make sure that patient is improving.  Should you have any questions, give me a call
 back.



Thank you very much for letting me participate in care of your patient.





__________________________________________

Yara Villegas MD







cc:



DD: 04/07/2017 14:15:51  [id number]

TT: 04/07/2017 15:26:09

Confirmation # [id number]

Dictation # [id number]

jn

## 2017-04-07 NOTE — PN
DATE: 04/07/2017



SUBJECTIVE:  The patient is lying in bed.  He is lethargic, but arousable.  Has been receiving Geodon
 and Risperdal for agitation.



OBJECTIVE:

VITAL SIGNS:  Blood pressure 116/58, pulse 92, respiratory rate 24, and temperature 99.4 axillary.

LUNGS:  Show a few scattered crepitations bilaterally.

HEART:  Regular rate and rhythm.

ABDOMEN:  Soft, nontender, bowel sounds are normoactive.

EXTREMITIES:  With dependent edema bilaterally.

NEUROLOGIC:  The patient is lethargic, arousable, without focal sensory or motor deficits.

SKIN:  Warm and dry.



LABORATORY DATA:  WBC is 9.4, hemoglobin 8.5, hematocrit 26.4.  Sodium 139, potassium 3.6, chloride 9
4, CO2 of 38, BUN 11, creatinine 2.0, glucose 128.



IMPRESSION:

1.  Hypertension, hypertensive cardiovascular disease and congestive heart failure.

2.  Coronary artery disease, status post non-ST-segment elevation myocardial infarction.

3.  Status post acute rhabdomyolysis with acute renal failure superimposed on mild chronic kidney dis
ease, improved.

4.  Status post sepsis, presumed secondary to cellulitis of the lower extremities with chronic venous
 stasis ulcers.

5.  Paroxysmal atrial fibrillation with intermittent rapid ventricular response.

6.  Dementia with superimposed delirium secondary to underlying medical condition.

7.  Immobility secondary to severe degenerative joint disease with obesity and deconditioning.

8.  Anemia, possibly secondary to repeated phlebotomy to rule out gastrointestinal bleed.

9.  Hyperglycemia.



PLAN:  The patient's transfer to a subacute unit has been canceled due to acute shortness of breath. 
 He is receiving IV antibiotics, and nebulizer treatments as well as budesonide inhaled.  Continue pu
lmonary followup with Dr. Fall, cardiology followup and infectious disease followup.  Would advise
 cautious use of neuroleptics; patient at risk for aspiration.  Out of bed as tolerated.  Social work
 for discharge planning.





__________________________________________

Ariel Browne JD, MD







cc:



DD: 04/07/2017 11:20:53  [id number]

TT: 04/07/2017 11:35:38

Confirmation # [id number]

Dictation # [id number]

mn

## 2017-04-08 LAB
ALBUMIN/GLOB SERPL: 0.7 {RATIO} (ref 1.1–1.8)
ALP SERPL-CCNC: 62 U/L (ref 38–133)
ALT SERPL-CCNC: 49 U/L (ref 7–56)
AST SERPL-CCNC: 55 U/L (ref 15–59)
BILIRUB SERPL-MCNC: 0.6 MG/DL (ref 0.2–1.3)
BUN SERPL-MCNC: 52 MG/DL (ref 7–21)
CALCIUM SERPL-MCNC: 9.6 MG/DL (ref 8.4–10.5)
CHLORIDE SERPL-SCNC: 96 MMOL/L (ref 98–107)
CO2 SERPL-SCNC: 37 MMOL/L (ref 21–33)
ERYTHROCYTE [DISTWIDTH] IN BLOOD BY AUTOMATED COUNT: 13.5 % (ref 11.5–14.5)
GLOBULIN SER-MCNC: 4.3 GM/DL
GLUCOSE SERPL-MCNC: 171 MG/DL (ref 70–110)
HCT VFR BLD CALC: 27.2 % (ref 42–52)
MCH RBC QN AUTO: 27.2 PG (ref 25–35)
MCHC RBC AUTO-ENTMCNC: 31.3 G/DL (ref 31–37)
MCV RBC AUTO: 87.2 FL (ref 80–105)
PLATELET # BLD: 283 10^3/UL (ref 120–450)
PMV BLD AUTO: 9 FL (ref 7–11)
POTASSIUM SERPL-SCNC: 4.3 MMOL/L (ref 3.6–5)
PROT SERPL-MCNC: 7.1 G/DL (ref 5.8–8.3)
SODIUM SERPL-SCNC: 141 MMOL/L (ref 132–148)
WBC # BLD AUTO: 7.3 10^3/UL (ref 4.5–11)

## 2017-04-08 RX ADMIN — METHYLPREDNISOLONE SODIUM SUCCINATE SCH MG: 40 INJECTION, POWDER, FOR SOLUTION INTRAMUSCULAR; INTRAVENOUS at 11:16

## 2017-04-08 RX ADMIN — METHYLPREDNISOLONE SODIUM SUCCINATE SCH MG: 40 INJECTION, POWDER, FOR SOLUTION INTRAMUSCULAR; INTRAVENOUS at 18:06

## 2017-04-08 RX ADMIN — BUDESONIDE SCH MG: 0.5 SUSPENSION RESPIRATORY (INHALATION) at 20:25

## 2017-04-08 RX ADMIN — SILVER SULFADIAZINE SCH APPLIC: 10 CREAM TOPICAL at 11:17

## 2017-04-08 RX ADMIN — METHYLPREDNISOLONE SODIUM SUCCINATE SCH MG: 40 INJECTION, POWDER, FOR SOLUTION INTRAMUSCULAR; INTRAVENOUS at 03:04

## 2017-04-08 RX ADMIN — PANTOPRAZOLE SODIUM SCH MG: 40 TABLET, DELAYED RELEASE ORAL at 11:15

## 2017-04-08 RX ADMIN — CLOTRIMAZOLE SCH APPLIC: 1 CREAM TOPICAL at 17:09

## 2017-04-08 RX ADMIN — BUDESONIDE SCH MG: 0.5 SUSPENSION RESPIRATORY (INHALATION) at 07:12

## 2017-04-08 RX ADMIN — IPRATROPIUM BROMIDE AND ALBUTEROL SULFATE SCH ML: .5; 3 SOLUTION RESPIRATORY (INHALATION) at 12:53

## 2017-04-08 RX ADMIN — CLOTRIMAZOLE SCH APPLIC: 1 CREAM TOPICAL at 11:17

## 2017-04-08 RX ADMIN — IPRATROPIUM BROMIDE AND ALBUTEROL SULFATE SCH ML: .5; 3 SOLUTION RESPIRATORY (INHALATION) at 20:26

## 2017-04-08 RX ADMIN — IPRATROPIUM BROMIDE AND ALBUTEROL SULFATE SCH ML: .5; 3 SOLUTION RESPIRATORY (INHALATION) at 07:12

## 2017-04-08 RX ADMIN — IPRATROPIUM BROMIDE AND ALBUTEROL SULFATE SCH ML: .5; 3 SOLUTION RESPIRATORY (INHALATION) at 13:03

## 2017-04-08 RX ADMIN — IPRATROPIUM BROMIDE AND ALBUTEROL SULFATE SCH ML: .5; 3 SOLUTION RESPIRATORY (INHALATION) at 04:09

## 2017-04-08 NOTE — PN
DATE: 04/08/2017



SUBJECTIVE:  The patient is lying in bed.  He is less lethargic today and responsive to verbal comman
ds.  There is some upper respiratory congestion.  The patient denies any shortness of breath or chest
 pain.  He remains somewhat confused.



OBJECTIVE:

VITAL SIGNS:  Blood pressure 144/69, pulse 85, temperature 97.3, respiratory rate 25.

LUNGS:  Show a few scattered crepitations and bibasilar rales.

HEART:  Regular rate and rhythm.

ABDOMEN:  Soft, nontender, bowel sounds are normoactive.

EXTREMITIES:  Dependent edema without change.

NEUROLOGIC:  The patient is awake and responsive without focal sensory or motor deficits.

SKIN:  Warm and dry.



LABORATORY DATA:  WBC 7.3, hemoglobin 8.5, hematocrit 27.2.  Sodium 141, potassium 4.3, chloride 96, 
CO2 37, BUN 52, creatinine 1.9, glucose 171.



IMPRESSION:

1.  Hypertension, hypertensive cardiovascular disease and congestive heart failure.

2.  Coronary artery disease, status post non-ST-segment elevation myocardial infarction.

3.  Status post acute rhabdomyolysis with acute renal failure superimposed on _____ chronic kidney di
sease.

4.  Status post sepsis, presumed secondary to cellulitis of the lower extremities with chronic venous
 stasis ulcers.

5.  Paroxysmal atrial fibrillation with intermittent rapid ventricular response.

6.  Dementia with superimposed delirium.

7.  Immobility secondary to severe degenerative joint disease with obesity and deconditioning.

8.  Anemia.

9.  Hyperglycemia.



PLAN:  Continue present treatment.  Pulmonary, cardiology and infectious disease followup.  Out of be
d as tolerated.  Social work for discharge planning.





__________________________________________

Ariel Browne JD, MD







cc:



DD: 04/08/2017 13:48:00  [id number]

TT: 04/08/2017 14:12:12

Confirmation # [id number]

Dictation # [id number]

vicente

## 2017-04-08 NOTE — CP.PCM.PN
Subjective





- Date & Time of Evaluation


Date of Evaluation: 04/08/17


Time of Evaluation: 14:35





- Subjective


Subjective: 


Comfortable in bed, not in distress, afebrile overnight. No diarrhea currently.





Objective





- Vital Signs/Intake and Output


Vital Signs (last 24 hours): 


 











Temp Pulse Resp BP Pulse Ox


 


 97.3 F L  85   25 H  144/69   99 


 


 04/08/17 07:55  04/08/17 11:16  04/08/17 07:55  04/08/17 11:16  04/08/17 07:55








Intake and Output: 


 











 04/08/17 04/08/17





 06:59 18:59


 


Intake Total 120 


 


Balance 120 














- Medications


Medications: 


 Current Medications





Acetaminophen (Tylenol 325mg Tab)  650 mg PO Q4 PRN


   PRN Reason: Fever >100.4 F


   Last Admin: 04/05/17 17:10 Dose:  650 mg


Albuterol/Ipratropium (Duoneb 3 Mg/0.5 Mg (3 Ml) Ud)  3 ml IH [id number] [facility]


   Last Admin: 04/08/17 13:03 Dose:  3 ml


Albuterol/Ipratropium (Duoneb 3 Mg/0.5 Mg (3 Ml) Ud)  3 ml IH Q2H PRN


   PRN Reason: Shortness of Breath


   Last Admin: 04/06/17 05:34 Dose:  3 ml


Aspirin (Aspirin)  325 mg PO DAILY [facility]


   Last Admin: 04/08/17 11:16 Dose:  325 mg


Budesonide (Pulmicort Respules)  0.5 mg IH [id number] [facility]


   Last Admin: 04/08/17 07:12 Dose:  0.5 mg


Clotrimazole (Lotrimin 1%)  0 gm TOP BID [facility]


   Last Admin: 04/08/17 11:17 Dose:  1 applic


Heparin Sodium (Porcine) (Heparin)  5,000 units SC Q12 WAQAR


   PRN Reason: Protocol


   Last Admin: 04/04/17 21:43 Dose:  5,000 units


Hydralazine HCl (Apresoline)  10 mg IVP Q6 PRN


   PRN Reason: Systolic Blood Pressure


   Last Admin: 04/02/17 09:56 Dose:  10 mg


Ceftriaxone Sodium (Rocephin 2 Gm Ivpb)  100 mls @ 100 mls/hr IVPB DAILY [facility]


   PRN Reason: Protocol


   Stop: 04/19/17 10:01


   Last Admin: 04/08/17 11:13 Dose:  100 mls/hr


Losartan Potassium (Cozaar)  25 mg PO DAILY [facility]


   Last Admin: 04/08/17 11:16 Dose:  25 mg


Methylprednisolone (Solu-Medrol)  40 mg IVP Q8H [facility]


   Last Admin: 04/08/17 11:16 Dose:  40 mg


Metoprolol Tartrate (Lopressor)  50 mg PO 0800,1800 [facility]


   Last Admin: 04/08/17 11:15 Dose:  50 mg


Pantoprazole Sodium (Protonix Ec Tab)  40 mg PO ACB [facility]


   Last Admin: 04/08/17 11:15 Dose:  40 mg


Risperidone (Risperdal Tab)  0.5 mg PO AMHS PRN; Protocol


   PRN Reason: Agitation


Silver Sulfadiazine (Silvadene 1% 20 Gm)  0 ea TOP DAILY [facility]


   Last Admin: 04/08/17 11:17 Dose:  1 applic


Vitamin A (Vitamin A & D Oint Ud Foilpak)  1 ea TOP BID PRN


   PRN Reason: chapped lips


   Last Admin: 04/07/17 10:32 Dose:  1 ea











- Labs


Labs: 


 





 04/08/17 06:47 





 04/08/17 06:47 





 











PT  13.7 Seconds (9.9-11.8)  H  04/05/17  06:25    


 


INR  1.27  (0.93-1.08)  H  04/05/17  06:25    


 


APTT  33.2 Seconds (23.7-30.8)  H  04/05/17  06:25    














- Constitutional


Appears: Non-toxic, No Acute Distress





- Head Exam


Head Exam: NORMAL INSPECTION





- ENT Exam


ENT Exam: Mucous Membranes Moist





- Neck Exam


Neck Exam: absent: Lymphadenopathy, Meningismus





- Respiratory Exam


Respiratory Exam: Decreased Breath Sounds





- Cardiovascular Exam


Cardiovascular Exam: +S1, +S2





- GI/Abdominal Exam


GI & Abdominal Exam: Soft.  absent: Tenderness





Assessment and Plan





- Assessment and Plan (Free Text)


Plan: 


Assessment


severe sepsis S/P shock S/P ventilator-dependent respiratory failure with acute 

on chronic renal failure due to Group G strep bacteremia, probably secondary to 

bilateral lower extremities skin and skin structure infection; also with MSSA 

from the wound cultures; so far no evidence of new infection; patient is 

clinically improving; he was intubated for hypercarbic respiratory failure and 

congestion, no evidence of pneumonia currently


acute rhabdomyolysis


consider acute NSTEMI


HTN


chronic renal failure


dementia


obesity with BMI 38





Plan


continue Rocephin (day 15 from first negative blood cx) - patient should finish 

28 days of antibiotics


2D echocardiogram did not show vegetations


will continue to monitor clinically

## 2017-04-08 NOTE — CON
DATE: 04/08/2017



The patient is an 81-year-old white male with multiple medical issues who psychiatry is following for
 acute delirium related to multiple medical issues.  The patient is being treated with Risperdal 0.5 
mg twice daily as well as Xanax for periods of confusion and minor agitation.  Dr. BROOKS has been followi
ng up with patient and today, is disorganized with no ability to have a meaningful conversation.  Of 
note, Dr. Villegas's evaluation was the second evaluation that the family requested, a second eval
uation after Dr. Mckinley saw the patient.  I met with the patient at bedside and I agree with Dr. Donna pizarro's assessment.  The patient is suffering from a delirium and it is very difficult to have a co
herent meaningful conversation with him.  The patient is unable to vocalize current month and year; h
owever, he is able to pick the correct answer when I give him a choice between various options.  He i
s able to indicate it is April and it is 2017.  The patient is aware that he is in the hospital.  He 
nods his head when I ask him if he has some depression or anxiety, but unclear if he truly understand
s my questioning in this regard.  He is unable to answer questions about hallucinations, but does not
 appear to be responding to internal stimuli during my visit.  All the nursing notes indicate that parviz batres had some restlessness and agitation overnight.  He is currently comfortable and in good control
, although a little sedated or fatigued.  The patient denies any discomfort; however, does states dif
ficulty breathing and he is noticeably short of breath which is noted in previous notes on the unit. 
 The patient did not have any further episodes of screaming, yelling overnight, but he did try to pul
l on the face mask.  The patient's insight and judgment are still considered to be limited due to his
 disorientation.



RELEVANT PSYCHIATRIC MEDICATIONS:  Include Risperdal 0.5 mg a.m. and at bedtime p.r.n.  The patient h
as not received any administrations of this medication.  



VITAL SIGNS:  Reviewed by this provider and at 7:55 a.m. they are 97.3, 85, 144/69 and respiratory ra
te 25.



LABORATORY DATA:  Also reviewed by this provider.  



IMPRESSION:  The patient ____ acute delirium related to multiple medical issues.



PLAN:  Please continue with current management including Risperdal 0.5 mg p.o. b.i.d. p.r.n.  Would r
ecommend that nursing treatment team give the Risperdal when patient does become agitated.  This may 
help with agitation and clarity of thought.  The patient did not receive any doses of Risperdal yeste
rday, but did have periods of agitation overnight around 1:00 a.m.  ____, family members were also gi
jabier emotional support.  Again, the family members were not around to calm the patient down, ____ use 
Risperdal in this case.  Would recommend to avoid using benzos and use Risperdal instead.  Please not
e, the wife is aware of recommendation for the patient ____ per Dr. Villegas's notes.  Psychiatry 
will follow every other day to determine the patient's progress.  Please feel free to call if there a
re any urgent issues or acute notable changes in the patient's mental status or tolerance to medicati
on.





__________________________________________

Nyla Figueroa MD







cc:



DD: 04/08/2017 12:35:14  [id number]

TT: 04/08/2017 13:34:35

Confirmation # [id number]

Dictation # [id number]

tn

## 2017-04-08 NOTE — PN
DATE: 04/08/2017



SUBJECTIVE:  The patient is somewhat sleepy this morning, but easily arousable.
  He is not short of breath at rest.



OBJECTIVE:

VITAL SIGNS:  Temperature is 97.0, pulse 68, respirations 18, blood pressure 127
/97.  Oxygen saturation on BiPAP is 96%.

HEENT:  Normocephalic, atraumatic.

NECK:  No JVD.

CARDIOVASCULAR:  Systolic ejection murmur at the lower left sternal border.  No 
S3 gallop.

LUNGS:  Minimal bilateral rhonchi.  No wheezing.

EXTREMITIES:  Mild edema.  No cyanosis, no clubbing.  Calves are nontender to 
palpation.

GASTROINTESTINAL:  Abdomen is soft, nontender, nondistended.  Bowel sounds are 
positive.

SKIN:  Resolving cellulitis of the lower extremities (anterior aspects).

NEUROLOGIC:  Limited at the present time.



IMPRESSION:

1.  Status post respiratory failure (multiple episodes).

2.  Chronic obstructive pulmonary disease.

3.  Bilateral cellulitis.

4.  Severe sepsis.

5.  Acute myocardial infarction.

6.  Renal insufficiency.

7.  Mild anemia.



PLAN:  The patient is mildly sleepy this morning -- but easily arousable.  He 
is not short of breath at rest.  I did discuss the case with the night nurse at 
length.  Apparently, the patient did receive some sedative medications -- while 
on telemetry.  Thus, when the patient arrived on 5R, the patient was very 
lethargic.  A rapid response was called.  An arterial blood gas done--revealed 
an acute respiratory acidosis.  The patient was then placed on BiPAP.  He is 
oxygenating very well on the BiPAP.  On physical exam, there is certainly less 
bronchospasm noted.  I will continue with the current nebulizer treatments and 
increased steroids (ordered by Dr. Lira) for now.  The note by Dr. Lira 
is reviewed.  I would continue with the psychiatric evaluation and close 
followup.  Again, I did discuss the case with the night nurse at length.  The 
patient has now been made a do not resuscitate/do not intubate status.  The 
patient remains with a very tenuous outlook.  All are aware.  I will discuss 
the above with the attending physician.





__________________________________________

Balwinder Fall MD







cc:   



DD: 04/08/2017 06:53:53  [id number]

TT: 04/08/2017 08:47:28

Confirmation # [id number]

Dictation # [id number]

jn

MARIA DEL CARMEN

## 2017-04-08 NOTE — CP.PCM.PN
<Antonietta Fong - Last Filed: 04/08/17 08:49>





Subjective





- Date & Time of Evaluation


Date of Evaluation: 04/08/17


Time of Evaluation: 08:49





- Subjective


Subjective: 


Patient seen and evaluated at bedside this morning. Patient is drowsy, in and 

out of sleep. Bandages are clean dry and intact to bilateral lower extremities. 

Patient unable to provide ROS. 





Objective





- Vital Signs/Intake and Output


Vital Signs (last 24 hours): 


 











Temp Pulse Resp BP Pulse Ox


 


 97.3 F L  85   25 H  144/69   99 


 


 04/08/17 07:55  04/08/17 07:55  04/08/17 07:55  04/08/17 07:55  04/08/17 07:55








Intake and Output: 


 











 04/08/17 04/08/17





 06:59 18:59


 


Intake Total 120 


 


Balance 120 














- Medications


Medications: 


 Current Medications





Acetaminophen (Tylenol 325mg Tab)  650 mg PO Q4 PRN


   PRN Reason: Fever >100.4 F


   Last Admin: 04/05/17 17:10 Dose:  650 mg


Albuterol/Ipratropium (Duoneb 3 Mg/0.5 Mg (3 Ml) Ud)  3 ml IH [id number] [facility]


   Last Admin: 04/08/17 07:12 Dose:  3 ml


Albuterol/Ipratropium (Duoneb 3 Mg/0.5 Mg (3 Ml) Ud)  3 ml IH Q2H PRN


   PRN Reason: Shortness of Breath


   Last Admin: 04/06/17 05:34 Dose:  3 ml


Aspirin (Aspirin)  325 mg PO DAILY [facility]


   Last Admin: 04/07/17 10:32 Dose:  325 mg


Budesonide (Pulmicort Respules)  0.5 mg IH [id number] [facility]


   Last Admin: 04/08/17 07:12 Dose:  0.5 mg


Clotrimazole (Lotrimin 1%)  0 gm TOP BID [facility]


   Last Admin: 04/07/17 19:51 Dose:  Not Given


Heparin Sodium (Porcine) (Heparin)  5,000 units SC Q12 WAQAR


   PRN Reason: Protocol


   Last Admin: 04/04/17 21:43 Dose:  5,000 units


Hydralazine HCl (Apresoline)  10 mg IVP Q6 PRN


   PRN Reason: Systolic Blood Pressure


   Last Admin: 04/02/17 09:56 Dose:  10 mg


Ceftriaxone Sodium (Rocephin 2 Gm Ivpb)  100 mls @ 100 mls/hr IVPB DAILY [facility]


   PRN Reason: Protocol


   Stop: 04/19/17 10:01


   Last Admin: 04/07/17 10:35 Dose:  100 mls/hr


Losartan Potassium (Cozaar)  25 mg PO DAILY [facility]


   Last Admin: 04/07/17 10:32 Dose:  25 mg


Methylprednisolone (Solu-Medrol)  40 mg IVP Q8H [facility]


   Last Admin: 04/08/17 03:04 Dose:  40 mg


Metoprolol Tartrate (Lopressor)  50 mg PO 0800,1800 [facility]


   Last Admin: 04/07/17 19:51 Dose:  Not Given


Pantoprazole Sodium (Protonix Ec Tab)  40 mg PO ACB [facility]


   Last Admin: 04/07/17 08:24 Dose:  40 mg


Risperidone (Risperdal Tab)  0.5 mg PO AMHS PRN; Protocol


   PRN Reason: Agitation


Silver Sulfadiazine (Silvadene 1% 20 Gm)  0 ea TOP DAILY [facility]


   Last Admin: 04/07/17 10:33 Dose:  1 applic


Vitamin A (Vitamin A & D Oint Ud Foilpak)  1 ea TOP BID PRN


   PRN Reason: chapped lips


   Last Admin: 04/07/17 10:32 Dose:  1 ea











- Labs


Labs: 


 





 04/08/17 06:47 





 04/08/17 06:47 





 











PT  13.7 Seconds (9.9-11.8)  H  04/05/17  06:25    


 


INR  1.27  (0.93-1.08)  H  04/05/17  06:25    


 


APTT  33.2 Seconds (23.7-30.8)  H  04/05/17  06:25    














- Neurological Exam


Neurological Exam: absent: Alert, Awake





- Additional Findings


Additional findings: 


DERMATOLOGIC: 


Left leg- two superficial healed ulcerations on the anterior and lateral aspect 

of the leg, there is no drainage, no purulence, no erythema. 


Right leg- very superficial healing lesion with mild hyperkeratotic tissue at 

the lateral border. No drainage, no erythema, no purulence. 


Remaining skin is intact, nails are thickened and discolored x10, mild 

hyperkeratotic tissue present to the dorsal aspect of the digits. 





VASCULAR: 


DP/PT pulses are non palpable bilaterally, skin temperature is normal, mild 

edema of b/l lower extremities 





NEUROLOGIC: 


Protective sensation decreased 





ORTHOPEDIC: 


Negative pain on palpation to the lower extremities 





Assessment and Plan





- Assessment and Plan (Free Text)


Assessment: 


81 year old male with healing superficial ulcerations of bilateral lower 

extremities. 


Plan: 


Patient seen and evaluated, discussed with attending Dr. Do. 


Legs cleansed with sterile saline, dressed with foam dressings. 


Offloading boots applied to bilateral lower extremities to protect the heels. 


Podiatry will continue to follow 





<Rodrigo Do - Last Filed: 04/08/17 12:01>





Objective





- Vital Signs/Intake and Output


Vital Signs (last 24 hours): 


 











Temp Pulse Resp BP Pulse Ox


 


 97.3 F L  85   25 H  144/69   99 


 


 04/08/17 07:55  04/08/17 11:16  04/08/17 07:55  04/08/17 11:16  04/08/17 07:55








Intake and Output: 


 











 04/08/17 04/08/17





 06:59 18:59


 


Intake Total 120 


 


Balance 120 














- Medications


Medications: 


 Current Medications





Acetaminophen (Tylenol 325mg Tab)  650 mg PO Q4 PRN


   PRN Reason: Fever >100.4 F


   Last Admin: 04/05/17 17:10 Dose:  650 mg


Albuterol/Ipratropium (Duoneb 3 Mg/0.5 Mg (3 Ml) Ud)  3 ml IH [id number] [facility]


   Last Admin: 04/08/17 07:12 Dose:  3 ml


Albuterol/Ipratropium (Duoneb 3 Mg/0.5 Mg (3 Ml) Ud)  3 ml IH Q2H PRN


   PRN Reason: Shortness of Breath


   Last Admin: 04/06/17 05:34 Dose:  3 ml


Aspirin (Aspirin)  325 mg PO DAILY [facility]


   Last Admin: 04/08/17 11:16 Dose:  325 mg


Budesonide (Pulmicort Respules)  0.5 mg IH [id number] [facility]


   Last Admin: 04/08/17 07:12 Dose:  0.5 mg


Clotrimazole (Lotrimin 1%)  0 gm TOP BID [facility]


   Last Admin: 04/08/17 11:17 Dose:  1 applic


Heparin Sodium (Porcine) (Heparin)  5,000 units SC Q12 [facility]


   PRN Reason: Protocol


   Last Admin: 04/04/17 21:43 Dose:  5,000 units


Hydralazine HCl (Apresoline)  10 mg IVP Q6 PRN


   PRN Reason: Systolic Blood Pressure


   Last Admin: 04/02/17 09:56 Dose:  10 mg


Ceftriaxone Sodium (Rocephin 2 Gm Ivpb)  100 mls @ 100 mls/hr IVPB DAILY [facility]


   PRN Reason: Protocol


   Stop: 04/19/17 10:01


   Last Admin: 04/08/17 11:13 Dose:  100 mls/hr


Losartan Potassium (Cozaar)  25 mg PO DAILY [facility]


   Last Admin: 04/08/17 11:16 Dose:  25 mg


Methylprednisolone (Solu-Medrol)  40 mg IVP Q8H [facility]


   Last Admin: 04/08/17 11:16 Dose:  40 mg


Metoprolol Tartrate (Lopressor)  50 mg PO 0800,1800 [facility]


   Last Admin: 04/08/17 11:15 Dose:  50 mg


Pantoprazole Sodium (Protonix Ec Tab)  40 mg PO ACB [facility]


   Last Admin: 04/08/17 11:15 Dose:  40 mg


Risperidone (Risperdal Tab)  0.5 mg PO AMHS PRN; Protocol


   PRN Reason: Agitation


Silver Sulfadiazine (Silvadene 1% 20 Gm)  0 ea TOP DAILY [facility]


   Last Admin: 04/08/17 11:17 Dose:  1 applic


Vitamin A (Vitamin A & D Oint Ud Foilpak)  1 ea TOP BID PRN


   PRN Reason: chapped lips


   Last Admin: 04/07/17 10:32 Dose:  1 ea











- Labs


Labs: 


 





 04/08/17 06:47 





 04/08/17 06:47 





 











PT  13.7 Seconds (9.9-11.8)  H  04/05/17  06:25    


 


INR  1.27  (0.93-1.08)  H  04/05/17  06:25    


 


APTT  33.2 Seconds (23.7-30.8)  H  04/05/17  06:25    














Attending/Attestation





- Attestation


I have personally seen and examined this patient.: Yes


I have fully participated in the care of the patient.: Yes


I have reviewed all pertinent clinical information, including history, physical 

exam and plan: Yes

## 2017-04-08 NOTE — CARD
--------------- APPROVED REPORT --------------





EKG Measurement

Heart [id number]

WI [id number]

[id number]

[id number]

[id number]



<Conclusion>

Sinus rhythm with 1st degree AV block with premature supraventricular 

complexes

Right bundle branch block

Abnormal ECG

## 2017-04-09 VITALS — RESPIRATION RATE: 20 BRPM

## 2017-04-09 LAB
ADD MANUAL DIFF?: NO
BASOPHILS # BLD AUTO: 0.01 K/MM3 (ref 0–2)
BASOPHILS NFR BLD: 0.1 % (ref 0–3)
BUN SERPL-MCNC: 60 MG/DL (ref 7–21)
CALCIUM SERPL-MCNC: 9.2 MG/DL (ref 8.4–10.5)
CHLORIDE SERPL-SCNC: 94 MMOL/L (ref 98–107)
CO2 SERPL-SCNC: 35 MMOL/L (ref 21–33)
EOSINOPHIL # BLD: 0 10*3/UL (ref 0–0.7)
EOSINOPHIL NFR BLD: 0 % (ref 1.5–5)
ERYTHROCYTE [DISTWIDTH] IN BLOOD BY AUTOMATED COUNT: 13.2 % (ref 11.5–14.5)
GLUCOSE SERPL-MCNC: 265 MG/DL (ref 70–110)
GRANULOCYTES # BLD: 12.83 10*3/UL (ref 1.4–6.5)
GRANULOCYTES NFR BLD: 91.6 % (ref 50–68)
HCT VFR BLD CALC: 25.2 % (ref 42–52)
LYMPHOCYTES # BLD: 0.6 10*3/UL (ref 1.2–3.4)
LYMPHOCYTES NFR BLD AUTO: 4.4 % (ref 22–35)
MCH RBC QN AUTO: 27.9 PG (ref 25–35)
MCHC RBC AUTO-ENTMCNC: 32.9 G/DL (ref 31–37)
MCV RBC AUTO: 84.6 FL (ref 80–105)
MONOCYTES # BLD AUTO: 0.6 10*3/UL (ref 0.1–0.6)
MONOCYTES NFR BLD: 3.9 % (ref 1–6)
PLATELET # BLD: 298 10^3/UL (ref 120–450)
PMV BLD AUTO: 8.6 FL (ref 7–11)
POTASSIUM SERPL-SCNC: 4 MMOL/L (ref 3.6–5)
SODIUM SERPL-SCNC: 137 MMOL/L (ref 132–148)
WBC # BLD AUTO: 14 10^3/UL (ref 4.5–11)

## 2017-04-09 RX ADMIN — METHYLPREDNISOLONE SODIUM SUCCINATE SCH MG: 40 INJECTION, POWDER, FOR SOLUTION INTRAMUSCULAR; INTRAVENOUS at 01:56

## 2017-04-09 RX ADMIN — PANTOPRAZOLE SODIUM SCH MG: 40 TABLET, DELAYED RELEASE ORAL at 06:30

## 2017-04-09 RX ADMIN — IPRATROPIUM BROMIDE AND ALBUTEROL SULFATE SCH ML: .5; 3 SOLUTION RESPIRATORY (INHALATION) at 13:15

## 2017-04-09 RX ADMIN — IPRATROPIUM BROMIDE AND ALBUTEROL SULFATE SCH ML: .5; 3 SOLUTION RESPIRATORY (INHALATION) at 07:21

## 2017-04-09 RX ADMIN — CLOTRIMAZOLE SCH APPLIC: 1 CREAM TOPICAL at 11:14

## 2017-04-09 RX ADMIN — IPRATROPIUM BROMIDE AND ALBUTEROL SULFATE SCH ML: .5; 3 SOLUTION RESPIRATORY (INHALATION) at 19:18

## 2017-04-09 RX ADMIN — IPRATROPIUM BROMIDE AND ALBUTEROL SULFATE SCH ML: .5; 3 SOLUTION RESPIRATORY (INHALATION) at 01:25

## 2017-04-09 RX ADMIN — BUDESONIDE SCH MG: 0.5 SUSPENSION RESPIRATORY (INHALATION) at 07:21

## 2017-04-09 RX ADMIN — BUDESONIDE SCH MG: 0.5 SUSPENSION RESPIRATORY (INHALATION) at 19:18

## 2017-04-09 RX ADMIN — SILVER SULFADIAZINE SCH APPLIC: 10 CREAM TOPICAL at 11:14

## 2017-04-09 RX ADMIN — CLOTRIMAZOLE SCH APPLIC: 1 CREAM TOPICAL at 17:50

## 2017-04-09 RX ADMIN — METHYLPREDNISOLONE SODIUM SUCCINATE SCH MG: 40 INJECTION, POWDER, FOR SOLUTION INTRAMUSCULAR; INTRAVENOUS at 22:15

## 2017-04-09 RX ADMIN — METHYLPREDNISOLONE SODIUM SUCCINATE SCH MG: 40 INJECTION, POWDER, FOR SOLUTION INTRAMUSCULAR; INTRAVENOUS at 11:14

## 2017-04-09 NOTE — PN
DATE: 04/08/2017



SUBJECTIVE:  He is comfortable in bed, in no acute distress.  He gets hypoxic 
during the night.  He is getting BiPAP in the night.  He has non-STEMI.  
Cardiac enzymes are improving.  Also has sepsis, treated with IV antibiotics.  
He developed acute respiratory acidosis, secondary to respiratory failure.  
Denies any shortness of breath.  He also gets delirious.  Currently, oriented 
to time, place, person.  Psych following.  He also had hypernatremia, which is 
improved.  Has anemia with low hemoglobin around 8 g/dL.  He has leukocytosis, 
improving.



REVIEW OF SYSTEMS:  As per HPI.  Rest of 12-point reviewed and negative.



PHYSICAL EXAMINATION:

GENERAL:  Comfortable in bed, in no acute distress.

VITAL SIGNS:  Temperature 98.7, heart rate is 90 per minute, blood pressure 127/
98, respiratory rate 20 per minute.

HEENT:  Normal.

LUNGS:  Clear to auscultation.  Air entry present, equal bilateral.

CARDIOVASCULAR:  S1 and S2 normal.  No murmur, no gallop.

ABDOMEN:  Soft, nontender, obese.

MUSCULOSKELETAL:  1+ bilateral pedal edema, ankle edema.  Bilateral chronic 
skin changes on both feet.

NEUROLOGIC:  Awake, alert, oriented to place, not to time.  Conversant.  No 
focal sensorimotor deficit.

PSYCHIATRIC:  Episodic episodes of delirium.  Currently alert, oriented.

LYMPHADENOPATHY:  None.



LABORATORIES:  White count 7.3, hemoglobin 8.5, hematocrit 27.2, platelet count 
283.  Sodium 141, potassium 4.3, creatinine 1.9, glucose 171, calcium 9.6, 
total bilirubin 0.6.



MEDICATIONS:  Tylenol 650 q. 6 hours p.r.n., DuoNeb q. 6 hours p.r.n., aspirin 
81 mg daily, ceftriaxone 2 grams daily, _____ local application, heparin 5000 
units subQ q. 12, Cozaar 25 mg daily, Solu-Medrol 40 q. 12, IV Lopressor 50 mg 
p.o. b.i.d., Protonix 40 mg daily, Risperdal 2.5 mg, vitamin A topical.



ASSESSMENT:

1.  Non-ST elevation myocardial infarction.

2.  Delirium.

3.  Leukocytosis.

4.  Anemia.

5.  Coagulopathy.

6.  Chronic kidney disease stage III.

7.  Sepsis.



PLAN:  He is currently comfortable, not disoriented, no delirium now.  He is on 
BiPAP in the night for respiratory failure.  Renal function is improving.  
Electrolytes normal.  Heme; He has leukocytosis and anemia.  Hemoglobin stable 
at 8.3 g/dL.  Will transfuse packed red blood cells if hemoglobin declines 
below 8.  Anemia is multifactorial, chronic disease, iron deficiency and 
chronic kidney disease.  He is currently on IV antibiotic, Rocephin.  ID 
following.  Deep venous thrombosis with 5000 units heparin b.i.d.  Non-ST 
elevation myocardial infarction.  Cardiac enzymes declining, no issues, 
conservative management, cardiology following.





__________________________________________

Anneliese Joe MD







cc:   



DD: 04/09/2017 13:03:30  [id number]

TT: 04/09/2017 13:36:57

Confirmation # [id number]

Dictation # [id number]

en

MTDD

## 2017-04-09 NOTE — PN
DATE: 04/09/2017



SUBJECTIVE:  The patient appears comfortable this morning.  He is mildly short 
of breath, but in no acute distress.  He remains confused.



PHYSICAL EXAMINATION:

VITAL SIGNS:  Temperature is 97.8, pulse 68, respirations 20-22, blood pressure 
130/81.  Oxygen saturation on nasal cannula is 96%-99%.

HEENT:  Normocephalic, atraumatic.  No JVD.

CARDIOVASCULAR:  Systolic ejection murmur at the lower left sternal border.  No 
S3 gallop.

LUNGS:  Minimal/less rhonchi.  No wheezing.

EXTREMITIES:  Mild edema.  No cyanosis, no clubbing.  Calves are nontender to 
palpation.

GASTROINTESTINAL:  Abdomen is soft, nontender, nondistended.  Bowel sounds are 
positive.

SKIN:  Resolving cellulitis of the lower extremities (anterior aspects).

NEUROLOGIC:  Limited at the present time.



IMPRESSION:

1.  Status post respiratory failure (multiple episodes).

2.  Chronic obstructive pulmonary disease.

3.  Bilateral cellulitis.

4.  Severe sepsis.

5.  Acute myocardial infarction.

6.  Renal insufficiency.

7.  Mild anemia.



PLAN:  The patient appears comfortable this morning.  He is mildly short of 
breath at rest.  He is awake and alert.  He remains somewhat confused.  I did 
discuss the case with the night nurse at length.  The night nurse stated that 
the patient did have a pretty good night.  On physical exam, there is only 
minimal bronchospasm noted.  I will continue with the current nebulizer 
treatments and decrease the intravenous steroids this morning.  In addition, 
the alveolar-arterial gradient is significantly less.  Oxygen saturation on 
nasal cannula is now 96%-99%.  The patient remains on antibiotic therapy -- as 
per infectious disease.  There are no temperatures noted.  The leukocytosis has 
resolved.  Psychiatric input is noted and appreciated.  Clinical status of the 
patient is certainly improved -- compared to a few days ago.  However, again, 
the overall prognosis for this elderly patient-- with multiple serious medical 
problems -- remains very guarded.  I will discuss the above with the attending 
physician.





__________________________________________

Balwinder Fall MD







cc:   



DD: 04/09/2017 06:53:13  [id number]

TT: 04/09/2017 08:27:04

Confirmation # [id number]

Dictation # [id number]

en

[facility]

## 2017-04-09 NOTE — CP.PCM.PN
Subjective





- Date & Time of Evaluation


Date of Evaluation: 04/09/17


Time of Evaluation: 13:30





- Subjective


Subjective: 


Comfortable in bed, not in distress, no fevers overnight, no diarrhea.





Objective





- Vital Signs/Intake and Output


Vital Signs (last 24 hours): 


 











Temp Pulse Resp BP Pulse Ox


 


 97.9 F   70   20   124/54 L  97 


 


 04/09/17 08:00  04/09/17 11:13  04/09/17 08:00  04/09/17 11:13  04/09/17 08:00








Intake and Output: 


 











 04/09/17 04/09/17





 06:59 18:59


 


Intake Total 1200 960


 


Balance 1200 960














- Medications


Medications: 


 Current Medications





Acetaminophen (Tylenol 325mg Tab)  650 mg PO Q4 PRN


   PRN Reason: Fever >100.4 F


   Last Admin: 04/05/17 17:10 Dose:  650 mg


Albuterol/Ipratropium (Duoneb 3 Mg/0.5 Mg (3 Ml) Ud)  3 ml IH [id number] [facility]


   Last Admin: 04/09/17 13:15 Dose:  3 ml


Albuterol/Ipratropium (Duoneb 3 Mg/0.5 Mg (3 Ml) Ud)  3 ml IH Q2H PRN


   PRN Reason: Shortness of Breath


   Last Admin: 04/06/17 05:34 Dose:  3 ml


Aspirin (Aspirin)  325 mg PO DAILY [facility]


   Last Admin: 04/09/17 11:12 Dose:  325 mg


Budesonide (Pulmicort Respules)  0.5 mg IH [id number] [facility]


   Last Admin: 04/09/17 07:21 Dose:  0.5 mg


Clotrimazole (Lotrimin 1%)  0 gm TOP BID [facility]


   Last Admin: 04/09/17 11:14 Dose:  1 applic


Heparin Sodium (Porcine) (Heparin)  5,000 units SC Q12 [facility]


   PRN Reason: Protocol


   Last Admin: 04/04/17 21:43 Dose:  5,000 units


Hydralazine HCl (Apresoline)  10 mg IVP Q6 PRN


   PRN Reason: Systolic Blood Pressure


   Last Admin: 04/02/17 09:56 Dose:  10 mg


Ceftriaxone Sodium (Rocephin 2 Gm Ivpb)  100 mls @ 100 mls/hr IVPB DAILY [facility]


   PRN Reason: Protocol


   Stop: 04/19/17 10:01


   Last Admin: 04/09/17 11:12 Dose:  100 mls/hr


Losartan Potassium (Cozaar)  25 mg PO DAILY [facility]


   Last Admin: 04/09/17 11:13 Dose:  25 mg


Methylprednisolone (Solu-Medrol)  40 mg IVP Q12 [facility]


   Last Admin: 04/09/17 11:14 Dose:  40 mg


Metoprolol Tartrate (Lopressor)  50 mg PO 0800,1800 [facility]


   Last Admin: 04/09/17 11:12 Dose:  50 mg


Pantoprazole Sodium (Protonix Ec Tab)  40 mg PO ACB [facility]


   Last Admin: 04/09/17 06:30 Dose:  40 mg


Risperidone (Risperdal Tab)  0.5 mg PO [facility] PRN; Protocol


   PRN Reason: Agitation


Vitamin A (Vitamin A & D Oint Ud Foilpak)  1 ea TOP BID PRN


   PRN Reason: chapped lips


   Last Admin: 04/07/17 10:32 Dose:  1 ea











- Labs


Labs: 


 





 04/09/17 11:40 





 04/09/17 11:40 





 











PT  13.7 Seconds (9.9-11.8)  H  04/05/17  06:25    


 


INR  1.27  (0.93-1.08)  H  04/05/17  06:25    


 


APTT  33.2 Seconds (23.7-30.8)  H  04/05/17  06:25    














- Constitutional


Appears: Non-toxic, No Acute Distress





- Head Exam


Head Exam: NORMAL INSPECTION





- ENT Exam


ENT Exam: Mucous Membranes Moist





- Neck Exam


Neck Exam: absent: Lymphadenopathy, Meningismus





- Respiratory Exam


Respiratory Exam: Decreased Breath Sounds





- Cardiovascular Exam


Cardiovascular Exam: +S1, +S2





- GI/Abdominal Exam


GI & Abdominal Exam: Soft.  absent: Tenderness





Assessment and Plan





- Assessment and Plan (Free Text)


Plan: 


Assessment


severe sepsis S/P shock S/P ventilator-dependent respiratory failure with acute 

on chronic renal failure due to Group G strep bacteremia, probably secondary to 

bilateral lower extremities skin and skin structure infection; also with MSSA 

from the wound cultures; so far no evidence of new infection; patient is 

clinically improving; he was intubated for hypercarbic respiratory failure and 

congestion, no evidence of pneumonia currently


acute rhabdomyolysis


consider acute NSTEMI


HTN


chronic renal failure


dementia


obesity with BMI 38





Plan


continue Rocephin (day 16 from first negative blood cx) - patient should finish 

28 days of antibiotics


2D echocardiogram did not show vegetations


will continue to monitor clinically

## 2017-04-09 NOTE — PN
DATE: 04/09/2017



SUBJECTIVE:  The patient is lying in bed.  He is somewhat confused and agitated.



OBJECTIVE:

VITAL SIGNS:  Blood pressure 124/54, pulse 70, temperature 97.9, respiratory rate 20.

LUNGS:  Show few scattered crepitations bilaterally.

HEART:  Regular rate and rhythm.

ABDOMEN:  Soft, nontender, bowel sounds are normoactive.

EXTREMITIES:  With dependent edema.

NEUROLOGIC:  The patient is awake and confused without focal sensory or motor deficits.

SKIN:  Warm and dry.



LABORATORY DATA:  WBC is 14.0, hemoglobin 8.3, hematocrit 25.2, sodium 137, potassium 4.0, chloride 9
4, CO2 of 35, BUN 60, creatinine 1.5, glucose 265.



IMPRESSION:

1.  Hypertension, hypertensive cardiovascular disease and congestive heart failure.

2.  Coronary artery disease, status post non-ST segment elevation myocardial infarction.

3.  Status post acute rhabdomyolysis with acute renal failure superimposed on mild chronic kidney dis
ease.

4.  Status post sepsis, presumed secondary to cellulitis of the lower extremities with chronic venous
 stasis ulcers.

5.  Paroxysmal atrial fibrillation with intermittent rapid ventricular response.

6.  Dementia with superimposed delirium due to underlying medical problems.

7.  Immobility secondary to severe degenerative joint disease with obesity and deconditioning.

8.  Anemia.  

9.  Hyperglycemia.



PLAN:  Continue present treatment.  Pulmonary, cardiology and infectious disease followup.  Out of be
d as tolerated.  Social work for discharge planning.  Prognosis remains guarded.





__________________________________________

Ariel Browne JD, MD







cc:



DD: 04/09/2017 14:03:51  [id number]

TT: 04/09/2017 15:05:36

Confirmation # [id number]

Dictation # [id number]

mn

## 2017-04-10 VITALS
SYSTOLIC BLOOD PRESSURE: 158 MMHG | DIASTOLIC BLOOD PRESSURE: 60 MMHG | OXYGEN SATURATION: 96 % | HEART RATE: 77 BPM | TEMPERATURE: 97.8 F

## 2017-04-10 RX ADMIN — PANTOPRAZOLE SODIUM SCH MG: 40 TABLET, DELAYED RELEASE ORAL at 06:33

## 2017-04-10 RX ADMIN — IPRATROPIUM BROMIDE AND ALBUTEROL SULFATE SCH ML: .5; 3 SOLUTION RESPIRATORY (INHALATION) at 07:18

## 2017-04-10 RX ADMIN — IPRATROPIUM BROMIDE AND ALBUTEROL SULFATE SCH ML: .5; 3 SOLUTION RESPIRATORY (INHALATION) at 13:45

## 2017-04-10 RX ADMIN — BUDESONIDE SCH MG: 0.5 SUSPENSION RESPIRATORY (INHALATION) at 07:18

## 2017-04-10 RX ADMIN — IPRATROPIUM BROMIDE AND ALBUTEROL SULFATE SCH ML: .5; 3 SOLUTION RESPIRATORY (INHALATION) at 01:12

## 2017-04-10 RX ADMIN — CLOTRIMAZOLE SCH APPLIC: 1 CREAM TOPICAL at 10:55

## 2017-04-10 NOTE — PN
DATE: 04/10/2017



SUBJECTIVE:  The patient has episodes of confusion.  He denies any chest pain, no shortness of breath
, no headaches.



PHYSICAL EXAMINATION:

VITAL SIGNS:  Temperature is 98, pulse of 67, blood pressure is 144/61, respirations 20, O2 saturatio
n 97%.

GENERAL:   The patient comfortable, in no acute distress.

HEENT:   Anicteric sclerae.  Moist mucosa.

NECK:   No JVD or adenopathy.

CARDIAC:   S1/S2.  No murmurs.  No rubs.  Regular.

RESPIRATORY:   Clear to auscultation bilaterally.  No wheezes, rales, or rhonchi.  Good air entry.

ABDOMEN:   Bowel sounds are positive, soft, nontender, and nondistended.

EXTREMITIES:   In the lower extremities, there is trace edema.  Has 1+ pulses.



ASSESSMENT:

1.  Delirium.

2.  Hypoxia, improved.

3.  Acute kidney injury, improved.

4.  Non-ST elevation myocardial infarction, resolved.

5.  Hypophosphatemia, resolved.

6.  Transaminitis, resolved.

7.  Hypernatremia, resolved.

8.  Right knee injury, secondary to degenerative joint disease.

9.  Left peripherally inserted central catheter line.

10.  Sepsis, on intravenous antibiotics.

11.  Do not resuscitate/do not intubate.



PLAN:  The patient is currently comfortable.  The patient is receiving his Rocephin.  He is on day #1
7 out of 28 of antibiotics.  He did not have any signs of vegetation on echo.  The patient is on hepa
rin for deep venous thrombosis prophylaxis.  He is on losartan for hypertension.  He is going to cont
inue with Protonix daily.  He is on risperidone.  The patient is going to continue with Tylenol as ne
eded.  I will speak to the patient's wife to give her an update.  The patient is on BiPAP.  He is on 
a heart healthy diet.  Overall prognosis is guarded.





__________________________________________

Jose Martin Arguello MD







cc:



DD: 04/10/2017 06:42:54  [id number]

TT: 04/10/2017 07:29:26

Confirmation # [id number]

Dictation # [id number]

en

## 2017-04-10 NOTE — CP.PCM.PN
<Rabia Bernal - Last Filed: 04/10/17 14:56>





Subjective





- Date & Time of Evaluation


Date of Evaluation: 04/10/17


Time of Evaluation: 14:00





- Subjective


Subjective: 


80 y/o male patient was seen at bedside this morning concerning healing 

bilateral lower extremity ulcerations. Patient was resting comfortably in bed 

this morning. Patient appears in NAD but not fully oriented time or person. 

Dressing to bilateral legs were clean dry and intact. Patient states that he 

does not have any pan to bilateral legs.  





Objective





- Vital Signs/Intake and Output


Vital Signs (last 24 hours): 


 











Temp Pulse Resp BP Pulse Ox


 


 97.7 F   95 H  20   154/75 H  97 


 


 04/10/17 07:23  04/10/17 10:54  04/10/17 07:23  04/10/17 10:54  04/10/17 07:23








Intake and Output: 


 











 04/10/17 04/10/17





 06:59 18:59


 


Intake Total 760 540


 


Output Total 300 100


 


Balance 460 440














- Medications


Medications: 


 Current Medications





Acetaminophen (Tylenol 325mg Tab)  650 mg PO Q4 PRN


   PRN Reason: Fever >100.4 F


   Last Admin: 04/05/17 17:10 Dose:  650 mg


Albuterol/Ipratropium (Duoneb 3 Mg/0.5 Mg (3 Ml) Ud)  3 ml IH [id number] [facility]


   Last Admin: 04/10/17 13:45 Dose:  3 ml


Albuterol/Ipratropium (Duoneb 3 Mg/0.5 Mg (3 Ml) Ud)  3 ml IH Q2H PRN


   PRN Reason: Shortness of Breath


   Last Admin: 04/06/17 05:34 Dose:  3 ml


Aspirin (Aspirin)  325 mg PO DAILY [facility]


   Last Admin: 04/10/17 10:54 Dose:  325 mg


Budesonide (Pulmicort Respules)  0.5 mg IH [id number] [facility]


   Last Admin: 04/10/17 07:18 Dose:  0.5 mg


Clotrimazole (Lotrimin 1%)  0 gm TOP BID [facility]


   Last Admin: 04/10/17 10:55 Dose:  1 applic


Heparin Sodium (Porcine) (Heparin)  5,000 units SC Q12 [facility]


   PRN Reason: Protocol


   Last Admin: 04/04/17 21:43 Dose:  5,000 units


Hydralazine HCl (Apresoline)  10 mg IVP Q6 PRN


   PRN Reason: Systolic Blood Pressure


   Last Admin: 04/02/17 09:56 Dose:  10 mg


Ceftriaxone Sodium (Rocephin 2 Gm Ivpb)  100 mls @ 100 mls/hr IVPB DAILY [facility]


   PRN Reason: Protocol


   Stop: 04/19/17 10:01


   Last Admin: 04/10/17 10:55 Dose:  100 mls/hr


Losartan Potassium (Cozaar)  25 mg PO DAILY [facility]


   Last Admin: 04/10/17 10:54 Dose:  25 mg


Methylprednisolone (Solu-Medrol)  30 mg IVP Q12 [facility]


   Last Admin: 04/10/17 10:55 Dose:  30 mg


Metoprolol Tartrate (Lopressor)  50 mg PO 0800,1800 [facility]


   Last Admin: 04/10/17 08:03 Dose:  50 mg


Pantoprazole Sodium (Protonix Ec Tab)  40 mg PO ACB [facility]


   Last Admin: 04/10/17 06:33 Dose:  40 mg


Polyethylene Glycol (Miralax)  17 gm PO DAILY [facility]


   Last Admin: 04/10/17 10:55 Dose:  17 gm


Risperidone (Risperdal Tab)  0.5 mg PO AMHS PRN; Protocol


   PRN Reason: Agitation


Vitamin A (Vitamin A & D Oint Ud Foilpak)  1 ea TOP BID PRN


   PRN Reason: chapped lips


   Last Admin: 04/07/17 10:32 Dose:  1 ea











- Labs


Labs: 


 





 04/09/17 11:40 





 04/09/17 11:40 





 











PT  13.7 Seconds (9.9-11.8)  H  04/05/17  06:25    


 


INR  1.27  (0.93-1.08)  H  04/05/17  06:25    


 


APTT  33.2 Seconds (23.7-30.8)  H  04/05/17  06:25    














- Constitutional


Appears: Well, Non-toxic, No Acute Distress





- Extremities Exam


Additional comments: 


Bilateral lower extremities exam





DERM: Previous Superficial ulcerations to Left leg appears to be healed. No 

open wound is noted. No drainage noted, no purulent discharge noted. No 

erythema noted around the wound. No sign of acute infection is noted.





Previous superficial ulceration to Right leg appears to be healed. No open 

wound is noted. No drainage noted. No pus noted. No mal-odor noted. No erythema 

is noted around the wound. No sign of infection is noted.





VASC: nonpalpable pedal pulses bilaterally, TG wnl, CFT < 3 sec to all digits


NEURO: grossly diminished


ORTHO: no pain on palpation of foot or legs bilaterally











- Neurological Exam


Neurological Exam: Awake





- Psychiatric Exam


Psychiatric exam: Normal Affect, Normal Mood





- Skin


Skin Exam: Normal Color, Warm





Assessment and Plan





- Assessment and Plan (Free Text)


Assessment: 


80 y/o male presents with healing superficial ulcerations to bilateral lower 

extremity


Plan: 


patient evaluated and seen at bedside


discussed in detail with Dr. Howard


labs and vitals reviewed


Legs cleansed with sterile saline, dressed with foam dressings


applied multipodus offloading boots to patients heels


podiatry will continue to monitor while patient remains in house 








<Priya Howard - Last Filed: 04/21/17 12:49>





Objective





- Vital Signs/Intake and Output


Vital Signs (last 24 hours): 


 











Temp Pulse Resp BP Pulse Ox


 


 97.8 F   77   20   158/60 H  96 


 


 04/10/17 16:00  04/10/17 16:00  04/10/17 16:00  04/10/17 16:00  04/10/17 16:00











- Labs


Labs: 


 





 04/09/17 11:40 





 04/09/17 11:40 





 











PT  13.7 Seconds (9.9-11.8)  H  04/05/17  06:25    


 


INR  1.27  (0.93-1.08)  H  04/05/17  06:25    


 


APTT  33.2 Seconds (23.7-30.8)  H  04/05/17  06:25    














Attending/Attestation





- Attestation


I have personally seen and examined this patient.: Yes


I have fully participated in the care of the patient.: Yes


I have reviewed all pertinent clinical information, including history, physical 

exam and plan: Yes

## 2017-04-10 NOTE — PN
DATE: 04/10/2017



SUBJECTIVE:  The patient is lying in bed.  He remains confused, but less agitated than previous.  The
re are no complaints of chest pain or shortness of breath.



OBJECTIVE:

VITAL SIGNS:  Blood pressure 154/75, pulse 95, temperature 97.7, respiratory rate 20.

LUNGS:  Clear.

HEART:  Regular rate and rhythm.

ABDOMEN:  Soft, nontender, bowel sounds are normoactive.

EXTREMITIES:  With decreased dependent edema.

NEUROLOGIC:  The patient is awake and confused without focal sensory or motor deficits.

SKIN:  Warm and dry.



IMPRESSION:

1.  Hypertension, hypertensive cardiovascular disease and congestive heart failure.

2.  Coronary artery disease, status post non-ST segment elevation myocardial infarction.

3.  Acute rhabdomyolysis with acute renal failure superimposed on chronic kidney disease, improved.

4.  Status post sepsis, presumed secondary to cellulitis of the lower extremities with chronic venous
 stasis ulcers, improving.

5.  Paroxysmal atrial fibrillation with intermittent rapid ventricular response.

6.  Dementia with superimposed delirium, improving.

7.  Immobility secondary to severe degenerative joint disease, obesity, deconditioning.

8.  Anemia.

9.  Hyperglycemia.



PLAN:  Continue present treatment.  Pulmonary, cardiology and infectious disease followup.  Out of be
d as tolerated.  Social work for discharge planning.





__________________________________________

Ariel Browne JD, MD







cc:



DD: 04/10/2017 14:46:29  [id number]

TT: 04/10/2017 15:23:57

Confirmation # [id number]

Dictation # [id number]

diana

## 2017-04-10 NOTE — PN
DATE: 04/09/2017



SUBJECTIVE:  The patient is confused, disoriented today.  [place]
.  He is not oriented to time, place, person.  He is not agitated.  He is off all benzos.  He gets Bi
PAP in the night.  Hemoglobin and hematocrit stable at 8.3 g/dL.  He still has leukocytosis, currentl
y on IV antibiotic.  He had non-STEMI.  Denies any chest pain.  No shortness of breath.  Lower extrem
ity skin ulceration improving.



REVIEW OF SYSTEMS:  As per HPI.  Rest of 12-point reviewed and negative.



PHYSICAL EXAMINATION:

GENERAL:  Comfortable in bed, no acute distress, disoriented, delirious.

VITAL SIGNS:  Stable.  Temperature 97.9, blood pressure 124/54, heart rate is 70 per minute, oxygen s
aturation 98% on oxygen by nasal cannula.

HEENT:  Normal.

NECK:  No lymphadenopathy.

CHEST:  Air entry present, equal bilateral.  No added sound.

CARDIOVASCULAR:  S1, S2 normal.  No murmur, no gallop.

ABDOMEN:  Soft, nontender, obese.  No rebound tenderness.

EXTREMITIES:  No edema.

BACK:  Spine normal.

SKIN:  Breakdown on the plantar surface of the feet bilaterally.  No petechia.

CENTRAL NERVOUS SYSTEM:  Delirious, not alert, oriented to time, place, person, not agitated, moving 
all the limbs.  No focal sensorimotor deficit.



MEDICATIONS:  Tylenol 650 q. 4 hours p.r.n., albuterol, DuoNeb q. 6 hours p.r.n., aspirin 325 mg rhonda
y, Pulmicort q. 12 hours, ceftriaxone 1 gram daily, heparin 5000 b.i.d., hydralazine 10 mg q. 6 hours
 p.r.n., Cozaar 25 mg p.o. daily, Solu-Medrol 40 mg IV q. 12, metoprolol 50 mg b.i.d., Protonix 40 mg
 daily.



ASSESSMENT:

1.  Non-ST elevation myocardial infarction.

2.  Chronic kidney disease.

3.  Sepsis.

4.  Leukocytosis.

5.  Anemia.

6.  Coagulopathy.



PLAN:  Hematology, still has leukocytosis.  Hemoglobin and hematocrit stable at 8 g/dL.  He has respi
ratory failure, currently on BiPAP in the night.  We will continue DuoNeb.  Continue Solu-Medrol 40 m
g q. 12 hours.  He is on gastric prophylaxis with Protonix, continue that.  IV antibiotic 2 grams Ronny
ephin, will continue that.  Aspirin 325 mg daily.  We will also continue deep venous thrombosis proph
ylaxis with heparin 5000 q. 12.  Will monitor blood counts and electrolytes closely.  Troponins are n
ormal now.  The daughter bedside.  Discussed with the daughter.  Discussed with the staff nurse.





__________________________________________

Anneliese Joe MD







cc:



DD: 04/09/2017 13:07:32  [id number]

TT: 04/09/2017 13:30:05

Confirmation # [id number]

Dictation # [id number]

en

## 2017-04-10 NOTE — PN
DATE: 04/10/2017



SUBJECTIVE:  Shortly, the patient is an 81-year-old  male with multiple medical problems who
 was admitted on the medical floor status post septic shock.  The patient was seen by this writer for
 evaluation of delirium stage.  This writer initiated Risperdal, discussed the plan with the patient'
s wife.  The patient's wife permitted to give Risperdal.  The patient was seen by Dr. Figueroa over the 
weekend and this writer is following the patient up.  The patient was seen today.  The patient presen
adrian to be alert, oriented in self, place, but off time.  The patient said that he remembers this writ
er from the medical floor, but this writer doubts.  The patient presented with much improvement to co
nichol with last week.  The patient was able to have a meaningful conversation.  The patient denied be
ing depressed, denied thoughts of harming himself or others, denied intent or plan.  The patient repo
rted to have good appetite and sleep.  The patient denied hearing voices, denied seeing things, but a
s per nursing staff report, patient had episodes when he has visual hallucinations.  Earlier today th
e patient was seeing his wife when she was not there.



VITAL SIGNS:  Stable.  Temperature 97.7, pulse 95, blood pressure 154/75, respirations 20, saturation
 is 97.



MEDICATIONS:  Reviewed.  Tylenol, DuoNeb, aspirin, Pulmicort, Rocephin 2 grams IV push daily, Lotrimi
n, heparin, Cozaar, Solu-Medrol, Lopressor, Protonix, MiraLax, Risperdal 0.5 mg twice a day as needed
 for agitation.  The patient's last dose of Risperdal 04/07.



PLAN:  Considering the fact that patient is improving without Risperdal gives this writer hope that d
frieda is clearing.  This writer reviewed previous notes from Dr. Arguello.  The patient will be tra
nsferred to [facility] for subacute rehab.



MENTAL STATUS EXAMINATION:  The patient appears to be alert, oriented in self and place.  Fair eye co
ntact.  Speech was normal rate, tone, quality, and quantity.  Mood described as "I'm not depressed." 
 Affect was constricted, but reactive, mood congruent.  The patient is aware that he is going to be t
ransferred to subacute rehab today.  Mood described as "I'm okay."  Thought process was coherent and 
more goal directed.  Thought content:  The patient denied visual, auditory, tactile hallucinations, d
enied paranoid ideations, but as per nursing staff, the patient was seeing things.  Insight and judgm
ent are improving.  Impulses are well controlled.



IMPRESSION:  Delirium due to general medical condition, which is improving; hypoxia, improving; acute
 kidney injury is improving; non-ST elevation myocardial infarction, resolved;  transaminitis; hypern
atremia; right knee injury; sepsis.  The patient is on intravenous antibiotics.  The patient is DNI a
nd DNR.



PLAN:  Continue current management.  Continue current medications.  The patient needs to be on Risper
rangel as needed for psychosis.  The patient needs to be seen by a psychiatrist in the facility within 1
 week after discharge.  Should you have any questions, give me a call back.  Overall, prognosis is po
or.  



Thank you very much for letting me participate in care of your patient.





__________________________________________

Yara Villegas MD







cc:



DD: 04/10/2017 14:34:06  [phone]

TT: 04/10/2017 15:31:03

Confirmation # [id number]

Dictation # [id number]

evette

## 2017-04-10 NOTE — PN
DATE: 04/10/2017



SUBJECTIVE:  The patient appears very comfortable this morning.  He is not 
short of breath at rest.  He appears less confused.



PHYSICAL EXAMINATION:

VITAL SIGNS:  Temperature is 98.0, pulse 67, respirations 18/20, blood pressure 
144/61.  Oxygen saturation on nasal cannula is 97%.

HEENT:  Normocephalic, atraumatic.  No JVD.

CARDIOVASCULAR:  Systolic ejection murmur at the lower left sternal border.  No 
S3 gallop.

LUNGS:  Very minimal/less rhonchi.  No wheezing.

EXTREMITIES:  Mild edema.  No cyanosis, no clubbing.  Calves are nontender to 
palpation.

GASTROINTESTINAL:  Abdomen is soft, nontender, nondistended.  Bowel sounds are 
positive.

SKIN:  Resolving cellulitis of the lower extremities (anterior aspects).

NEUROLOGIC:  Limited at the present time.



IMPRESSION:

1.  Status post respiratory failure (multiple episodes).

2.  Chronic obstructive pulmonary disease.

3.  Bilateral cellulitis.

4.  Severe sepsis.

5.  Acute myocardial infarction.

6.  Renal insufficiency.

7.  Mild anemia.



PLAN:  The patient appears very comfortable this morning.  He is not short of 
breath at rest.  He is awake and alert.  He appears less confused.  On physical 
exam, his bronchospasm continues to resolve.  In addition, the alveolar 
arterial gradient also continues to resolve.  I will continue with the current 
nebulizer treatments and decrease the intravenous steroids this morning.  The 
patient remains on antibiotic therapy -- as per infectious disease.  Input by 
Dr. Ham is noted.  Clinical status of the patient is significantly improved - 
compared to the end of last week.  However, again, the overall status/prognosis 
of this elderly patient with multiple medical problems -- remains very guarded 
at best.  I will discuss the above with the attending physician.





__________________________________________

Balwinder Fall MD







cc:   



DD: 04/10/2017 07:05:28  [id number]

TT: 04/10/2017 07:54:29

Confirmation # [id number]

Dictation # [id number]

abelardo JOYCE

## 2017-04-11 NOTE — PN
DATE: 04/10/2017



The patient is in bed in no acute distress, nontoxic.  No fevers, no chills.  The patient was seen ea
rlier today in 562, bed 1.



PHYSICAL EXAMINATION:

VITAL SIGNS:  Temperature is 97, blood pressure is 150/60, respiratory rate of 20, heart rate of 77.

HEENT:  Unremarkable.

NECK:  Supple.

LUNGS:  Have decreased breath sounds.

HEART:  Sounds normal S1, S2.

ABDOMEN:  Soft.



LABORATORY DATA:  Reveals a white count of 14,000, hemoglobin of 8, platelets of 298.  Chemistries re
veal the BUN of _____, creatinine of 1.5.  Urinalysis is noted.  Serology is noted.  



Review of the orders reveals the patient to be on ceftriaxone.



ASSESSMENT AND PLAN:  An 81-year-old male who was seen earlier this morning in room 562, bed 1 with s
evere sepsis, status post shocks, status post _____, apparent respiratory failure with acute on chron
ic renal failure due to group G strep bacteremia probably secondary to bilateral lower extremity skin
 and skin structure infection, also with MSSA from the wound culture, so far no evidence of new infec
tion.  The patient is clinically improving and currently on daptomycin day #17.  Would complete 28 da
ys of antibiotics with the repeat echo did not show any vegetations.  We will follow closely with you
.





__________________________________________

Lj Baca MD







cc:



DD: 04/10/2017 18:01:44  350

TT: 04/10/2017 18:33:40

Confirmation # [id number]

Dictation # [id number]

mn

## 2017-04-30 NOTE — CP.PCM.PN
Subjective





- Date & Time of Evaluation


Date of Evaluation: 04/01/17


Time of Evaluation: 10:00





- Subjective


Subjective: 





DATE: 04/01/2017





SUBJECTIVE:  He is comfortable in bed, in no acute distress. He is getting 

BiPAP in the night.  He has non-STEMI.  Cardiac enzymes are improving.  Also 

has sepsis, treated with IV antibiotics.  He developed acute respiratory 

acidosis, secondary to respiratory failure.  Denies any shortness of breath.  

He also gets delirious.  Currently, oriented to time, place, person.  Psych 

following.  He also had hypernatremia, which is improved.  Has anemia with low 

hemoglobin around 8 g/dL.  He has leukocytosis..





REVIEW OF SYSTEMS:  As per HPI.  Rest of 12-point reviewed and negative.





PHYSICAL EXAMINATION:


GENERAL:  Comfortable in bed, in no acute distress.


VITAL SIGNS:  reviewed.


HEENT:  Normal.


LUNGS:  Clear to auscultation.  Air entry present, equal bilateral.


CARDIOVASCULAR:  S1 and S2 normal.  No murmur, no gallop.


ABDOMEN:  Soft, nontender, obese.


MUSCULOSKELETAL:  1+ bilateral pedal edema, ankle edema.  Bilateral chronic 

skin changes on both feet.


NEUROLOGIC:  Awake, alert, oriented to place, not to time.  Conversant.  No 

focal sensorimotor deficit.


PSYCHIATRIC:  Episodic episodes of delirium.  Currently alert, oriented.


LYMPHADENOPATHY:  None.





LABORATORIES:  White count 7.3, hemoglobin 8.5, hematocrit 27.2, platelet count 

283.  Sodium 141, potassium 4.3, creatinine 1.9, glucose 171, calcium 9.6, 

total bilirubin 0.6.





MEDICATIONS:  Tylenol 650 q. 6 hours p.r.n., DuoNeb q. 6 hours p.r.n., aspirin 

81 mg daily, ceftriaxone 2 grams daily, _____ local application, heparin 5000 

units subQ q. 12, Cozaar 25 mg daily, Solu-Medrol 40 q. 12, IV Lopressor 50 mg 

p.o. b.i.d., Protonix 40 mg daily, Risperdal 2.5 mg, vitamin A topical.





ASSESSMENT:


1.  Non-ST elevation myocardial infarction.


2.  Delirium.


3.  Leukocytosis.


4.  Anemia.


5.  Coagulopathy.


6.  Chronic kidney disease stage III.


7.  Sepsis.





PLAN:  He is currently comfortable, coagulopathy improved.  He is on BiPAP in 

the night for respiratory failure.  Renal function is improving.  Electrolytes 

normal.  Heme; He has leukocytosis and anemia.  Hemoglobin stable at 8.3 g/dL.  

Will transfuse packed red blood cells if hemoglobin declines below 8.  Anemia 

is multifactorial, chronic disease, iron deficiency and chronic kidney disease.

  He is currently on IV antibiotic, Rocephin.  ID following.  Deep venous 

thrombosis with 5000 units heparin b.i.d.  Non-ST elevation myocardial 

infarction.  Cardiac enzymes declining, no issues, conservative management, 

cardiology following.








__________________________________________


Anneliese Joe MD








Objective





- Vital Signs/Intake and Output


Vital Signs (last 24 hours): 


 











Temp Pulse Resp BP Pulse Ox


 


 97.8 F   77   20   158/60 H  96 


 


 04/10/17 16:00  04/10/17 16:00  04/10/17 16:00  04/10/17 16:00  04/10/17 16:00











- Labs


Labs: 


 





 04/09/17 11:40 





 04/09/17 11:40 





 











PT  13.7 Seconds (9.9-11.8)  H  04/05/17  06:25    


 


INR  1.27  (0.93-1.08)  H  04/05/17  06:25    


 


APTT  33.2 Seconds (23.7-30.8)  H  04/05/17  06:25

## 2017-05-10 NOTE — DS
This is an 81-year-old male who came to the hospital who had non-ST elevation MI.  The patient had ac
[place] kidney injury.  He was hypoxic.  He had improvement of his symptoms and was discharged to [facility].  



Please see the note dictated on 4/23/2017 for further details.  The patient's family was updated.





__________________________________________

Jose Martin Arguello MD







cc:



DD: 05/10/2017 09:09:51  358

TT: 05/10/2017 10:48:37

Job # [id number]

jn

## 2018-11-21 ENCOUNTER — HOSPITAL ENCOUNTER (INPATIENT)
Dept: HOSPITAL 42 - ED | Age: 83
LOS: 22 days | Discharge: NURSING, REHAB OR LONG-TERM CARE | DRG: 617 | End: 2018-12-13
Attending: INTERNAL MEDICINE | Admitting: INTERNAL MEDICINE
Payer: MEDICARE

## 2018-11-21 VITALS — BODY MASS INDEX: 31.8 KG/M2

## 2018-11-21 DIAGNOSIS — L03.032: ICD-10-CM

## 2018-11-21 DIAGNOSIS — E11.42: ICD-10-CM

## 2018-11-21 DIAGNOSIS — Z79.82: ICD-10-CM

## 2018-11-21 DIAGNOSIS — L97.426: ICD-10-CM

## 2018-11-21 DIAGNOSIS — N18.3: ICD-10-CM

## 2018-11-21 DIAGNOSIS — N20.0: ICD-10-CM

## 2018-11-21 DIAGNOSIS — D64.9: ICD-10-CM

## 2018-11-21 DIAGNOSIS — E66.01: ICD-10-CM

## 2018-11-21 DIAGNOSIS — M86.172: ICD-10-CM

## 2018-11-21 DIAGNOSIS — B95.62: ICD-10-CM

## 2018-11-21 DIAGNOSIS — E11.621: ICD-10-CM

## 2018-11-21 DIAGNOSIS — I25.10: ICD-10-CM

## 2018-11-21 DIAGNOSIS — I08.3: ICD-10-CM

## 2018-11-21 DIAGNOSIS — I49.5: ICD-10-CM

## 2018-11-21 DIAGNOSIS — J44.9: ICD-10-CM

## 2018-11-21 DIAGNOSIS — I25.2: ICD-10-CM

## 2018-11-21 DIAGNOSIS — L97.529: ICD-10-CM

## 2018-11-21 DIAGNOSIS — E78.5: ICD-10-CM

## 2018-11-21 DIAGNOSIS — E11.52: ICD-10-CM

## 2018-11-21 DIAGNOSIS — I12.9: ICD-10-CM

## 2018-11-21 DIAGNOSIS — E11.69: Primary | ICD-10-CM

## 2018-11-21 DIAGNOSIS — E11.22: ICD-10-CM

## 2018-11-21 DIAGNOSIS — L03.116: ICD-10-CM

## 2018-11-21 DIAGNOSIS — F03.90: ICD-10-CM

## 2018-11-21 LAB
ALBUMIN SERPL-MCNC: 3.7 G/DL (ref 3–4.8)
ALBUMIN/GLOB SERPL: 1.1 {RATIO} (ref 1.1–1.8)
ALT SERPL-CCNC: 23 U/L (ref 7–56)
APTT BLD: 34.7 SECONDS (ref 25.1–36.5)
AST SERPL-CCNC: 26 U/L (ref 17–59)
BASOPHILS # BLD AUTO: 0.01 K/MM3 (ref 0–2)
BASOPHILS NFR BLD: 0.1 % (ref 0–3)
BUN SERPL-MCNC: 36 MG/DL (ref 7–21)
CALCIUM SERPL-MCNC: 9 MG/DL (ref 8.4–10.5)
EOSINOPHIL # BLD: 0.2 10*3/UL (ref 0–0.7)
EOSINOPHIL NFR BLD: 1.7 % (ref 1.5–5)
ERYTHROCYTE [DISTWIDTH] IN BLOOD BY AUTOMATED COUNT: 13.3 % (ref 11.5–14.5)
GFR NON-AFRICAN AMERICAN: 45
GRANULOCYTES # BLD: 8.9 10*3/UL (ref 1.4–6.5)
GRANULOCYTES NFR BLD: 76.1 % (ref 50–68)
HGB BLD-MCNC: 12 G/DL (ref 14–18)
INR PPP: 1.29
LYMPHOCYTES # BLD: 1.4 10*3/UL (ref 1.2–3.4)
LYMPHOCYTES NFR BLD AUTO: 11.7 % (ref 22–35)
MCH RBC QN AUTO: 27.1 PG (ref 25–35)
MCHC RBC AUTO-ENTMCNC: 32.6 G/DL (ref 31–37)
MCV RBC AUTO: 83.1 FL (ref 80–105)
MONOCYTES # BLD AUTO: 1.2 10*3/UL (ref 0.1–0.6)
MONOCYTES NFR BLD: 10.4 % (ref 1–6)
PLATELET # BLD: 197 10^3/UL (ref 120–450)
PMV BLD AUTO: 8.4 FL (ref 7–11)
PROTHROMBIN TIME: 14.8 SECONDS (ref 9.4–12.5)
RBC # BLD AUTO: 4.43 10^6/UL (ref 3.5–6.1)
WBC # BLD AUTO: 11.7 10^3/UL (ref 4.5–11)

## 2018-11-21 NOTE — ED PDOC
Arrival/HPI





- General


Chief Complaint: Lower Extremity Problem/Injury





- History of Present Illness


Narrative History of Present Illness (Text): 





11/21/18 15:53


84 y/o male with PMH of HTN and PAD sent to the ED by his podiatrist for 

evaluation of a left foot wound with surrounding redness and swelling. Pt was 

seen by podiatrist Dr. Do today at home and discovered to have a worsening 

left foot 2nd digit wound with associated cellulitis of the foot and lower leg. 

Per patient, he scraped it two days ago on the wooden floor and since then the 

toe has become black and the wound is bleeding and draining yellow fluid. 

Associated swelling and redness of the left foot and left lower leg. Dr. Do 

typically visits the home every few weeks for care of patient's chronic left h

eel wound but sent pt here for evaluation after noticing the new wound on the 

2nd left digit. Patient is ambulating per baseline. Denies fevers, chills, N/V, 

headache, dizziness, leg or foot pain, numbness, paresthesias, weakness, or any 

other associated symptoms. 








Past Medical History





- Provider Review


Nursing Documentation Reviewed: Yes





- Past History


Past History: Non-Contributing





- Cardiac


Hx Hypertension: Yes





- Pulmonary


Hx Respiratory Disorders: No





- Neurological


Hx Neurological Disorder: No





- HEENT


Hx Cataracts: Yes (bilateral surgery)


Hx Deafness: Yes (bilateral hearing aids)


Other/Comment: eye glasses for driving and reading





- Renal


Hx Kidney Stones: Yes (many years ago)





- Hematological/Oncological


Hx Blood Disorders: No





- Musculoskeletal/Rheumatological


Hx Falls: Yes





- Psychiatric


Hx Substance Use: No





- Surgical History


Other/Comment: pilonidal cyst





Family/Social History





- Physician Review


Nursing Documentation Reviewed: Yes


Family/Social History: No Known Family HX


Smoking Status: Former Smoker


Hx Alcohol Use: No


Hx Substance Use: No





Allergies/Home Meds


Allergies/Adverse Reactions: 


Allergies





alprazolam [From Xanax] Adverse Reaction (Severe, Verified 11/21/18 15:04)


   SHORTNESS OF BREATH


ativan Adverse Reaction (Severe, Uncoded 11/21/18 15:04)


   SHORTNESS OF BREATH


   Respiratory failure , sleepiness 


Morphine Sulfate Adverse Reaction (Severe, Uncoded 11/21/18 15:04)


   SHORTNESS OF BREATH








Home Medications: 


                                    Home Meds











 Medication  Instructions  Recorded  Confirmed


 


Losartan Potassium [Cozaar] 25 mg PO DAILY 03/22/17 11/21/18


 


Aspirin [Ecotrin] 81 mg PO DAILY 11/21/18 11/21/18


 


amLODIPine [Norvasc] 5 mg PO BID 11/21/18 11/21/18














Review of Systems





- Physician Review


All systems were reviewed & negative as marked: Yes





- Review of Systems


Constitutional: Normal.  absent: Fevers


Eyes: Normal.  absent: Vision Changes


ENT: Normal.  absent: Sinus Congestion


Respiratory: Normal.  absent: SOB, Cough


Cardiovascular: Normal.  absent: Chest Pain, Palpitations


Gastrointestinal: Normal.  absent: Abdominal Pain, Nausea, Vomiting


Genitourinary Male: Normal


Musculoskeletal: Other (left foot wound)


Skin: Cellulitis (left lower leg and foot).  absent: Abscess


Neurological: Normal.  absent: Headache, Dizziness


Endocrine: Normal


Hemo/Lymphatic: Normal


Psychiatric: Normal





Physical Exam


Vital Signs Reviewed: Yes





Vital Signs











  Temp Pulse Resp BP Pulse Ox


 


 11/21/18 15:06  98.0 F  47 L  18  189/74 H  100











Temperature: Afebrile


Blood Pressure: Normal


Pulse: Bradycardic


Respiratory Rate: Normal


Appearance: Positive for: Well-Appearing, Non-Toxic, Comfortable


Pain Distress: None


Mental Status: Positive for: Alert and Oriented X 3





- Systems Exam


Head: Present: Atraumatic, Normocephalic


Pupils: Present: PERRL


Extroacular Muscles: Present: EOMI


Conjunctiva: Present: Normal


Mouth: Present: Moist Mucous Membranes


Pharnyx: Present: Normal


Neck: Present: Normal Range of Motion


Respiratory/Chest: Present: Clear to Auscultation, Good Air Exchange.  No: 

Respiratory Distress, Accessory Muscle Use


Cardiovascular: Present: Regular Rate and Rhythm, Normal S1, S2.  No: Murmurs


Abdomen: No: Tenderness, Distention, Peritoneal Signs


Back: Present: Normal Inspection


Upper Extremity: Present: Normal Inspection, Normal ROM, NORMAL PULSES, 

Neurovascularly Intact, Capillary Refill < 2s.  No: Cyanosis, Edema, Tenderness,

 Swelling, Erythema


Lower Extremity: Present: Normal Inspection, Normal ROM, Swelling (left foot), 

Erythema (left lower anterior leg and foot), Temperature Abnormalties (left 

lower leg and foot warm to touch), Neurovascularly Intact, Capillary Refill < 2 

s, Other (1cm x 1cm wound on dorsal 2nd digit, left foot over PIP joint. Wound 

is open, bleeding, small amount of yellow drainage. Digit is black and blue, 

discolored to the base; left heel chronic wound, no signs of infection ).  No: 

Edema, CALF TENDERNESS, Tenderness, Deformity


Neurological: Present: GCS=15, CN II-XII Intact, Speech Normal, Motor Func 

Grossly Intact, Normal Sensory Function, Gait Normal, Memory Normal


Skin: Present: Warm, Dry, Normal Color, Hot (left lower leg).  No: Rashes


Lymphatic: No: Cervical Adenopathy


Psychiatric: Present: Alert, Oriented x 3, Normal Insight, Normal Concentration,

 Normal Affect, Normal Mood





Medical Decision Making


ED Course and Treatment: 





11/21/18 15:00


Initial Plan:


* CBC, CMP


* Coags


* EKG


* Left foot XR


* Wound culture


* Blood cultures


* IV Vancomycin


* IV Zosyn





CBC: WBC 11.7, otherwise unremarkable


CMP: K 5.1, otherwise unremarkable





15:15


Spoke with Podiatry resident Dr. Tyson, states podiatry will see the patient 

tomorrow morning. Advises dry dressing over wound for now. 





16:19


Spoke with Hospitalist Dr. Cassia Raymundo, who accepted patient for inpatient 

admission to med/surg floor. 





Discussed plan of care with patient and family who agree and understand the 

necessity of admission. Patient continues to be A&Ox3 with stable vital signs. 




















- Lab Interpretations


Narrative Lab Interpretation (Text): 





11/21/18 17:28











                                 11/21/18 15:41 





                                 11/21/18 15:41 





                                   Lab Results





11/21/18 15:41: Sodium 139, Potassium 5.1 H, Chloride 104, Carbon Dioxide 29, 

Anion Gap 12, BUN 36 H, Creatinine 1.5, Est GFR (African Amer) 54, Est GFR (Non-

Af Amer) 45, Random Glucose 116 H, Calcium 9.0, Total Bilirubin 0.5, AST 26, ALT

 23, Alkaline Phosphatase 101, Total Protein 7.0, Albumin 3.7, Globulin 3.3, 

Albumin/Globulin Ratio 1.1


11/21/18 15:41: PT 14.8 H, INR 1.29, APTT 34.7


11/21/18 15:41: WBC 11.7 H, RBC 4.43, Hgb 12.0 L, Hct 36.8 L, MCV 83.1, MCH 

27.1, MCHC 32.6, RDW 13.3, Plt Count 197, MPV 8.4, Gran % 76.1 H, Lymph % (Auto)

 11.7 L, Mono % (Auto) 10.4 H, Eos % (Auto) 1.7, Baso % (Auto) 0.1, Gran # 8.90 

H, Lymph # (Auto) 1.4, Mono # (Auto) 1.2 H, Eos # (Auto) 0.2, Baso # (Auto) 0.01








I have reviewed the lab results: Yes





- RAD Interpretation


Narrative RAD Interpretations (Text): 





11/21/18 17:28


Left Foot Xray:


FINDINGS:


BONES:


There is no acute displaced fracture or bone destruction.  Bone alignment is 

normal.  There is diffuse bone demineralization.  There is a prominent plantar 

calcaneal spur. 


JOINTS:


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 

metatarsophalangeal joints.  There is also moderate degenerative osteoarthrosis 

in the 1st interphalangeal and MTP joints. 


SOFT TISSUES:


There is diffuse soft tissue swelling in the foot. 


OTHER FINDINGS:


Atherosclerotic vascular calcifications are present.


IMPRESSION:


No evidence for bone erosion or destruction to suggest osteomyelitis.  Diffuse 

soft tissue swelling in the foot may represent cellulitis.  If clinically 

indicated an MRI may be performed for further evaluation.


Radiology Orders: 











11/21/18 15:11


FOOT LEFT 3 VIEWS ROUTINE [RAD] Stat 











: Radiologist





- EKG Interpretation


EKG Interpretation (Text): 





11/21/18 17:24


rate 62; NSR with 1st degree AV block; RBBB; No ST elevations/depressions or 

other signs of acute ischemia. Compared with prior EKG, no acute changes.


Interpreted by ED Physician: Yes


Type: 12 lead EKG


Comparison: Com.w/previous EKG





Disposition/Present on Arrival





- Present on Arrival


Any Indicators Present on Arrival: No


History of DVT/PE: No


History of Uncontrolled Diabetes: No


Urinary Catheter: No


History of Decub. Ulcer: No


History Surgical Site Infection Following: None





- Disposition


Have Diagnosis and Disposition been Completed?: Yes


Diagnosis: 


 Wound cellulitis





Disposition: HOSPITALIZED


Disposition Time: 16:20


Patient Plan: Admission


Patient Problems: 


                             Current Active Problems











Problem Status Onset


 


Wound cellulitis Acute 











Condition: STABLE

## 2018-11-21 NOTE — CP.PCM.HP
History of Present Illness





- History of Present Illness


History of Present Illness: 





HISTORY & PHYSICAL NOTE FOR HOSPITALIST TEAM


Anna Funez PGY-1





CC: LLE extremity ulcer/L 2nd distal phalanx gangrene





84 y/o M with PMHx of peripheral artery disease, chronic LLE celluiltis, COPD, 

HTN, dementia, obesity presents to [facility] with complaints of LLE anterior tibial 

region cellulitis and black L foot 2nd distal phalanx. He reports that he 

noticed the black toe about 2 days ago when he scraped it while getting up 

however hes been getting treatment by Dr. Do (podiatrist) at home every 2-3 

weeks for L heel ulcers for the past 1.5 years with silvadene and xeroform 

treatments. Dr Do had visited him today and instructed him to go to ER for 

further treatment. He denies pain or discomfort to the area. He reports he had 

been scratching the region which is why there are scabs. He walks at home 

regularly with a walker, denies calf pain when walking. Per wife at bedside, he 

previously had taken aspirin but stopped because he was scratching himself and 

noticed excessive bleeding. He report having 1 episode of brown diarrhea 

yesterday. He denies recent oral antibiotics. He denies fevers, chills, headache

dizziness, chest pain, palpitations, shortness of breath, nausea, vomiting, 

constipation, diarrhea, dysuria. 





PMHx: PAD, LE cellulitis, HTN, COPD, CAD, 


All: Opioids (leads to respiratory failure)


PSH: denies


FH: Mother: PAD, "heart problems." Father: CHF


Meds: amlodipine 5mg qd, losartan 25mg qd


PMD: Dr. Werner ([place])


Pharmacy: [facility] ([place])











Present on Admission





- Present on Admission


Any Indicators Present on Admission: No





Review of Systems





- Review of Systems


Review of Systems: 





per HPI





Past Patient History





- Past Medical History & Family History


Past Medical History?: Yes





- Past Social History


Smoking Status: Former Smoker





- CARDIAC


Hx Hypertension: Yes





- PULMONARY


Hx Respiratory Disorders: No





- NEUROLOGICAL


Hx Neurological Disorder: No





- HEENT


Hx Cataracts: Yes (bilateral surgery)


Hx Deafness: Yes (bilateral hearing aids)


Other/Comment: eye glasses for driving and reading





- RENAL


Hx Kidney Stones: Yes (many years ago)





- HEMATOLOGICAL/ONCOLOGICAL


Hx Blood Disorders: No





- MUSCULOSKELETAL/RHEUMATOLOGICAL


Hx Falls: Yes





- PSYCHIATRIC


Hx Substance Use: No





- SURGICAL HISTORY


Other/Comment: pilonidal cyst





Meds


Allergies/Adverse Reactions: 


                                    Allergies











Allergy/AdvReac Type Severity Reaction Status Date / Time


 


alprazolam [From Xanax] AdvReac Severe SHORTNESS Verified 11/21/18 15:04





   OF BREATH  


 


ativan AdvReac Severe SHORTNESS Uncoded 11/21/18 15:04





   OF BREATH  


 


Morphine Sulfate AdvReac Severe SHORTNESS Uncoded 11/21/18 15:04





   OF BREATH  














Physical Exam





- Constitutional


Appears: Well, Non-toxic, No Acute Distress





- Head Exam


Head Exam: NORMAL INSPECTION, NORMOCEPHALIC





- Eye Exam


Eye Exam: EOMI, Normal appearance





- ENT Exam


ENT Exam: Mucous Membranes Moist, Normal Exam





- Neck Exam


Neck exam: Positive for: Normal Inspection





- Respiratory Exam


Respiratory Exam: Clear to Auscultation Bilateral, NORMAL BREATHING PATTERN





- Cardiovascular Exam


Cardiovascular Exam: REGULAR RHYTHM, +S1, +S2





- GI/Abdominal Exam


GI & Abdominal Exam: Distended, Soft.  absent: Rebound, Rigid, Tenderness





- Extremities Exam


Extremities exam: Negative for: calf tenderness


Additional comments: 





L anterior tibial region erythema without purulence or drainage. Black healed 

linear eschar noted 


Black L foot 2nd distal phalanx, with dark red bloody discharge. 


L heel chronic ulcer noted, not erythematous








- Back Exam


Back exam: NORMAL INSPECTION





- Neurological Exam


Neurological exam: Alert, Oriented x3





- Psychiatric Exam


Psychiatric exam: Normal Affect, Normal Mood





- Skin


Skin Exam: Dry, Intact, Warm





Results





- Vital Signs


Recent Vital Signs: 





                                Last Vital Signs











Temp  98.0 F   11/21/18 15:06


 


Pulse  50 L  11/21/18 16:24


 


Resp  18   11/21/18 16:24


 


BP  145/68   11/21/18 16:24


 


Pulse Ox  100   11/21/18 16:24














- Labs


Result Diagrams: 


                                 11/21/18 15:41





                                 11/21/18 15:41


Labs: 





                         Laboratory Results - last 24 hr











  11/21/18 11/21/18 11/21/18





  15:41 15:41 15:41


 


WBC  11.7 H  


 


RBC  4.43  


 


Hgb  12.0 L  


 


Hct  36.8 L  


 


MCV  83.1  


 


MCH  27.1  


 


MCHC  32.6  


 


RDW  13.3  


 


Plt Count  197  


 


MPV  8.4  


 


Gran %  76.1 H  


 


Lymph % (Auto)  11.7 L  


 


Mono % (Auto)  10.4 H  


 


Eos % (Auto)  1.7  


 


Baso % (Auto)  0.1  


 


Gran #  8.90 H  


 


Lymph # (Auto)  1.4  


 


Mono # (Auto)  1.2 H  


 


Eos # (Auto)  0.2  


 


Baso # (Auto)  0.01  


 


PT   14.8 H 


 


INR   1.29 


 


APTT   34.7 


 


Sodium    139


 


Potassium    5.1 H


 


Chloride    104


 


Carbon Dioxide    29


 


Anion Gap    12


 


BUN    36 H


 


Creatinine    1.5


 


Est GFR ( Amer)    54


 


Est GFR (Non-Af Amer)    45


 


Random Glucose    116 H


 


Calcium    9.0


 


Total Bilirubin    0.5


 


AST    26


 


ALT    23


 


Alkaline Phosphatase    101


 


Total Protein    7.0


 


Albumin    3.7


 


Globulin    3.3


 


Albumin/Globulin Ratio    1.1














Assessment & Plan





- Assessment and Plan (Free Text)


Assessment: 





3 y/o M with PMHx of peripheral artery disease, chronic LLE celluiltis, COPD, 

HTN, dementia, obesity admitted L foot 2nd distal phalanx questionable gangrene 

and LLE cellulitis


Plan: 





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


empiric vancomycin/zosyn


start aspirin


LE arterial doppler studies


DEYSI


podiatry consult


vascular surgery consult


f/u L foot xray


f/u foot MRI


f/u wound culture


f/u blood culture


f/u procalcitonin


consult ID, appreciate recs





Hx of Hypertension


Continue home amlodipine, aspirin and losartan





DVT/GI PPx: Hep/protonix





Case seen, examined and discussed with attending physician, Dr. Verma

## 2018-11-21 NOTE — RAD
Date of service: 



11/21/2018



PROCEDURE:  Left Foot Radiographs.



HISTORY:

 r/o osteomyelitis, wound 2nd digit 



COMPARISON:

None.



FINDINGS:



BONES:

There is no acute displaced fracture or bone destruction.  Bone 

alignment is normal.  There is diffuse bone demineralization.  There 

is a prominent plantar calcaneal spur. 



JOINTS:

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 

metatarsophalangeal joints.  There is also moderate degenerative 

osteoarthrosis in the 1st interphalangeal and MTP joints. 



SOFT TISSUES:

There is diffuse soft tissue swelling in the foot. 



OTHER FINDINGS:

Atherosclerotic vascular calcifications are present.



IMPRESSION:

No evidence for bone erosion or destruction to suggest osteomyelitis. 

 Diffuse soft tissue swelling in the foot may represent cellulitis.  

If clinically indicated an MRI may be performed for further 

evaluation.

## 2018-11-22 LAB
ALBUMIN SERPL-MCNC: 3.2 G/DL (ref 3–4.8)
ALBUMIN/GLOB SERPL: 1 {RATIO} (ref 1.1–1.8)
ALT SERPL-CCNC: 24 U/L (ref 7–56)
AST SERPL-CCNC: 30 U/L (ref 17–59)
BASOPHILS # BLD AUTO: 0.02 K/MM3 (ref 0–2)
BASOPHILS NFR BLD: 0.2 % (ref 0–3)
BUN SERPL-MCNC: 28 MG/DL (ref 7–21)
CALCIUM SERPL-MCNC: 8.5 MG/DL (ref 8.4–10.5)
EOSINOPHIL # BLD: 0.3 10*3/UL (ref 0–0.7)
EOSINOPHIL NFR BLD: 3 % (ref 1.5–5)
ERYTHROCYTE [DISTWIDTH] IN BLOOD BY AUTOMATED COUNT: 13.4 % (ref 11.5–14.5)
GFR NON-AFRICAN AMERICAN: 53
GRANULOCYTES # BLD: 6.9 10*3/UL (ref 1.4–6.5)
GRANULOCYTES NFR BLD: 71.3 % (ref 50–68)
HGB BLD-MCNC: 10.7 G/DL (ref 14–18)
LYMPHOCYTES # BLD: 1.5 10*3/UL (ref 1.2–3.4)
LYMPHOCYTES NFR BLD AUTO: 15.4 % (ref 22–35)
MCH RBC QN AUTO: 26.4 PG (ref 25–35)
MCHC RBC AUTO-ENTMCNC: 32 G/DL (ref 31–37)
MCV RBC AUTO: 82.5 FL (ref 80–105)
MONOCYTES # BLD AUTO: 1 10*3/UL (ref 0.1–0.6)
MONOCYTES NFR BLD: 10.1 % (ref 1–6)
PLATELET # BLD: 189 10^3/UL (ref 120–450)
PMV BLD AUTO: 8.7 FL (ref 7–11)
RBC # BLD AUTO: 4.05 10^6/UL (ref 3.5–6.1)
WBC # BLD AUTO: 9.7 10^3/UL (ref 4.5–11)

## 2018-11-22 RX ADMIN — VANCOMYCIN HYDROCHLORIDE SCH: 1 INJECTION, POWDER, LYOPHILIZED, FOR SOLUTION INTRAVENOUS at 11:18

## 2018-11-22 RX ADMIN — VANCOMYCIN HYDROCHLORIDE SCH MLS/HR: 1 INJECTION, POWDER, LYOPHILIZED, FOR SOLUTION INTRAVENOUS at 09:16

## 2018-11-22 RX ADMIN — PANTOPRAZOLE SODIUM SCH MG: 20 TABLET, DELAYED RELEASE ORAL at 06:59

## 2018-11-22 RX ADMIN — PANTOPRAZOLE SODIUM SCH MG: 20 TABLET, DELAYED RELEASE ORAL at 18:00

## 2018-11-22 RX ADMIN — INSULIN LISPRO SCH: 100 INJECTION, SOLUTION INTRAVENOUS; SUBCUTANEOUS at 20:01

## 2018-11-22 RX ADMIN — INSULIN LISPRO SCH: 100 INJECTION, SOLUTION INTRAVENOUS; SUBCUTANEOUS at 22:28

## 2018-11-22 NOTE — CARD
--------------- APPROVED REPORT --------------





Date of service: 11/21/2018



EKG Measurement

Heart [id number]

CT [id number]

[id number]

[id number]

[id number]



<Conclusion>

Sinus rhythm with 1st degree AV block

Right bundle branch block

Possible Inferior infarct, age undetermined

Abnormal ECG

## 2018-11-22 NOTE — CP.PCM.PN
<Erich Hilton - Last Filed: 11/22/18 11:38>





Subjective





- Date & Time of Evaluation


Date of Evaluation: 11/22/18


Time of Evaluation: 07:00





- Subjective


Subjective: 





Patient seen and examined at bedside in no acute distress. Patient states he no 

issues overnight. Currently states he is in no pain. Admits to numbness and 

tingling in his left lower extremity. Denies fevers, chills, fever, cough, 

abdominal pain, nausea, vomiting, diarrhea, dysuria. 





Objective





- Vital Signs/Intake and Output


Vital Signs (last 24 hours): 


                                        











Temp Pulse Resp BP Pulse Ox


 


 97.9 F   67   18   143/79   94 L


 


 11/22/18 06:00  11/22/18 09:16  11/22/18 06:00  11/22/18 09:16  11/22/18 06:00








Intake and Output: 


                                        











 11/22/18 11/22/18





 06:59 18:59


 


Intake Total 720 


 


Output Total 300 


 


Balance 420 














- Medications


Medications: 


                               Current Medications





Amlodipine Besylate (Norvasc)  5 mg PO BID [facility]


   Last Admin: 11/22/18 09:16 Dose:  5 mg


Aspirin (Ecotrin)  81 mg PO DAILY [facility]


   Last Admin: 11/22/18 09:15 Dose:  81 mg


Heparin Sodium (Porcine) (Heparin)  5,000 units SC Q12 [facility]; Protocol


   Last Admin: 11/22/18 09:15 Dose:  5,000 units


Sodium Chloride (Sodium Chloride 0.9%)  1,000 mls @ 60 mls/hr IV .[id number] [facility]


   Last Admin: 11/21/18 22:09 Dose:  60 mls/hr


Ceftaroline Fosamil 600 mg/ (Sodium Chloride)  100 mls @ 100 mls/hr IVPB Q12 

[facility]; Protocol


   Stop: 12/01/18 10:31


Losartan Potassium (Cozaar)  25 mg PO DAILY [facility]


   Last Admin: 11/22/18 09:15 Dose:  25 mg


Pantoprazole Sodium (Protonix Ec Tab)  20 mg PO 0600,1600 [facility]


   Last Admin: 11/22/18 06:59 Dose:  20 mg











- Labs


Labs: 


                                        





                                 11/22/18 05:30 





                                 11/22/18 05:30 





                                        











PT  14.8 SECONDS (9.4-12.5)  H  11/21/18  15:41    


 


INR  1.29   11/21/18  15:41    


 


APTT  34.7 Seconds (25.1-36.5)   11/21/18  15:41    














- Constitutional


Appears: Well, Non-toxic





- Head Exam


Head Exam: ATRAUMATIC, NORMAL INSPECTION, NORMOCEPHALIC





- Eye Exam


Eye Exam: EOMI, Normal appearance





- ENT Exam


ENT Exam: Mucous Membranes Moist





- Respiratory Exam


Respiratory Exam: Clear to Ausculation Bilateral, NORMAL BREATHING PATTERN





- Cardiovascular Exam


Cardiovascular Exam: REGULAR RHYTHM, +S1, +S2





- GI/Abdominal Exam


GI & Abdominal Exam: Soft, Normal Bowel Sounds.  absent: Tenderness





- Extremities Exam


Extremities Exam: Pedal Edema (left lower extremity).  absent: Calf Tenderness, 

Normal Inspection (left lower leg ), Tenderness





- Back Exam


Back Exam: NORMAL INSPECTION





- Neurological Exam


Neurological Exam: Alert, Awake, Oriented x3





- Psychiatric Exam


Psychiatric exam: Normal Affect, Normal Mood





- Skin


Skin Exam: absent: Normal Color


Additional comments: 





blood pooling within the second phalanx of left foot. Erythema and swelling of 

lower left leg. 





Assessment and Plan





- Assessment and Plan (Free Text)


Assessment: 








Progress note for Dr. Cote Hospitalist Service


Erich Hilton PGY2





84 y/o M with PMHx of peripheral artery disease, chronic LLE celluiltis, COPD, 

HTN, dementia, obesity presents [place] [facility] with complaints of LLE anterior tibial 

region cellulitis and black L foot 2nd distal phalanx.


Plan: 





3 y/o M with PMHx of peripheral artery disease, chronic LLE celluiltis (previous

group G strep and MSSA+), COPD, HTN, dementia, obesity admitted L foot 2nd 

distal phalanx questionable gangrene and LLE cellulitis


Plan: 





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


Continue with Ceftaroline as per ID


Continue aspirin


LE arterial doppler and DEYSI studies ordered


Podiatry on consult


ID on consult


Vascular surgery on consult


L foot xray: no signs of osteomyelitis, cellulitis present


Foot MRI ordered


Wound culture; gram positive cocci present


Blood culture pending


Procalcitonin pending








Hx of Hypertension


Continue home amlodipine and losartan





Hx of Pre-diabetes


Previous HgA1C 6.6 a year ago, will repeat





DVT/GI PPx: Hep/protonix





Case seen and discussed with attending Dr. Cote





<Maynor Cote - Last Filed: 11/22/18 13:29>





Objective





- Vital Signs/Intake and Output


Vital Signs (last 24 hours): 


                                        











Temp Pulse Resp BP Pulse Ox


 


 97.9 F   67   18   143/79   94 L


 


 11/22/18 06:00  11/22/18 09:16  11/22/18 06:00  11/22/18 09:16  11/22/18 06:00








Intake and Output: 


                                        











 11/22/18 11/22/18





 06:59 18:59


 


Intake Total 720 


 


Output Total 300 


 


Balance 420 














- Medications


Medications: 


                               Current Medications





Amlodipine Besylate (Norvasc)  5 mg PO BID [facility]


   Last Admin: 11/22/18 09:16 Dose:  5 mg


Aspirin (Ecotrin)  81 mg PO DAILY [facility]


   Last Admin: 11/22/18 09:15 Dose:  81 mg


Heparin Sodium (Porcine) (Heparin)  5,000 units SC Q12 [facility]; Protocol


   Last Admin: 11/22/18 09:15 Dose:  5,000 units


Sodium Chloride (Sodium Chloride 0.9%)  1,000 mls @ 60 mls/hr IV .[id number] [facility]


   Last Admin: 11/21/18 22:09 Dose:  60 mls/hr


Ceftaroline Fosamil 600 mg/ (Sodium Chloride)  100 mls @ 100 mls/hr IVPB Q12 

[facility]; Protocol


   Stop: 12/01/18 10:31


   Last Admin: 11/22/18 11:47 Dose:  100 mls/hr


Losartan Potassium (Cozaar)  25 mg PO DAILY [facility]


   Last Admin: 11/22/18 09:15 Dose:  25 mg


Pantoprazole Sodium (Protonix Ec Tab)  20 mg PO 0600,1600 [facility]


   Last Admin: 11/22/18 06:59 Dose:  20 mg











- Labs


Labs: 


                                        





                                 11/22/18 05:30 





                                 11/22/18 05:30 





                                        











PT  14.8 SECONDS (9.4-12.5)  H  11/21/18  15:41    


 


INR  1.29   11/21/18  15:41    


 


APTT  34.7 Seconds (25.1-36.5)   11/21/18  15:41    














Attending/Attestation





- Attestation


I have personally seen and examined this patient.: Yes


I have fully participated in the care of the patient.: Yes


I have reviewed all pertinent clinical information, including history, physical 

exam and plan: Yes


Notes (Text): 





11/22/18 13:25


83 year old male with past medical history of PAD, COPD, borderline diabetes and

hypertension who presented with left foot/leg cellulitis and and left 2nd digit 

ulceration.  Conitnue with iv antibiotics as per ID and wound care as per 

podiatry.  Will follow up on cultures.  LE dopplers and DEYSI is ordered.  MRI 

ordered to rule out osteomyelitis.  Continue with home medications for 

hypertension.  Continue with insulin ss while awaiting hemoglobin A1c.





Maynor Cote MD


Hospitalist.

## 2018-11-22 NOTE — CP.CCUPN
CCU Objective





- Vital Signs / Intake & Output


Vital Signs (Last 4 hours): 


Vital Signs











  Pulse BP


 


 11/22/18 09:16  67  143/79


 


 11/22/18 09:15  67  143/79











Intake and Output (Last 8hrs): 


                                 Intake & Output











 11/21/18 11/22/18 11/22/18





 22:59 06:59 14:59


 


Intake Total 540 180 


 


Output Total 200 100 


 


Balance 340 80 


 


Weight 100.698 kg  


 


Intake:   


 


  Oral 540 180 


 


Output:   


 


  Urine 200 100 


 


    Urine, Voided 200 100 


 


Other:   


 


  Voiding Method Diaper  


 


  # Bowel Movements 0  














- Physical Exam


Head: Positive for: Atraumatic, Normocephalic


Pupils: Positive for: PERRL


Extroacular Muscles: Positive for: EOMI


Conjunctiva: Positive for: Normal


Mouth: Positive for: Moist Mucous Membranes


Pharnyx: Positive for: Normal


Neck: Positive for: Normal Range of Motion


Respiratory/Chest: Positive for: Clear to Auscultation, Good Air Exchange.  

Negative for: Respiratory Distress, Accessory Muscle Use


Cardiovascular: Positive for: Regular Rate and Rhythm, Normal S1, S2.  Negative 

for: Murmurs


Abdomen: Negative for: Tenderness, Distention, Peritoneal Signs


Back: Positive for: Normal Inspection


Upper Extremity: Positive for: Normal Inspection, Normal ROM, NORMAL PULSES, 

Neurovascularly Intact, Capillary Refill < 2s.  Negative for: Cyanosis, Edema, 

Tenderness, Swelling, Erythema


Lower Extremity: Positive for: Normal Inspection, Normal ROM, Swelling (left 

foot), Erythema (left lower anterior leg and foot), Temperature Abnormalties 

(left lower leg and foot warm to touch), Neurovascularly Intact, Capillary 

Refill < 2 s, Other (1cm x 1cm wound on dorsal 2nd digit, left foot over PIP 

joint. Wound is open, bleeding, small amount of yellow drainage. Digit is black 

and blue, discolored to the base; left heel chronic wound, no signs of infection

).  Negative for: Edema, CALF TENDERNESS, Tenderness, Deformity


Neurological: Positive for: GCS=15, CN II-XII Intact, Speech Normal, Motor Func 

Grossly Intact, Normal Sensory Function, Gait Normal, Memory Normal


Skin: Positive for: Warm, Dry, Normal Color, Hot (left lower leg).  Negative 

for: Rashes


Lymphatic: Negative for: Cervical Adenopathy


Psychiatric: Positive for: Alert, Oriented x 3, Normal Insight, Normal 

Concentration, Normal Affect, Normal Mood





- Medications


Active Medications: 


Active Medications











Generic Name Dose Route Start Last Admin





  Trade Name Bossmanq  PRN Reason Stop Dose Admin


 


Amlodipine Besylate  5 mg  11/21/18 20:15  11/22/18 09:16





  Norvasc  PO   5 mg





  BID [facility]   Administration





     





     





     





     


 


Aspirin  81 mg  11/22/18 10:00  11/22/18 09:15





  Ecotrin  PO   81 mg





  DAILY [facility]   Administration





     





     





     





     


 


Heparin Sodium (Porcine)  5,000 units  11/21/18 22:00  11/22/18 09:15





  Heparin  SC   5,000 units





  Q12 [facility]   Administration





     





     





  Protocol   





     


 


Sodium Chloride  1,000 mls @ 60 mls/hr  11/21/18 19:45  11/21/18 22:09





  Sodium Chloride 0.9%  IV   60 mls/hr





  .[id number] [facility]   Administration





     





     





     





     


 


Ceftaroline Fosamil 600 mg/  100 mls @ 100 mls/hr  11/22/18 10:30  





  Sodium Chloride  IVPB  12/01/18 10:31  





  Q12 [facility]   





     





     





  Protocol   





     


 


Losartan Potassium  25 mg  11/22/18 10:00  11/22/18 09:15





  Cozaar  PO   25 mg





  DAILY [facility]   Administration





     





     





     





     


 


Pantoprazole Sodium  20 mg  11/22/18 06:00  11/22/18 06:59





  Protonix Ec Tab  PO   20 mg





  0600,1600 [facility]   Administration





     





     





     





     














- Patient Studies


Lab Studies: 


                              Microbiology Studies











 11/21/18 15:41 Gram Stain - Final





 Toe Wound Culture - Preliminary





    Gram Positive Cocci








                                   Lab Studies











  11/22/18 11/22/18 11/21/18 Range/Units





  05:30 05:30 15:41 


 


WBC   9.7   (4.5-11.0)  10^3/uL


 


RBC   4.05   (3.5-6.1)  10^6/uL


 


Hgb   10.7 L   (14.0-18.0)  g/dL


 


Hct   33.4 L   (42.0-52.0)  %


 


MCV   82.5   (80.0-105.0)  fl


 


MCH   26.4   (25.0-35.0)  pg


 


MCHC   32.0   (31.0-37.0)  g/dl


 


RDW   13.4   (11.5-14.5)  %


 


Plt Count   189   (120.0-450.0)  10^3/uL


 


MPV   8.7   (7.0-11.0)  fl


 


Gran %   71.3 H   (50.0-68.0)  %


 


Lymph % (Auto)   15.4 L   (22.0-35.0)  %


 


Mono % (Auto)   10.1 H   (1.0-6.0)  %


 


Eos % (Auto)   3.0   (1.5-5.0)  %


 


Baso % (Auto)   0.2   (0.0-3.0)  %


 


Gran #   6.90 H   (1.4-6.5)  


 


Lymph # (Auto)   1.5   (1.2-3.4)  


 


Mono # (Auto)   1.0 H   (0.1-0.6)  


 


Eos # (Auto)   0.3   (0.0-0.7)  


 


Baso # (Auto)   0.02   (0.0-2.0)  K/mm3


 


ESR    76 H  (0.00-15.0)  mm/hr


 


PT     (9.4-12.5)  SECONDS


 


INR     


 


APTT     (25.1-36.5)  Seconds


 


Sodium  138    (132-148)  mmol/L


 


Potassium  4.4    (3.6-5.0)  mmol/L


 


Chloride  106    ()  mmol/L


 


Carbon Dioxide  27    (21-33)  mmol/L


 


Anion Gap  9 L    (10-20)  


 


BUN  28 H    (7-21)  mg/dL


 


Creatinine  1.3    (0.8-1.5)  mg/dl


 


Est GFR (African Amer)  > 60    


 


Est GFR (Non-Af Amer)  53    


 


Random Glucose  108    ()  mg/dL


 


Calcium  8.5    (8.4-10.5)  mg/dL


 


Phosphorus  3.7    (2.5-4.5)  mg/dL


 


Magnesium  2.1    (1.7-2.2)  mg/dL


 


Total Bilirubin  0.5    (0.2-1.3)  mg/dL


 


AST  30    (17-59)  U/L


 


ALT  24    (7-56)  U/L


 


Alkaline Phosphatase  89    ()  U/L


 


Total Protein  6.3    (5.8-8.3)  g/dL


 


Albumin  3.2    (3.0-4.8)  g/dL


 


Globulin  3.2    gm/dL


 


Albumin/Globulin Ratio  1.0 L    (1.1-1.8)  














  11/21/18 11/21/18 11/21/18 Range/Units





  15:41 15:41 15:41 


 


WBC    11.7 H  (4.5-11.0)  10^3/uL


 


RBC    4.43  (3.5-6.1)  10^6/uL


 


Hgb    12.0 L  (14.0-18.0)  g/dL


 


Hct    36.8 L  (42.0-52.0)  %


 


MCV    83.1  (80.0-105.0)  fl


 


MCH    27.1  (25.0-35.0)  pg


 


MCHC    32.6  (31.0-37.0)  g/dl


 


RDW    13.3  (11.5-14.5)  %


 


Plt Count    197  (120.0-450.0)  10^3/uL


 


MPV    8.4  (7.0-11.0)  fl


 


Gran %    76.1 H  (50.0-68.0)  %


 


Lymph % (Auto)    11.7 L  (22.0-35.0)  %


 


Mono % (Auto)    10.4 H  (1.0-6.0)  %


 


Eos % (Auto)    1.7  (1.5-5.0)  %


 


Baso % (Auto)    0.1  (0.0-3.0)  %


 


Gran #    8.90 H  (1.4-6.5)  


 


Lymph # (Auto)    1.4  (1.2-3.4)  


 


Mono # (Auto)    1.2 H  (0.1-0.6)  


 


Eos # (Auto)    0.2  (0.0-0.7)  


 


Baso # (Auto)    0.01  (0.0-2.0)  K/mm3


 


ESR     (0.00-15.0)  mm/hr


 


PT   14.8 H   (9.4-12.5)  SECONDS


 


INR   1.29   


 


APTT   34.7   (25.1-36.5)  Seconds


 


Sodium  139    (132-148)  mmol/L


 


Potassium  5.1 H    (3.6-5.0)  mmol/L


 


Chloride  104    ()  mmol/L


 


Carbon Dioxide  29    (21-33)  mmol/L


 


Anion Gap  12    (10-20)  


 


BUN  36 H    (7-21)  mg/dL


 


Creatinine  1.5    (0.8-1.5)  mg/dl


 


Est GFR (African Amer)  54    


 


Est GFR (Non-Af Amer)  45    


 


Random Glucose  116 H    ()  mg/dL


 


Calcium  9.0    (8.4-10.5)  mg/dL


 


Phosphorus     (2.5-4.5)  mg/dL


 


Magnesium     (1.7-2.2)  mg/dL


 


Total Bilirubin  0.5    (0.2-1.3)  mg/dL


 


AST  26    (17-59)  U/L


 


ALT  23    (7-56)  U/L


 


Alkaline Phosphatase  101    ()  U/L


 


Total Protein  7.0    (5.8-8.3)  g/dL


 


Albumin  3.7    (3.0-4.8)  g/dL


 


Globulin  3.3    gm/dL


 


Albumin/Globulin Ratio  1.1    (1.1-1.8)  








                         Laboratory Results - last 24 hr











  11/21/18 11/21/18 11/21/18





  15:41 15:41 15:41


 


WBC  11.7 H  


 


RBC  4.43  


 


Hgb  12.0 L  


 


Hct  36.8 L  


 


MCV  83.1  


 


MCH  27.1  


 


MCHC  32.6  


 


RDW  13.3  


 


Plt Count  197  


 


MPV  8.4  


 


Gran %  76.1 H  


 


Lymph % (Auto)  11.7 L  


 


Mono % (Auto)  10.4 H  


 


Eos % (Auto)  1.7  


 


Baso % (Auto)  0.1  


 


Gran #  8.90 H  


 


Lymph # (Auto)  1.4  


 


Mono # (Auto)  1.2 H  


 


Eos # (Auto)  0.2  


 


Baso # (Auto)  0.01  


 


ESR   


 


PT   14.8 H 


 


INR   1.29 


 


APTT   34.7 


 


Sodium    139


 


Potassium    5.1 H


 


Chloride    104


 


Carbon Dioxide    29


 


Anion Gap    12


 


BUN    36 H


 


Creatinine    1.5


 


Est GFR ( Amer)    54


 


Est GFR (Non-Af Amer)    45


 


Random Glucose    116 H


 


Calcium    9.0


 


Phosphorus   


 


Magnesium   


 


Total Bilirubin    0.5


 


AST    26


 


ALT    23


 


Alkaline Phosphatase    101


 


Total Protein    7.0


 


Albumin    3.7


 


Globulin    3.3


 


Albumin/Globulin Ratio    1.1














  11/21/18 11/22/18 11/22/18





  15:41 05:30 05:30


 


WBC   9.7 


 


RBC   4.05 


 


Hgb   10.7 L 


 


Hct   33.4 L 


 


MCV   82.5 


 


MCH   26.4 


 


MCHC   32.0 


 


RDW   13.4 


 


Plt Count   189 


 


MPV   8.7 


 


Gran %   71.3 H 


 


Lymph % (Auto)   15.4 L 


 


Mono % (Auto)   10.1 H 


 


Eos % (Auto)   3.0 


 


Baso % (Auto)   0.2 


 


Gran #   6.90 H 


 


Lymph # (Auto)   1.5 


 


Mono # (Auto)   1.0 H 


 


Eos # (Auto)   0.3 


 


Baso # (Auto)   0.02 


 


ESR  76 H  


 


PT   


 


INR   


 


APTT   


 


Sodium    138


 


Potassium    4.4


 


Chloride    106


 


Carbon Dioxide    27


 


Anion Gap    9 L


 


BUN    28 H


 


Creatinine    1.3


 


Est GFR ( Amer)    > 60


 


Est GFR (Non-Af Amer)    53


 


Random Glucose    108


 


Calcium    8.5


 


Phosphorus    3.7


 


Magnesium    2.1


 


Total Bilirubin    0.5


 


AST    30


 


ALT    24


 


Alkaline Phosphatase    89


 


Total Protein    6.3


 


Albumin    3.2


 


Globulin    3.2


 


Albumin/Globulin Ratio    1.0 L











EKG/Cardiology Studies: 


Cardiology / EKG Studies





11/21/18 16:09


EKG [ELECTROCARDIOGRAM] Stat 


   Comment: 


   Reason For Exam: admission














Critical Care Progress Note





- Nutrition


Nutrition: 


                                    Nutrition











 Category Date Time Status


 


 Heart Healthy Diet [DIET] Diets  11/21/18 Dinner Active

## 2018-11-22 NOTE — CON
DATE:  11/22/2018



CHIEF COMPLAINT:  Left foot infection times several days.



HISTORY OF PRESENT ILLNESS:  This is an 83-year-old male with a past

medical history of obesity with a BMI of 38, hypertension, dementia, who

denies any fevers and denies any diabetes, who states that he had a minor

trauma to his foot, which became progressively worse.  The patient does

have renal disease, but he states he has no medical problems.  He takes no

medications.  The patient did have a history of group G Strep bacteremia

and Staph aureus cellulitis of the lower extremity in the past and was seen

in the hospital in 2017.  He had rhabdomyolysis.  He also had

non-ST-elevation myocardial infarction and hypertension.  He had a surgery

in the past, but does not remember what type of surgery.  The patient was

seen in the emergency room with  _____.  In the emergency room, the

patient was admitted with a left foot infection.



REVIEW OF SYSTEMS:  Reveals a 14-point review of systems is performed.  No

fevers and no chills.  No nausea and no vomiting.  No chest pain.  No

abdominal pain, diarrhea or constipation.  No bright red blood per rectum. 

No melena.  The patient's current chief complaint is left foot infection.



PAST MEDICAL HISTORY:  Significant for coronary artery disease, myocardial

infarction, hypertension, dementia, renal disease, renal stones, history of

group G strep bacteremia, and history of Staph aureus leg cellulitis.  The

patient also is hard of hearing.



PAST SURGICAL HISTORY:  Significant for bilateral cataract surgery.



ALLERGIES:  THE PATIENT STATES HE HAS NO KNOWN ALLERGIES; HOWEVER, IN HIS

CHART, IT IS WRITTEN THAT THE PATIENT IS ALLERGIC TO ATIVAN, MORPHINE, BUT

NO ALLERGIES TO ANY ANTIBIOTICS.



MEDICATIONS AT HOME:  Include the patient to be on amlodipine, Cozaar and

aspirin.



PHYSICAL EXAMINATION:

VITAL SIGNS:  On exam, the patient is in bed with a temperature of 98,

respiratory rate of 18, blood pressure is 176/50, and heart rate of 67.

HEENT:  Unremarkable.

NECK:  Supple.

LUNGS:  Have decreased breath sounds.

HEART:  Normal S1 and S2.

ABDOMEN:  Soft and nontender.  No organomegaly.  No rebound.  No guarding. 

No masses.

EXTREMITIES:  Examination of left leg with significant erythema of the

lower extremity and the low foot.  A second toe ulcer and also a second toe

gangrene.  Pulses are weak.



LABORATORY DATA:  Reveals a white count of 11,700 and hemoglobin of 12. 

Sed rate of 76.  Chemistries reveal the creatinine is 1.5 and he has had

renal insufficiency in the past and his creatinine had been up to 3.4 in

2017 and it was 1.9 upon discharge on last admission.  The patient had an

x-ray of the foot, which reveals no evidence of bony erosion or destruction

to suggest osteomyelitis.  History and physical examination is reviewed.



ASSESSMENT AND PLAN:  This is an 83-year-old male with obesity,

hypertension, dementia, coronary artery disease, kidney stones, myocardial

infarction, cataracts, hard of hearing, history of group G strep bacteremia

with cellulitis, history of Staph aureus foot infection, now presenting

with a left leg and left foot cellulitis with a second toe gangrene with

renal insufficiency.  We will treat with Teflaro.  Pending panculture. 

Must rule out underlying osteomyelitis and should have imaging to rule out

osteomyelitis.  Should have ankle brachial index to rule out underlying

peripheral arterial disease.  Podiatric consultation and Vascular

consultation.  Because of the patient's renal insufficiency, we will

discontinue the vancomycin and use Teflaro.  We will follow closely with

you.





__________________________________________

Lj Baca MD



DD:  11/22/2018 10:18:46

DT:  11/22/2018 10:24:22

Job # [id number]

## 2018-11-22 NOTE — CP.PCM.CON
<Garfield Sanches - Last Filed: 11/22/18 11:14>





History of Present Illness





- History of Present Illness


History of Present Illness: 





Podiatry consult note for Dr. Do





84 y/o M patient with PMH of peripheral artery disease, chronic LLE celluiltis, 

COPD, HTN, dementia, obesity seen and evaluated in the bedside for infected left

2nd toe ulcer with left foot cellulitis. Patient states that last monda he 

scrabbed his left 2nd toe against the floor. He states that his toe color 

changed to blue and stayed the same in the next few days. patient states that he

didn't feel any pain. Dr. Do (podiatrist) at home every 2-3 weeks for L heel

ulcers for the past 1.5 years with silvadene and xeroform treatments. Dr Do 

had visited him yesterday and instructed him to go to ER for further treatment. 

He states that his left foot started to get red and swollen. Patient denies any 

other pedal complaint at this time. He denies any F/N/V/C or SOB. 





PMH: peripheral artery disease, chronic LLE celluiltis, COPD, HTN, dementia, 

obesity


PSH: None


Allergies: Alprazolam, Ativan, Morphine sulphate


Social Hx: denies Smoking, EtOH use or illicite drug use




















Review of Systems





- Review of Systems


Review of Systems: 





As Per HPI





- Constitutional


Constitutional: As Per HPI





Past Patient History





- Past Medical History & Family History


Past Medical History?: Yes





- Past Social History


Smoking Status: Former Smoker





- CARDIAC


Hx Hypertension: Yes





- PULMONARY


Hx Respiratory Disorders: No





- NEUROLOGICAL


Hx Neurological Disorder: No





- HEENT


Hx Cataracts: Yes (bilateral surgery)


Hx Deafness: Yes (bilateral hearing aids)


Other/Comment: eye glasses for driving and reading





- RENAL


Hx Kidney Stones: Yes (many years ago)





- ENDOCRINE/METABOLIC


Hx Endocrine Disorders: No





- HEMATOLOGICAL/ONCOLOGICAL


Hx Blood Disorders: No





- INTEGUMENTARY


Hx Dermatological Problems: Yes


Other/Comment: right 3rd finger skin around fingernail dry red swollen pt denies

pain, left foot 2nd toe red swollen bloody and yellow drainage, left foot red 

and lle red skin and discolored, rle discolored skin, small dark red scab 0.2cm 

top of right foot, small 0.2cm deep red scab left knee surrounded by dry red 

skin





- MUSCULOSKELETAL/RHEUMATOLOGICAL


Hx Falls: Yes





- GASTROINTESTINAL


Hx Gastrointestinal Disorders: Yes (obese)





- GENITOURINARY/GYNECOLOGICAL


Hx Genitourinary Disorders: Yes


Hx Incontinence: Yes





- PSYCHIATRIC


Hx Substance Use: No





- SURGICAL HISTORY


Other/Comment: pilonidal cyst





Meds


Allergies/Adverse Reactions: 


                                    Allergies











Allergy/AdvReac Type Severity Reaction Status Date / Time


 


alprazolam [From Xanax] AdvReac Severe SHORTNESS Verified 11/21/18 15:04





   OF BREATH  


 


ativan AdvReac Severe SHORTNESS Uncoded 11/21/18 15:04





   OF BREATH  


 


Morphine Sulfate AdvReac Severe SHORTNESS Uncoded 11/21/18 15:04





   OF BREATH  














- Medications


Medications: 


                               Current Medications





Amlodipine Besylate (Norvasc)  5 mg PO BID [facility]


   Last Admin: 11/22/18 09:16 Dose:  5 mg


Aspirin (Ecotrin)  81 mg PO DAILY [facility]


   Last Admin: 11/22/18 09:15 Dose:  81 mg


Heparin Sodium (Porcine) (Heparin)  5,000 units SC Q12 [facility]; Protocol


   Last Admin: 11/22/18 09:15 Dose:  5,000 units


Sodium Chloride (Sodium Chloride 0.9%)  1,000 mls @ 60 mls/hr IV .[id number] [facility]


   Last Admin: 11/21/18 22:09 Dose:  60 mls/hr


Ceftaroline Fosamil 600 mg/ (Sodium Chloride)  100 mls @ 100 mls/hr IVPB Q12 

[facility]; Protocol


   Stop: 12/01/18 10:31


Losartan Potassium (Cozaar)  25 mg PO DAILY [facility]


   Last Admin: 11/22/18 09:15 Dose:  25 mg


Pantoprazole Sodium (Protonix Ec Tab)  20 mg PO 0600,1600 [facility]


   Last Admin: 11/22/18 06:59 Dose:  20 mg











Physical Exam





- Constitutional


Appears: Well, Non-toxic, No Acute Distress





- Head Exam


Head Exam: ATRAUMATIC, NORMOCEPHALIC





- Extremities Exam


Additional comments: 





Bilateral lower extremities exam





VASC: faintly palpable pedal pulses bilaterally, Temp gradient warm to cool in 

the right side and warm to warm in the left side. Cap refill < 3 sec to all 

digits. Erythema noted to the left midfoot. Left 2nd toe is dark blue in color. 

Moderate non pitting edema noted to the left foot. B/L Chronic leg edema. left 

leg noted to be erythematous up to the level of the midcalf.





NEURO: Gross andf protective sensations are grossly diminished





DERM: B/L Chronic leg edema. Left 2nd toe dorsal ulcer at the level of the PIPJ.

0.7X0.5X0.1cm. Mild seous drainage. No malodor. No tracking, undermining or 

probing to bone. Erythema noted to the left midfoot.  Left 2nd toe is dark blue 

in color. left leg noted to be erythematous up to the level of the midcalf with 

multiple scab covered superficial wounds. Diffuse dryness to the lower 1/3 of 

the left leg and also to the plantar aspect of both feet.





MSK: no pain on palpation of foot or legs bilaterally. Muscle power intact 5/5 

to all groups b/l.

















- Neurological Exam


Neurological exam: Alert, Oriented x3





- Psychiatric Exam


Psychiatric exam: Normal Affect, Normal Mood





Results





- Vital Signs


Recent Vital Signs: 


                                Last Vital Signs











Temp  97.9 F   11/22/18 06:00


 


Pulse  67   11/22/18 09:16


 


Resp  18   11/22/18 06:00


 


BP  143/79   11/22/18 09:16


 


Pulse Ox  94 L  11/22/18 06:00














- Labs


Result Diagrams: 


                                 11/22/18 05:30





                                 11/22/18 05:30


Labs: 


                         Laboratory Results - last 24 hr











  11/21/18 11/21/18 11/21/18





  15:41 15:41 15:41


 


WBC  11.7 H  


 


RBC  4.43  


 


Hgb  12.0 L  


 


Hct  36.8 L  


 


MCV  83.1  


 


MCH  27.1  


 


MCHC  32.6  


 


RDW  13.3  


 


Plt Count  197  


 


MPV  8.4  


 


Gran %  76.1 H  


 


Lymph % (Auto)  11.7 L  


 


Mono % (Auto)  10.4 H  


 


Eos % (Auto)  1.7  


 


Baso % (Auto)  0.1  


 


Gran #  8.90 H  


 


Lymph # (Auto)  1.4  


 


Mono # (Auto)  1.2 H  


 


Eos # (Auto)  0.2  


 


Baso # (Auto)  0.01  


 


ESR   


 


PT   14.8 H 


 


INR   1.29 


 


APTT   34.7 


 


Sodium    139


 


Potassium    5.1 H


 


Chloride    104


 


Carbon Dioxide    29


 


Anion Gap    12


 


BUN    36 H


 


Creatinine    1.5


 


Est GFR ( Amer)    54


 


Est GFR (Non-Af Amer)    45


 


Random Glucose    116 H


 


Calcium    9.0


 


Phosphorus   


 


Magnesium   


 


Total Bilirubin    0.5


 


AST    26


 


ALT    23


 


Alkaline Phosphatase    101


 


Total Protein    7.0


 


Albumin    3.7


 


Globulin    3.3


 


Albumin/Globulin Ratio    1.1














  11/21/18 11/22/18 11/22/18





  15:41 05:30 05:30


 


WBC   9.7 


 


RBC   4.05 


 


Hgb   10.7 L 


 


Hct   33.4 L 


 


MCV   82.5 


 


MCH   26.4 


 


MCHC   32.0 


 


RDW   13.4 


 


Plt Count   189 


 


MPV   8.7 


 


Gran %   71.3 H 


 


Lymph % (Auto)   15.4 L 


 


Mono % (Auto)   10.1 H 


 


Eos % (Auto)   3.0 


 


Baso % (Auto)   0.2 


 


Gran #   6.90 H 


 


Lymph # (Auto)   1.5 


 


Mono # (Auto)   1.0 H 


 


Eos # (Auto)   0.3 


 


Baso # (Auto)   0.02 


 


ESR  76 H  


 


PT   


 


INR   


 


APTT   


 


Sodium    138


 


Potassium    4.4


 


Chloride    106


 


Carbon Dioxide    27


 


Anion Gap    9 L


 


BUN    28 H


 


Creatinine    1.3


 


Est GFR ( Amer)    > 60


 


Est GFR (Non-Af Amer)    53


 


Random Glucose    108


 


Calcium    8.5


 


Phosphorus    3.7


 


Magnesium    2.1


 


Total Bilirubin    0.5


 


AST    30


 


ALT    24


 


Alkaline Phosphatase    89


 


Total Protein    6.3


 


Albumin    3.2


 


Globulin    3.2


 


Albumin/Globulin Ratio    1.0 L














Assessment & Plan





- Assessment and Plan (Free Text)


Assessment: 





84 y/o M patient seen and evaluated in the bedside for infected left 2nd toe 

ulcer with left foot cellulitis.


Plan: 





Patient seen and evaluated in the bedside


Plan discussed with Dr. Do


Wound Culture; Gram positive cocci


Left 2nd toe Wound dressed with xeroform, gauze and kerlix


X-rays left foot; No osteomyelitis, soft tissue swelling


Ordered DEYSI/PVR


Ordered LLE venous duplex.


Ordered MRI L foot


Podiatry will continue to follow up the patient while in house





- Date & Time


Date: 11/22/18


Time: 11:15





<Rodrigo Do - Last Filed: 11/24/18 08:40>





Meds





- Medications


Medications: 


                               Current Medications





Amlodipine Besylate (Norvasc)  5 mg PO BID [facility]


   Last Admin: 11/23/18 17:57 Dose:  5 mg


Aspirin (Ecotrin)  81 mg PO DAILY [facility]


   Last Admin: 11/23/18 10:53 Dose:  81 mg


Heparin Sodium (Porcine) (Heparin)  5,000 units SC Q12 [facility]; Protocol


   Last Admin: 11/23/18 22:19 Dose:  5,000 units


Sodium Chloride (Sodium Chloride 0.9%)  1,000 mls @ 60 mls/hr IV .[id number] [facility]


   Last Admin: 11/23/18 20:51 Dose:  60 mls/hr


Ceftaroline Fosamil 600 mg/ (Sodium Chloride)  100 mls @ 100 mls/hr IVPB Q12 

[facility]; Protocol


   Stop: 12/01/18 10:31


   Last Admin: 11/23/18 22:12 Dose:  100 mls/hr


Insulin Human Lispro (Humalog Low)  0 units SC ACHS [facility]; Protocol


   Last Admin: 11/24/18 08:23 Dose:  Not Given


Losartan Potassium (Cozaar)  25 mg PO DAILY [facility]


   Last Admin: 11/23/18 10:53 Dose:  25 mg


Mupirocin (Bactroban Ointment)  0 gm TOP DAILY [facility]


Pantoprazole Sodium (Protonix Ec Tab)  20 mg PO 0600,1600 [facility]


   Last Admin: 11/23/18 17:57 Dose:  20 mg











Results





- Vital Signs


Recent Vital Signs: 


                                Last Vital Signs











Temp  98.2 F   11/23/18 22:00


 


Pulse  58 L  11/23/18 22:00


 


Resp  18   11/23/18 22:00


 


BP  164/52 H  11/23/18 22:00


 


Pulse Ox  96   11/23/18 22:00














- Labs


Result Diagrams: 


                                 11/23/18 07:20





                                 11/23/18 07:20


Labs: 


                         Laboratory Results - last 24 hr











  11/23/18 11/23/18 11/23/18





  11:31 15:54 21:28


 


POC Glucose (mg/dL)  80  130 H  117 H














  11/24/18





  07:05


 


POC Glucose (mg/dL)  94














Attending/Attestation





- Attestation


I have personally seen and examined this patient.: Yes


I have fully participated in the care of the patient.: Yes


I have reviewed all pertinent clinical information: Yes

## 2018-11-23 LAB
ALBUMIN SERPL-MCNC: 3.3 G/DL (ref 3–4.8)
ALBUMIN/GLOB SERPL: 1 {RATIO} (ref 1.1–1.8)
ALT SERPL-CCNC: 21 U/L (ref 7–56)
AST SERPL-CCNC: 20 U/L (ref 17–59)
BASOPHILS # BLD AUTO: 0.02 K/MM3 (ref 0–2)
BASOPHILS NFR BLD: 0.2 % (ref 0–3)
BUN SERPL-MCNC: 25 MG/DL (ref 7–21)
CALCIUM SERPL-MCNC: 8.6 MG/DL (ref 8.4–10.5)
EOSINOPHIL # BLD: 0.3 10*3/UL (ref 0–0.7)
EOSINOPHIL NFR BLD: 3.2 % (ref 1.5–5)
ERYTHROCYTE [DISTWIDTH] IN BLOOD BY AUTOMATED COUNT: 13.3 % (ref 11.5–14.5)
GFR NON-AFRICAN AMERICAN: 48
GRANULOCYTES # BLD: 6.01 10*3/UL (ref 1.4–6.5)
GRANULOCYTES NFR BLD: 69.2 % (ref 50–68)
HGB BLD-MCNC: 11.2 G/DL (ref 14–18)
LYMPHOCYTES # BLD: 1.6 10*3/UL (ref 1.2–3.4)
LYMPHOCYTES NFR BLD AUTO: 18.2 % (ref 22–35)
MCH RBC QN AUTO: 27 PG (ref 25–35)
MCHC RBC AUTO-ENTMCNC: 32.6 G/DL (ref 31–37)
MCV RBC AUTO: 82.9 FL (ref 80–105)
MONOCYTES # BLD AUTO: 0.8 10*3/UL (ref 0.1–0.6)
MONOCYTES NFR BLD: 9.2 % (ref 1–6)
PLATELET # BLD: 191 10^3/UL (ref 120–450)
PMV BLD AUTO: 8.5 FL (ref 7–11)
RBC # BLD AUTO: 4.15 10^6/UL (ref 3.5–6.1)
WBC # BLD AUTO: 8.7 10^3/UL (ref 4.5–11)

## 2018-11-23 RX ADMIN — INSULIN LISPRO SCH: 100 INJECTION, SOLUTION INTRAVENOUS; SUBCUTANEOUS at 12:00

## 2018-11-23 RX ADMIN — PANTOPRAZOLE SODIUM SCH MG: 20 TABLET, DELAYED RELEASE ORAL at 06:03

## 2018-11-23 RX ADMIN — INSULIN LISPRO SCH: 100 INJECTION, SOLUTION INTRAVENOUS; SUBCUTANEOUS at 17:00

## 2018-11-23 RX ADMIN — INSULIN LISPRO SCH: 100 INJECTION, SOLUTION INTRAVENOUS; SUBCUTANEOUS at 22:11

## 2018-11-23 RX ADMIN — INSULIN LISPRO SCH: 100 INJECTION, SOLUTION INTRAVENOUS; SUBCUTANEOUS at 10:55

## 2018-11-23 RX ADMIN — PANTOPRAZOLE SODIUM SCH MG: 20 TABLET, DELAYED RELEASE ORAL at 17:57

## 2018-11-23 NOTE — PN
DATE:  11/23/2018



SUBJECTIVE:  The patient is in bed, no acute distress, nontoxic.



PHYSICAL EXAMINATION:

VITAL SIGNS:  On exam, temperature is 98, blood pressure is 150/60,

respiratory rate of 20.

HEENT:  Examination of HEENT is unremarkable.

NECK:  Supple.

LUNGS:  Have decreased breath sounds.

HEART:  Normal S1, S2.

ABDOMEN:  Soft.

EXTREMITIES:  Examination of leg is slightly less erythematous and the

second toe is not changed.



LABORATORY DATA:  Chemistries are noted.  Laboratory reveals the white

count is improved down to 8.7.  The sed rate is 76.  C-reactive protein is

69.  Microbiology reveals the gram-positive cocci from the toe culture. 

The blood cultures are no growth at 24 hours.



ASSESSMENT AND PLAN:  An 83-year-old male with obesity, hypertension,

dementia, coronary artery disease, kidney stone, myocardial infarction,

cataracts, hard of hearing, history of group G Streptococcus bacteremia

cellulitis, history of Staphylococcus aureus infection, presenting with a

left leg and left foot cellulitis, second toe gangrene with renal

insufficiency.  Currently on Teflaro.  Waiting for further culture results

and workup results.  Renal consultation is reviewed.  Podiatry and Vascular

consultation is needed.





__________________________________________

Lj Baca MD





DD:  11/23/2018 9:18:26

DT:  11/23/2018 9:21:40

Job # [id number]

## 2018-11-23 NOTE — CP.PCM.PN
<Erich Hilton - Last Filed: 11/23/18 14:42>





Subjective





- Date & Time of Evaluation


Date of Evaluation: 11/23/18


Time of Evaluation: 09:15





- Subjective


Subjective: 





Patient seen and examined at bedside in no acute distress. Patient denies any 

pain or tenderness at site of infection. Denies fevers, chills, vomiting, 

diarrhea, abdominal pain, chest pain, dysuria, cough. 





Physical Exam





- Constitutional


Appears: Well, Non-toxic





- Head Exam


Head Exam: ATRAUMATIC, NORMAL INSPECTION, NORMOCEPHALIC





- Eye Exam


Eye Exam: EOMI, Normal appearance





- ENT Exam


ENT Exam: Mucous Membranes Moist





- Respiratory Exam


Respiratory Exam: Clear to Ausculation Bilateral, NORMAL BREATHING PATTERN





- Cardiovascular Exam


Cardiovascular Exam: REGULAR RHYTHM, +S1, +S2





- GI/Abdominal Exam


GI & Abdominal Exam: Soft, Normal Bowel Sounds.  absent: Tenderness





- Extremities Exam


Extremities Exam: Pedal Edema (left lower extremity).  absent: Calf Tenderness, 

Normal Inspection (left lower leg ), Tenderness





- Back Exam


Back Exam: NORMAL INSPECTION





- Neurological Exam


Neurological Exam: Alert, Awake, Oriented x3





- Psychiatric Exam


Psychiatric exam: Normal Affect, Normal Mood





- Skin


Skin Exam: absent: Normal Color


Additional comments: 





gangrenous second phalanx of left foot with ulceration. Erythema and swelling of

lower left leg. 





Objective





- Vital Signs/Intake and Output


Vital Signs (last 24 hours): 


                                        











Temp Pulse Resp BP Pulse Ox


 


 98.0 F   69   20   157/76 H  98 


 


 11/23/18 06:00  11/23/18 06:00  11/23/18 06:00  11/23/18 10:54  11/23/18 06:00








Intake and Output: 


                                        











 11/23/18 11/23/18





 06:59 18:59


 


Intake Total 180 


 


Balance 180 














- Medications


Medications: 


                               Current Medications





Amlodipine Besylate (Norvasc)  5 mg PO BID [facility]


   Last Admin: 11/23/18 10:54 Dose:  5 mg


Aspirin (Ecotrin)  81 mg PO DAILY [facility]


   Last Admin: 11/23/18 10:53 Dose:  81 mg


Heparin Sodium (Porcine) (Heparin)  5,000 units SC Q12 [facility]; Protocol


   Last Admin: 11/23/18 10:54 Dose:  5,000 units


Sodium Chloride (Sodium Chloride 0.9%)  1,000 mls @ 60 mls/hr IV .[id number] [facility]


   Last Admin: 11/21/18 22:09 Dose:  60 mls/hr


Ceftaroline Fosamil 600 mg/ (Sodium Chloride)  100 mls @ 100 mls/hr IVPB Q12 

[facility]; Protocol


   Stop: 12/01/18 10:31


   Last Admin: 11/23/18 10:52 Dose:  100 mls/hr


Insulin Human Lispro (Humalog Low)  0 units SC ACHS [facility]; Protocol


   Last Admin: 11/23/18 10:55 Dose:  Not Given


Losartan Potassium (Cozaar)  25 mg PO DAILY [facility]


   Last Admin: 11/23/18 10:53 Dose:  25 mg


Mupirocin (Bactroban Ointment)  0 gm TOP DAILY [facility]


Pantoprazole Sodium (Protonix Ec Tab)  20 mg PO 0600,1600 [facility]


   Last Admin: 11/23/18 06:03 Dose:  20 mg











- Labs


Labs: 


                                        





                                 11/23/18 07:20 





                                 11/23/18 07:20 





                                        











PT  14.8 SECONDS (9.4-12.5)  H  11/21/18  15:41    


 


INR  1.29   11/21/18  15:41    


 


APTT  34.7 Seconds (25.1-36.5)   11/21/18  15:41    














Assessment and Plan





- Assessment and Plan (Free Text)


Assessment: 








84 y/o M with PMHx of peripheral artery disease, chronic LLE celluiltis, COPD, 

HTN, dementia, obesity presents to [facility] with complaints of LLE anterior tibial 

region cellulitis and gangrenous L foot 2nd distal phalanx.


Plan: 





3 y/o M with PMHx of peripheral artery disease, chronic LLE celluiltis (previous

group G strep and MSSA+), COPD, HTN, dementia, obesity admitted L foot 2nd 

distal phalanx questionable gangrene and LLE cellulitis


Plan: 





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


Continue with Ceftaroline day# 2 as per ID


Continue aspirin


LLE DEYSI studies reveal possible left tibial occlusive disease


Podiatry on consult


ID on consult


L foot xray: no signs of osteomyelitis, cellulitis present


Foot MRI ordered; results pending


Wound culture; MRSA+


Blood culture show no growth after 24 hours


Procalcitonin 0.07








Hx of Hypertension


Continue home amlodipine and losartan





Hx of Pre-diabetes


HgA1C 6.2


Educate patient on diet and prevention of diabetes


ISS-low





DVT/GI PPx: Hep/protonix





Dispo: Patient's wife does not want surgical intervention if osteomyelitis 

prefers 6 weeks of abx. MRI read pending. 





Case seen and discussed with attending Dr. Cote





<Maynor Cote - Last Filed: 11/23/18 14:46>





Objective





- Vital Signs/Intake and Output


Vital Signs (last 24 hours): 


                                        











Temp Pulse Resp BP Pulse Ox


 


 98.0 F   69   20   157/76 H  98 


 


 11/23/18 06:00  11/23/18 06:00  11/23/18 06:00  11/23/18 10:54  11/23/18 06:00








Intake and Output: 


                                        











 11/23/18 11/23/18





 06:59 18:59


 


Intake Total 180 


 


Balance 180 














- Medications


Medications: 


                               Current Medications





Amlodipine Besylate (Norvasc)  5 mg PO BID [facility]


   Last Admin: 11/23/18 10:54 Dose:  5 mg


Aspirin (Ecotrin)  81 mg PO DAILY [facility]


   Last Admin: 11/23/18 10:53 Dose:  81 mg


Heparin Sodium (Porcine) (Heparin)  5,000 units SC Q12 [facility]; Protocol


   Last Admin: 11/23/18 10:54 Dose:  5,000 units


Sodium Chloride (Sodium Chloride 0.9%)  1,000 mls @ 60 mls/hr IV .[id number] [facility]


   Last Admin: 11/21/18 22:09 Dose:  60 mls/hr


Ceftaroline Fosamil 600 mg/ (Sodium Chloride)  100 mls @ 100 mls/hr IVPB Q12 

[facility]; Protocol


   Stop: 12/01/18 10:31


   Last Admin: 11/23/18 10:52 Dose:  100 mls/hr


Insulin Human Lispro (Humalog Low)  0 units SC ACHS [facility]; Protocol


   Last Admin: 11/23/18 10:55 Dose:  Not Given


Losartan Potassium (Cozaar)  25 mg PO DAILY [facility]


   Last Admin: 11/23/18 10:53 Dose:  25 mg


Mupirocin (Bactroban Ointment)  0 gm TOP DAILY [facility]


Pantoprazole Sodium (Protonix Ec Tab)  20 mg PO 0600,1600 [facility]


   Last Admin: 11/23/18 06:03 Dose:  20 mg











- Labs


Labs: 


                                        





                                 11/23/18 07:20 





                                 11/23/18 07:20 





                                        











PT  14.8 SECONDS (9.4-12.5)  H  11/21/18  15:41    


 


INR  1.29   11/21/18  15:41    


 


APTT  34.7 Seconds (25.1-36.5)   11/21/18  15:41    














Attending/Attestation





- Attestation


I have personally seen and examined this patient.: Yes


I have fully participated in the care of the patient.: Yes


I have reviewed all pertinent clinical information, including history, physical 

exam and plan: Yes


Notes (Text): 





11/23/18 14:45


83 year old male with past medical history of PAD, COPD, borderline diabetes and

hypertension who presented with left foot/leg cellulitis and and left 2nd digit 

ulceration.  Continue with iv antibiotics as per ID and wound care as per 

podiatry.  Wound culture is growing MRSA.  Doppler showed possible left tibial 

occlusive disease.  IR evaluation was requested.  MRI is pending to rule out 

osteomyelitis.  Continue with home medications for hypertension.  Continue with 

insulin ss for prediabetes.  A1c was 6.2.





Maynor Cote MD


Hospitalist.

## 2018-11-23 NOTE — US
PROCEDURE:  Lower extremity DEYSI exam



HISTORY:

Peripheral vascular disease with pain and ulceration.  Previous 

smoker. 



PHYSICIAN(S):  Jerzy Sotelo MD.



FINDINGS:

The right resting DEYSI is normal, 1.18.  The left resting DEYSI is 

mildly abnormal, 0.77 



The brachial systolic pressures are symmetric.



The low thigh pressures and waveforms are relatively normal.



The calf PVR waveforms augment normally. No significant gradients are 

noted across the thighs.



There is a mild gradient across the left tibial vessels.  However, 

the PVR waveforms are normal and symmetric.  This is of uncertain 

clinical significance 



IMPRESSION:

1.  Relatively normal DEYSI and PVR exam at rest. 



2.  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

## 2018-11-23 NOTE — PN
DATE:  11/23/2018



SUBJECTIVE:  An 83-year-old patient seen at bedside with his wife and

daughter present for continued evaluation and management of an infected

left second toe ulceration with accompanying cellulitis.  The patient

reports no fever, chills, nausea or shortness of breath.  His wife and

daughter state the foot looks much better within the last 48 hours given

the IV antibiotics.  The patient's laboratory findings reveal white count

of 8.7, down from 11.7 upon admission; hemoglobin of 11.2; hematocrit of

34.4; platelet count of 191.  His ESR is 76.  Most recent culture taken on

11/21/2018 reveals MRSA growth at the ulceration site on the left toe. 

X-ray report reveals no radiographic evidence of cortical destruction at

the left second digit to suggest osteomyelitis.  However, clinically the

wound does probe to bone.



OBJECTIVE:  Nonpalpable posterior tibial pulses noted bilaterally.  Weakly

palpable dorsalis pedis pulse noted on the right and absent on the left. 

The left lower extremity is noted to be edematous and erythematous.  The

patient is unable to detect 5.07 g monofilament wire testing bilaterally. 

There is a full-thickness ulceration located at the dorsal aspect of the

left second proximal interphalangeal joint that measures approximately 0.5

x 0.6 x 0.1 cm.  The wound probes to phalangeal bone.  There is no

purulence.  The entire digit is edematous.  However, there is less venous

congestion than previously as I had seen on 11/21/2018.  There is a

resolving ulceration located at the left posterior heel and measures

approximately 0.2 x 0.3 x 0.1 cm.  Base of that ulcer is primarily granular

with scant serous drainage, that wound does not probe to tendon or bone. 

There are no signs of infection located at that region.



ASSESSMENT:  Full-thickness ulceration on the left second dorsal toe, which

probes to bone; resolving left posterior heel ulceration.



PLAN:  The patient was seen and evaluated.  Wound was cleansed with normal

sterile saline and application of Bactroban and Xeroform were applied to

the left second digit as well as a left posterior heel with a dry sterile

dressing.  Spoke with the patient's wife and daughter at length as the

patient does have dementia and told him that we need to order an MRI

because clinically the wound looks like it represents osteomyelitis as the

wound probes to bone, which is a high probability of bone infection.  Two

options were presented, one was digital amputation of the toe if there is

osteomyelitis or to undergo 4-6 weeks of antibiotics.  I explained that we

will need vascular clearance and we will need to ascertain whether lower

extremity perfusion will allow amputation to occur before any surgical

intervention is attempted.  Consult with Dr. Jerzy Sotelo had been ordered

and it does show possible left tibial occlusive disease.  The patient went

for the MRI today and hopefully we will have the results within the next

day.  At this point, the patient's wife states that she would prefer not to

have any surgical intervention on the toe and to have 4-6 weeks of IV

antibiotics if his wound is deemed to be positive for osteomyelitis of the

underlying phalangeal bone.  The patient will be seen and followed daily.





__________________________________________

Rodrigo Do DPM





DD:  11/23/2018 13:27:15

DT:  11/23/2018 13:33:49

Job # [id number]



MARIA DEL CARMEN

## 2018-11-24 RX ADMIN — INSULIN LISPRO SCH: 100 INJECTION, SOLUTION INTRAVENOUS; SUBCUTANEOUS at 11:43

## 2018-11-24 RX ADMIN — INSULIN LISPRO SCH: 100 INJECTION, SOLUTION INTRAVENOUS; SUBCUTANEOUS at 22:16

## 2018-11-24 RX ADMIN — PANTOPRAZOLE SODIUM SCH MG: 20 TABLET, DELAYED RELEASE ORAL at 11:34

## 2018-11-24 RX ADMIN — PANTOPRAZOLE SODIUM SCH MG: 20 TABLET, DELAYED RELEASE ORAL at 18:01

## 2018-11-24 RX ADMIN — INSULIN LISPRO SCH: 100 INJECTION, SOLUTION INTRAVENOUS; SUBCUTANEOUS at 08:23

## 2018-11-24 RX ADMIN — MUPIROCIN SCH: 20 OINTMENT TOPICAL at 11:28

## 2018-11-24 RX ADMIN — INSULIN LISPRO SCH: 100 INJECTION, SOLUTION INTRAVENOUS; SUBCUTANEOUS at 16:39

## 2018-11-24 NOTE — CP.PCM.PN
<Bryant Silva - Last Filed: 11/24/18 14:26>





Subjective





- Date & Time of Evaluation


Date of Evaluation: 11/24/18


Time of Evaluation: 14:07





- Subjective


Subjective: 





[facility] PGY2 - IM Progress Note for Hospitlaist Service 





Patient seen and examined this AM. No acute events overnight. Patient denies 

chest pain, shortness of breath, abdominal pain, nausea, vomiting, fever, 

chills. Patient to have podiatry check wound and re-dress. Awaiting foot MRI at 

this time. 





Objective





- Vital Signs/Intake and Output


Vital Signs (last 24 hours): 


                                        











Temp Pulse Resp BP Pulse Ox


 


 97.7 F   65   18   166/61 H  96 


 


 11/24/18 06:00  11/24/18 11:34  11/24/18 06:00  11/24/18 11:34  11/24/18 06:00











- Medications


Medications: 


                               Current Medications





Amlodipine Besylate (Norvasc)  5 mg PO BID [facility]


   Last Admin: 11/24/18 11:34 Dose:  5 mg


Aspirin (Ecotrin)  81 mg PO DAILY [facility]


   Last Admin: 11/24/18 11:33 Dose:  81 mg


Heparin Sodium (Porcine) (Heparin)  5,000 units SC Q12 [facility]; Protocol


   Last Admin: 11/24/18 11:33 Dose:  5,000 units


Sodium Chloride (Sodium Chloride 0.9%)  1,000 mls @ 60 mls/hr IV .[id number] [facility]


   Last Admin: 11/23/18 20:51 Dose:  60 mls/hr


Ceftaroline Fosamil 600 mg/ (Sodium Chloride)  100 mls @ 100 mls/hr IVPB Q12 

[facility]; Protocol


   Stop: 12/01/18 10:31


   Last Admin: 11/24/18 11:34 Dose:  100 mls/hr


Insulin Human Lispro (Humalog Low)  0 units SC ACHS [facility]; Protocol


   Last Admin: 11/24/18 11:43 Dose:  Not Given


Losartan Potassium (Cozaar)  25 mg PO DAILY [facility]


   Last Admin: 11/24/18 11:33 Dose:  25 mg


Mupirocin (Bactroban Ointment)  0 gm TOP DAILY [facility]


   Last Admin: 11/24/18 11:28 Dose:  Not Given


Pantoprazole Sodium (Protonix Ec Tab)  20 mg PO 0600,1600 [facility]


   Last Admin: 11/24/18 11:34 Dose:  20 mg











- Labs


Labs: 


                                        





                                 11/23/18 07:20 





                                 11/23/18 07:20 





                                        











PT  14.8 SECONDS (9.4-12.5)  H  11/21/18  15:41    


 


INR  1.29   11/21/18  15:41    


 


APTT  34.7 Seconds (25.1-36.5)   11/21/18  15:41    














- Constitutional


Appears: No Acute Distress





- Head Exam


Head Exam: ATRAUMATIC, NORMAL INSPECTION, NORMOCEPHALIC





- Eye Exam


Eye Exam: EOMI, PERRL





- ENT Exam


ENT Exam: Mucous Membranes Moist





- Respiratory Exam


Respiratory Exam: Clear to Ausculation Bilateral, NORMAL BREATHING PATTERN





- Cardiovascular Exam


Cardiovascular Exam: REGULAR RHYTHM, +S1, +S2





- GI/Abdominal Exam


GI & Abdominal Exam: Soft, Normal Bowel Sounds





- Extremities Exam


Additional comments: 





left lower extremity cellulitis  





- Neurological Exam


Neurological Exam: Alert, Awake, CN II-XII Intact, Oriented x3


Neuro motor strength exam: Left Upper Extremity: 5, Right Upper Extremity: 5, 

Left Lower Extremity: 5, Right Lower Extremity: 5





- Psychiatric Exam


Psychiatric exam: Normal Affect, Normal Mood





- Skin


Skin Exam: Dry, Intact





Assessment and Plan





- Assessment and Plan (Free Text)


Assessment: 





84 y/o M with PMHx of peripheral artery disease, chronic LLE celluiltis, COPD, 

HTN, dementia, obesity presents to [facility] with complaints of LLE anterior tibial 

region cellulitis and gangrenous L foot 2nd distal phalanx.


Plan: 





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


- LLE DEYSI studies reveal possible left tibial occlusive disease


- Podiatry on consult, following, 


- ID on consult


- Continue with Ceftaroline day# 3 as per ID


- L foot xray: no signs of osteomyelitis, cellulitis present


- Foot MRI ordered; results pending


- Wound culture: MRSA+


- Blood culture show no growth 


- Procalcitonin 0.07





Hx of Hypertension


- Amlodipine


- losartan





Hx of Pre-diabetes


- HgA1C 6.2


- Educate patient on diet and prevention of diabetes


- ISS-low





DVT/GI PPx: 


- Heparin


- Protonix 





Dispo: Pending MRI read for recommended course of antibiotics 





Patient seen, case and plan discussed with attending Dr. oCte





<Maynor Cote - Last Filed: 11/24/18 15:05>





Objective





- Vital Signs/Intake and Output


Vital Signs (last 24 hours): 


                                        











Temp Pulse Resp BP Pulse Ox


 


 97.7 F   65   18   166/61 H  96 


 


 11/24/18 06:00  11/24/18 11:34  11/24/18 06:00  11/24/18 11:34  11/24/18 06:00











- Medications


Medications: 


                               Current Medications





Amlodipine Besylate (Norvasc)  5 mg PO BID [facility]


   Last Admin: 11/24/18 11:34 Dose:  5 mg


Aspirin (Ecotrin)  81 mg PO DAILY [facility]


   Last Admin: 11/24/18 11:33 Dose:  81 mg


Heparin Sodium (Porcine) (Heparin)  5,000 units SC Q12 [facility]; Protocol


   Last Admin: 11/24/18 11:33 Dose:  5,000 units


Sodium Chloride (Sodium Chloride 0.9%)  1,000 mls @ 60 mls/hr IV .[id number] [facility]


   Last Admin: 11/23/18 20:51 Dose:  60 mls/hr


Ceftaroline Fosamil 600 mg/ (Sodium Chloride)  100 mls @ 100 mls/hr IVPB Q12 

[facility]; Protocol


   Stop: 12/01/18 10:31


   Last Admin: 11/24/18 11:34 Dose:  100 mls/hr


Insulin Human Lispro (Humalog Low)  0 units SC ACHS [facility]; Protocol


   Last Admin: 11/24/18 11:43 Dose:  Not Given


Losartan Potassium (Cozaar)  25 mg PO DAILY [facility]


   Last Admin: 11/24/18 11:33 Dose:  25 mg


Mupirocin (Bactroban Ointment)  0 gm TOP DAILY [facility]


   Last Admin: 11/24/18 11:28 Dose:  Not Given


Pantoprazole Sodium (Protonix Ec Tab)  20 mg PO 0600,1600 [facility]


   Last Admin: 11/24/18 11:34 Dose:  20 mg











- Labs


Labs: 


                                        





                                 11/23/18 07:20 





                                 11/23/18 07:20 





                                        











PT  14.8 SECONDS (9.4-12.5)  H  11/21/18  15:41    


 


INR  1.29   11/21/18  15:41    


 


APTT  34.7 Seconds (25.1-36.5)   11/21/18  15:41    














Attending/Attestation





- Attestation


I have personally seen and examined this patient.: Yes


I have fully participated in the care of the patient.: Yes


I have reviewed all pertinent clinical information, including history, physical 

exam and plan: Yes


Notes (Text): 





11/24/18 15:03


83 year old male with past medical history of PAD, COPD, borderline diabetes and

hypertension who presented with left foot/leg cellulitis and and left 2nd digit 

ulceration.  





Continue with iv antibiotics as per ID.  Continue with wound care as per 

podiatry.  Wound culture is growing MRSA.  


Doppler showed possible left tibial occlusive disease.  IR evaluation was 

requested.  MRI is still pending to rule out osteomyelitis.  


Continue with home medications for hypertension.  Continue with insulin ss for 

prediabetes.  A1c was 6.2.





Maynor Cote MD


Hospitalist.

## 2018-11-24 NOTE — CP.PCM.PN
<Garfield Sanches - Last Filed: 11/24/18 11:28>





Subjective





- Date & Time of Evaluation


Date of Evaluation: 11/24/18


Time of Evaluation: 11:28





- Subjective


Subjective: 





Podiatry consult note for Dr. Do





84 y/o M patient with seen and evaluated in the bedside for infected left 2nd 

toe ulcer with left foot cellulitis. Patient states that he didn't have pain in 

his left foot yesterday. Patient denies any other pedal complaint at this time. 

He denies any F/N/V/C or SOB. 














Objective





- Vital Signs/Intake and Output


Vital Signs (last 24 hours): 


                                        











Temp Pulse Resp BP Pulse Ox


 


 97.7 F   65   18   166/61 H  96 


 


 11/24/18 06:00  11/24/18 06:00  11/24/18 06:00  11/24/18 06:00  11/24/18 06:00











- Medications


Medications: 


                               Current Medications





Amlodipine Besylate (Norvasc)  5 mg PO BID [facility]


   Last Admin: 11/23/18 17:57 Dose:  5 mg


Aspirin (Ecotrin)  81 mg PO DAILY [facility]


   Last Admin: 11/23/18 10:53 Dose:  81 mg


Heparin Sodium (Porcine) (Heparin)  5,000 units SC Q12 [facility]; Protocol


   Last Admin: 11/23/18 22:19 Dose:  5,000 units


Sodium Chloride (Sodium Chloride 0.9%)  1,000 mls @ 60 mls/hr IV .[id number] [facility]


   Last Admin: 11/23/18 20:51 Dose:  60 mls/hr


Ceftaroline Fosamil 600 mg/ (Sodium Chloride)  100 mls @ 100 mls/hr IVPB Q12 

[facility]; Protocol


   Stop: 12/01/18 10:31


   Last Admin: 11/23/18 22:12 Dose:  100 mls/hr


Insulin Human Lispro (Humalog Low)  0 units SC ACHS [facility]; Protocol


   Last Admin: 11/24/18 08:23 Dose:  Not Given


Losartan Potassium (Cozaar)  25 mg PO DAILY [facility]


   Last Admin: 11/23/18 10:53 Dose:  25 mg


Mupirocin (Bactroban Ointment)  0 gm TOP DAILY [facility]


Pantoprazole Sodium (Protonix Ec Tab)  20 mg PO 0600,1600 [facility]


   Last Admin: 11/23/18 17:57 Dose:  20 mg











- Labs


Labs: 


                                        





                                 11/23/18 07:20 





                                 11/23/18 07:20 





                                        











PT  14.8 SECONDS (9.4-12.5)  H  11/21/18  15:41    


 


INR  1.29   11/21/18  15:41    


 


APTT  34.7 Seconds (25.1-36.5)   11/21/18  15:41    














- Constitutional


Appears: Well, Non-toxic, No Acute Distress





- Head Exam


Head Exam: ATRAUMATIC, NORMOCEPHALIC





- Extremities Exam


Additional comments: 





Bilateral lower extremities exam





VASC: faintly palpable pedal pulses bilaterally, Temp gradient warm to cool in 

the right side and warm to warm in the left side. Cap refill < 3 sec to all 

digits. Erythema noted to the left midfoot. Left 2nd toe is dark blue in color. 

Moderate non pitting edema noted to the left foot. B/L Chronic leg edema. left 

leg noted to be erythematous up to the level of the midcalf.





NEURO: Gross and protective sensations are grossly diminished





DERM: B/L Chronic leg edema. Left 2nd toe dorsal ulcer at the level of the PIPJ.

0.7X0.5X0.1cm. Mild seous drainage. No malodor. No tracking, undermining or 

probing to bone. Erythema noted to the left midfoot.  Left 2nd toe is has an 

ulcer measuring 3X1X0.3cm. positive purulent drainage. No malodor. Positive 

probe to bone. No tracking or undermining. Positive clinical signs of active inf

ection. left leg noted to be erythematous up to the level of the midcalf with 

multiple scab covered superficial wounds. Diffuse dryness to the lower 1/3 of 

the left leg and also to the plantar aspect of both feet.





MSK: no pain on palpation of foot or legs bilaterally. Muscle power intact 5/5 

to all groups b/l.





























- Neurological Exam


Neurological Exam: Alert, Awake, Oriented x3





Assessment and Plan





- Assessment and Plan (Free Text)


Assessment: 





84 y/o M patient seen and evaluated in the bedside for infected left 2nd toe 

ulcer with left foot cellulitis.





Plan: 








Patient seen and evaluated in the bedside


Plan discussed with Dr. Do


Wound Culture; MRSA


Left 2nd toe Wound cleaned with saline and dressed with xeroform, gauze and 

kerlix


X-rays left foot; No osteomyelitis, soft tissue swelling


DEYSI/PVR; Possible left tibial occlusive disease otherwise normal DEYSI


Ordered LLE venous duplex.


Ordered MRI L foot


Spoke to the patient and his wife about theSurgical option vs the conservative 

option in case there is osteomyelitis of the left 2nd toe


Podiatry will continue to follow up the patient while in house





<ArarianaRodrigo rogers - Last Filed: 11/24/18 13:04>





Objective





- Vital Signs/Intake and Output


Vital Signs (last 24 hours): 


                                        











Temp Pulse Resp BP Pulse Ox


 


 97.7 F   65   18   166/61 H  96 


 


 11/24/18 06:00  11/24/18 11:34  11/24/18 06:00  11/24/18 11:34  11/24/18 06:00











- Medications


Medications: 


                               Current Medications





Amlodipine Besylate (Norvasc)  5 mg PO BID [facility]


   Last Admin: 11/24/18 11:34 Dose:  5 mg


Aspirin (Ecotrin)  81 mg PO DAILY [facility]


   Last Admin: 11/24/18 11:33 Dose:  81 mg


Heparin Sodium (Porcine) (Heparin)  5,000 units SC Q12 [facility]; Protocol


   Last Admin: 11/24/18 11:33 Dose:  5,000 units


Sodium Chloride (Sodium Chloride 0.9%)  1,000 mls @ 60 mls/hr IV .[id number] [facility]


   Last Admin: 11/23/18 20:51 Dose:  60 mls/hr


Ceftaroline Fosamil 600 mg/ (Sodium Chloride)  100 mls @ 100 mls/hr IVPB Q12 

[facility]; Protocol


   Stop: 12/01/18 10:31


   Last Admin: 11/24/18 11:34 Dose:  100 mls/hr


Insulin Human Lispro (Humalog Low)  0 units SC ACHS [facility]; Protocol


   Last Admin: 11/24/18 11:43 Dose:  Not Given


Losartan Potassium (Cozaar)  25 mg PO DAILY [facility]


   Last Admin: 11/24/18 11:33 Dose:  25 mg


Mupirocin (Bactroban Ointment)  0 gm TOP DAILY [facility]


   Last Admin: 11/24/18 11:28 Dose:  Not Given


Pantoprazole Sodium (Protonix Ec Tab)  20 mg PO 0600,1600 [facility]


   Last Admin: 11/24/18 11:34 Dose:  20 mg











- Labs


Labs: 


                                        





                                 11/23/18 07:20 





                                 11/23/18 07:20 





                                        











PT  14.8 SECONDS (9.4-12.5)  H  11/21/18  15:41    


 


INR  1.29   11/21/18  15:41    


 


APTT  34.7 Seconds (25.1-36.5)   11/21/18  15:41    














Attending/Attestation





- Attestation


I have personally seen and examined this patient.: Yes


I have fully participated in the care of the patient.: Yes


I have reviewed all pertinent clinical information, including history, physical 

exam and plan: Yes

## 2018-11-24 NOTE — PN
DATE:  11/24/2018



SUBJECTIVE:  The patient is in bed, in no acute distress, nontoxic.



PHYSICAL EXAMINATION:

VITAL SIGNS:  On exam, temperature is 97, blood pressure is 160/50,

respiratory rate of 18, heart rate of 58.

HEENT:  Examination of HEENT is unremarkable.

NECK:  Supple.

LUNGS:  Have decreased breath sounds.

HEART:  Normal S1, S2.

ABDOMEN:  Soft, nontender.



LABORATORY DATA:  Laboratory examination reveals the white count is 8.7,

hemoglobin of 11.  Chemistries are noted.  BUN of 25, creatinine of 1.4. 

Urinalysis is noted.  Microbiology reveals toe culture has MRSA.  Blood

cultures are negative.  Urine cultures are negative.  The patient is

currently on Teflaro.  The patient had an MRI of the foot ordered, it has

not been done thus far.



ASSESSMENT AND PLAN:  An 83-year-old male with obesity, hypertension,

dementia, coronary artery disease, kidney stones, myocardial infarction,

cataract, hard of hearing, history of group G Streptococcus bacteremia,

cellulitis, history of Staphylococcus aureus infection, presenting with a

left leg and left foot cellulitis, second toe gangrene, renal

insufficiency.  On Teflaro.  Waiting for MRI results.  The patient with

methicillin-resistant Staphylococcus aureus cellulitis.  Rule out

underlying osteomyelitis and peripheral arterial disease.  Vascular and

surgical input.  The patient who has renal insufficiency with a creatinine

of 1.4.





__________________________________________

Lj Baca MD





DD:  11/24/2018 8:34:03

DT:  11/24/2018 8:36:07

Job # [id number]

## 2018-11-25 LAB
BUN SERPL-MCNC: 21 MG/DL (ref 7–21)
CALCIUM SERPL-MCNC: 8.5 MG/DL (ref 8.4–10.5)
ERYTHROCYTE [DISTWIDTH] IN BLOOD BY AUTOMATED COUNT: 13 % (ref 11.5–14.5)
GFR NON-AFRICAN AMERICAN: 53
HGB BLD-MCNC: 10.9 G/DL (ref 14–18)
MCH RBC QN AUTO: 26.7 PG (ref 25–35)
MCHC RBC AUTO-ENTMCNC: 32.7 G/DL (ref 31–37)
MCV RBC AUTO: 81.4 FL (ref 80–105)
PLATELET # BLD: 221 10^3/UL (ref 120–450)
PMV BLD AUTO: 8.5 FL (ref 7–11)
RBC # BLD AUTO: 4.09 10^6/UL (ref 3.5–6.1)
WBC # BLD AUTO: 7.4 10^3/UL (ref 4.5–11)

## 2018-11-25 RX ADMIN — PANTOPRAZOLE SODIUM SCH MG: 20 TABLET, DELAYED RELEASE ORAL at 05:49

## 2018-11-25 RX ADMIN — INSULIN LISPRO SCH: 100 INJECTION, SOLUTION INTRAVENOUS; SUBCUTANEOUS at 13:56

## 2018-11-25 RX ADMIN — INSULIN LISPRO SCH: 100 INJECTION, SOLUTION INTRAVENOUS; SUBCUTANEOUS at 18:09

## 2018-11-25 RX ADMIN — INSULIN LISPRO SCH: 100 INJECTION, SOLUTION INTRAVENOUS; SUBCUTANEOUS at 10:33

## 2018-11-25 RX ADMIN — PANTOPRAZOLE SODIUM SCH MG: 20 TABLET, DELAYED RELEASE ORAL at 18:10

## 2018-11-25 RX ADMIN — MUPIROCIN SCH: 20 OINTMENT TOPICAL at 10:34

## 2018-11-25 RX ADMIN — INSULIN LISPRO SCH: 100 INJECTION, SOLUTION INTRAVENOUS; SUBCUTANEOUS at 23:12

## 2018-11-25 NOTE — PN
DATE:  11/25/2018



SUBJECTIVE:  The patient is in bed, in no acute distress, nontoxic.  He is

doing well.



PHYSICAL EXAMINATION:

VITAL SIGNS:  On exam, temperature is 97, blood pressure is 150/60,

respiratory rate of 18.

HEENT:  Examination of HEENT is unremarkable.

NECK:  Supple.

LUNGS:  Have decreased breath sounds.

HEART:  Normal S1, S2.

ABDOMEN:  Soft.



LABORATORY DATA:  Laboratory examination reveals a white count of 7.4,

hemoglobin of 10.  Chemistries are noted.  BUN of 21, creatinine of 1.3. 

Microbiology reveals MRSA from his toe culture and the blood cultures are

negative.  Urine cultures are negative.  Review of orders revealed the

patient to be on ceftaroline.



ASSESSMENT AND PLAN:  An 83-year-old male with obesity, hypertension,

dementia, coronary artery disease, kidney stones, myocardial infarction,

cataract, hard of hearing, history of group G Streptococcus bacteremia,

cellulitis, history of Staphylococcus aureus infection.  Presented with a

left leg and the left foot cellulitis and second toe gangrene with

methicillin-resistant Staphylococcus aureus, also with renal insufficiency,

on Teflaro.  Vascular workup, podiatric workup and the patient is scheduled

for an MRI and Dopplers.  Must rule out underlying osteomyelitis and

further progression of underlying vascular arterial disease.





__________________________________________

Lj Baca MD





DD:  11/25/2018 11:55:42

DT:  11/25/2018 11:57:22

Job # [id number]

## 2018-11-25 NOTE — US
PROCEDURE:  Left lower extremity venous US



HISTORY:

Leg pain and swelling. Evaluate for DVT.



PHYSICIAN(S):  Jerzy Sotelo MD.



TECHNIQUE:

Duplex sonography and color-flow Doppler with graded compression were 

used to evaluate the deep venous system of the left lower extremity. 

The exam is somewhat limited due to edema.  The left tibial veins are 

not well seen



FINDINGS:

The visualized deep venous system of the left lower extremity is 

sonographically normal and compressible. Normal wave forms and 

augmentation are seen. There is no sonographic evidence for deep 

venous thrombosis in the visualized segments of the left lower 

extremity.



IMPRESSION:

1. No sonographic evidence for deep venous thrombosis in the 

visualized segments of the left lower extremity.

## 2018-11-25 NOTE — CP.PCM.PN
<Erich Hilton - Last Filed: 11/25/18 12:02>





Subjective





- Date & Time of Evaluation


Date of Evaluation: 11/25/18


Time of Evaluation: 08:04





- Subjective


Subjective: 





Patient seen and examined at bedside in no acute distress. Patient has no 

complaints overnight. Denies fevers, chills, pain, nausea, vomiting, diarrhea, 

cough, dysuria, chest pain, shortness of breath. 





Physical Exam





- Constitutional


Appears: Well, Non-toxic





- Head Exam


Head Exam: ATRAUMATIC, NORMAL INSPECTION, NORMOCEPHALIC





- Eye Exam


Eye Exam: EOMI, Normal appearance





- ENT Exam


ENT Exam: Mucous Membranes Moist





- Respiratory Exam


Respiratory Exam: Clear to Ausculation Bilateral, NORMAL BREATHING PATTERN





- Cardiovascular Exam


Cardiovascular Exam: REGULAR RHYTHM, +S1, +S2





- GI/Abdominal Exam


GI & Abdominal Exam: Soft, Normal Bowel Sounds.  absent: Tenderness





- Extremities Exam


Extremities Exam: Pedal Edema (left lower extremity).  absent: Calf Tenderness, 

Normal Inspection (left lower leg ), Tenderness





- Back Exam


Back Exam: NORMAL INSPECTION





- Neurological Exam


Neurological Exam: Alert, Awake, Oriented x3





- Psychiatric Exam


Psychiatric exam: Normal Affect, Normal Mood





- Skin


Skin Exam: absent: Normal Color


Additional comments: 





gangrenous second phalanx of left foot with ulceration. Erythema (improving) and

swelling (decreased) of lower left leg. 








Objective





- Vital Signs/Intake and Output


Vital Signs (last 24 hours): 


                                        











Temp Pulse Resp BP Pulse Ox


 


 97.8 F   60   18   155/60 H  95 


 


 11/25/18 06:00  11/25/18 06:00  11/25/18 06:00  11/25/18 06:00  11/25/18 06:00








Intake and Output: 


                                        











 11/25/18 11/25/18





 06:59 18:59


 


Intake Total 420 


 


Output Total 700 


 


Balance -280 














- Medications


Medications: 


                               Current Medications





Amlodipine Besylate (Norvasc)  5 mg PO BID [facility]


   Last Admin: 11/24/18 18:01 Dose:  5 mg


Aspirin (Ecotrin)  81 mg PO DAILY [facility]


   Last Admin: 11/24/18 11:33 Dose:  81 mg


Heparin Sodium (Porcine) (Heparin)  5,000 units SC Q12 [facility]; Protocol


   Last Admin: 11/24/18 22:47 Dose:  5,000 units


Sodium Chloride (Sodium Chloride 0.9%)  1,000 mls @ 60 mls/hr IV .[id number] [facility]


   Last Admin: 11/24/18 20:11 Dose:  60 mls/hr


Ceftaroline Fosamil 600 mg/ (Sodium Chloride)  100 mls @ 100 mls/hr IVPB Q12 

[facility]; Protocol


   Stop: 12/01/18 10:31


   Last Admin: 11/24/18 21:13 Dose:  100 mls/hr


Insulin Human Lispro (Humalog Low)  0 units SC ACHS [facility]; Protocol


   Last Admin: 11/24/18 22:16 Dose:  Not Given


Losartan Potassium (Cozaar)  25 mg PO DAILY [facility]


   Last Admin: 11/24/18 11:33 Dose:  25 mg


Mupirocin (Bactroban Ointment)  0 gm TOP DAILY [facility]


   Last Admin: 11/24/18 11:28 Dose:  Not Given


Pantoprazole Sodium (Protonix Ec Tab)  20 mg PO 0600,1600 [facility]


   Last Admin: 11/25/18 05:49 Dose:  20 mg











- Labs


Labs: 


                                        





                                 11/23/18 07:20 





                                 11/23/18 07:20 





                                        











PT  14.8 SECONDS (9.4-12.5)  H  11/21/18  15:41    


 


INR  1.29   11/21/18  15:41    


 


APTT  34.7 Seconds (25.1-36.5)   11/21/18  15:41    














Assessment and Plan





- Assessment and Plan (Free Text)


Assessment: 








84 y/o M with PMHx of peripheral artery disease, chronic LLE cellulitis, COPD, h

ypertension, dementia, obesity [place] [facility] [place]LE anterior 

tibial region cellulitis and gangrenous L foot 2nd distal phalanx.


Plan: 





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


- LLE DEYSI studies reveal possible left tibial occlusive disease


- Podiatry on consult


- ID on consult


- Continue with Ceftaroline day# 4 as per ID (CrCl >55; can conitnue with 

current abx dose)


- L foot xray: no signs of osteomyelitis, cellulitis present


- Foot MRI ordered


- Wound culture: MRSA+


- Blood culture show no growth 


- Procalcitonin 0.07





Hx of Hypertension


-Continue amlodipine and losartan





Hx of Pre-diabetes


- HgA1C 6.2


- Educate patient on diet and prevention of diabetes


- ISS-low





DVT/GI PPx: 


- Heparin


- Protonix 





Dispo: Pending MRI read for recommended course of antibiotics 





Patient seen, case and plan discussed with attending Dr. Cote





<Maynor Cote - Last Filed: 11/25/18 16:19>





Objective





- Vital Signs/Intake and Output


Vital Signs (last 24 hours): 


                                        











Temp Pulse Resp BP Pulse Ox


 


 97.6 F   59 L  18   162/53 H  94 L


 


 11/25/18 14:00  11/25/18 14:00  11/25/18 14:00  11/25/18 14:00  11/25/18 14:00








Intake and Output: 


                                        











 11/25/18 11/25/18





 06:59 18:59


 


Intake Total 420 


 


Output Total 700 


 


Balance -280 














- Medications


Medications: 


                               Current Medications





Amlodipine Besylate (Norvasc)  5 mg PO BID [facility]


   Last Admin: 11/25/18 10:34 Dose:  5 mg


Aspirin (Ecotrin)  81 mg PO DAILY [facility]


   Last Admin: 11/25/18 10:34 Dose:  81 mg


Heparin Sodium (Porcine) (Heparin)  5,000 units SC Q12 [facility]; Protocol


   Last Admin: 11/25/18 10:34 Dose:  5,000 units


Sodium Chloride (Sodium Chloride 0.9%)  1,000 mls @ 60 mls/hr IV .[id number] [facility]


   Last Admin: 11/25/18 10:35 Dose:  60 mls/hr


Ceftaroline Fosamil 600 mg/ (Sodium Chloride)  100 mls @ 100 mls/hr IVPB Q12 

[facility]; Protocol


   Stop: 12/01/18 10:31


   Last Admin: 11/25/18 10:35 Dose:  100 mls/hr


Insulin Human Lispro (Humalog Low)  0 units SC ACHS [facility]; Protocol


   Last Admin: 11/25/18 13:56 Dose:  Not Given


Losartan Potassium (Cozaar)  25 mg PO DAILY [facility]


   Last Admin: 11/25/18 10:34 Dose:  25 mg


Mupirocin (Bactroban Ointment)  0 gm TOP DAILY [facility]


   Last Admin: 11/25/18 10:34 Dose:  Not Given


Pantoprazole Sodium (Protonix Ec Tab)  20 mg PO 0600,1600 [facility]


   Last Admin: 11/25/18 05:49 Dose:  20 mg











- Labs


Labs: 


                                        





                                 11/25/18 07:00 





                                 11/25/18 07:00 





                                        











PT  14.8 SECONDS (9.4-12.5)  H  11/21/18  15:41    


 


INR  1.29   11/21/18  15:41    


 


APTT  34.7 Seconds (25.1-36.5)   11/21/18  15:41    














Attending/Attestation





- Attestation


I have personally seen and examined this patient.: Yes


I have fully participated in the care of the patient.: Yes


I have reviewed all pertinent clinical information, including history, physical 

exam and plan: Yes


Notes (Text): 





11/25/18 16:17


83 year old male with past medical history of PAD, COPD, borderline diabetes and

hypertension who presented with left foot/leg cellulitis and and left 2nd digit 

ulceration.  





Continue with iv antibiotics as per ID.  


Continue with wound care as per podiatry.  


Wound culture is growing MRSA.  


Doppler showed possible left tibial occlusive disease.  


IR evaluation was requested.  


MRI is still pending to rule out osteomyelitis.  


Further recommendations to follow after MRI.





Continue with home medications for hypertension.  


Continue with insulin ss for prediabetes.  A1c was 6.2.





Maynor Cote MD


Hospitalist.

## 2018-11-25 NOTE — CP.PCM.PN
<Garfield Sanches - Last Filed: 11/25/18 13:18>





Subjective





- Date & Time of Evaluation


Date of Evaluation: 11/25/18


Time of Evaluation: 13:18





- Subjective


Subjective: 





Podiatry consult note for Dr. Do





84 y/o M patient with seen and evaluated in the bedside for infected left 2nd 

toe ulcer with left foot cellulitis. Patient states that he didn't have pain in 

his left foot since yesterday. Patient denies any other pedal complaint at this 

time. He denies any F/N/V/C or SOB. 




















Objective





- Vital Signs/Intake and Output


Vital Signs (last 24 hours): 


                                        











Temp Pulse Resp BP Pulse Ox


 


 97.8 F   60   18   161/61 H  95 


 


 11/25/18 08:04  11/25/18 10:34  11/25/18 08:04  11/25/18 10:34  11/25/18 08:04








Intake and Output: 


                                        











 11/25/18 11/25/18





 06:59 18:59


 


Intake Total 420 


 


Output Total 700 


 


Balance -280 














- Medications


Medications: 


                               Current Medications





Amlodipine Besylate (Norvasc)  5 mg PO BID [facility]


   Last Admin: 11/25/18 10:34 Dose:  5 mg


Aspirin (Ecotrin)  81 mg PO DAILY [facility]


   Last Admin: 11/25/18 10:34 Dose:  81 mg


Heparin Sodium (Porcine) (Heparin)  5,000 units SC Q12 [facility]; Protocol


   Last Admin: 11/25/18 10:34 Dose:  5,000 units


Sodium Chloride (Sodium Chloride 0.9%)  1,000 mls @ 60 mls/hr IV .[id number] [facility]


   Last Admin: 11/25/18 10:35 Dose:  60 mls/hr


Ceftaroline Fosamil 600 mg/ (Sodium Chloride)  100 mls @ 100 mls/hr IVPB Q12 

[facility]; Protocol


   Stop: 12/01/18 10:31


   Last Admin: 11/25/18 10:35 Dose:  100 mls/hr


Insulin Human Lispro (Humalog Low)  0 units SC ACHS [facility]; Protocol


   Last Admin: 11/25/18 10:33 Dose:  Not Given


Losartan Potassium (Cozaar)  25 mg PO DAILY [facility]


   Last Admin: 11/25/18 10:34 Dose:  25 mg


Mupirocin (Bactroban Ointment)  0 gm TOP DAILY [facility]


   Last Admin: 11/25/18 10:34 Dose:  Not Given


Pantoprazole Sodium (Protonix Ec Tab)  20 mg PO 0600,1600 [facility]


   Last Admin: 11/25/18 05:49 Dose:  20 mg











- Labs


Labs: 


                                        





                                 11/25/18 07:00 





                                 11/25/18 07:00 





                                        











PT  14.8 SECONDS (9.4-12.5)  H  11/21/18  15:41    


 


INR  1.29   11/21/18  15:41    


 


APTT  34.7 Seconds (25.1-36.5)   11/21/18  15:41    














- Constitutional


Appears: Well, Non-toxic, No Acute Distress





- Head Exam


Head Exam: ATRAUMATIC, NORMOCEPHALIC





- Extremities Exam


Additional comments: 





Bilateral lower extremities exam





VASC: faintly palpable pedal pulses bilaterally, Temp gradient warm to cool in 

the right side and warm to warm in the left side. Cap refill < 3 sec to all 

digits. Erythema noted to the left midfoot. Moderate non pitting edema noted to 

the left foot. B/L Chronic leg edema. left leg noted to be erythematous up to 

the level of the midcalf.





NEURO: Gross and protective sensations are grossly diminished





DERM: B/L Chronic leg edema. Left 2nd toe is has an ulcer measuring 3X1X0.3cm. 

positive purulent drainage. No malodor. Positive probe to bone. No tracking or 

undermining. Positive clinical signs of active infection. Diffuse dryness to the

lower 1/3 of the left leg and also to the plantar aspect of both feet.





MSK: no pain on palpation of foot or legs bilaterally. Muscle power intact 5/5 

to all groups b/l.





























- Neurological Exam


Neurological Exam: Alert, Awake, Oriented x3





Assessment and Plan





- Assessment and Plan (Free Text)


Assessment: 








84 y/o M patient seen and evaluated in the bedside for infected left 2nd toe 

ulcer with left foot cellulitis.














Plan: 








Patient seen and evaluated in the bedside with Dr. Do


Plan discussed with Dr. Do


Wound Culture; MRSA


Left 2nd toe Wound cleaned with saline and dressed with xeroform, gauze and 

kerlix


X-rays left foot; No osteomyelitis, soft tissue swelling


DEYSI/PVR; Possible left tibial occlusive disease otherwise normal DEYSI


LLE venous duplex; Pending official report.


Ordered MRI L foot


Spoke yesterday to the patient and his wife about the Surgical option vs the 

conservative option in case there is osteomyelitis of the left 2nd toe


Podiatry will continue to follow up the patient while in house





<Rodrigo Do - Last Filed: 11/26/18 10:16>





Objective





- Vital Signs/Intake and Output


Vital Signs (last 24 hours): 


                                        











Temp Pulse Resp BP Pulse Ox


 


 98 F   57 L  18   160/74 H  95 


 


 11/26/18 06:00  11/26/18 06:00  11/26/18 06:00  11/26/18 10:02  11/26/18 06:00








Intake and Output: 


                                        











 11/26/18 11/26/18





 06:59 18:59


 


Intake Total 900 


 


Balance 900 














- Medications


Medications: 


                               Current Medications





Amlodipine Besylate (Norvasc)  5 mg PO BID [facility]


   Last Admin: 11/26/18 10:02 Dose:  5 mg


Aspirin (Ecotrin)  81 mg PO DAILY [facility]


   Last Admin: 11/26/18 10:02 Dose:  81 mg


Heparin Sodium (Porcine) (Heparin)  5,000 units SC Q8H [facility]; Protocol


Ceftaroline Fosamil 600 mg/ (Sodium Chloride)  100 mls @ 100 mls/hr IVPB Q12 

[facility]; Protocol


   Stop: 12/01/18 10:31


   Last Admin: 11/26/18 10:03 Dose:  100 mls/hr


Insulin Human Lispro (Humalog Low)  0 units SC ACHS [facility]; Protocol


   Last Admin: 11/26/18 08:00 Dose:  Not Given


Losartan Potassium (Cozaar)  25 mg PO DAILY [facility]


   Last Admin: 11/26/18 10:02 Dose:  25 mg


Mupirocin (Bactroban Ointment)  0 gm TOP DAILY [facility]


   Last Admin: 11/26/18 10:06 Dose:  1 applic


Pantoprazole Sodium (Protonix Ec Tab)  20 mg PO 0600,1600 [facility]


   Last Admin: 11/26/18 05:08 Dose:  20 mg











- Labs


Labs: 


                                        





                                 11/26/18 06:20 





                                 11/26/18 06:20 





                                        











PT  14.8 SECONDS (9.4-12.5)  H  11/21/18  15:41    


 


INR  1.29   11/21/18  15:41    


 


APTT  34.7 Seconds (25.1-36.5)   11/21/18  15:41    














Attending/Attestation





- Attestation


I have personally seen and examined this patient.: Yes


I have fully participated in the care of the patient.: Yes


I have reviewed all pertinent clinical information, including history, physical 

exam and plan: Yes

## 2018-11-26 LAB
BUN SERPL-MCNC: 17 MG/DL (ref 7–21)
CALCIUM SERPL-MCNC: 8.8 MG/DL (ref 8.4–10.5)
ERYTHROCYTE [DISTWIDTH] IN BLOOD BY AUTOMATED COUNT: 13.1 % (ref 11.5–14.5)
GFR NON-AFRICAN AMERICAN: 53
HGB BLD-MCNC: 11.2 G/DL (ref 14–18)
MCH RBC QN AUTO: 26.6 PG (ref 25–35)
MCHC RBC AUTO-ENTMCNC: 32.6 G/DL (ref 31–37)
MCV RBC AUTO: 81.7 FL (ref 80–105)
PLATELET # BLD: 206 10^3/UL (ref 120–450)
PMV BLD AUTO: 8.1 FL (ref 7–11)
RBC # BLD AUTO: 4.21 10^6/UL (ref 3.5–6.1)
WBC # BLD AUTO: 6.4 10^3/UL (ref 4.5–11)

## 2018-11-26 RX ADMIN — PANTOPRAZOLE SODIUM SCH MG: 20 TABLET, DELAYED RELEASE ORAL at 18:50

## 2018-11-26 RX ADMIN — PANTOPRAZOLE SODIUM SCH MG: 20 TABLET, DELAYED RELEASE ORAL at 05:08

## 2018-11-26 RX ADMIN — INSULIN LISPRO SCH: 100 INJECTION, SOLUTION INTRAVENOUS; SUBCUTANEOUS at 12:00

## 2018-11-26 RX ADMIN — MUPIROCIN SCH APPLIC: 20 OINTMENT TOPICAL at 10:06

## 2018-11-26 RX ADMIN — INSULIN LISPRO SCH: 100 INJECTION, SOLUTION INTRAVENOUS; SUBCUTANEOUS at 17:00

## 2018-11-26 RX ADMIN — INSULIN LISPRO SCH: 100 INJECTION, SOLUTION INTRAVENOUS; SUBCUTANEOUS at 08:00

## 2018-11-26 NOTE — CP.PCM.PN
<Terry Martinez - Last Filed: 11/26/18 13:15>





Subjective





- Date & Time of Evaluation


Date of Evaluation: 11/26/18


Time of Evaluation: 07:40





- Subjective


Subjective: 


Terry Martinez PGY-1 Progress Note for Hospitalist Service





Patient seen and evaluated at bedside. No acute events reported overnight. 

Denies chest pain, shortness of breath, palpitations but reports some distal L 

foot pain. Patient L foot wrapped. 








Objective





- Vital Signs/Intake and Output


Vital Signs (last 24 hours): 


                                        











Temp Pulse Resp BP Pulse Ox


 


 98 F   57 L  18   180/69 H  95 


 


 11/26/18 06:00  11/26/18 06:00  11/26/18 06:00  11/26/18 06:00  11/26/18 06:00








Intake and Output: 


                                        











 11/26/18 11/26/18





 06:59 18:59


 


Intake Total 900 


 


Balance 900 














- Medications


Medications: 


                               Current Medications





Amlodipine Besylate (Norvasc)  5 mg PO BID [facility]


   Last Admin: 11/26/18 05:10 Dose:  5 mg


Aspirin (Ecotrin)  81 mg PO DAILY [facility]


   Last Admin: 11/25/18 10:34 Dose:  81 mg


Heparin Sodium (Porcine) (Heparin)  5,000 units SC Q12 [facility]; Protocol


   Last Admin: 11/25/18 22:23 Dose:  5,000 units


Sodium Chloride (Sodium Chloride 0.9%)  1,000 mls @ 60 mls/hr IV .[id number] [facility]


   Last Admin: 11/25/18 10:35 Dose:  60 mls/hr


Ceftaroline Fosamil 600 mg/ (Sodium Chloride)  100 mls @ 100 mls/hr IVPB Q12 

[facility]; Protocol


   Stop: 12/01/18 10:31


   Last Admin: 11/25/18 22:22 Dose:  100 mls/hr


Insulin Human Lispro (Humalog Low)  0 units SC ACHS [facility]; Protocol


   Last Admin: 11/25/18 23:12 Dose:  Not Given


Losartan Potassium (Cozaar)  25 mg PO DAILY [facility]


   Last Admin: 11/26/18 05:08 Dose:  25 mg


Mupirocin (Bactroban Ointment)  0 gm TOP DAILY [facility]


   Last Admin: 11/25/18 10:34 Dose:  Not Given


Pantoprazole Sodium (Protonix Ec Tab)  20 mg PO 0600,1600 [facility]


   Last Admin: 11/26/18 05:08 Dose:  20 mg











- Labs


Labs: 


                                        





                                 11/26/18 06:20 





                                 11/26/18 06:20 





                                        











PT  14.8 SECONDS (9.4-12.5)  H  11/21/18  15:41    


 


INR  1.29   11/21/18  15:41    


 


APTT  34.7 Seconds (25.1-36.5)   11/21/18  15:41    














- Additional Findings


Additional findings: 





- Constitutional


Appears: Well, Non-toxic





- Head Exam


Head Exam: ATRAUMATIC, NORMAL INSPECTION, NORMOCEPHALIC





- Eye Exam


Eye Exam: EOMI, Normal appearance





- ENT Exam


ENT Exam: Mucous Membranes Moist





- Respiratory Exam


Respiratory Exam: Clear to Ausculation Bilateral, NORMAL BREATHING PATTERN





- Cardiovascular Exam


Cardiovascular Exam: REGULAR RHYTHM, +S1, +S2





- GI/Abdominal Exam


GI & Abdominal Exam: Soft, Normal Bowel Sounds.  absent: Tenderness





- Extremities Exam


Extremities Exam: Pedal Edema (left lower extremity).  absent: Calf Tenderness, 

Normal Inspection (left lower leg ), Tenderness





- Back Exam


Back Exam: NORMAL INSPECTION





- Neurological Exam


Neurological Exam: Alert, Awake, Oriented x3





- Psychiatric Exam


Psychiatric exam: Normal Affect, Normal Mood





- Skin


Skin Exam: absent: Normal Color


Additional comments: 





gangrenous second phalanx of left foot with ulceration. Erythema (improving) and

swelling (decreased) of lower left leg. 





Assessment and Plan





- Assessment and Plan (Free Text)


Assessment: 





82 y/o M with PMHx of peripheral artery disease, chronic LLE cellulitis, COPD, 

hypertension, dementia, obesity presents to [facility] [place] with complaints of LLE anterior 

tibial region cellulitis and gangrenous L foot 2nd distal phalanx.





Plan: 





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


- LLE DEYSI studies reveal possible left tibial occlusive disease


- Podiatry on consult


- ID on consult


- Continue with Ceftaroline day# 5 as per ID (CrCl >55; can continue with 

current abx dose)


- L foot xray: no signs of osteomyelitis, cellulitis present


- F/U Foot MRI


- Wound culture: MRSA+


- Blood culture show no growth 


- Procalcitonin 0.07





Hx of Hypertension


-Continue amlodipine and losartan


IVF discontinued, so will monitor response with BP 





Hx of Pre-diabetes


- HgA1C 6.2


- Educate patient on diet and prevention of diabetes


- ISS-low





DVT/GI PPx: 


- Heparin


- Protonix 





Dispo: Pending MRI read for recommended course of antibiotics. MRI performed 

yesterday per nursing notes and radiology.


F/u PT eval.





Patient seen, case reviewed and plan approved by Dr. Chappell.





Terry Martinez, PGY-1





<Donnell Chappell - Last Filed: 12/01/18 18:34>





Objective





- Vital Signs/Intake and Output


Vital Signs (last 24 hours): 


                                        











Temp Pulse Resp BP Pulse Ox


 


 97.8 F   43 L  18   144/46 L  96 


 


 12/01/18 07:00  12/01/18 17:33  12/01/18 07:00  12/01/18 17:33  12/01/18 07:00








Intake and Output: 


                                        











 12/01/18 12/01/18





 06:59 18:59


 


Intake Total 570 


 


Output Total 400 


 


Balance 170 














- Medications


Medications: 


                               Current Medications





Acetaminophen (Tylenol 325mg Tab)  650 mg PO Q4H PRN


   PRN Reason: Pain, Mild (1-3)


Amlodipine Besylate (Norvasc)  5 mg PO BID [facility]


   Last Admin: 12/01/18 17:33 Dose:  Not Given


Heparin Sodium (Porcine) (Heparin)  5,000 units SC Q8H [facility]; Protocol


   Last Admin: 12/01/18 17:22 Dose:  5,000 units


Ceftaroline Fosamil 600 mg/ (Sodium Chloride)  100 mls @ 100 mls/hr IVPB Q12 [facility]


   Last Admin: 12/01/18 09:42 Dose:  100 mls/hr


Insulin Human Lispro (Humalog Low)  0 units SC ACHS [facility]; Protocol


   Last Admin: 12/01/18 16:57 Dose:  Not Given


Losartan Potassium (Cozaar)  25 mg PO DAILY [facility]


   Last Admin: 12/01/18 09:37 Dose:  25 mg


Metoclopramide HCl (Reglan)  10 mg IV ONCE PRN


   PRN Reason: Nausea/Vomiting


Ondansetron HCl (Zofran Inj)  4 mg IVP ONCE PRN


   PRN Reason: Nausea/Vomiting


Oxycodone/Acetaminophen (Percocet 5/325 Mg Tab)  1 tab PO Q4H PRN


   PRN Reason: Pain, moderate (4-7)


   Stop: 12/02/18 08:45


Oxycodone/Acetaminophen (Percocet 5/325 Mg Tab)  2 tab PO Q4H PRN


   PRN Reason: Pain, severe (8-10)


   Stop: 12/02/18 08:45


   Last Admin: 11/29/18 16:48 Dose:  2 tab


Pantoprazole Sodium (Protonix Ec Tab)  20 mg PO 0600,1600 [facility]


   Last Admin: 12/01/18 17:23 Dose:  20 mg











- Labs


Labs: 


                                        





                                 12/01/18 10:00 





                                 11/30/18 06:30 





                                        











PT  14.8 SECONDS (9.4-12.5)  H  11/21/18  15:41    


 


INR  1.29   11/21/18  15:41    


 


APTT  34.7 Seconds (25.1-36.5)   11/21/18  15:41    














Attending/Attestation





- Attestation


I have personally seen and examined this patient.: Yes


I have fully participated in the care of the patient.: Yes


I have reviewed all pertinent clinical information, including history, physical 

exam and plan: Yes


Notes (Text): 





12/01/18 18:34


Medical record note made by the resident after discussion with my direction and 

input after the patient was personally seen and examined by me. I have reviewed 

the chart and agree that the record accurately reflects by personal performance 

of the history, physical exam, data review, and medical decision-making, in the 

course for the patient. I have also personally directed the plan of care.

## 2018-11-26 NOTE — CP.PCM.PN
<MarbellaTosin eisenberg - Last Filed: 11/26/18 21:12>





Subjective





- Date & Time of Evaluation


Date of Evaluation: 11/26/18


Time of Evaluation: 21:12





- Subjective


Subjective: 


Podiatry progress note for Dr. Howard





82 y/o male patient with seen and evaluated at bedside for infected left 2nd 

digit ulceration with left foot cellulitis. Patient is seen resting comfortably 

in bed. Patient denies any other pedal complaint at this time. Patient is aware 

he will require surgery for the 2nd digit left foot. He denies any F/N/V/C or 

SOB. 











Objective





- Vital Signs/Intake and Output


Vital Signs (last 24 hours): 


                                        











Temp Pulse Resp BP Pulse Ox


 


 99 F   85   18   134/95 H  100 


 


 11/26/18 14:00  11/26/18 14:00  11/26/18 14:00  11/26/18 14:00  11/26/18 14:00











- Medications


Medications: 


                               Current Medications





Amlodipine Besylate (Norvasc)  5 mg PO BID [facility]


   Last Admin: 11/26/18 18:46 Dose:  5 mg


Aspirin (Ecotrin)  81 mg PO DAILY [facility]


   Last Admin: 11/26/18 10:02 Dose:  81 mg


Heparin Sodium (Porcine) (Heparin)  5,000 units SC Q8H [facility]; Protocol


   Last Admin: 11/26/18 18:45 Dose:  5,000 units


Ceftaroline Fosamil 600 mg/ (Sodium Chloride)  100 mls @ 100 mls/hr IVPB Q12 

[facility]; Protocol


   Stop: 12/01/18 10:31


   Last Admin: 11/26/18 10:03 Dose:  100 mls/hr


Insulin Human Lispro (Humalog Low)  0 units SC ACHS [facility]; Protocol


   Last Admin: 11/26/18 17:00 Dose:  Not Given


Losartan Potassium (Cozaar)  25 mg PO DAILY [facility]


   Last Admin: 11/26/18 10:02 Dose:  25 mg


Mupirocin (Bactroban Ointment)  0 gm TOP DAILY [facility]


   Last Admin: 11/26/18 10:06 Dose:  1 applic


Pantoprazole Sodium (Protonix Ec Tab)  20 mg PO 0600,1600 [facility]


   Last Admin: 11/26/18 18:50 Dose:  20 mg











- Labs


Labs: 


                                        





                                 11/26/18 06:20 





                                 11/26/18 06:20 





                                        











PT  14.8 SECONDS (9.4-12.5)  H  11/21/18  15:41    


 


INR  1.29   11/21/18  15:41    


 


APTT  34.7 Seconds (25.1-36.5)   11/21/18  15:41    














- Constitutional


Appears: Well, Non-toxic, No Acute Distress





- Head Exam


Head Exam: ATRAUMATIC, NORMOCEPHALIC





- Extremities Exam


Additional comments: 


Bilateral lower extremities exam





VASC: faintly palpable pedal pulses bilaterally, Temp gradient warm to cool in 

the right side and warm to warm in the left side. Cap refill < 3 sec to all 

digits. Erythema noted to the left midfoot, improving. Moderate non pitting 

edema noted to the left foot. 





NEURO: Gross and protective sensations are grossly diminished





DERM: Left 2nd toe ulceration measuring 3 X 3 X 0.3cm, positive purulent 

drainage, positive malodor, Positive probe to bone, No tracking or undermining, 

Positive clinical signs of active infection 





MSK: no pain on palpation of foot or legs bilaterally. Muscle power intact 5/5 

to all groups b/l.














- Neurological Exam


Neurological Exam: Alert, Awake





- Psychiatric Exam


Psychiatric exam: Normal Affect, Normal Mood





Assessment and Plan





- Assessment and Plan (Free Text)


Assessment: 


82 y/o M patient seen and evaluated in the bedside for infected left 2nd digit 

ulceration


Plan: 


Patient seen and evaluated in the bedside with Dr. Howard


Plan discussed with attending


Wound Culture: MRSA


Wound cleansed with saline and dressed with betadine, gauze and kerlix


X-rays left foot: No osteomyelitis, soft tissue swelling


DEYSI/PVR: Possible left tibial occlusive disease otherwise normal DEYSI


LLE venous duplex: no evidence of DVT


MRI Left Foot- Ordered, pending


Discussed with patient the surgical option amputation of left 2nd digit, all 

risks benefits and complications discussed


Patient states he would like to speak to family before decided


Podiatry will continue to follow up the patient while in house








<Priya Howard - Last Filed: 11/29/18 07:53>





Objective





- Vital Signs/Intake and Output


Vital Signs (last 24 hours): 


                                        











Temp Pulse Resp BP Pulse Ox


 


 97.6 F   53 L  18   149/66   94 L


 


 11/29/18 06:50  11/29/18 06:50  11/29/18 06:50  11/29/18 06:50  11/29/18 06:50








Intake and Output: 


                                        











 11/29/18 11/29/18





 06:59 18:59


 


Intake Total 540 


 


Output Total 200 


 


Balance 340 














- Medications


Medications: 


                               Current Medications





Amlodipine Besylate (Norvasc)  5 mg PO BID [facility]


   Last Admin: 11/28/18 18:22 Dose:  5 mg


Heparin Sodium (Porcine) (Heparin)  5,000 units SC Q8H [facility]; Protocol


   Last Admin: 11/28/18 12:15 Dose:  5,000 units


Ceftaroline Fosamil 600 mg/ (Sodium Chloride)  100 mls @ 100 mls/hr IVPB Q12 [facility]


   Last Admin: 11/28/18 22:35 Dose:  100 mls/hr


Insulin Human Lispro (Humalog Low)  0 units SC ACHS [facility]; Protocol


   Last Admin: 11/28/18 22:35 Dose:  Not Given


Losartan Potassium (Cozaar)  25 mg PO DAILY [facility]


   Last Admin: 11/28/18 10:54 Dose:  25 mg


Mupirocin (Bactroban Ointment)  0 gm TOP DAILY [facility]


   Last Admin: 11/28/18 11:30 Dose:  1 applic


Pantoprazole Sodium (Protonix Ec Tab)  20 mg PO 0600,1600 [facility]


   Last Admin: 11/29/18 06:15 Dose:  Not Given











- Labs


Labs: 


                                        





                                 11/28/18 05:40 





                                 11/28/18 05:40 





                                        











PT  14.8 SECONDS (9.4-12.5)  H  11/21/18  15:41    


 


INR  1.29   11/21/18  15:41    


 


APTT  34.7 Seconds (25.1-36.5)   11/21/18  15:41    














Attending/Attestation





- Attestation


I have personally seen and examined this patient.: Yes


I have fully participated in the care of the patient.: Yes


I have reviewed all pertinent clinical information, including history, physical 

exam and plan: Yes

## 2018-11-26 NOTE — CP.PCM.PN
Subjective





- Date & Time of Evaluation


Date of Evaluation: 11/26/18


Time of Evaluation: 08:30





- Subjective


Subjective: 





Comfortable in bed, no pain in the left foot, no fevers, no nausea, no diarrhea.





Objective





- Vital Signs/Intake and Output


Vital Signs (last 24 hours): 


                                        











Temp Pulse Resp BP Pulse Ox


 


 97.9 F   59 L  18   180/69 H  96 


 


 11/25/18 22:00  11/26/18 05:10  11/25/18 22:00  11/26/18 05:10  11/25/18 22:00








Intake and Output: 


                                        











 11/26/18 11/26/18





 06:59 18:59


 


Intake Total 900 


 


Balance 900 














- Medications


Medications: 


                               Current Medications





Amlodipine Besylate (Norvasc)  5 mg PO BID [facility]


   Last Admin: 11/26/18 05:10 Dose:  5 mg


Aspirin (Ecotrin)  81 mg PO DAILY [facility]


   Last Admin: 11/25/18 10:34 Dose:  81 mg


Heparin Sodium (Porcine) (Heparin)  5,000 units SC Q12 [facility]; Protocol


   Last Admin: 11/25/18 22:23 Dose:  5,000 units


Sodium Chloride (Sodium Chloride 0.9%)  1,000 mls @ 60 mls/hr IV .[id number] [facility]


   Last Admin: 11/25/18 10:35 Dose:  60 mls/hr


Ceftaroline Fosamil 600 mg/ (Sodium Chloride)  100 mls @ 100 mls/hr IVPB Q12 

[facility]; Protocol


   Stop: 12/01/18 10:31


   Last Admin: 11/25/18 22:22 Dose:  100 mls/hr


Insulin Human Lispro (Humalog Low)  0 units SC ACHS [facility]; Protocol


   Last Admin: 11/25/18 23:12 Dose:  Not Given


Losartan Potassium (Cozaar)  25 mg PO DAILY [facility]


   Last Admin: 11/26/18 05:08 Dose:  25 mg


Mupirocin (Bactroban Ointment)  0 gm TOP DAILY [facility]


   Last Admin: 11/25/18 10:34 Dose:  Not Given


Pantoprazole Sodium (Protonix Ec Tab)  20 mg PO 0600,1600 [facility]


   Last Admin: 11/26/18 05:08 Dose:  20 mg











- Labs


Labs: 


                                        





                                 11/26/18 06:20 





                                 11/26/18 06:20 





                                        











PT  14.8 SECONDS (9.4-12.5)  H  11/21/18  15:41    


 


INR  1.29   11/21/18  15:41    


 


APTT  34.7 Seconds (25.1-36.5)   11/21/18  15:41    














- Constitutional


Appears: No Acute Distress, Chronically Ill





- Head Exam


Head Exam: NORMAL INSPECTION





- Respiratory Exam


Respiratory Exam: Decreased Breath Sounds





- Cardiovascular Exam


Cardiovascular Exam: +S1, +S2





- GI/Abdominal Exam


GI & Abdominal Exam: Soft.  absent: Tenderness





- Extremities Exam


Additional comments: 





left foot 2nd toe with discoloration, no discharge or bleeding





Assessment and Plan





- Assessment and Plan (Free Text)


Plan: 





Assessment


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


history of Group G strep bacteremia, probably secondary to bilateral lower 

extremities skin and skin structure infection;


history of rhabdomyolysis


CAD


HTN


chronic renal failure


dementia


obesity with BMI 32





Plan


continue Teflaro pending further Podiatry plans, vascular studies and MRI of the

lower extremity to rule out osteomyelitis


will continue to monitor clinically

## 2018-11-27 LAB
BUN SERPL-MCNC: 20 MG/DL (ref 7–21)
CALCIUM SERPL-MCNC: 8.7 MG/DL (ref 8.4–10.5)
ERYTHROCYTE [DISTWIDTH] IN BLOOD BY AUTOMATED COUNT: 13.1 % (ref 11.5–14.5)
GFR NON-AFRICAN AMERICAN: 48
HGB BLD-MCNC: 11 G/DL (ref 14–18)
MCH RBC QN AUTO: 26.4 PG (ref 25–35)
MCHC RBC AUTO-ENTMCNC: 32.3 G/DL (ref 31–37)
MCV RBC AUTO: 81.8 FL (ref 80–105)
PLATELET # BLD: 204 10^3/UL (ref 120–450)
PMV BLD AUTO: 8.1 FL (ref 7–11)
RBC # BLD AUTO: 4.17 10^6/UL (ref 3.5–6.1)
WBC # BLD AUTO: 7.1 10^3/UL (ref 4.5–11)

## 2018-11-27 RX ADMIN — MUPIROCIN SCH APPLIC: 20 OINTMENT TOPICAL at 17:26

## 2018-11-27 RX ADMIN — PANTOPRAZOLE SODIUM SCH MG: 20 TABLET, DELAYED RELEASE ORAL at 05:56

## 2018-11-27 RX ADMIN — INSULIN LISPRO SCH: 100 INJECTION, SOLUTION INTRAVENOUS; SUBCUTANEOUS at 13:32

## 2018-11-27 RX ADMIN — INSULIN LISPRO SCH: 100 INJECTION, SOLUTION INTRAVENOUS; SUBCUTANEOUS at 17:03

## 2018-11-27 RX ADMIN — INSULIN LISPRO SCH UNIT: 100 INJECTION, SOLUTION INTRAVENOUS; SUBCUTANEOUS at 13:28

## 2018-11-27 RX ADMIN — PANTOPRAZOLE SODIUM SCH MG: 20 TABLET, DELAYED RELEASE ORAL at 17:04

## 2018-11-27 RX ADMIN — INSULIN LISPRO SCH: 100 INJECTION, SOLUTION INTRAVENOUS; SUBCUTANEOUS at 22:22

## 2018-11-27 NOTE — CP.PCM.PN
<Terry Martinez - Last Filed: 11/27/18 11:30>





Subjective





- Date & Time of Evaluation


Date of Evaluation: 11/27/18


Time of Evaluation: 07:30





- Subjective


Subjective: 


Terry Martinez PGY-1 Progress Note for Hospitalist Service





Patient seen and evaluated at bedside. No acute events reported overnight. 

Denies chest pain, shortness of breath, palpitations but reports some distal L 

foot pain. Patient L foot wrapped. Some dried blood evident. Patient interested 

to know plan moving forward. 








Objective





- Vital Signs/Intake and Output


Vital Signs (last 24 hours): 


                                        











Temp Pulse Resp BP Pulse Ox


 


 97.4 F L  66   20   150/61   96 


 


 11/27/18 06:00  11/27/18 11:08  11/27/18 06:00  11/27/18 11:08  11/27/18 06:00








Intake and Output: 


                                        











 11/27/18 11/27/18





 06:59 18:59


 


Intake Total 1240 


 


Output Total 600 


 


Balance 640 














- Medications


Medications: 


                               Current Medications





Amlodipine Besylate (Norvasc)  5 mg PO BID [facility]


   Last Admin: 11/27/18 11:08 Dose:  5 mg


Aspirin (Ecotrin)  81 mg PO DAILY [facility]


   Last Admin: 11/27/18 11:08 Dose:  81 mg


Heparin Sodium (Porcine) (Heparin)  5,000 units SC Q8H [facility]; Protocol


   Last Admin: 11/27/18 11:18 Dose:  5,000 units


Insulin Human Lispro (Humalog Low)  0 units SC ACHS [facility]; Protocol


   Last Admin: 11/26/18 17:00 Dose:  Not Given


Losartan Potassium (Cozaar)  25 mg PO DAILY [facility]


   Last Admin: 11/27/18 11:08 Dose:  25 mg


Mupirocin (Bactroban Ointment)  0 gm TOP DAILY [facility]


   Last Admin: 11/26/18 10:06 Dose:  1 applic


Pantoprazole Sodium (Protonix Ec Tab)  20 mg PO 0600,1600 [facility]


   Last Admin: 11/27/18 05:56 Dose:  20 mg











- Labs


Labs: 


                                        





                                 11/27/18 06:15 





                                 11/27/18 06:15 





                                        











PT  14.8 SECONDS (9.4-12.5)  H  11/21/18  15:41    


 


INR  1.29   11/21/18  15:41    


 


APTT  34.7 Seconds (25.1-36.5)   11/21/18  15:41    














- Additional Findings


Additional findings: 





- Constitutional


Appears: Well, Non-toxic. Sitting in chair comfortably.





- Head Exam


Head Exam: ATRAUMATIC, NORMAL INSPECTION, NORMOCEPHALIC





- Eye Exam


Eye Exam: EOMI, Normal appearance





- ENT Exam


ENT Exam: Mucous Membranes Moist





- Respiratory Exam


Respiratory Exam: Clear to Ausculation Bilateral, NORMAL BREATHING PATTERN





- Cardiovascular Exam


Cardiovascular Exam: REGULAR RHYTHM, +S1, +S2





- GI/Abdominal Exam


GI & Abdominal Exam: Soft, Normal Bowel Sounds.  absent: Tenderness





- Extremities Exam


Extremities Exam: Pedal Edema (left lower extremity).  absent: Calf Tenderness, 

Normal Inspection (left lower leg ), Tenderness





- Back Exam


Back Exam: NORMAL INSPECTION





- Neurological Exam


Neurological Exam: Alert, Awake, Oriented x3





- Psychiatric Exam


Psychiatric exam: Normal Affect, Normal Mood





- Skin


Skin Exam: absent: Normal Color


Additional comments: 





gangrenous second phalanx of left foot with ulceration. Erythema and swelling of

lower left leg. 





Assessment and Plan





- Assessment and Plan (Free Text)


Assessment: 


84 y/o M with PMHx of peripheral artery disease, chronic LLE cellulitis, COPD, 

hypertension, dementia, obesity presents to [facility] with complaints of LLE anterior 

tibial region cellulitis and gangrenous L foot 2nd distal phalanx. OM confirmed 

on MRI 11/25.





Plan: 





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


- LLE DEYSI studies reveal possible left tibial occlusive disease


- Podiatry on consult -Dr. Do - recs appreciated regarding possible 

amputation


- ID on consult - Dr. Baca recs appreciated


- Ceftaroline day# 6 as per ID (CrCl >55; can continue with current abx dose). 

Will f/u podiatry recs which will determine length of ABx course


- L foot xray: no signs of osteomyelitis, cellulitis present


- Foot MRI 11/25 Marrow edema in 2nd proximal and middle phalanx consistent with

osteo


- Wound culture: MRSA+


- Final blood culture shows no growth after 5 days


- Procalcitonin 0.07





Hx of Hypertension


-Continue amlodipine and losartan


IVF discontinued, so will continue to monitor response to BP 





Hx of Pre-diabetes


- HgA1C 6.2


- Diabetes prevention and diet education


- ISS-low





DVT/GI PPx: 


- Heparin 5000 q8


- Protonix 





Dispo: f/u podiatry recs for possible OR amputation of 2nd digit


F/u PT eval for strengthening





Patient seen, case reviewed and plan approved by Dr. Chappell.





Terry Martinez, PGY-1








<Donnell Chappell - Last Filed: 12/01/18 18:34>





Objective





- Vital Signs/Intake and Output


Vital Signs (last 24 hours): 


                                        











Temp Pulse Resp BP Pulse Ox


 


 97.8 F   43 L  18   144/46 L  96 


 


 12/01/18 07:00  12/01/18 17:33  12/01/18 07:00  12/01/18 17:33  12/01/18 07:00








Intake and Output: 


                                        











 12/01/18 12/01/18





 06:59 18:59


 


Intake Total 570 


 


Output Total 400 


 


Balance 170 














- Medications


Medications: 


                               Current Medications





Acetaminophen (Tylenol 325mg Tab)  650 mg PO Q4H PRN


   PRN Reason: Pain, Mild (1-3)


Amlodipine Besylate (Norvasc)  5 mg PO BID [facility]


   Last Admin: 12/01/18 17:33 Dose:  Not Given


Heparin Sodium (Porcine) (Heparin)  5,000 units SC Q8H [facility]; Protocol


   Last Admin: 12/01/18 17:22 Dose:  5,000 units


Ceftaroline Fosamil 600 mg/ (Sodium Chloride)  100 mls @ 100 mls/hr IVPB Q12 [facility]


   Last Admin: 12/01/18 09:42 Dose:  100 mls/hr


Insulin Human Lispro (Humalog Low)  0 units SC ACHS [facility]; Protocol


   Last Admin: 12/01/18 16:57 Dose:  Not Given


Losartan Potassium (Cozaar)  25 mg PO DAILY [facility]


   Last Admin: 12/01/18 09:37 Dose:  25 mg


Metoclopramide HCl (Reglan)  10 mg IV ONCE PRN


   PRN Reason: Nausea/Vomiting


Ondansetron HCl (Zofran Inj)  4 mg IVP ONCE PRN


   PRN Reason: Nausea/Vomiting


Oxycodone/Acetaminophen (Percocet 5/325 Mg Tab)  1 tab PO Q4H PRN


   PRN Reason: Pain, moderate (4-7)


   Stop: 12/02/18 08:45


Oxycodone/Acetaminophen (Percocet 5/325 Mg Tab)  2 tab PO Q4H PRN


   PRN Reason: Pain, severe (8-10)


   Stop: 12/02/18 08:45


   Last Admin: 11/29/18 16:48 Dose:  2 tab


Pantoprazole Sodium (Protonix Ec Tab)  20 mg PO 0600,1600 [facility]


   Last Admin: 12/01/18 17:23 Dose:  20 mg











- Labs


Labs: 


                                        





                                 12/01/18 10:00 





                                 11/30/18 06:30 





                                        











PT  14.8 SECONDS (9.4-12.5)  H  11/21/18  15:41    


 


INR  1.29   11/21/18  15:41    


 


APTT  34.7 Seconds (25.1-36.5)   11/21/18  15:41    














Attending/Attestation





- Attestation


I have personally seen and examined this patient.: Yes


I have fully participated in the care of the patient.: Yes


I have reviewed all pertinent clinical information, including history, physical 

exam and plan: Yes


Notes (Text): 





12/01/18 18:34


Medical record note made by the resident after discussion with my direction and 

input after the patient was personally seen and examined by me. I have reviewed 

the chart and agree that the record accurately reflects by personal performance 

of the history, physical exam, data review, and medical decision-making, in the 

course for the patient. I have also personally directed the plan of care.

## 2018-11-27 NOTE — PN
DATE:  11/27/2018



SUBJECTIVE:  An 83-year-old diabetic male seen at bedside with his wife and

granddaughter present for an infected left second digit ulceration

accompanying osteomyelitis of the proximal phalanx.  The patient offers no

complaints and has been afebrile.  The patient is scheduled for left second

digit amputation on Thursday morning.  The patient's wife agrees to have

the surgery as the patient does have some dementia and needs wife's

consent.  We will hold his heparin tomorrow.  The surgery will be done

under local anesthesia with IV sedation.



The patient's vital signs reveal temperature of 97.4, pulse rate of 66,

blood pressure of 150/61, respiratory rate of 20.



LABORATORY FINDINGS:  Reveal white count of 7.1, hemoglobin of 11,

hematocrit of 34.1, platelet count of 211,000.  His ESR is elevated at 76. 

Most recent microbiology report reveals methicillin-resistant Staph aureus

growth on the left second toe ulcer.



OBJECTIVE:  Weakly palpable pedal pulses noted bilaterally.  Temperature

gradient is increased on the left foot.  Capillary filling time is less

than 3 minutes x10.  There is noted to be edema and erythema of the left

forefoot.  The patient is unable to detect 5.07 g monofilament wire testing

bilaterally.  Left second digit presents with the head of the proximal

phalangeal bone exposed.  The entire digit is edematous and erythematous

with purulent discharge noted at the open PIP joint.  There are no signs of

underlying abscess formation at this time; however, the toe remains

edematous and erythematous.



ASSESSMENT:  An 83-year-old male seen and evaluated at bedside for

osteomyelitis of the left second digit secondary to diabetic toe

ulceration.



PLAN:  The patient was seen and evaluated.  Discussed surgery with his wife

who was amenable to have the procedure done.  Wound was cleansed with

normal sterile saline and application of Betadine and dry sterile dressing

was applied. Vascular consultant was Dr. Sotelo, ordered a consult with Dr. Sotelo for vascular evaluation prior to surgery.  The patient is scheduled

for surgery Thursday morning to be done with local under monitored

anesthetic care/IV sedation.  Tentatively planned for the patient to be

discharged to CCU after surgery.  We will continue with IV antibiotics as

per Infectious Disease.







__________________________________________

Rodrigo Do DPM





DD:  11/27/2018 16:44:10

DT:  11/27/2018 16:49:07

Job # [id number]



MTDD

## 2018-11-28 LAB
BUN SERPL-MCNC: 23 MG/DL (ref 7–21)
CALCIUM SERPL-MCNC: 9 MG/DL (ref 8.4–10.5)
ERYTHROCYTE [DISTWIDTH] IN BLOOD BY AUTOMATED COUNT: 13.3 % (ref 11.5–14.5)
GFR NON-AFRICAN AMERICAN: 48
HGB BLD-MCNC: 11 G/DL (ref 14–18)
MCH RBC QN AUTO: 26.5 PG (ref 25–35)
MCHC RBC AUTO-ENTMCNC: 32.3 G/DL (ref 31–37)
MCV RBC AUTO: 82.2 FL (ref 80–105)
PLATELET # BLD: 213 10^3/UL (ref 120–450)
PMV BLD AUTO: 8.4 FL (ref 7–11)
RBC # BLD AUTO: 4.15 10^6/UL (ref 3.5–6.1)
WBC # BLD AUTO: 8.1 10^3/UL (ref 4.5–11)

## 2018-11-28 RX ADMIN — INSULIN LISPRO SCH UNIT: 100 INJECTION, SOLUTION INTRAVENOUS; SUBCUTANEOUS at 12:30

## 2018-11-28 RX ADMIN — INSULIN LISPRO SCH: 100 INJECTION, SOLUTION INTRAVENOUS; SUBCUTANEOUS at 17:00

## 2018-11-28 RX ADMIN — INSULIN LISPRO SCH: 100 INJECTION, SOLUTION INTRAVENOUS; SUBCUTANEOUS at 22:35

## 2018-11-28 RX ADMIN — MUPIROCIN SCH APPLIC: 20 OINTMENT TOPICAL at 11:30

## 2018-11-28 RX ADMIN — INSULIN LISPRO SCH: 100 INJECTION, SOLUTION INTRAVENOUS; SUBCUTANEOUS at 08:00

## 2018-11-28 RX ADMIN — PANTOPRAZOLE SODIUM SCH MG: 20 TABLET, DELAYED RELEASE ORAL at 16:31

## 2018-11-28 RX ADMIN — PANTOPRAZOLE SODIUM SCH MG: 20 TABLET, DELAYED RELEASE ORAL at 05:58

## 2018-11-28 NOTE — CP.PCM.PN
Subjective





- Date & Time of Evaluation


Date of Evaluation: 11/28/18


Time of Evaluation: 08:35





- Subjective


Subjective: 





No increase in left foot pain, no fevers.





Objective





- Vital Signs/Intake and Output


Vital Signs (last 24 hours): 


                                        











Temp Pulse Resp BP Pulse Ox


 


 98 F   60   20   120/65   97 


 


 11/28/18 06:00  11/28/18 06:00  11/28/18 06:00  11/28/18 06:00  11/28/18 06:00








Intake and Output: 


                                        











 11/28/18 11/28/18





 06:59 18:59


 


Intake Total 620 


 


Output Total 300 


 


Balance 320 














- Medications


Medications: 


                               Current Medications





Amlodipine Besylate (Norvasc)  5 mg PO BID [facility]


   Last Admin: 11/27/18 17:03 Dose:  5 mg


Heparin Sodium (Porcine) (Heparin)  5,000 units SC Q8H [facility]; Protocol


   Last Admin: 11/28/18 03:00 Dose:  Not Given


Ceftaroline Fosamil 600 mg/ (Sodium Chloride)  100 mls @ 100 mls/hr IVPB Q12 [facility]


   Last Admin: 11/27/18 22:23 Dose:  100 mls/hr


Insulin Human Lispro (Humalog Low)  0 units SC ACHS [facility]; Protocol


   Last Admin: 11/27/18 22:22 Dose:  Not Given


Losartan Potassium (Cozaar)  25 mg PO DAILY [facility]


   Last Admin: 11/28/18 05:58 Dose:  25 mg


Mupirocin (Bactroban Ointment)  0 gm TOP DAILY [facility]


   Last Admin: 11/27/18 17:26 Dose:  1 applic


Pantoprazole Sodium (Protonix Ec Tab)  20 mg PO 0600,1600 [facility]


   Last Admin: 11/28/18 05:58 Dose:  20 mg











- Labs


Labs: 


                                        





                                 11/28/18 05:40 





                                 11/28/18 05:40 





                                        











PT  14.8 SECONDS (9.4-12.5)  H  11/21/18  15:41    


 


INR  1.29   11/21/18  15:41    


 


APTT  34.7 Seconds (25.1-36.5)   11/21/18  15:41    














- Constitutional


Appears: Chronically Ill





- Head Exam


Head Exam: NORMAL INSPECTION





- Respiratory Exam


Respiratory Exam: Decreased Breath Sounds





- Cardiovascular Exam


Cardiovascular Exam: +S1, +S2





- GI/Abdominal Exam


GI & Abdominal Exam: Soft.  absent: Tenderness





- Extremities Exam


Additional comments: 





left foot with dressings in place





Assessment and Plan





- Assessment and Plan (Free Text)


Plan: 





Assessment


left leg and foot cellulitis and 2nd toe gangrene, consider osteomyelitis with 

MRSA


history of Group G strep bacteremia, probably secondary to bilateral lower 

extremities skin and skin structure infection;


history of rhabdomyolysis


CAD


HTN


chronic renal failure


dementia


obesity with BMI 32





Plan


continue Teflaro pending further Podiatry plans, vascular studies; MRI of the 

lower extremity apparently showed osteomyelitis -for procedure tomorrow


will continue to monitor clinically

## 2018-11-28 NOTE — MRI
Date of service: 



11/25/2018



PROCEDURE:  MRI of the left foot without contrast



HISTORY:

Rule out osteomyelitis of the 2nd toe 



COMPARISON:

Plain films 11/21/2018



TECHNIQUE:

MRI of the left foot was performed in multiple planes using multiple 

pulse sequences.



FINDINGS:

There is marrow edema in the 2nd proximal and middle phalanx 

consistent with osteomyelitis.



The remaining toes and metatarsals are unremarkable. 



IMPRESSION:

Marrow edema in the 2nd proximal and middle phalanx consistent with 

osteomyelitis

## 2018-11-28 NOTE — PN
DATE:  11/27/2018





SUBJECTIVE:  The patient is seen early this morning in room 562, bed 1.  No

fevers.  No chills.  No abdominal pain or diarrhea.



PHYSICAL EXAMINATION:

VITAL SIGNS:  The patient's temperature is 98, blood pressure is 120/70,

and respiratory rate of _____.

HEENT:  Unremarkable.

NECK:  Supple.

LUNGS:  Decreased breath sounds.

HEART:  Normal S1 and S2.

ABDOMEN:  Soft.



LABORATORY DATA:  Reveals a white count of 7.1.  Hemoglobin is noted. 

Chemistries are reviewed.  Creatinine is 1.4.  Microbiology reveals MRSA

from the toe, blood cultures are negative.  Review of orders revealed the

patient is on Teflaro.



ASSESSMENT AND PLAN:  This is an 83-year-old male with left leg and foot

cellulitis and second toe gangrene with methicillin-resistant

Staphylococcus aureus, history of group G Streptococcus bacteremia, _____

skin and skin-like structure infection, coronary artery disease, chronic

renal failure and dementia, on Teflaro.  MRI is consistent with

osteomyelitis.  He was given a verbal report of that, unable to find the

MRI.  The patient awaiting for decision on the surgical and podiatric

approach.  We will follow with you.  Continue the Teflaro.







__________________________________________

Lj Baca MD





DD:  11/27/2018 22:14:34

DT:  11/28/2018 1:34:14

Job # [id number]

## 2018-11-28 NOTE — CP.PCM.PN
<MarbellaTosin - Last Filed: 11/28/18 14:55>





Subjective





- Date & Time of Evaluation


Date of Evaluation: 11/28/18


Time of Evaluation: 14:55





- Subjective


Subjective: 


Podiatry progress note for Dr. Howard





82 y/o male patient was seen and evaluated at bedside for infected left 2nd 

digit ulceration with left foot cellulitis. Patient's wife is present at 

bedside. Patient is seen resting comfortably in bed. Patient denies any other 

pedal complaint at this time. Patient is aware he will require surgery for the 

2nd digit left foot, and that it is scheduled for tomorrow 11/29/18. He denies 

any F/N/V/C or SOB. 














Objective





- Vital Signs/Intake and Output


Vital Signs (last 24 hours): 


                                        











Temp Pulse Resp BP Pulse Ox


 


 98 F   60   20   132/64   97 


 


 11/28/18 06:00  11/28/18 06:00  11/28/18 06:00  11/28/18 10:54  11/28/18 06:00








Intake and Output: 


                                        











 11/28/18 11/28/18





 06:59 18:59


 


Intake Total 620 


 


Output Total 300 


 


Balance 320 














- Medications


Medications: 


                               Current Medications





Amlodipine Besylate (Norvasc)  5 mg PO BID [facility]


   Last Admin: 11/28/18 10:54 Dose:  5 mg


Heparin Sodium (Porcine) (Heparin)  5,000 units SC Q8H [facility]; Protocol


   Last Admin: 11/28/18 12:15 Dose:  5,000 units


Ceftaroline Fosamil 600 mg/ (Sodium Chloride)  100 mls @ 100 mls/hr IVPB Q12 [facility]


   Last Admin: 11/28/18 10:55 Dose:  100 mls/hr


Insulin Human Lispro (Humalog Low)  0 units SC ACHS [facility]; Protocol


   Last Admin: 11/28/18 12:30 Dose:  2 unit


Losartan Potassium (Cozaar)  25 mg PO DAILY [facility]


   Last Admin: 11/28/18 10:54 Dose:  25 mg


Mupirocin (Bactroban Ointment)  0 gm TOP DAILY [facility]


   Last Admin: 11/28/18 11:30 Dose:  1 applic


Pantoprazole Sodium (Protonix Ec Tab)  20 mg PO 0600,1600 [facility]


   Last Admin: 11/28/18 05:58 Dose:  20 mg











- Labs


Labs: 


                                        





                                 11/28/18 05:40 





                                 11/28/18 05:40 





                                        











PT  14.8 SECONDS (9.4-12.5)  H  11/21/18  15:41    


 


INR  1.29   11/21/18  15:41    


 


APTT  34.7 Seconds (25.1-36.5)   11/21/18  15:41    














- Constitutional


Appears: Well, Non-toxic, No Acute Distress





- Head Exam


Head Exam: ATRAUMATIC, NORMOCEPHALIC





- Extremities Exam


Additional comments: 


Bilateral lower extremities exam





VASC: faintly palpable pedal pulses bilaterally, Temp gradient warm to cool in 

the right side and warm to warm in the left side. Cap refill < 3 sec to all 

digits. Erythema noted to the left midfoot, improving. Moderate non pitting 

edema noted to the left foot. 





NEURO: Gross and protective sensations are grossly diminished





DERM: Left 2nd toe ulceration measuring 3 X 3 X 0.3cm, positive purulent 

drainage, positive malodor, Positive probe to bone, No tracking or undermining, 

Positive clinical signs of active infection 





MSK: no pain on palpation of foot or legs bilaterally. Muscle power intact 5/5 

to all groups b/l.














- Neurological Exam


Neurological Exam: Alert, Awake, Oriented x3





- Psychiatric Exam


Psychiatric exam: Normal Affect, Normal Mood





Assessment and Plan





- Assessment and Plan (Free Text)


Assessment: 


82 y/o M patient seen and evaluated in the bedside for infected left 2nd digit 

ulceration





Plan: 


Patient seen and evaluated in the bedside with Dr. Howard


Plan discussed with attending


Wound Culture: MRSA


Wound cleansed with saline and dressed with betadine, gauze and kerlix


X-rays left foot: No osteomyelitis, soft tissue swelling


DEYSI/PVR: Possible left tibial occlusive disease otherwise normal DEYSI


LLE venous duplex: no evidence of DVT


MRI Left Foot- osteomyelitis noted of the left 2nd digit


Discussed with patient the surgical option amputation of left 2nd digit, all 

risks benefits and complications discussed


Patient and family agreeable to amputation of the left 2nd digit


Patient scheduled for the OR for Thursday 11/29/18 for 7:30 AM


Consent was obtained, signed by spouse, and witnessed by nurse- consent placed 

in patient's chart


Podiatry will continue to follow up the patient while in house











<Priya Howard - Last Filed: 11/29/18 07:47>





Objective





- Vital Signs/Intake and Output


Vital Signs (last 24 hours): 


                                        











Temp Pulse Resp BP Pulse Ox


 


 97.6 F   53 L  18   149/66   94 L


 


 11/29/18 06:50  11/29/18 06:50  11/29/18 06:50  11/29/18 06:50  11/29/18 06:50








Intake and Output: 


                                        











 11/29/18 11/29/18





 06:59 18:59


 


Intake Total 540 


 


Output Total 200 


 


Balance 340 














- Medications


Medications: 


                               Current Medications





Amlodipine Besylate (Norvasc)  5 mg PO BID [facility]


   Last Admin: 11/28/18 18:22 Dose:  5 mg


Heparin Sodium (Porcine) (Heparin)  5,000 units SC Q8H [facility]; Protocol


   Last Admin: 11/28/18 12:15 Dose:  5,000 units


Ceftaroline Fosamil 600 mg/ (Sodium Chloride)  100 mls @ 100 mls/hr IVPB Q12 [facility]


   Last Admin: 11/28/18 22:35 Dose:  100 mls/hr


Insulin Human Lispro (Humalog Low)  0 units SC ACHS [facility]; Protocol


   Last Admin: 11/28/18 22:35 Dose:  Not Given


Losartan Potassium (Cozaar)  25 mg PO DAILY [facility]


   Last Admin: 11/28/18 10:54 Dose:  25 mg


Mupirocin (Bactroban Ointment)  0 gm TOP DAILY [facility]


   Last Admin: 11/28/18 11:30 Dose:  1 applic


Pantoprazole Sodium (Protonix Ec Tab)  20 mg PO 0600,1600 [facility]


   Last Admin: 11/29/18 06:15 Dose:  Not Given











- Labs


Labs: 


                                        





                                 11/28/18 05:40 





                                 11/28/18 05:40 





                                        











PT  14.8 SECONDS (9.4-12.5)  H  11/21/18  15:41    


 


INR  1.29   11/21/18  15:41    


 


APTT  34.7 Seconds (25.1-36.5)   11/21/18  15:41    














Attending/Attestation





- Attestation


I have personally seen and examined this patient.: Yes


I have fully participated in the care of the patient.: Yes


I have reviewed all pertinent clinical information, including history, physical 

exam and plan: Yes


Notes (Text): 





11/29/18 07:47


pt going for digital amputation with Dr Do in am

## 2018-11-28 NOTE — CP.PCM.PN
<Terry Martinez - Last Filed: 11/28/18 12:53>





Subjective





- Date & Time of Evaluation


Date of Evaluation: 11/28/18


Time of Evaluation: 07:40





- Subjective


Subjective: 





Terry Martinez PGY-1 Progress Note for Hospitalist Service





Patient seen and evaluated at bedside. No acute events reported overnight. 

Denies chest pain, shortness of breath, palpitations but reports some distal L 

foot pain. Patient L foot wrapped. Some dried blood evident. Patient notified of

plan to go for amputation tomorrow per Podiatry. Patient and wife were notified 

yesterday. 





Objective





- Vital Signs/Intake and Output


Vital Signs (last 24 hours): 


                                        











Temp Pulse Resp BP Pulse Ox


 


 98 F   60   20   120/65   97 


 


 11/28/18 06:00  11/28/18 06:00  11/28/18 06:00  11/28/18 06:00  11/28/18 06:00








Intake and Output: 


                                        











 11/28/18 11/28/18





 06:59 18:59


 


Intake Total 620 


 


Output Total 300 


 


Balance 320 














- Medications


Medications: 


                               Current Medications





Amlodipine Besylate (Norvasc)  5 mg PO BID [facility]


   Last Admin: 11/27/18 17:03 Dose:  5 mg


Heparin Sodium (Porcine) (Heparin)  5,000 units SC Q8H [facility]; Protocol


   Last Admin: 11/28/18 03:00 Dose:  Not Given


Ceftaroline Fosamil 600 mg/ (Sodium Chloride)  100 mls @ 100 mls/hr IVPB Q12 [facility]


   Last Admin: 11/27/18 22:23 Dose:  100 mls/hr


Insulin Human Lispro (Humalog Low)  0 units SC ACHS [facility]; Protocol


   Last Admin: 11/27/18 22:22 Dose:  Not Given


Losartan Potassium (Cozaar)  25 mg PO DAILY [facility]


   Last Admin: 11/28/18 05:58 Dose:  25 mg


Mupirocin (Bactroban Ointment)  0 gm TOP DAILY [facility]


   Last Admin: 11/27/18 17:26 Dose:  1 applic


Pantoprazole Sodium (Protonix Ec Tab)  20 mg PO 0600,1600 [facility]


   Last Admin: 11/28/18 05:58 Dose:  20 mg











- Labs


Labs: 


                                        





                                 11/28/18 05:40 





                                 11/28/18 05:40 





                                        











PT  14.8 SECONDS (9.4-12.5)  H  11/21/18  15:41    


 


INR  1.29   11/21/18  15:41    


 


APTT  34.7 Seconds (25.1-36.5)   11/21/18  15:41    














- Additional Findings


Additional findings: 





- Constitutional


Appears: Well, Non-toxic. Sitting in chair comfortably.





- Head Exam


Head Exam: ATRAUMATIC, NORMAL INSPECTION, NORMOCEPHALIC





- Eye Exam


Eye Exam: EOMI, Normal appearance





- ENT Exam


ENT Exam: Mucous Membranes Moist





- Respiratory Exam


Respiratory Exam: Clear to Ausculation Bilateral, NORMAL BREATHING PATTERN





- Cardiovascular Exam


Cardiovascular Exam: REGULAR RHYTHM, +S1, +S2





- GI/Abdominal Exam


GI & Abdominal Exam: Soft, Normal Bowel Sounds.  absent: Tenderness





- Extremities Exam


Extremities Exam: Pedal Edema (left lower extremity).  absent: Calf Tenderness, 

Normal Inspection (left lower leg ), Tenderness





- Back Exam


Back Exam: NORMAL INSPECTION





- Neurological Exam


Neurological Exam: Alert, Awake, Oriented x3





- Psychiatric Exam


Psychiatric exam: Normal Affect, Normal Mood





- Skin


Skin Exam: absent: Normal Color


Additional comments: 





gangrenous second phalanx of left foot with ulceration. Erythema and swelling of

lower left leg. 





Assessment and Plan





- Assessment and Plan (Free Text)


Assessment: 





82 y/o M with PMHx of peripheral artery disease, chronic LLE cellulitis, COPD, 

hypertension, dementia, obesity [place] [facility] [place] complaints of LLE anterior 

tibial region cellulitis and gangrenous L foot 2nd distal phalanx. OM confirmed 

on MRI 11/25. Set for amputation tomorrow AM.





Plan: 





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


- LLE DEYSI studies reveal possible left tibial occlusive disease


- Podiatry on consult -Dr. Do - amputation scheduled for 11/29 AM


- ID on consult - Dr. Baac recs appreciated


- Ceftaroline day# 7 as per ID (CrCl >55; can continue with current abx dose). 

Will f/u podiatry recs which will determine length of ABx course


- L foot xray: no signs of osteomyelitis, cellulitis present


- Foot MRI 11/25 Marrow edema in 2nd proximal and middle phalanx consistent with

osteo


- Wound culture: MRSA+


- Final blood culture shows no growth after 5 days


- Procalcitonin 0.07





Hx of Hypertension


-Continue amlodipine and losartan


IVF discontinued, so will continue to monitor response to BP 





Hx of Pre-diabetes


- HgA1C 6.2


- Diabetes prevention and diet education


- ISS-low





DVT/GI PPx: 


- Heparin 5000 q8


- Protonix 





Dispo: SW - ? TCU then home vs ARLIN at [facility]


F/u PT eval for strengthening





Patient seen, case reviewed and plan approved by Dr. Chappell.





Terry Martinez, PGY-1





<Donnell Chappell - Last Filed: 12/01/18 18:34>





Objective





- Vital Signs/Intake and Output


Vital Signs (last 24 hours): 


                                        











Temp Pulse Resp BP Pulse Ox


 


 97.8 F   43 L  18   144/46 L  96 


 


 12/01/18 07:00  12/01/18 17:33  12/01/18 07:00  12/01/18 17:33  12/01/18 07:00








Intake and Output: 


                                        











 12/01/18 12/01/18





 06:59 18:59


 


Intake Total 570 


 


Output Total 400 


 


Balance 170 














- Medications


Medications: 


                               Current Medications





Acetaminophen (Tylenol 325mg Tab)  650 mg PO Q4H PRN


   PRN Reason: Pain, Mild (1-3)


Amlodipine Besylate (Norvasc)  5 mg PO BID [facility]


   Last Admin: 12/01/18 17:33 Dose:  Not Given


Heparin Sodium (Porcine) (Heparin)  5,000 units SC Q8H [facility]; Protocol


   Last Admin: 12/01/18 17:22 Dose:  5,000 units


Ceftaroline Fosamil 600 mg/ (Sodium Chloride)  100 mls @ 100 mls/hr IVPB Q12 [facility]


   Last Admin: 12/01/18 09:42 Dose:  100 mls/hr


Insulin Human Lispro (Humalog Low)  0 units SC ACHS [facility]; Protocol


   Last Admin: 12/01/18 16:57 Dose:  Not Given


Losartan Potassium (Cozaar)  25 mg PO DAILY [facility]


   Last Admin: 12/01/18 09:37 Dose:  25 mg


Metoclopramide HCl (Reglan)  10 mg IV ONCE PRN


   PRN Reason: Nausea/Vomiting


Ondansetron HCl (Zofran Inj)  4 mg IVP ONCE PRN


   PRN Reason: Nausea/Vomiting


Oxycodone/Acetaminophen (Percocet 5/325 Mg Tab)  1 tab PO Q4H PRN


   PRN Reason: Pain, moderate (4-7)


   Stop: 12/02/18 08:45


Oxycodone/Acetaminophen (Percocet 5/325 Mg Tab)  2 tab PO Q4H PRN


   PRN Reason: Pain, severe (8-10)


   Stop: 12/02/18 08:45


   Last Admin: 11/29/18 16:48 Dose:  2 tab


Pantoprazole Sodium (Protonix Ec Tab)  20 mg PO 0600,1600 [facility]


   Last Admin: 12/01/18 17:23 Dose:  20 mg











- Labs


Labs: 


                                        





                                 12/01/18 10:00 





                                 11/30/18 06:30 





                                        











PT  14.8 SECONDS (9.4-12.5)  H  11/21/18  15:41    


 


INR  1.29   11/21/18  15:41    


 


APTT  34.7 Seconds (25.1-36.5)   11/21/18  15:41    














Attending/Attestation





- Attestation


I have personally seen and examined this patient.: Yes


I have fully participated in the care of the patient.: Yes


I have reviewed all pertinent clinical information, including history, physical 

exam and plan: Yes


Notes (Text): 





12/01/18 18:34


Medical record note made by the resident after discussion with my direction and 

input after the patient was personally seen and examined by me. I have reviewed 

the chart and agree that the record accurately reflects by personal performance 

of the history, physical exam, data review, and medical decision-making, in the 

course for the patient. I have also personally directed the plan of care.

## 2018-11-29 PROCEDURE — 0Y6S0Z0 DETACHMENT AT LEFT 2ND TOE, COMPLETE, OPEN APPROACH: ICD-10-PCS | Performed by: PODIATRIST

## 2018-11-29 RX ADMIN — PANTOPRAZOLE SODIUM SCH MG: 20 TABLET, DELAYED RELEASE ORAL at 15:41

## 2018-11-29 RX ADMIN — INSULIN LISPRO SCH: 100 INJECTION, SOLUTION INTRAVENOUS; SUBCUTANEOUS at 21:32

## 2018-11-29 RX ADMIN — PANTOPRAZOLE SODIUM SCH: 20 TABLET, DELAYED RELEASE ORAL at 06:15

## 2018-11-29 RX ADMIN — INSULIN LISPRO SCH: 100 INJECTION, SOLUTION INTRAVENOUS; SUBCUTANEOUS at 15:44

## 2018-11-29 RX ADMIN — INSULIN LISPRO SCH: 100 INJECTION, SOLUTION INTRAVENOUS; SUBCUTANEOUS at 10:54

## 2018-11-29 RX ADMIN — INSULIN LISPRO SCH: 100 INJECTION, SOLUTION INTRAVENOUS; SUBCUTANEOUS at 17:41

## 2018-11-29 RX ADMIN — MUPIROCIN SCH APPLIC: 20 OINTMENT TOPICAL at 10:46
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
## 2018-11-29 NOTE — CP.PCM.PN
<Terry Martinez - Last Filed: 11/29/18 14:07>





Subjective





- Date & Time of Evaluation


Date of Evaluation: 11/29/18


Time of Evaluation: 07:18





- Subjective


Subjective: 


Terry Martinez PGY-1 Progress Note for Hospitalist Service





Patient seen and evaluated at bedside. No acute events reported overnight per 

nursing. Patient returned from OR and reports tolerating procedure well. Spoke 

with patient regarding next steps depending on margins of path report and 

possible need for 3 more weeks of antibiotics. Patient reports L foot achiness 

but denies chest pain, shortness of breath, abdominal pain, and calf pain.








Objective





- Vital Signs/Intake and Output


Vital Signs (last 24 hours): 


                                        











Temp Pulse Resp BP Pulse Ox


 


 97.6 F   53 L  18   149/66   94 L


 


 11/29/18 06:50  11/29/18 06:50  11/29/18 06:50  11/29/18 06:50  11/29/18 06:50








Intake and Output: 


                                        











 11/29/18 11/29/18





 06:59 18:59


 


Intake Total 540 0


 


Output Total 200 


 


Balance 340 0














- Medications


Medications: 


                               Current Medications





Amlodipine Besylate (Norvasc)  5 mg PO BID [facility]


   Last Admin: 11/28/18 18:22 Dose:  5 mg


Heparin Sodium (Porcine) (Heparin)  5,000 units SC Q8H [facility]; Protocol


   Last Admin: 11/28/18 12:15 Dose:  5,000 units


Ceftaroline Fosamil 600 mg/ (Sodium Chloride)  100 mls @ 100 mls/hr IVPB Q12 [facility]


   Last Admin: 11/28/18 22:35 Dose:  100 mls/hr


Insulin Human Lispro (Humalog Low)  0 units SC ACHS [facility]; Protocol


   Last Admin: 11/28/18 22:35 Dose:  Not Given


Losartan Potassium (Cozaar)  25 mg PO DAILY [facility]


   Last Admin: 11/28/18 10:54 Dose:  25 mg


Mupirocin (Bactroban Ointment)  0 gm TOP DAILY [facility]


   Last Admin: 11/28/18 11:30 Dose:  1 applic


Pantoprazole Sodium (Protonix Ec Tab)  20 mg PO 0600,1600 [facility]


   Last Admin: 11/29/18 06:15 Dose:  Not Given











- Labs


Labs: 


                                        





                                 11/28/18 05:40 





                                 11/28/18 05:40 





                                        











PT  14.8 SECONDS (9.4-12.5)  H  11/21/18  15:41    


 


INR  1.29   11/21/18  15:41    


 


APTT  34.7 Seconds (25.1-36.5)   11/21/18  15:41    














- Additional Findings


Additional findings: 





- Constitutional


Appears: Well, Non-toxic. Sitting in chair comfortably.





- Head Exam


Head Exam: ATRAUMATIC, NORMAL INSPECTION, NORMOCEPHALIC





- Eye Exam


Eye Exam: EOMI, Normal appearance





- ENT Exam


ENT Exam: Mucous Membranes Moist





- Respiratory Exam


Respiratory Exam: Clear to Ausculation Bilateral, NORMAL BREATHING PATTERN





- Cardiovascular Exam


Cardiovascular Exam: REGULAR RHYTHM, +S1, +S2





- GI/Abdominal Exam


GI & Abdominal Exam: Soft, Normal Bowel Sounds.  absent: Tenderness





- Extremities Exam


Extremities Exam: Pedal Edema (left lower extremity).  absent: Calf Tenderness, 

Normal Inspection (left lower leg ), Tenderness





- Back Exam


Back Exam: NORMAL INSPECTION





- Neurological Exam


Neurological Exam: Alert, Awake, Oriented x3





- Psychiatric Exam


Psychiatric exam: Normal Affect, Normal Mood





- Skin


Skin Exam: absent: Normal Color


Additional comments: 





amputated second metatarsal head of left foot - dressings c/d/i 





Assessment and Plan





- Assessment and Plan (Free Text)


Assessment: 





82 y/o M with PMHx of peripheral artery disease, chronic LLE cellulitis, COPD, 

hypertension, dementia, obesity [place] [facility] [place] with complaints of LLE anterior 

tibial region cellulitis and gangrenous L foot 2nd distal phalanx. OM confirmed 

on MRI 11/25. Amputation of second digit this AM.





Plan: 





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


- LLE DEYSI studies reveal possible left tibial occlusive disease


- Podiatry on consult -Dr. Do - amputated second metatarsal head of left 

foot


- ID on consult - Dr. Baca recs appreciated s/p amputation depending on 

path and cultures


- Ceftaroline day# 8 as per ID (CrCl >55; can continue with current abx dose). 

Will f/u podiatry recs which will determine length of ABx course


- L foot xray: no signs of osteomyelitis, cellulitis present


- Foot MRI 11/25 Marrow edema in 2nd proximal and middle phalanx consistent with

osteo


- Wound culture: MRSA+


- Final blood culture shows no growth after 5 days


- Procalcitonin 0.07





Hx of Hypertension


-Continue amlodipine and losartan


IVF discontinued, so will continue to monitor response to BP 





Hx of Pre-diabetes


- HgA1C 6.2


- Diabetes prevention and diet education


- ISS-low





DVT/GI PPx: 


- Heparin 5000 q8


- Protonix 





Dispo: SW - sw spoke with patient's wife in room. reviewed sub acute rehab miko

mmendation. wife indicated that [facility] is [facility] first [facility]. 


F/u PT eval for strengthening





Patient seen, case reviewed and plan approved by Dr. Chappell.





Terry Martinez, PGY-1








<Donnell Chappell - Last Filed: 12/01/18 18:33>





Objective





- Vital Signs/Intake and Output


Vital Signs (last 24 hours): 


                                        











Temp Pulse Resp BP Pulse Ox


 


 97.8 F   43 L  18   144/46 L  96 


 


 12/01/18 07:00  12/01/18 17:33  12/01/18 07:00  12/01/18 17:33  12/01/18 07:00








Intake and Output: 


                                        











 12/01/18 12/01/18





 06:59 18:59


 


Intake Total 570 


 


Output Total 400 


 


Balance 170 














- Medications


Medications: 


                               Current Medications





Acetaminophen (Tylenol 325mg Tab)  650 mg PO Q4H PRN


   PRN Reason: Pain, Mild (1-3)


Amlodipine Besylate (Norvasc)  5 mg PO BID [facility]


   Last Admin: 12/01/18 17:33 Dose:  Not Given


Heparin Sodium (Porcine) (Heparin)  5,000 units SC Q8H [facility]; Protocol


   Last Admin: 12/01/18 17:22 Dose:  5,000 units


Ceftaroline Fosamil 600 mg/ (Sodium Chloride)  100 mls @ 100 mls/hr IVPB Q12 [facility]


   Last Admin: 12/01/18 09:42 Dose:  100 mls/hr


Insulin Human Lispro (Humalog Low)  0 units SC ACHS [facility]; Protocol


   Last Admin: 12/01/18 16:57 Dose:  Not Given


Losartan Potassium (Cozaar)  25 mg PO DAILY [facility]


   Last Admin: 12/01/18 09:37 Dose:  25 mg


Metoclopramide HCl (Reglan)  10 mg IV ONCE PRN


   PRN Reason: Nausea/Vomiting


Ondansetron HCl (Zofran Inj)  4 mg IVP ONCE PRN


   PRN Reason: Nausea/Vomiting


Oxycodone/Acetaminophen (Percocet 5/325 Mg Tab)  1 tab PO Q4H PRN


   PRN Reason: Pain, moderate (4-7)


   Stop: 12/02/18 08:45


Oxycodone/Acetaminophen (Percocet 5/325 Mg Tab)  2 tab PO Q4H PRN


   PRN Reason: Pain, severe (8-10)


   Stop: 12/02/18 08:45


   Last Admin: 11/29/18 16:48 Dose:  2 tab


Pantoprazole Sodium (Protonix Ec Tab)  20 mg PO 0600,1600 [facility]


   Last Admin: 12/01/18 17:23 Dose:  20 mg











- Labs


Labs: 


                                        





                                 12/01/18 10:00 





                                 11/30/18 06:30 





                                        











PT  14.8 SECONDS (9.4-12.5)  H  11/21/18  15:41    


 


INR  1.29   11/21/18  15:41    


 


APTT  34.7 Seconds (25.1-36.5)   11/21/18  15:41    














Attending/Attestation





- Attestation


I have personally seen and examined this patient.: Yes


I have fully participated in the care of the patient.: Yes


I have reviewed all pertinent clinical information, including history, physical 

exam and plan: Yes


Notes (Text): 





12/01/18 18:33


Medical record note made by the resident after discussion with my direction and 

input after the patient was personally seen and examined by me. I have reviewed 

the chart and agree that the record accurately reflects by personal performance 

of the history, physical exam, data review, and medical decision-making, in the 

course for the patient. I have also personally directed the plan of care.

## 2018-11-29 NOTE — PCM.SURG1
Surgeon's Initial Post Op Note





- Surgeon's Notes


Surgeon: Dr. Do, DPM


Assistant: Dr. Tosin Tyson, PGY1


Type of Anesthesia: IV Sedation, Local


Anesthesia Administered By: Dr. Gillis


Pre-Operative Diagnosis: Left 2nd digit osteomyelitis


Operative Findings: see dictation.  I: 15 cc 2% Lidocaine and 0.5% marcaine.  M:

3-0 Vicryl, 4-0 Nylon


Post-Operative Diagnosis: same


Operation Performed: Left 2nd digit amputation with resection of the 2nd 

metatarsal head proximal margin


Specimen/Specimens Removed: 1) Left 2nd digit- soft tissue and bone.  2) left 

2nd metatarsal head- proximal margin.  3) Deep Wound culture


Estimated Blood Loss: EBL {In ML}: 5


Blood Products Given: N/A


Drains Used: No Drains


Post-Op Condition: Good


Date of Surgery/Procedure: 11/29/18


Time of Surgery/Procedure: 08:48

## 2018-11-29 NOTE — PN
DATE:  11/29/2018



SUBJECTIVE:  The patient is in bed, in no acute distress, nontoxic.  He is

awake and alert.  The patient was seen earlier today in 562, bed 1.  No

fevers.  No chills.



PHYSICAL EXAMINATION:

VITAL SIGNS:  On exam, temperature is 98, blood pressure is 140/60 and

respiratory rate of 18.

HEENT:  Unremarkable.

NECK:  Supple.

LUNGS:  Have decreased breath.

HEART:  Normal S1 and S2.

ABDOMEN:  Soft and nontender.

EXTREMITIES:  Examination of toe is noted, still with erythema and

gangrenous changes.



LABORATORY EXAMINATION:  Reveals a white count of 8.1 and hemoglobin of 11.

Coagulation is noted.  Chemistries reveals a BUN of 23, creatinine of 1.4

and a C-reactive protein is 69.5.  Microbiology reveals the patient's blood

cultures are negative.  There is MRSA, oxacillin resistant staph aureus

from the toe.  Nares MRSA screen is negative.  Urine cultures are negative.



ASSESSMENT AND PLAN:  This is an 83-year-old, who is admitted with the

methicillin-resistant Staphylococcus aureus left foot cellulitis and the

methicillin-resistant Staphylococcus aureus second toe gangrene with a

history of group G streptococcus bacteremia and coronary artery disease,

chronic renal failure, dementia, osteomyelitis probably and the patient is

scheduled for the operating room this morning for surgery and the patient

is for amputation of the second left toe this morning.  The patient did

have an MRI of the foot which revealed osteomyelitis of the second toe. 

Currently on Teflaro.  We will follow closely with you.







__________________________________________

Lj Baca MD





DD:  11/29/2018 8:44:24

DT:  11/29/2018 8:45:44

Job # [id number]

## 2018-11-29 NOTE — RAD
Date of service: 



11/29/2018



PROCEDURE:  Left Foot Radiographs.



HISTORY:

 s/p left 2nd digit amputation 



COMPARISON:

Left foot radiographs 11/21/2018.



FINDINGS:



BONES:

Interval 2nd digit amputation is appreciated with osteotomy 

questioned at the head of the 2nd metatarsal bone with distal 

fragments remaining in the soft tissues.  Examination otherwise 

stable including extensive degenerative changes throughout the 

residual digits and remaining forefoot midfoot and hindfoot joints.  

Calcific density at the plantar foot soft tissues again appreciated 

laterally.  No subluxation or dislocation appreciated throughout. 



OTHER FINDINGS:

None.



IMPRESSION:

Interval left 2nd digit amputation identified with osteotomy through 

the head of the 2nd metatarsal bone noted.  Bony fragments are 

identified distal to the 2nd metatarsal bone within soft tissue.



Diffuse advanced degenerative joint disease otherwise appreciated 

throughout the left foot.

## 2018-11-30 LAB
ALBUMIN SERPL-MCNC: 3.2 G/DL (ref 3–4.8)
ALBUMIN/GLOB SERPL: 1 {RATIO} (ref 1.1–1.8)
ALT SERPL-CCNC: 25 U/L (ref 7–56)
AST SERPL-CCNC: 28 U/L (ref 17–59)
BUN SERPL-MCNC: 20 MG/DL (ref 7–21)
CALCIUM SERPL-MCNC: 8.9 MG/DL (ref 8.4–10.5)
ERYTHROCYTE [DISTWIDTH] IN BLOOD BY AUTOMATED COUNT: 13.3 % (ref 11.5–14.5)
GFR NON-AFRICAN AMERICAN: 48
HGB BLD-MCNC: 11.1 G/DL (ref 14–18)
MCH RBC QN AUTO: 26.8 PG (ref 25–35)
MCHC RBC AUTO-ENTMCNC: 32.9 G/DL (ref 31–37)
MCV RBC AUTO: 81.4 FL (ref 80–105)
PLATELET # BLD: 188 10^3/UL (ref 120–450)
PMV BLD AUTO: 8.2 FL (ref 7–11)
RBC # BLD AUTO: 4.14 10^6/UL (ref 3.5–6.1)
WBC # BLD AUTO: 9.3 10^3/UL (ref 4.5–11)

## 2018-11-30 RX ADMIN — PANTOPRAZOLE SODIUM SCH MG: 20 TABLET, DELAYED RELEASE ORAL at 05:43

## 2018-11-30 RX ADMIN — INSULIN LISPRO SCH: 100 INJECTION, SOLUTION INTRAVENOUS; SUBCUTANEOUS at 16:50

## 2018-11-30 RX ADMIN — INSULIN LISPRO SCH: 100 INJECTION, SOLUTION INTRAVENOUS; SUBCUTANEOUS at 07:30

## 2018-11-30 RX ADMIN — PANTOPRAZOLE SODIUM SCH MG: 20 TABLET, DELAYED RELEASE ORAL at 18:07

## 2018-11-30 RX ADMIN — INSULIN LISPRO SCH: 100 INJECTION, SOLUTION INTRAVENOUS; SUBCUTANEOUS at 21:33

## 2018-11-30 RX ADMIN — INSULIN LISPRO SCH: 100 INJECTION, SOLUTION INTRAVENOUS; SUBCUTANEOUS at 11:56

## 2018-11-30 NOTE — CP.PCM.PN
<Terry Martinez - Last Filed: 11/30/18 15:10>





Subjective





- Date & Time of Evaluation


Date of Evaluation: 11/30/18


Time of Evaluation: 07:30





- Subjective


Subjective: 


Terry Martinez PGY-1 Progress Note for Hospitalist Service





Patient seen and evaluated at bedside. No acute events reported overnight. Spoke

with patient regarding next steps depending on margins of path report and 

possible need for continued antibiotics. Patient reports resolved L foot 

achiness but denies chest pain, shortness of breath, abdominal pain, LLE numbnes

s, tingling and calf pain. Feels well.








Objective





- Vital Signs/Intake and Output


Vital Signs (last 24 hours): 


                                        











Temp Pulse Resp BP Pulse Ox


 


 97 F L  64   20   193/72 H  98 


 


 11/30/18 06:00  11/30/18 06:00  11/30/18 06:00  11/30/18 06:00  11/30/18 06:00








Intake and Output: 


                                        











 11/30/18 11/30/18





 06:59 18:59


 


Intake Total 1380 


 


Output Total 1500 


 


Balance -120 














- Medications


Medications: 


                               Current Medications





Acetaminophen (Tylenol 325mg Tab)  650 mg PO Q4H PRN


   PRN Reason: Pain, Mild (1-3)


Amlodipine Besylate (Norvasc)  [facility]ID [facility]


   Last Admin: 11/29/18 19:17 Dose:  5 mg


Fentanyl (Fentanyl)  25 mcg IV Q5M PRN


   PRN Reason: Pain, moderate (4-7)


Heparin Sodium (Porcine) (Heparin)  5,000 units SC Q8H [facility]; [facility]


   Last Admin: 11/28/18 12:15 Dose:  5,000 units


Ceftaroline Fosamil 600 mg/ (Sodium Chloride)  100 mls @ 100 mls/hr IVPB Q12 [facility]


   Last Admin: 11/29/18 21:54 Dose:  100 mls/hr


Insulin Human Lispro (Humalog Low)  0 units SC ACHS [facility]; Protocol


   Last Admin: 11/29/18 21:32 Dose:  Not Given


Losartan Potassium (Cozaar)  25 mg PO DAILY [facility]


   Last Admin: 11/29/18 10:52 Dose:  25 mg


Metoclopramide HCl (Reglan)  10 mg IV ONCE PRN


   PRN Reason: Nausea/Vomiting


Mupirocin (Bactroban Ointment)  0 gm TOP DAILY [facility]


   Last Admin: 11/29/18 10:46 Dose:  2 applic


Ondansetron HCl (Zofran Inj)  4 mg IVP ONCE PRN


   PRN Reason: Nausea/Vomiting


Oxycodone/Acetaminophen (Percocet 5/325 Mg Tab)  1 tab PO Q4H PRN


   PRN Reason: Pain, moderate (4-7)


   Stop: 12/02/18 08:45


Oxycodone/Acetaminophen (Percocet 5/325 Mg Tab)  2 tab PO Q4H PRN


   PRN Reason: Pain, severe (8-10)


   Stop: 12/02/18 08:45


   Last Admin: 11/29/18 16:48 Dose:  2 tab


Pantoprazole Sodium (Protonix Ec Tab)  20 mg PO 0600,1600 [facility]


   Last Admin: 11/30/18 05:43 Dose:  20 mg











- Labs


Labs: 


                                        





                                 11/30/18 06:30 





                                 11/30/18 06:30 





                                        











PT  14.8 SECONDS (9.4-12.5)  H  11/21/18  15:41    


 


INR  1.29   11/21/18  15:41    


 


APTT  34.7 Seconds (25.1-36.5)   11/21/18  15:41    














- Additional Findings


Additional findings: 





- Constitutional


Appears: Well, Non-toxic. Sitting in chair comfortably.





- Head Exam


Head Exam: ATRAUMATIC, NORMAL INSPECTION, NORMOCEPHALIC





- Eye Exam


Eye Exam: EOMI, Normal appearance





- ENT Exam


ENT Exam: Mucous Membranes Moist





- Respiratory Exam


Respiratory Exam: Clear to Ausculation Bilateral, NORMAL BREATHING PATTERN





- Cardiovascular Exam


Cardiovascular Exam: REGULAR RHYTHM, +S1, +S2





- GI/Abdominal Exam


GI & Abdominal Exam: Soft, Normal Bowel Sounds.  absent: Tenderness





- Extremities Exam


Extremities Exam: Pedal Edema (left lower extremity).  absent: Calf Tenderness, 

Normal Inspection (left lower leg ), Tenderness





- Back Exam


Back Exam: NORMAL INSPECTION





- Neurological Exam


Neurological Exam: Alert, Awake, Oriented x3





- Psychiatric Exam


Psychiatric exam: Normal Affect, Normal Mood





- Skin


Skin Exam: absent: Normal Color


Additional comments: 





amputated second metatarsal head of left foot - dressings c/d/i 





Assessment and Plan





- Assessment and Plan (Free Text)


Assessment: 





82 y/o M with PMHx of peripheral artery disease, chronic LLE cellulitis, COPD, 

hypertension, dementia, obesity [place] [facility] [place]LE anterior 

tibial region cellulitis and gangrenous L foot 2nd distal phalanx. OM confirmed 

on MRI 11/25. Amputation of second digit AM 11/28. Currently pain controlled 

waiting on path and culture results.





Plan: 





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


- LLE DEYSI studies reveal possible left tibial occlusive disease


- Podiatry on consult -Dr. Do - amputated second metatarsal head of left 

foot 11/28


- ID on consult - Dr. Baca recs appreciated s/p amputation depending on 

path and cultures


- Ceftaroline day# 9 as per ID (CrCl >55; can continue with current abx dose). 

Will f/u podiatry recs and path margin report which will determine length of ABx

course and choice of ABx


- L foot xray: no signs of osteomyelitis, cellulitis present


- Foot MRI 11/25 Marrow edema in 2nd proximal and middle phalanx consistent with

osteo


- Wound culture: MRSA+ initially, repeat wound cultures pending


- Urine cx negative


- Final blood culture shows no growth after 5 days


- Procalcitonin 0.07





Hx of Hypertension


-Continue amlodipine and losartan


IVF discontinued, so will continue to monitor response to BP 





Hx of Pre-diabetes


- HgA1C 6.2


- Diabetes prevention and diet education


- ISS-low





DVT/GI PPx: 


- Heparin 5000 q8


- Protonix 





Dispo: SW - sw spoke with patient's wife in room. reviewed sub acute rehab 

recommendation. wife indicated that [facility] is [facility] first [facility]. 


F/u PT eval for strengthening





Patient seen, case reviewed and plan approved by Dr. Chappell.





Terry Martinez, PGY-1





<Donnell Chappell - Last Filed: 12/01/18 18:32>





Objective





- Vital Signs/Intake and Output


Vital Signs (last 24 hours): 


                                        











Temp Pulse Resp BP Pulse Ox


 


 97.8 F   43 L  18   144/46 L  96 


 


 12/01/18 07:00  12/01/18 17:33  12/01/18 07:00  12/01/18 17:33  12/01/18 07:00








Intake and Output: 


                                        











 12/01/18 12/01/18





 06:59 18:59


 


Intake Total 570 


 


Output Total 400 


 


Balance 170 














- Medications


Medications: 


                               Current Medications





Acetaminophen (Tylenol 325mg Tab)  650 mg PO Q4H PRN


   PRN Reason: Pain, Mild (1-3)


Amlodipine Besylate (Norvasc)  5 mg PO BID [facility]


   Last Admin: 12/01/18 17:33 Dose:  Not Given


Heparin Sodium (Porcine) (Heparin)  5,000 units SC Q8H [facility]; Protocol


   Last Admin: 12/01/18 17:22 Dose:  5,000 units


Ceftaroline Fosamil 600 mg/ (Sodium Chloride)  100 mls @ 100 mls/hr IVPB Q12 [facility]


   Last Admin: 12/01/18 09:42 Dose:  100 mls/hr


Insulin Human Lispro (Humalog Low)  0 units SC ACHS [facility]; Protocol


   Last Admin: 12/01/18 16:57 Dose:  Not Given


Losartan Potassium (Cozaar)  25 mg PO DAILY [facility]


   Last Admin: 12/01/18 09:37 Dose:  25 mg


Metoclopramide HCl (Reglan)  10 mg IV ONCE PRN


   PRN Reason: Nausea/Vomiting


Ondansetron HCl (Zofran Inj)  4 mg IVP ONCE PRN


   PRN Reason: Nausea/Vomiting


Oxycodone/Acetaminophen (Percocet 5/325 Mg Tab)  1 tab PO Q4H PRN


   PRN Reason: Pain, moderate (4-7)


   Stop: 12/02/18 08:45


Oxycodone/Acetaminophen (Percocet 5/325 Mg Tab)  2 tab PO Q4H PRN


   PRN Reason: Pain, severe (8-10)


   Stop: 12/02/18 08:45


   Last Admin: 11/29/18 16:48 Dose:  2 tab


Pantoprazole Sodium (Protonix Ec Tab)  20 mg PO 0600,1600 [facility]


   Last Admin: 12/01/18 17:23 Dose:  20 mg











- Labs


Labs: 


                                        





                                 12/01/18 10:00 





                                 11/30/18 06:30 





                                        











PT  14.8 SECONDS (9.4-12.5)  H  11/21/18  15:41    


 


INR  1.29   11/21/18  15:41    


 


APTT  34.7 Seconds (25.1-36.5)   11/21/18  15:41    














Attending/Attestation





- Attestation


I have personally seen and examined this patient.: Yes


I have fully participated in the care of the patient.: Yes


I have reviewed all pertinent clinical information, including history, physical 

exam and plan: Yes


Notes (Text): 





12/01/18 18:29





Medical record note made by the resident after discussion with my direction and 

input after the patient was personally seen and examined by me. I have reviewed 

the chart and agree that the record accurately reflects by personal performance 

of the history, physical exam, data review, and medical decision-making, in the 

course for the patient. I have also personally directed the plan of care.





83 yrs M with PMH of peripheral artery disease, chronic LLE cellulitis, COPD, 

hypertension, dementia, obesity [place] [facility] [place] LLE anterior 

tibial region cellulitis and gangrenous L foot 2nd distal phalanx. OM confirmed 

on MRI 11/25. Amputation of second digit AM 11/28. 


Patient is on IV ceftaroline as per ID.





Currently pain controlled waiting on path and culture results.

## 2018-11-30 NOTE — PN
DATE:  11/30/2018



SUBJECTIVE:  An 83-year-old male seen status post day #1 partial left

second ray resection secondary to osteomyelitis.  The patient is sitting in

his chair comfortably watching television.  Denies any fever, chills,

nausea or vomiting.  He reports no pain in his foot and no shortness of

breath reported.  He has no complaints of posterior left lower leg pain.



LABORATORY DATA:  Laboratory findings reveal white count of 9.3, hemoglobin

of 11.1, hematocrit of 33.7, platelet count of 188.  Microbiology report

preliminarily taken in the operating room shows no polymorphonuclear white

blood cells and no organisms seen.  Culture and sensitivities to follow.



OBJECTIVE:

VITAL SIGNS:  Revealed temperature of 97, pulse rate of 49, blood pressure

of 145/47, respiratory rate 20.



Weakly palpable pedal pulses noted bilaterally.  Incision site at the

second metatarsophalangeal joint presents with all sutures intact with no

signs of dehiscence.  The suture line is well coapted.  There is noted to

be some serous drainage on the bandage; however, there is no active

bleeding.  The forefoot remains slightly edematous and erythematous

postoperatively, but no signs of localized cellulitis or ascending

cellulitis.



ASSESSMENT:  Status post day #1 left partial second ray amputation.



PLAN:  The patient's foot was examined and cleansed with normal sterile

saline, Betadine-soaked sterile Adaptic and a dry sterile dressing was

applied.  We will order a surgical shoe for the patient to start to

ambulate.  Will remain off weightbearing at this time; however, recommend

transfer to TCU where he can undergo assistance with ambulation given a

surgical shoe.  The patient will be seen and followed daily.







__________________________________________

Rodrigo Do DPM



DD:  11/30/2018 11:39:06

DT:  11/30/2018 11:43:35

Job # [id number]

## 2018-11-30 NOTE — CP.PCM.PN
Subjective





- Date & Time of Evaluation


Date of Evaluation: 11/30/18


Time of Evaluation: 08:50





- Subjective


Subjective: 





No increased pain the left foot, no fevers, no diarrhea, no nausea.





Objective





- Vital Signs/Intake and Output


Vital Signs (last 24 hours): 


                                        











Temp Pulse Resp BP Pulse Ox


 


 97 F L  64   20   193/72 H  98 


 


 11/30/18 06:00  11/30/18 06:00  11/30/18 06:00  11/30/18 06:00  11/30/18 06:00








Intake and Output: 


                                        











 11/30/18 11/30/18





 06:59 18:59


 


Intake Total 1380 


 


Output Total 1500 


 


Balance -120 














- Medications


Medications: 


                               Current Medications





Acetaminophen (Tylenol 325mg Tab)  650 mg PO Q4H PRN


   PRN Reason: Pain, Mild (1-3)


Amlodipine Besylate (Norvasc)  5 mg PO BID [facility]


   Last Admin: 11/29/18 19:17 Dose:  5 mg


Fentanyl (Fentanyl)  25 mcg IV Q5M PRN


   PRN Reason: Pain, moderate (4-7)


Heparin Sodium (Porcine) (Heparin)  5,000 units SC Q8H [facility]; Protocol


   Last Admin: 11/28/18 12:15 Dose:  5,000 units


Ceftaroline Fosamil 600 mg/ (Sodium Chloride)  100 mls @ 100 mls/hr IVPB Q12 [facility]


   Last Admin: 11/29/18 21:54 Dose:  100 mls/hr


Insulin Human Lispro (Humalog Low)  0 units SC ACHS [facility]; Protocol


   Last Admin: 11/29/18 21:32 Dose:  Not Given


Losartan Potassium (Cozaar)  25 mg PO DAILY [facility]


   Last Admin: 11/29/18 10:52 Dose:  25 mg


Metoclopramide HCl (Reglan)  10 mg IV ONCE PRN


   PRN Reason: Nausea/Vomiting


Mupirocin (Bactroban Ointment)  0 gm TOP DAILY [facility]


   Last Admin: 11/29/18 10:46 Dose:  2 applic


Ondansetron HCl (Zofran Inj)  4 mg IVP ONCE PRN


   PRN Reason: Nausea/Vomiting


Oxycodone/Acetaminophen (Percocet 5/325 Mg Tab)  1 tab PO Q4H PRN


   PRN Reason: Pain, moderate (4-7)


   Stop: 12/02/18 08:45


Oxycodone/Acetaminophen (Percocet 5/325 Mg Tab)  2 tab PO Q4H PRN


   PRN Reason: Pain, severe (8-10)


   Stop: 12/02/18 08:45


   Last Admin: 11/29/18 16:48 Dose:  2 tab


Pantoprazole Sodium (Protonix Ec Tab)  20 mg PO 0600,1600 [facility]


   Last Admin: 11/30/18 05:43 Dose:  20 mg











- Labs


Labs: 


                                        





                                 11/30/18 06:30 





                                 11/30/18 06:30 





                                        











PT  14.8 SECONDS (9.4-12.5)  H  11/21/18  15:41    


 


INR  1.29   11/21/18  15:41    


 


APTT  34.7 Seconds (25.1-36.5)   11/21/18  15:41    














- Constitutional


Appears: Chronically Ill





- Head Exam


Head Exam: NORMAL INSPECTION





- Neck Exam


Neck Exam: absent: Meningismus





- Respiratory Exam


Respiratory Exam: Decreased Breath Sounds





- Cardiovascular Exam


Cardiovascular Exam: +S1, +S2





- GI/Abdominal Exam


GI & Abdominal Exam: Soft.  absent: Tenderness





Assessment and Plan





- Assessment and Plan (Free Text)


Plan: 





Assessment


left leg and foot cellulitis and 2nd toe gangrene, consider osteomyelitis with 

MRSA S/P amputation POD #1


history of Group G strep bacteremia, probably secondary to bilateral lower 

extremities skin and skin structure infection;


history of rhabdomyolysis


CAD


HTN


chronic renal failure


dementia


obesity with BMI 32





Plan


continue Teflaro pending OR cx and pathology

## 2018-12-01 LAB
BASOPHILS # BLD AUTO: 0.02 K/MM3 (ref 0–2)
BASOPHILS NFR BLD: 0.3 % (ref 0–3)
EOSINOPHIL # BLD: 0.3 10*3/UL (ref 0–0.7)
EOSINOPHIL NFR BLD: 4.1 % (ref 1.5–5)
ERYTHROCYTE [DISTWIDTH] IN BLOOD BY AUTOMATED COUNT: 13.7 % (ref 11.5–14.5)
GRANULOCYTES # BLD: 5.49 10*3/UL (ref 1.4–6.5)
GRANULOCYTES NFR BLD: 70.7 % (ref 50–68)
HGB BLD-MCNC: 10.9 G/DL (ref 14–18)
LYMPHOCYTES # BLD: 1.3 10*3/UL (ref 1.2–3.4)
LYMPHOCYTES NFR BLD AUTO: 16.9 % (ref 22–35)
MCH RBC QN AUTO: 27.3 PG (ref 25–35)
MCHC RBC AUTO-ENTMCNC: 32.8 G/DL (ref 31–37)
MCV RBC AUTO: 83.2 FL (ref 80–105)
MONOCYTES # BLD AUTO: 0.6 10*3/UL (ref 0.1–0.6)
MONOCYTES NFR BLD: 8 % (ref 1–6)
PLATELET # BLD: 201 10^3/UL (ref 120–450)
PMV BLD AUTO: 8.1 FL (ref 7–11)
RBC # BLD AUTO: 3.99 10^6/UL (ref 3.5–6.1)
WBC # BLD AUTO: 7.8 10^3/UL (ref 4.5–11)

## 2018-12-01 RX ADMIN — INSULIN LISPRO SCH: 100 INJECTION, SOLUTION INTRAVENOUS; SUBCUTANEOUS at 07:43

## 2018-12-01 RX ADMIN — INSULIN LISPRO SCH: 100 INJECTION, SOLUTION INTRAVENOUS; SUBCUTANEOUS at 21:25

## 2018-12-01 RX ADMIN — INSULIN LISPRO SCH: 100 INJECTION, SOLUTION INTRAVENOUS; SUBCUTANEOUS at 16:57

## 2018-12-01 RX ADMIN — PANTOPRAZOLE SODIUM SCH MG: 20 TABLET, DELAYED RELEASE ORAL at 06:30

## 2018-12-01 RX ADMIN — INSULIN LISPRO SCH: 100 INJECTION, SOLUTION INTRAVENOUS; SUBCUTANEOUS at 11:56

## 2018-12-01 RX ADMIN — PANTOPRAZOLE SODIUM SCH MG: 20 TABLET, DELAYED RELEASE ORAL at 17:23

## 2018-12-01 NOTE — PN
DATE:  12/01/2018



SUBJECTIVE:  The patient is in bed, in no acute distress, nontoxic.



PHYSICAL EXAMINATION:

VITAL SIGNS:  Temperature is 97, blood pressure is 170/60 and respiratory

rate of 18.

HEENT:  Unremarkable.

NECK:  Supple.

LUNGS:  Have decreased breath sounds.

HEART:  Normal, S1 and S2.

ABDOMEN:  Soft and nontender.



LABORATORY DATA:  Reveals the patient to have white count is 9.3 and

hemoglobin of 11.  Chemistry has been reviewed.  Microbiology reveals

gram-positive cocci from the culture from the 29th and the _____ culture

from the 21st is MRSA and the pathology is still pending.



ASSESSMENT AND PLAN:  This is an 83-year-old male who is seen earlier today

in room 562, bed 1, with left leg and foot cellulitis, second toe gangrene,

status post amputation, post procedure day #2 on Teflaro currently, patient

with hypertension, coronary artery disease, renal disease, dementia,

history of group G streptococcus bacteremia awaiting for pathology report

and identification and sensitivity gram-positive cocci from the OR.  We

will follow with you.







__________________________________________

Lj Baca MD





DD:  12/01/2018 9:57:10

DT:  12/01/2018 10:38:35

Job # [id number]

## 2018-12-01 NOTE — CP.PCM.PN
<Fer Benz - Last Filed: 12/01/18 09:12>





Subjective





- Date & Time of Evaluation


Date of Evaluation: 12/01/18


Time of Evaluation: 09:13





- Subjective


Subjective: 





Podiatry progress note for Dr. Do





83M seen at bedside with Dr. Do. Resting comfortably. Denies any pain to 

surgical site. States he is eating and voiding freely. Denies N/V/F/C/SOB/CP and

has no other pedal complaints at this time.





Objective





- Vital Signs/Intake and Output


Vital Signs (last 24 hours): 


                                        











Temp Pulse Resp BP Pulse Ox


 


 97.8 F   54 L  18   177/62 H  96 


 


 12/01/18 07:00  12/01/18 07:00  12/01/18 07:00  12/01/18 07:00  12/01/18 07:00








Intake and Output: 


                                        











 12/01/18 12/01/18





 06:59 18:59


 


Intake Total 570 


 


Output Total 400 


 


Balance 170 














- Medications


Medications: 


                               Current Medications





Acetaminophen (Tylenol 325mg Tab)  650 mg PO Q4H PRN


   PRN Reason: Pain, Mild (1-3)


Amlodipine Besylate (Norvasc)  5 mg PO BID [facility]


   Last Admin: 11/30/18 18:11 Dose:  5 mg


Heparin Sodium (Porcine) (Heparin)  5,000 units SC Q8H [facility]; Protocol


   Last Admin: 11/30/18 18:06 Dose:  5,000 units


Ceftaroline Fosamil 600 mg/ (Sodium Chloride)  100 mls @ 100 mls/hr IVPB Q12 [facility]


   Last Admin: 11/30/18 21:33 Dose:  100 mls/hr


Insulin Human Lispro (Humalog Low)  0 units SC ACHS [facility]; Protocol


   Last Admin: 11/30/18 21:33 Dose:  Not Given


Losartan Potassium (Cozaar)  25 mg PO DAILY [facility]


   Last Admin: 11/30/18 10:50 Dose:  25 mg


Metoclopramide HCl (Reglan)  10 mg IV ONCE PRN


   PRN Reason: Nausea/Vomiting


Ondansetron HCl (Zofran Inj)  4 mg IVP ONCE PRN


   PRN Reason: Nausea/Vomiting


Oxycodone/Acetaminophen (Percocet 5/325 Mg Tab)  1 tab PO Q4H PRN


   PRN Reason: Pain, moderate (4-7)


   Stop: 12/02/18 08:45


Oxycodone/Acetaminophen (Percocet 5/325 Mg Tab)  2 tab PO Q4H PRN


   PRN Reason: Pain, severe (8-10)


   Stop: 12/02/18 08:45


   Last Admin: 11/29/18 16:48 Dose:  2 tab


Pantoprazole Sodium (Protonix Ec Tab)  20 mg PO 0600,1600 [facility]


   Last Admin: 12/01/18 06:30 Dose:  20 mg











- Labs


Labs: 


                                        





                                 11/30/18 06:30 





                                 11/30/18 06:30 





                                        











PT  14.8 SECONDS (9.4-12.5)  H  11/21/18  15:41    


 


INR  1.29   11/21/18  15:41    


 


APTT  34.7 Seconds (25.1-36.5)   11/21/18  15:41    














- Constitutional


Appears: Well, Non-toxic, No Acute Distress





- Head Exam


Head Exam: ATRAUMATIC, NORMOCEPHALIC





- Extremities Exam


Additional comments: 





Bilateral lower extremities exam





VASC: faintly palpable pedal pulses bilaterally, Temp gradient warm to cool in 

the right side and warm to warm in the left side. Cap refill < 3 sec to all 

remaining digits. Erythema noted to the left midfoot, improving. Moderate non 

pitting edema noted to the left foot. 





NEURO: Gross and protective sensations are grossly diminished





DERM: surgical site incision well coapted, sutures intact, no evidence of pus or

purulent drainage, mild serosanguinous drainage noted on dressing, no openings 

or signs of wound dehiscence, no probing, no clinical signs of infection





MSK: no pain on palpation of foot or legs bilaterally. Muscle power intact 5/5 

to all groups b/l.





- Neurological Exam


Neurological Exam: Alert, Awake, Oriented x3





- Psychiatric Exam


Psychiatric exam: Normal Affect, Normal Mood





Assessment and Plan





- Assessment and Plan (Free Text)


Assessment: 





83M POD 2 left second digit amputation with met head resection


Plan: 





Patient seen and evaluated at bedside with Dr. Do


Afebrile, absent leukocytosis


Dressing taken down, surgical site cleansed with sterile saline, dressed with 

betadine, adaptic, DSD, light ACE


Wound cx 11/29 - gram positive cocci


Continue medications per ID 


Postoperative x-ray - normal changes s/p 2nd digit amputation


Pathology postop - pending, f/u results


Continue pain management


Patient seen by PT and evaluated


Will continue to follow patient while in house





<Rodrigo Do - Last Filed: 12/01/18 11:42>





Objective





- Vital Signs/Intake and Output


Vital Signs (last 24 hours): 


                                        











Temp Pulse Resp BP Pulse Ox


 


 97.8 F   54 L  18   177/62 H  96 


 


 12/01/18 07:00  12/01/18 09:38  12/01/18 07:00  12/01/18 09:38  12/01/18 07:00








Intake and Output: 


                                        











 12/01/18 12/01/18





 06:59 18:59


 


Intake Total 570 


 


Output Total 400 


 


Balance 170 














- Medications


Medications: 


                               Current Medications





Acetaminophen (Tylenol 325mg Tab)  650 mg PO Q4H PRN


   PRN Reason: Pain, Mild (1-3)


Amlodipine Besylate (Norvasc)  5 mg PO BID [facility]


   Last Admin: 12/01/18 09:38 Dose:  5 mg


Heparin Sodium (Porcine) (Heparin)  5,000 units SC Q8H [facility]; Protocol


   Last Admin: 12/01/18 09:44 Dose:  5,000 units


Ceftaroline Fosamil 600 mg/ (Sodium Chloride)  100 mls @ 100 mls/hr IVPB Q12 [facility]


   Last Admin: 12/01/18 09:42 Dose:  100 mls/hr


Insulin Human Lispro (Humalog Low)  0 units SC ACHS [facility]; Protocol


   Last Admin: 12/01/18 07:43 Dose:  Not Given


Losartan Potassium (Cozaar)  25 mg PO DAILY [facility]


   Last Admin: 12/01/18 09:37 Dose:  25 mg


Metoclopramide HCl (Reglan)  10 mg IV ONCE PRN


   PRN Reason: Nausea/Vomiting


Ondansetron HCl (Zofran Inj)  4 mg IVP ONCE PRN


   PRN Reason: Nausea/Vomiting


Oxycodone/Acetaminophen (Percocet 5/325 Mg Tab)  1 tab PO Q4H PRN


   PRN Reason: Pain, moderate (4-7)


   Stop: 12/02/18 08:45


Oxycodone/Acetaminophen (Percocet 5/325 Mg Tab)  2 tab PO Q4H PRN


   PRN Reason: Pain, severe (8-10)


   Stop: 12/02/18 08:45


   Last Admin: 11/29/18 16:48 Dose:  2 tab


Pantoprazole Sodium (Protonix Ec Tab)  20 mg PO 0600,1600 [facility]


   Last Admin: 12/01/18 06:30 Dose:  20 mg











- Labs


Labs: 


                                        





                                 12/01/18 10:00 





                                 11/30/18 06:30 





                                        











PT  14.8 SECONDS (9.4-12.5)  H  11/21/18  15:41    


 


INR  1.29   11/21/18  15:41    


 


APTT  34.7 Seconds (25.1-36.5)   11/21/18  15:41    














Attending/Attestation





- Attestation


I have personally seen and examined this patient.: Yes


I have fully participated in the care of the patient.: Yes


I have reviewed all pertinent clinical information, including history, physical 

exam and plan: Yes

## 2018-12-01 NOTE — CP.PCM.PN
<Kar Stephens L - Last Filed: 12/01/18 19:02>





Subjective





- Date & Time of Evaluation


Date of Evaluation: 12/01/18


Time of Evaluation: 07:30





- Subjective


Subjective: 





Resident Progress Note for Hospitalist Service





Patient examined at bedside. No acute events overnight. Patient states his pain 

is well controlled. He was able to tolerate out of bed to chair. Denies fevers, 

chills, chest pain, shortness of breath, abdominal pain. 





Objective





- Vital Signs/Intake and Output


Vital Signs (last 24 hours): 


                                        











Temp Pulse Resp BP Pulse Ox


 


 97.8 F   54 L  18   177/62 H  96 


 


 12/01/18 07:00  12/01/18 09:38  12/01/18 07:00  12/01/18 09:38  12/01/18 07:00








Intake and Output: 


                                        











 12/01/18 12/01/18





 06:59 18:59


 


Intake Total 570 


 


Output Total 400 


 


Balance 170 














- Medications


Medications: 


                               Current Medications





Acetaminophen (Tylenol 325mg Tab)  650 mg PO Q4H PRN


   PRN Reason: Pain, Mild (1-3)


Amlodipine Besylate (Norvasc)  [facility] BID [facility]


   Last Admin: 12/01/18 09:38 Dose:  5 mg


Heparin Sodium (Porcine) (Heparin)  5,000 units SC Q8H [facility]; Protocol


   Last Admin: 12/01/18 09:44 Dose:  5,000 units


Ceftaroline Fosamil 600 mg/ (Sodium Chloride)  100 mls @ 100 mls/hr IVPB Q12 [facility]


   Last Admin: 12/01/18 09:42 Dose:  100 mls/hr


Insulin Human Lispro (Humalog Low)  0 units SC ACHS [facility]; Protocol


   Last Admin: 12/01/18 07:43 Dose:  Not Given


Losartan Potassium (Cozaar)  25 mg PO DAILY [facility]


   Last Admin: 12/01/18 09:37 Dose:  25 mg


Metoclopramide HCl (Reglan)  10 mg IV ONCE PRN


   PRN Reason: Nausea/Vomiting


Ondansetron HCl (Zofran Inj)  4 mg IVP ONCE PRN


   PRN Reason: Nausea/Vomiting


Oxycodone/Acetaminophen (Percocet 5/325 Mg Tab)  1 tab PO Q4H PRN


   PRN Reason: Pain, moderate (4-7)


   Stop: 12/02/18 08:45


Oxycodone/Acetaminophen (Percocet 5/325 Mg Tab)  2 tab PO Q4H PRN


   PRN Reason: Pain, severe (8-10)


   Stop: 12/02/18 08:45


   Last Admin: 11/29/18 16:48 Dose:  2 tab


Pantoprazole Sodium (Protonix Ec Tab)  20 mg PO 0600,1600 [facility]


   Last Admin: 12/01/18 06:30 Dose:  20 mg











- Labs


Labs: 


                                        





                                 11/30/18 06:30 





                                 11/30/18 06:30 





                                        











PT  14.8 SECONDS (9.4-12.5)  H  11/21/18  15:41    


 


INR  1.29   11/21/18  15:41    


 


APTT  34.7 Seconds (25.1-36.5)   11/21/18  15:41    














- Additional Findings


Additional findings: 





- Constitutional


Appears: Well, Non-toxic, No Acute Distress





- Head Exam


Head Exam: ATRAUMATIC, NORMAL INSPECTION, NORMOCEPHALIC





- Eye Exam


Eye Exam: EOMI, Normal appearance





- ENT Exam


ENT Exam: Mucous Membranes Moist





- Respiratory Exam


Respiratory Exam: Clear to Ausculation Bilateral, NORMAL BREATHING PATTERN





- Cardiovascular Exam


Cardiovascular Exam: REGULAR RHYTHM, +S1, +S2





- GI/Abdominal Exam


GI & Abdominal Exam: Soft, Normal Bowel Sounds.  absent: Tenderness





- Extremities Exam


Extremities Exam: Pedal Edema (left lower extremity).  absent: Calf Tenderness, 

Normal Inspection (left lower leg), Tenderness





- Back Exam


Back Exam: NORMAL INSPECTION





- Neurological Exam


Neurological Exam: Alert, Awake, Oriented x3





- Psychiatric Exam


Psychiatric exam: Normal Affect, Normal Mood





- Skin


Skin Exam: absent: Normal Color


Additional comments: 





amputated second metatarsal head of left foot - dressings c/d/i 





Assessment and Plan





- Assessment and Plan (Free Text)


Assessment: 





82 y/o M with PMHx of peripheral artery disease, chronic LLE cellulitis, COPD, h

ypertension, dementia, obesity presents to [facility] [place] with complaints of LLE anterior 

tibial region cellulitis and gangrenous L foot 2nd distal phalanx. OM confirmed 

on MRI 11/25. Amputation of second digit AM 11/28. Currently pain controlled 

waiting on path and culture results.


Plan: 





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


- LLE DEYSI studies reveal possible left tibial occlusive disease


- Podiatry on consult -Dr. Arloro - amputated second metatarsal head of left 

foot 11/28


- ID on consult - Dr. Baca recs appreciated s/p amputation depending on 

path and cultures


- Ceftaroline day# 10 as per ID (CrCl >55; can continue with current abx dose). 

Will f/u podiatry recs and path margin report which will determine length of ABx

course and choice of ABx


- L foot xray: no signs of osteomyelitis, cellulitis present


- Foot MRI 11/25 Marrow edema in 2nd proximal and middle phalanx consistent with

osteo


- Repeat wound cultures show MRSA


- Urine cx negative


- Final blood culture shows no growth after 5 days


- Procalcitonin 0.07





Hx of Hypertension


-Continue amlodipine and losartan


IVF discontinued, so will continue to monitor response to BP 





Hx of Pre-diabetes


- HgA1C 6.2


- Diabetes prevention and diet education


- ISS-low





DVT/GI PPx: 


- Heparin 5000 q8


- Protonix 





Dispo: SW - sw spoke with patient's wife in room. reviewed sub acute rehab 

recommendation. wife indicated that [facility] is [facility] first choice followed [facility]. 


F/u PT eval for strengthening





Patient seen, case reviewed and plan approved by Dr. Misti Stephens PGY-1





<Donnell Chappell - Last Filed: 12/02/18 13:17>





Objective





- Vital Signs/Intake and Output


Vital Signs (last 24 hours): 


                                        











Temp Pulse Resp BP Pulse Ox


 


 97.7 F   54 L  22   167/57 H  97 


 


 12/02/18 07:00  12/02/18 10:19  12/02/18 07:00  12/02/18 10:19  12/02/18 07:00








Intake and Output: 


                                        











 12/02/18 12/02/18





 06:59 18:59


 


Intake Total 540 


 


Balance 540 














- Medications


Medications: 


                               Current Medications





Acetaminophen (Tylenol 325mg Tab)  650 mg PO Q4H PRN


   PRN Reason: Pain, Mild (1-3)


Amlodipine Besylate (Norvasc)  5 mg PO BID [facility]


   Last Admin: 12/02/18 10:19 Dose:  5 mg


Heparin Sodium (Porcine) (Heparin)  5,000 units SC Q8H [facility]; Protocol


   Last Admin: 12/02/18 10:16 Dose:  5,000 units


Ceftaroline Fosamil 600 mg/ (Sodium Chloride)  100 mls @ 100 mls/hr IVPB Q12 [facility]


   Last Admin: 12/02/18 10:18 Dose:  100 mls/hr


Insulin Human Lispro (Humalog Low)  0 units SC ACHS [facility]; Protocol


   Last Admin: 12/02/18 07:41 Dose:  Not Given


Losartan Potassium (Cozaar)  25 mg PO DAILY [facility]


   Last Admin: 12/02/18 10:19 Dose:  25 mg


Metoclopramide HCl (Reglan)  10 mg IV ONCE PRN


   PRN Reason: Nausea/Vomiting


Ondansetron HCl (Zofran Inj)  4 mg IVP ONCE PRN


   PRN Reason: Nausea/Vomiting


Pantoprazole Sodium (Protonix Ec Tab)  20 mg PO 0600,1600 [facility]


   Last Admin: 12/02/18 05:48 Dose:  20 mg











- Labs


Labs: 


                                        





                                 12/02/18 06:30 





                                 12/02/18 06:30 





                                        











PT  14.8 SECONDS (9.4-12.5)  H  11/21/18  15:41    


 


INR  1.29   11/21/18  15:41    


 


APTT  34.7 Seconds (25.1-36.5)   11/21/18  15:41    














Attending/Attestation





- Attestation


I have personally seen and examined this patient.: Yes


I have fully participated in the care of the patient.: Yes


I have reviewed all pertinent clinical information, including history, physical 

exam and plan: Yes


Notes (Text): 





12/02/18 13:15





Medical record note made by the resident after discussion with my direction and 

input after the patient was personally seen and examined by me. I have reviewed 

the chart and agree that the record accurately reflects by personal performance 

of the history, physical exam, data review, and medical decision-making, in the 

course for the patient. I have also personally directed the plan of care.





83 yrs M with PMH of peripheral artery disease, chronic LLE cellulitis, COPD, 

hypertension, dementia, obesity presents to [facility] [place] with complaints of LLE anterior 

tibial region cellulitis and gangrenous L foot 2nd distal phalanx.Foot MRI 11/25

Marrow edema in 2nd proximal and middle phalanx consistent with osteomylitis.


wound cultures grew MRSA. Patient is  SP Amputation of second digit AM 11/28. 

Pathology report is pending.


Patient is on IV ceftaroline as per ID.





Management plan was discussed in detail with patient. Education was provided.





12/02/18 13:17

## 2018-12-02 LAB
ALBUMIN SERPL-MCNC: 3.3 G/DL (ref 3–4.8)
ALBUMIN/GLOB SERPL: 1 {RATIO} (ref 1.1–1.8)
ALT SERPL-CCNC: 24 U/L (ref 7–56)
AST SERPL-CCNC: 29 U/L (ref 17–59)
BASOPHILS # BLD AUTO: 0.02 K/MM3 (ref 0–2)
BASOPHILS NFR BLD: 0.3 % (ref 0–3)
BUN SERPL-MCNC: 27 MG/DL (ref 7–21)
CALCIUM SERPL-MCNC: 8.7 MG/DL (ref 8.4–10.5)
EOSINOPHIL # BLD: 0.4 10*3/UL (ref 0–0.7)
EOSINOPHIL NFR BLD: 4.6 % (ref 1.5–5)
ERYTHROCYTE [DISTWIDTH] IN BLOOD BY AUTOMATED COUNT: 13.4 % (ref 11.5–14.5)
GFR NON-AFRICAN AMERICAN: 45
GRANULOCYTES # BLD: 5.04 10*3/UL (ref 1.4–6.5)
GRANULOCYTES NFR BLD: 64.2 % (ref 50–68)
HGB BLD-MCNC: 10.6 G/DL (ref 14–18)
LYMPHOCYTES # BLD: 1.7 10*3/UL (ref 1.2–3.4)
LYMPHOCYTES NFR BLD AUTO: 21.1 % (ref 22–35)
MCH RBC QN AUTO: 26.4 PG (ref 25–35)
MCHC RBC AUTO-ENTMCNC: 32.3 G/DL (ref 31–37)
MCV RBC AUTO: 81.8 FL (ref 80–105)
MONOCYTES # BLD AUTO: 0.8 10*3/UL (ref 0.1–0.6)
MONOCYTES NFR BLD: 9.8 % (ref 1–6)
PLATELET # BLD: 205 10^3/UL (ref 120–450)
PMV BLD AUTO: 8.4 FL (ref 7–11)
RBC # BLD AUTO: 4.01 10^6/UL (ref 3.5–6.1)
WBC # BLD AUTO: 7.9 10^3/UL (ref 4.5–11)

## 2018-12-02 RX ADMIN — INSULIN LISPRO SCH: 100 INJECTION, SOLUTION INTRAVENOUS; SUBCUTANEOUS at 21:23

## 2018-12-02 RX ADMIN — PANTOPRAZOLE SODIUM SCH: 20 TABLET, DELAYED RELEASE ORAL at 16:45

## 2018-12-02 RX ADMIN — PANTOPRAZOLE SODIUM SCH MG: 20 TABLET, DELAYED RELEASE ORAL at 05:48

## 2018-12-02 RX ADMIN — INSULIN LISPRO SCH: 100 INJECTION, SOLUTION INTRAVENOUS; SUBCUTANEOUS at 07:41

## 2018-12-02 RX ADMIN — INSULIN LISPRO SCH: 100 INJECTION, SOLUTION INTRAVENOUS; SUBCUTANEOUS at 16:56

## 2018-12-02 RX ADMIN — INSULIN LISPRO SCH: 100 INJECTION, SOLUTION INTRAVENOUS; SUBCUTANEOUS at 11:52

## 2018-12-02 NOTE — CP.PCM.PN
Subjective





- Date & Time of Evaluation


Date of Evaluation: 12/02/18


Time of Evaluation: 09:40





- Subjective


Subjective: 


Terry Martinez, PGY-1 Progress Note for Hospitalist Service





Patient seen and evaluated at bedside. No acute complaints reported overnight. 

Patient states his pain is well controlled. He was able to tolerate out of bed 

to chair. Denies fevers, chills, chest pain, shortness of breath, abdominal 

pain. Denies left foot pain.








Objective





- Vital Signs/Intake and Output


Vital Signs (last 24 hours): 


                                        











Temp Pulse Resp BP Pulse Ox


 


 97.7 F   54 L  22   167/57 H  97 


 


 12/02/18 07:00  12/02/18 10:19  12/02/18 07:00  12/02/18 10:19  12/02/18 07:00








Intake and Output: 


                                        











 12/02/18 12/02/18





 06:59 18:59


 


Intake Total 540 


 


Balance 540 














- Medications


Medications: 


                               Current Medications





Acetaminophen (Tylenol 325mg Tab)  650 mg PO Q4H PRN


   PRN Reason: Pain, Mild (1-3)


Amlodipine Besylate (Norvasc)  5 mg PO BID [facility]


   Last Admin: 12/02/18 10:19 Dose:  5 mg


Heparin Sodium (Porcine) (Heparin)  5,000 units SC Q8H [facility]; Protocol


   Last Admin: 12/02/18 10:16 Dose:  5,000 units


Ceftaroline Fosamil 600 mg/ (Sodium Chloride)  100 mls @ 100 mls/hr IVPB Q12 [facility]


   Last Admin: 12/02/18 10:18 Dose:  100 mls/hr


Insulin Human Lispro (Humalog Low)  0 units SC ACHS [facility]; Protocol


   Last Admin: 12/02/18 11:52 Dose:  Not Given


Losartan Potassium (Cozaar)  25 mg PO DAILY [facility]


   Last Admin: 12/02/18 10:19 Dose:  25 mg


Metoclopramide HCl (Reglan)  10 mg IV ONCE PRN


   PRN Reason: Nausea/Vomiting


Ondansetron HCl (Zofran Inj)  4 mg IVP ONCE PRN


   PRN Reason: Nausea/Vomiting


Pantoprazole Sodium (Protonix Ec Tab)  20 mg PO 0600,1600 [facility]


   Last Admin: 12/02/18 05:48 Dose:  20 mg











- Labs


Labs: 


                                        





                                 12/02/18 06:30 





                                 12/02/18 06:30 





                                        











PT  14.8 SECONDS (9.4-12.5)  H  11/21/18  15:41    


 


INR  1.29   11/21/18  15:41    


 


APTT  34.7 Seconds (25.1-36.5)   11/21/18  15:41    














- Additional Findings


Additional findings: 





- Constitutional


Appears: Well, Non-toxic, No Acute Distress





- Head Exam


Head Exam: ATRAUMATIC, NORMAL INSPECTION, NORMOCEPHALIC





- Eye Exam


Eye Exam: EOMI, Normal appearance





- ENT Exam


ENT Exam: Mucous Membranes Moist





- Respiratory Exam


Respiratory Exam: Clear to Ausculation Bilateral, NORMAL BREATHING PATTERN





- Cardiovascular Exam


Cardiovascular Exam: REGULAR RHYTHM, +S1, +S2





- GI/Abdominal Exam


GI & Abdominal Exam: Soft, Normal Bowel Sounds.  absent: Tenderness





- Extremities Exam


Extremities Exam: Pedal Edema (left lower extremity).  absent: Calf Tenderness, 

Normal Inspection (left lower leg), Tenderness





- Back Exam


Back Exam: NORMAL INSPECTION





- Neurological Exam


Neurological Exam: Alert, Awake, Oriented x3





- Psychiatric Exam


Psychiatric exam: Normal Affect, Normal Mood





- Skin


Skin Exam: absent: Normal Color


Additional comments: 





amputated second metatarsal head of left foot - dressings c/d/i





Assessment and Plan





- Assessment and Plan (Free Text)


Assessment: 





82 y/o M with PMHx of peripheral artery disease, chronic LLE cellulitis, COPD, 

hypertension, dementia, obesity presents to [facility] with complaints of LLE anterior 

tibial region cellulitis and gangrenous L foot 2nd distal phalanx. OM confirmed 

on MRI 11/25. Amputation of second digit AM 11/28. Currently pain controlled 

waiting on path and culture results. POD #3


Plan: 





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


- LLE DEYSI studies reveal possible left tibial occlusive disease


- Podiatry on consult -Dr. Do - amputated second metatarsal head of left 

foot 11/29


- ID on consult - Dr. Baca recs appreciated s/p amputation depending on 

path and cultures


- Ceftaroline day# 11 as per ID (CrCl >55; can continue with current abx dose). 

Will f/u podiatry recs and path margin report which will determine length and 

choice of ABx. F/u ID recs thereafter for ABx choices suitable for ARLIN.


- L foot xray: no signs of osteomyelitis, cellulitis present


- Foot MRI 11/25 Marrow edema in 2nd proximal and middle phalanx consistent with

osteo


- Repeat wound cultures from 11/29 show MRSA


- Urine cx negative


- Final blood culture shows no growth after 5 days


- Procalcitonin 0.07





Hx of Hypertension


-Continue amlodipine and losartan. Losartan held per nursing due to bradycardia


- will consider increasing Losartan if BP remains elevated tomorrow


IVF discontinued, so will continue to monitor response to BP 





Hx of Pre-diabetes


- HgA1C 6.2


- Diabetes prevention and diet education


- ISS-low





DVT/GI PPx: 


- Heparin 5000 q8


- Protonix 





Dispo: SW - wife indicated that [facility] is [facility]. 


f/u podiatry recs and path margin report which will determine length and choice 

of ABx. F/u ID recs thereafter for ABx choices suitable for ARLIN.


F/u PT eval for strengthening





Patient seen, case reviewed and plan approved by Dr. Misti Martinez, PGY-1

## 2018-12-02 NOTE — PN
DATE:  12/02/2018



SUBJECTIVE:  The patient is in bed, in no acute distress.  He was seen

earlier today in room 562.  No fevers and chills.



PHYSICAL EXAMINATION:

VITAL SIGNS:  Temperature is 98, blood pressure is 120/70, respiratory rate

of 16.

HEENT:  Unremarkable.

NECK:  Supple.

LUNGS:  Have decreased breath sounds.

HEART:  Normal S1, S2.

ABDOMEN:  Soft.



LABORATORY EXAMINATION:  Reveals a white count of 7.9, hemoglobin of 10. 

Chemistries are noted, BUN of 27, creatinine of 1.5.  Microbiology reveals

MRSA from the pleural cultures and pathology is still pending.



ASSESSMENT AND PLAN:  This is an 83-year-old male who was seen earlier

today in 562, bed #1, with a left leg and foot cellulitis, second toe

gangrene, status post amputation postprocedure day #3 with Teflaro, with a

history of hypertension, coronary artery disease, renal disease, dementia,

and group G strep bacteremia.  Waiting for pathology and the operating room

cultures.  We will make further recommendations based on pathology and

operating room cultures, regarding antibiotic choice and duration of

antibiotics.





__________________________________________

Lj Baca MD





DD:  12/02/2018 15:55:02

DT:  12/02/2018 15:57:14

Job # [id number]

## 2018-12-02 NOTE — CP.PCM.PN
<Fer Benz - Last Filed: 12/02/18 11:56>





Subjective





- Date & Time of Evaluation


Date of Evaluation: 12/02/18


Time of Evaluation: 11:56





- Subjective


Subjective: 





Podiatry progress note for Dr. Howard





83M seen at bedside. Resting comfortably. Denies any pain to surgical site. 

Denies any acute events overnight, states he slept well and his pain is 

controlled. Denies N/V/F/C/SOB/CP and has no other pedal complaints at this 

time.





Objective





- Vital Signs/Intake and Output


Vital Signs (last 24 hours): 


                                        











Temp Pulse Resp BP Pulse Ox


 


 97.7 F   54 L  22   167/57 H  97 


 


 12/02/18 07:00  12/02/18 10:19  12/02/18 07:00  12/02/18 10:19  12/02/18 07:00








Intake and Output: 


                                        











 12/02/18 12/02/18





 06:59 18:59


 


Intake Total 540 


 


Balance 540 














- Medications


Medications: 


                               Current Medications





Acetaminophen (Tylenol 325mg Tab)  650 mg PO Q4H PRN


   PRN Reason: Pain, Mild (1-3)


Amlodipine Besylate (Norvasc)  5 mg PO BID [facility]


   Last Admin: 12/02/18 10:19 Dose:  5 mg


Heparin Sodium (Porcine) (Heparin)  5,000 units SC Q8H [facility]; Protocol


   Last Admin: 12/02/18 10:16 Dose:  5,000 units


Ceftaroline Fosamil 600 mg/ (Sodium Chloride)  100 mls @ 100 mls/hr IVPB Q12 [facility]


   Last Admin: 12/02/18 10:18 Dose:  100 mls/hr


Insulin Human Lispro (Humalog Low)  0 units SC ACHS [facility]; Protocol


   Last Admin: 12/02/18 07:41 Dose:  Not Given


Losartan Potassium (Cozaar)  25 mg PO DAILY [facility]


   Last Admin: 12/02/18 10:19 Dose:  25 mg


Metoclopramide HCl (Reglan)  10 mg IV ONCE PRN


   PRN Reason: Nausea/Vomiting


Ondansetron HCl (Zofran Inj)  4 mg IVP ONCE PRN


   PRN Reason: Nausea/Vomiting


Pantoprazole Sodium (Protonix Ec Tab)  20 mg PO 0600,1600 [facility]


   Last Admin: 12/02/18 05:48 Dose:  20 mg











- Labs


Labs: 


                                        





                                 12/02/18 06:30 





                                 12/02/18 06:30 





                                        











PT  14.8 SECONDS (9.4-12.5)  H  11/21/18  15:41    


 


INR  1.29   11/21/18  15:41    


 


APTT  34.7 Seconds (25.1-36.5)   11/21/18  15:41    














- Constitutional


Appears: Well, Non-toxic, No Acute Distress





- Head Exam


Head Exam: ATRAUMATIC, NORMOCEPHALIC





- Extremities Exam


Additional comments: 





Bilateral lower extremities exam





VASC: faintly palpable pedal pulses bilaterally, Temp gradient warm to cool in 

the right side and warm to warm in the left side. Cap refill < 3 sec to all 

remaining digits. Erythema noted to the left midfoot, improving. Moderate non 

pitting edema noted to the left foot. 





NEURO: Gross and protective sensations are grossly diminished





DERM: surgical site incision well coapted, sutures intact, no evidence of pus or

purulent drainage, mild serosanguinous drainage noted on dressing, no openings 

or signs of wound dehiscence, no probing, no clinical signs of infection





MSK: no pain on palpation of foot or legs bilaterally. Muscle power intact 5/5 

to all groups b/l.





- Neurological Exam


Neurological Exam: Alert, Awake, Oriented x3





- Psychiatric Exam


Psychiatric exam: Normal Affect, Normal Mood





Assessment and Plan





- Assessment and Plan (Free Text)


Assessment: 





83M POD 3 left second digit amputation with met head resection


Plan: 





Patient seen and evaluated at bedside


Discussed in detail with Dr. Howard


Afebrile, absent leukocytosis


Dressing taken down, surgical site cleansed with sterile saline, dressed with 

betadine, adaptic, DSD, light ACE


Wound cx 11/29 - gram positive cocci


Continue medications per ID 


Postoperative x-ray - normal changes s/p 2nd digit amputation


Pathology postop - pending, f/u results


Continue pain management


Patient seen by PT and evaluated - recommend [facility] for continued skilled PT and 

management


Will continue to follow patient while in house





<Priya Howard - Last Filed: 12/07/18 15:18>





Objective





- Vital Signs/Intake and Output


Vital Signs (last 24 hours): 


                                        











Temp Pulse Resp BP Pulse Ox


 


 97.6 F   43 L  20   144/86   96 


 


 12/07/18 14:00  12/07/18 14:00  12/07/18 14:00  12/07/18 14:00  12/07/18 14:00








Intake and Output: 


                                        











 12/07/18 12/07/18





 06:59 18:59


 


Intake Total 860 360


 


Output Total 1410 


 


Balance -550 360














- Medications


Medications: 


                               Current Medications





Acetaminophen (Tylenol 325mg Tab)  650 mg PO Q4H PRN


   PRN Reason: Pain, Mild (1-3)


Amlodipine Besylate (Norvasc)  5 mg PO BID [facility]


   Last Admin: 12/07/18 09:41 Dose:  5 mg


Heparin Sodium (Porcine) (Heparin)  5,000 units SC Q8H [facility]; Protocol


   Last Admin: 12/03/18 10:11 Dose:  5,000 units


Ceftaroline Fosamil 600 mg/ (Sodium Chloride)  100 mls @ 100 mls/hr IVPB Q12 [facility]


   Last Admin: 12/07/18 09:41 Dose:  100 mls/hr


Insulin Human Regular (Humulin R Med)  0 units SC ACHS [facility]; Protocol


   Last Admin: 12/07/18 11:50 Dose:  Not Given


Losartan Potassium (Cozaar)  25 mg PO DAILY [facility]


   Last Admin: 12/05/18 11:47 Dose:  25 mg


Ondansetron HCl (Zofran Inj)  4 mg IVP ONCE PRN


   PRN Reason: Nausea/Vomiting


Pantoprazole Sodium (Protonix Ec Tab)  20 mg PO 0600,1600 [facility]


   Last Admin: 12/07/18 05:45 Dose:  20 mg











- Labs


Labs: 


                                        





                                 12/07/18 06:45 





                                 12/07/18 06:45 





                                        











PT  15.0 SECONDS (9.4-12.5)  H  12/04/18  06:30    


 


INR  1.30   12/04/18  06:30    


 


APTT  38.5 Seconds (25.1-36.5)  H  12/04/18  06:30    














Attending/Attestation





- Attestation


I have personally seen and examined this patient.: Yes


I have fully participated in the care of the patient.: Yes


I have reviewed all pertinent clinical information, including history, physical 

exam and plan: Yes

## 2018-12-02 NOTE — PQF
PROVIDER RESPONSE TEXT:



Patient had NIDDM, diet controlled

Patient has diabetic perpheral Neuropathy.



REVIEWER QUERY TEXT:



Diabetic Associated Manifestations



Please specify any manifestations associated / due to diabetes

Such as:

-- Diabetes with renal manifestation

-- Diabetes with neurologic manifestation

-- Diabetes with ophthalmic manifestation

-- Diabetes with peripheral circulatory manifestation

-- Other, please specify



The patient's Clinical Indicators include:

Documentation in the medical record notes patient has pre-diabetes.

Documentation by podiatry notes "osteomyelitis of left second digit secondary to diabetic toe ulcer".


Please clarify if patient has diabetes and if this a diabetic complication for coding accuracy.





Query created by: Chelsea Christianson on 11/28/2018 1:19 PM





Electronically signed by:  Donnell Chappell MD 12/2/2018 11:46 AM

## 2018-12-03 LAB
ALBUMIN SERPL-MCNC: 3.3 G/DL (ref 3–4.8)
ALBUMIN/GLOB SERPL: 1 {RATIO} (ref 1.1–1.8)
ALT SERPL-CCNC: 17 U/L (ref 7–56)
AST SERPL-CCNC: 28 U/L (ref 17–59)
BASOPHILS # BLD AUTO: 0.03 K/MM3 (ref 0–2)
BASOPHILS NFR BLD: 0.4 % (ref 0–3)
BUN SERPL-MCNC: 28 MG/DL (ref 7–21)
CALCIUM SERPL-MCNC: 9 MG/DL (ref 8.4–10.5)
EOSINOPHIL # BLD: 0.4 10*3/UL (ref 0–0.7)
EOSINOPHIL NFR BLD: 4.4 % (ref 1.5–5)
ERYTHROCYTE [DISTWIDTH] IN BLOOD BY AUTOMATED COUNT: 13.7 % (ref 11.5–14.5)
GFR NON-AFRICAN AMERICAN: 48
GRANULOCYTES # BLD: 5.15 10*3/UL (ref 1.4–6.5)
GRANULOCYTES NFR BLD: 64.4 % (ref 50–68)
HGB BLD-MCNC: 11.3 G/DL (ref 14–18)
LYMPHOCYTES # BLD: 1.9 10*3/UL (ref 1.2–3.4)
LYMPHOCYTES NFR BLD AUTO: 23.5 % (ref 22–35)
MCH RBC QN AUTO: 26.4 PG (ref 25–35)
MCHC RBC AUTO-ENTMCNC: 32.1 G/DL (ref 31–37)
MCV RBC AUTO: 82.2 FL (ref 80–105)
MONOCYTES # BLD AUTO: 0.6 10*3/UL (ref 0.1–0.6)
MONOCYTES NFR BLD: 7.3 % (ref 1–6)
PLATELET # BLD: 213 10^3/UL (ref 120–450)
PMV BLD AUTO: 8.1 FL (ref 7–11)
RBC # BLD AUTO: 4.28 10^6/UL (ref 3.5–6.1)
WBC # BLD AUTO: 8 10^3/UL (ref 4.5–11)

## 2018-12-03 RX ADMIN — PANTOPRAZOLE SODIUM SCH: 20 TABLET, DELAYED RELEASE ORAL at 19:30

## 2018-12-03 RX ADMIN — INSULIN LISPRO SCH: 100 INJECTION, SOLUTION INTRAVENOUS; SUBCUTANEOUS at 17:49

## 2018-12-03 RX ADMIN — PANTOPRAZOLE SODIUM SCH MG: 20 TABLET, DELAYED RELEASE ORAL at 17:32

## 2018-12-03 RX ADMIN — INSULIN LISPRO SCH: 100 INJECTION, SOLUTION INTRAVENOUS; SUBCUTANEOUS at 10:12

## 2018-12-03 RX ADMIN — INSULIN LISPRO SCH: 100 INJECTION, SOLUTION INTRAVENOUS; SUBCUTANEOUS at 17:48

## 2018-12-03 NOTE — CP.PCM.PN
<Garfield Sanches - Last Filed: 12/03/18 19:54>





Subjective





- Date & Time of Evaluation


Date of Evaluation: 12/03/18


Time of Evaluation: 10:37





- Subjective


Subjective: 





Podiatry progress note for Dr. Howard





84 y/o M patient  seen and evaluated at the bedside 5 days S/P left second digit

amputation with met head resection. Patient was resting comfortably and NAD. He 

denies any pain to surgical site. Denies any acute events overnight. Patient 

denies N/V/F/C/SOB/CP and has no other pedal complaints at this time.














Objective





- Vital Signs/Intake and Output


Vital Signs (last 24 hours): 


                                        











Temp Pulse Resp BP Pulse Ox


 


 97.6 F   60   20   160/58 H  96 


 


 12/03/18 06:00  12/03/18 10:09  12/03/18 06:00  12/03/18 10:09  12/03/18 06:00











- Medications


Medications: 


                               Current Medications





Acetaminophen (Tylenol 325mg Tab)  650 mg PO Q4H PRN


   PRN Reason: Pain, Mild (1-3)


Amlodipine Besylate (Norvasc)  5 mg PO BID [facility]


   Last Admin: 12/03/18 10:09 Dose:  5 mg


Heparin Sodium (Porcine) (Heparin)  5,000 units SC Q8H [facility]; Protocol


   Last Admin: 12/03/18 10:11 Dose:  5,000 units


Ceftaroline Fosamil 600 mg/ (Sodium Chloride)  100 mls @ 100 mls/hr IVPB Q12 [facility]


   Last Admin: 12/03/18 10:08 Dose:  100 mls/hr


Insulin Human Lispro (Humalog Low)  0 units SC ACHS [facility]; Protocol


   Last Admin: 12/03/18 10:12 Dose:  Not Given


Losartan Potassium (Cozaar)  25 mg PO DAILY [facility]


   Last Admin: 12/03/18 10:09 Dose:  25 mg


Metoclopramide HCl (Reglan)  10 mg IV ONCE PRN


   PRN Reason: Nausea/Vomiting


Ondansetron HCl (Zofran Inj)  4 mg IVP ONCE PRN


   PRN Reason: Nausea/Vomiting


Pantoprazole Sodium (Protonix Ec Tab)  20 mg PO 0600,1600 [facility]


   Last Admin: 12/02/18 16:45 Dose:  Not Given











- Labs


Labs: 


                                        





                                 12/03/18 06:20 





                                 12/03/18 06:20 





                                        











PT  14.8 SECONDS (9.4-12.5)  H  11/21/18  15:41    


 


INR  1.29   11/21/18  15:41    


 


APTT  34.7 Seconds (25.1-36.5)   11/21/18  15:41    














- Constitutional


Appears: Well, Non-toxic, No Acute Distress





- Head Exam


Head Exam: ATRAUMATIC, NORMOCEPHALIC





- Extremities Exam


Additional comments: 





Bilateral lower extremities exam





VASC: faintly palpable pedal pulses bilaterally, Temp gradient warm to cool in 

the right side and warm to warm in the left side. Cap refill < 3 sec to all rem

aining digits. No erythema noted. Mild non pitting edema noted to the left foot.







NEURO: Gross and protective sensations are grossly diminished





DERM: Surgical site incision well coapted, sutures intact, no evidence of pus or

purulent drainage, No drainage noted through the dressing, no openings or signs 

of wound dehiscence, no probing, no clinical signs of infection





MSK: No pain on palpation of foot or legs bilaterally. Muscle power intact 5/5 

to all groups b/l.














- Neurological Exam


Neurological Exam: Alert, Awake, Oriented x3





Assessment and Plan





- Assessment and Plan (Free Text)


Assessment: 





84 y/o M patient  seen and evaluated at the bedside 5 days S/P left second digit

amputation with met head resection


 





Plan: 








Patient seen and evaluated at bedside with Dr. Howard


Discussed in detail with Dr. Howard


Charts, labs ad vitals reviewed; Afebrile, absent leukocytosis


Dressing taken down, surgical site dressed with betadine, adaptic, DSD, light 

ACE


Wound cx 11/29 - MRSAi


Continue medications per ID 


Postoperative x-ray - normal changes s/p 2nd digit amputation, fragment of bone 

noted at the amputation site


Pathology postop - OM of the amputated toe, 2nd met head clean with no OM.


Continue pain management as needed


Patient seen by PT and evaluated - recommend [facility] for continued skilled PT and 

management


Patient planned to go to the OR tomorrow 12/4 to excise the bone fragment from 

the amputation site


Podiatry will continue to follow up the patient while in house








<Priya Howard - Last Filed: 12/07/18 15:10>





Objective





- Vital Signs/Intake and Output


Vital Signs (last 24 hours): 


                                        











Temp Pulse Resp BP Pulse Ox


 


 97.6 F   43 L  20   144/86   96 


 


 12/07/18 14:00  12/07/18 14:00  12/07/18 14:00  12/07/18 14:00  12/07/18 14:00








Intake and Output: 


                                        











 12/07/18 12/07/18





 06:59 18:59


 


Intake Total 860 360


 


Output Total 1410 


 


Balance -550 360














- Medications


Medications: 


                               Current Medications





Acetaminophen (Tylenol 325mg Tab)  650 mg PO Q4H PRN


   PRN Reason: Pain, Mild (1-3)


Amlodipine Besylate (Norvasc)  5 mg PO BID [facility]


   Last Admin: 12/07/18 09:41 Dose:  5 mg


Heparin Sodium (Porcine) (Heparin)  5,000 units SC Q8H [facility]; Protocol


   Last Admin: 12/03/18 10:11 Dose:  5,000 units


Ceftaroline Fosamil 600 mg/ (Sodium Chloride)  100 mls @ 100 mls/hr IVPB Q12 [facility]


   Last Admin: 12/07/18 09:41 Dose:  100 mls/hr


Insulin Human Regular (Humulin R Med)  0 units SC ACHS [facility]; Protocol


   Last Admin: 12/07/18 11:50 Dose:  Not Given


Losartan Potassium (Cozaar)  25 mg PO DAILY [facility]


   Last Admin: 12/05/18 11:47 Dose:  25 mg


Ondansetron HCl (Zofran Inj)  4 mg IVP ONCE PRN


   PRN Reason: Nausea/Vomiting


Pantoprazole Sodium (Protonix Ec Tab)  20 mg PO 0600,1600 [facility]


   Last Admin: 12/07/18 05:45 Dose:  20 mg











- Labs


Labs: 


                                        





                                 12/07/18 06:45 





                                 12/07/18 06:45 





                                        











PT  15.0 SECONDS (9.4-12.5)  H  12/04/18  06:30    


 


INR  1.30   12/04/18  06:30    


 


APTT  38.5 Seconds (25.1-36.5)  H  12/04/18  06:30    














Attending/Attestation





- Attestation


I have personally seen and examined this patient.: Yes


I have fully participated in the care of the patient.: Yes


I have reviewed all pertinent clinical information, including history, physical 

exam and plan: Yes

## 2018-12-03 NOTE — CP.PCM.PN
Subjective





- Date & Time of Evaluation


Date of Evaluation: 12/03/18


Time of Evaluation: 08:50





- Subjective


Subjective: 





Comfortable, no fevers.





Objective





- Vital Signs/Intake and Output


Vital Signs (last 24 hours): 


                                        











Temp Pulse Resp BP Pulse Ox


 


 98.5 F   45 L  20   151/56 H  100 


 


 12/03/18 14:00  12/03/18 17:32  12/03/18 14:00  12/03/18 17:32  12/03/18 14:00











- Medications


Medications: 


                               Current Medications





Acetaminophen (Tylenol 325mg Tab)  650 mg PO Q4H PRN


   PRN Reason: Pain, Mild (1-3)


Amlodipine Besylate (Norvasc)  5 mg PO BID [facility]


   Last Admin: 12/03/18 17:32 Dose:  5 mg


Heparin Sodium (Porcine) (Heparin)  5,000 units SC Q8H [facility]; Protocol


   Last Admin: 12/03/18 10:11 Dose:  5,000 units


Ceftaroline Fosamil 600 mg/ (Sodium Chloride)  100 mls @ 100 mls/hr IVPB Q12 [facility]


   Last Admin: 12/03/18 10:08 Dose:  100 mls/hr


Insulin Human Lispro (Humalog Low)  0 units SC ACHS [facility]; Protocol


   Last Admin: 12/03/18 17:49 Dose:  Not Given


Losartan Potassium (Cozaar)  25 mg PO DAILY [facility]


   Last Admin: 12/03/18 10:09 Dose:  25 mg


Ondansetron HCl (Zofran Inj)  4 mg IVP ONCE PRN


   PRN Reason: Nausea/Vomiting


Pantoprazole Sodium (Protonix Ec Tab)  20 mg PO 0600,1600 [facility]


   Last Admin: 12/03/18 19:30 Dose:  Not Given











- Labs


Labs: 


                                        





                                 12/03/18 06:20 





                                 12/03/18 06:20 





                                        











PT  14.8 SECONDS (9.4-12.5)  H  11/21/18  15:41    


 


INR  1.29   11/21/18  15:41    


 


APTT  34.7 Seconds (25.1-36.5)   11/21/18  15:41    














- Constitutional


Appears: Chronically Ill





- Head Exam


Head Exam: NORMAL INSPECTION





- Respiratory Exam


Respiratory Exam: Decreased Breath Sounds





- Cardiovascular Exam


Cardiovascular Exam: +S1, +S2





- GI/Abdominal Exam


GI & Abdominal Exam: Soft.  absent: Tenderness





- Extremities Exam


Additional comments: 





left foot with dressings in place





Assessment and Plan





- Assessment and Plan (Free Text)


Plan: 





Assessment


left leg and foot cellulitis and 2nd toe gangrene, with osteomyelitis with MRSA 

S/P amputation POD #4 - margins of bone still with osteomyelitis


history of Group G strep bacteremia, probably secondary to bilateral lower 

extremities skin and skin structure infection;


history of rhabdomyolysis


CAD


HTN


chronic renal failure


dementia


obesity with BMI 32





Plan


continue Teflaro and will need at least 4-6 weeks of antibiotics with weekly 

ESR, CRP, CBC, CMP to be followed by the wound care center while on antibiotics

## 2018-12-03 NOTE — CP.PCM.PN
<Terry Martinez - Last Filed: 12/03/18 15:03>





Subjective





- Date & Time of Evaluation


Date of Evaluation: 12/03/18


Time of Evaluation: 07:30





- Subjective


Subjective: 


Terry Martinez PGY-1 Progress Note for Hospitalist Service





Patient seen and evaluated at bedside. No acute complaints reported overnight. 

Patient states his pain is well controlled. He was able to tolerate out of bed 

to chair yesterday. Denies fevers, chills, chest pain, shortness of breath, 

abdominal pain. Denies left foot pain.











Objective





- Vital Signs/Intake and Output


Vital Signs (last 24 hours): 


                                        











Temp Pulse Resp BP Pulse Ox


 


 97.6 F   60   20   160/58 H  96 


 


 12/03/18 06:00  12/03/18 06:00  12/03/18 06:00  12/03/18 06:00  12/03/18 06:00











- Medications


Medications: 


                               Current Medications





Acetaminophen (Tylenol 325mg Tab)  650 mg PO Q4H PRN


   PRN Reason: Pain, Mild (1-3)


Amlodipine Besylate (Norvasc)  5 mg PO BID [facility]


   Last Admin: 12/02/18 18:44 Dose:  5 mg


Heparin Sodium (Porcine) (Heparin)  5,000 units SC Q8H [facility]; Protocol


   Last Admin: 12/03/18 02:29 Dose:  Not Given


Ceftaroline Fosamil 600 mg/ (Sodium Chloride)  100 mls @ 100 mls/hr IVPB Q12 [facility]


   Last Admin: 12/02/18 21:21 Dose:  100 mls/hr


Insulin Human Lispro (Humalog Low)  0 units SC ACHS [facility]; Protocol


   Last Admin: 12/02/18 21:23 Dose:  Not Given


Losartan Potassium (Cozaar)  25 mg PO DAILY [facility]


   Last Admin: 12/02/18 10:19 Dose:  25 mg


Metoclopramide HCl (Reglan)  10 mg IV ONCE PRN


   PRN Reason: Nausea/Vomiting


Ondansetron HCl (Zofran Inj)  4 mg IVP ONCE PRN


   PRN Reason: Nausea/Vomiting


Pantoprazole Sodium (Protonix Ec Tab)  20 mg PO 0600,1600 [facility]


   Last Admin: 12/02/18 16:45 Dose:  Not Given











- Labs


Labs: 


                                        





                                 12/03/18 06:20 





                                 12/03/18 06:20 





                                        











PT  14.8 SECONDS (9.4-12.5)  H  11/21/18  15:41    


 


INR  1.29   11/21/18  15:41    


 


APTT  34.7 Seconds (25.1-36.5)   11/21/18  15:41    














- Additional Findings


Additional findings: 





- Constitutional


Appears: Well, Non-toxic, No Acute Distress





- Head Exam


Head Exam: ATRAUMATIC, NORMAL INSPECTION, NORMOCEPHALIC





- Eye Exam


Eye Exam: EOMI, Normal appearance





- ENT Exam


ENT Exam: Mucous Membranes Moist





- Respiratory Exam


Respiratory Exam: Clear to Ausculation Bilateral, NORMAL BREATHING PATTERN





- Cardiovascular Exam


Cardiovascular Exam: REGULAR RHYTHM, +S1, +S2





- GI/Abdominal Exam


GI & Abdominal Exam: Soft, Normal Bowel Sounds.  absent: Tenderness





- Extremities Exam


Extremities Exam: Pedal Edema (left lower extremity).  absent: Calf Tenderness, 

Normal Inspection (left lower leg), Tenderness





- Back Exam


Back Exam: NORMAL INSPECTION





- Neurological Exam


Neurological Exam: Alert, Awake, Oriented x3. Remaining toes have poor sensation

2/2 diabetic peripheral neuropathy





- Psychiatric Exam


Psychiatric exam: Normal Affect, Normal Mood





- Skin


Skin Exam: absent: Normal Color


Additional comments: 





amputated second metatarsal head of left foot - dressings c/d/i





Assessment and Plan





- Assessment and Plan (Free Text)


Assessment: 





82 y/o M with PMHx of peripheral artery disease, chronic LLE cellulitis, COPD, 

hypertension, dementia, obesity presents to [facility] with complaints of LLE anterior 

tibial region cellulitis and gangrenous L foot 2nd distal phalanx. OM confirmed 

on MRI 11/25. Amputation of second digit AM 11/28. Currently pain controlled 

waiting on ID and podiatry recs. POD #4.





Plan: 





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


- LLE DEYSI studies reveal possible left tibial occlusive disease


- Podiatry on consult -Dr. Do - amputated second metatarsal head of left 

foot 11/29


- ID on consult - Dr. Baca recs appreciated s/p amputation depending on 

path and cultures





- Ceftaroline day# 12 as per ID (CrCl >55; can continue with current abx dose). 

Will f/u podiatry recs





Path: margin report 12/3: Inflammatory process involves bone and soft tissue at 

resection margin. Separate bone with OM. Separate skin and soft tissue with 

marked acute inflammation.


-f/u podiatry and ID recs thereafter for oral ABx choices suitable for ARLIN.





- L foot xray: no signs of osteomyelitis, cellulitis present


- Foot MRI 11/25 Marrow edema in 2nd proximal and middle phalanx consistent with

osteo


- Repeat wound cultures from 11/29 show MRSA


- Urine cx negative


- Final blood culture shows no growth after 5 days


- Procalcitonin 0.07





Hx of Hypertension


-Continue amlodipine and losartan. Losartan held per nursing due to bradycardia


- will consider increasing Losartan if BP remains elevated tomorrow


IVF discontinued, so will continue to monitor response to BP 





Hx of Pre-diabetes, Diabetic Peripheral Neuropathy


- HgA1C 6.2


- Diabetes prevention and diet education


- ISS-low





DVT/GI PPx: 


- Heparin 5000 q8


- Protonix 





Dispo: SW/CM - ARLIN when medically clear


F/u ID recs thereafter for ABx choices suitable for ARLIN placement


F/u PT eval for strengthening





Patient seen, case reviewed and plan approved by Dr. Cote.





Terry Martinez, PGY-1








<Maynor Cote - Last Filed: 12/03/18 16:07>





Objective





- Vital Signs/Intake and Output


Vital Signs (last 24 hours): 


                                        











Temp Pulse Resp BP Pulse Ox


 


 98.5 F   45 L  20   151/56 H  100 


 


 12/03/18 14:00  12/03/18 14:00  12/03/18 14:00  12/03/18 14:00  12/03/18 14:00











- Medications


Medications: 


                               Current Medications





Acetaminophen (Tylenol 325mg Tab)  650 mg PO Q4H PRN


   PRN Reason: Pain, Mild (1-3)


Amlodipine Besylate (Norvasc)  5 mg PO BID [facility]


   Last Admin: 12/03/18 10:09 Dose:  5 mg


Heparin Sodium (Porcine) (Heparin)  5,000 units SC Q8H [facility]; Protocol


   Last Admin: 12/03/18 10:11 Dose:  5,000 units


Ceftaroline Fosamil 600 mg/ (Sodium Chloride)  100 mls @ 100 mls/hr IVPB Q12 [facility]


   Last Admin: 12/03/18 10:08 Dose:  100 mls/hr


Insulin Human Lispro (Humalog Low)  0 units SC ACHS [facility]; Protocol


   Last Admin: 12/03/18 10:12 Dose:  Not Given


Losartan Potassium (Cozaar)  25 mg PO DAILY [facility]


   Last Admin: 12/03/18 10:09 Dose:  25 mg


Ondansetron HCl (Zofran Inj)  4 mg IVP ONCE PRN


   PRN Reason: Nausea/Vomiting


Pantoprazole Sodium (Protonix Ec Tab)  20 mg PO 0600,1600 [facility]


   Last Admin: 12/02/18 16:45 Dose:  Not Given











- Labs


Labs: 


                                        





                                 12/03/18 06:20 





                                 12/03/18 06:20 





                                        











PT  14.8 SECONDS (9.4-12.5)  H  11/21/18  15:41    


 


INR  1.29   11/21/18  15:41    


 


APTT  34.7 Seconds (25.1-36.5)   11/21/18  15:41    














Attending/Attestation





- Attestation


I have personally seen and examined this patient.: Yes


I have fully participated in the care of the patient.: Yes


I have reviewed all pertinent clinical information, including history, physical 

exam and plan: Yes


Notes (Text): 





12/03/18 16:00


83 year old male with past medical history of PAD, COPD, hypertension, and 

chronic LE cellulitis who presented with complaint of left lower extremity 

cellulitis and gangrenous left 2nd distal phalanx.  Foot MRI was consistent with

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  Patient 

is on IV antibiotics.  ID and podiatry are following.  He is s/p amputation of 

second digit POD #5.  Pathology came back today, reviewed as above; will review 

findings with ID and podiatry and discuss with  regarding ARLIN.





Maynor Cote MD


Hospitalist.

## 2018-12-04 LAB
ALBUMIN SERPL-MCNC: 3.4 G/DL (ref 3–4.8)
ALBUMIN/GLOB SERPL: 1 {RATIO} (ref 1.1–1.8)
ALT SERPL-CCNC: 21 U/L (ref 7–56)
APTT BLD: 38.5 SECONDS (ref 25.1–36.5)
AST SERPL-CCNC: 25 U/L (ref 17–59)
BASOPHILS # BLD AUTO: 0.03 K/MM3 (ref 0–2)
BASOPHILS NFR BLD: 0.4 % (ref 0–3)
BUN SERPL-MCNC: 28 MG/DL (ref 7–21)
CALCIUM SERPL-MCNC: 9.1 MG/DL (ref 8.4–10.5)
EOSINOPHIL # BLD: 0.3 10*3/UL (ref 0–0.7)
EOSINOPHIL NFR BLD: 4.4 % (ref 1.5–5)
ERYTHROCYTE [DISTWIDTH] IN BLOOD BY AUTOMATED COUNT: 13.7 % (ref 11.5–14.5)
GFR NON-AFRICAN AMERICAN: 53
GRANULOCYTES # BLD: 4.74 10*3/UL (ref 1.4–6.5)
GRANULOCYTES NFR BLD: 65.2 % (ref 50–68)
HGB BLD-MCNC: 11 G/DL (ref 14–18)
INR PPP: 1.3
LYMPHOCYTES # BLD: 1.6 10*3/UL (ref 1.2–3.4)
LYMPHOCYTES NFR BLD AUTO: 21.6 % (ref 22–35)
MCH RBC QN AUTO: 26.4 PG (ref 25–35)
MCHC RBC AUTO-ENTMCNC: 32.2 G/DL (ref 31–37)
MCV RBC AUTO: 82.2 FL (ref 80–105)
MONOCYTES # BLD AUTO: 0.6 10*3/UL (ref 0.1–0.6)
MONOCYTES NFR BLD: 8.4 % (ref 1–6)
PLATELET # BLD: 213 10^3/UL (ref 120–450)
PMV BLD AUTO: 8.4 FL (ref 7–11)
PROTHROMBIN TIME: 15 SECONDS (ref 9.4–12.5)
RBC # BLD AUTO: 4.16 10^6/UL (ref 3.5–6.1)
WBC # BLD AUTO: 7.3 10^3/UL (ref 4.5–11)

## 2018-12-04 PROCEDURE — 0QBR0ZZ EXCISION OF LEFT TOE PHALANX, OPEN APPROACH: ICD-10-PCS | Performed by: PODIATRIST

## 2018-12-04 RX ADMIN — PANTOPRAZOLE SODIUM SCH: 20 TABLET, DELAYED RELEASE ORAL at 16:07

## 2018-12-04 RX ADMIN — INSULIN HUMAN SCH: 100 INJECTION, SOLUTION PARENTERAL at 21:57

## 2018-12-04 RX ADMIN — INSULIN HUMAN SCH: 100 INJECTION, SOLUTION PARENTERAL at 11:46

## 2018-12-04 RX ADMIN — INSULIN LISPRO SCH: 100 INJECTION, SOLUTION INTRAVENOUS; SUBCUTANEOUS at 05:12

## 2018-12-04 RX ADMIN — INSULIN LISPRO SCH: 100 INJECTION, SOLUTION INTRAVENOUS; SUBCUTANEOUS at 09:40

## 2018-12-04 RX ADMIN — PANTOPRAZOLE SODIUM SCH MG: 20 TABLET, DELAYED RELEASE ORAL at 05:12

## 2018-12-04 RX ADMIN — INSULIN HUMAN SCH: 100 INJECTION, SOLUTION PARENTERAL at 16:30

## 2018-12-04 NOTE — PCM.SURG1
Surgeon's Initial Post Op Note





- Surgeon's Notes


Surgeon: Dr. Rodrigo Do. DPM


Assistant: Dr. Garfield Sanches. DPM/PGY1


Type of Anesthesia: IV Sedation, Local


Anesthesia Administered By: Dr. Campo


Pre-Operative Diagnosis: L 4th toe infected ulcer with underlying OM 6 days S/P 

L 4th toe amputation.


Operative Findings: See Dictation.  Injectables: 10 cc mix of 2% lidocaine and 

0.5% marcaine.  Materials: 4-0 vicryl and 3-0 Nylon.


Post-Operative Diagnosis: Same


Operation Performed: Revision of L 4th toe amputation


Specimen/Specimens Removed: Fragments of L 4t proximal phalanx.  Deep tissue 

culture


Estimated Blood Loss: EBL {In ML}: 1


Blood Products Given: N/A


Drains Used: No Drains


Post-Op Condition: Good


Date of Surgery/Procedure: 12/04/18


Time of Surgery/Procedure: 19:47

## 2018-12-04 NOTE — CARD
--------------- APPROVED REPORT --------------





Date of service: 12/04/2018



EKG Measurement

Heart [id number]

AR 162P

[id number]

[id number]

[id number]



<Conclusion>

Marked sinus bradycardia

Right bundle branch block

T wave abnormality, consider lateral ischemia

Abnormal ECG

## 2018-12-04 NOTE — CARD
--------------- APPROVED REPORT --------------





Date of service: 12/04/2018



EXAM: Two-dimensional and M-mode echocardiogram with Doppler and 

color Doppler.



INDICATION

Pre-Op LV Function:SystolicDiastolic



2D DIMENSIONS 

Left Atrium (2D)4.9   (1.6-4.0cm)IVSd1.2   (0.7-1.1cm)

LVDd5.0   (3.9-5.9cm)PWd1.2   (0.7-1.1cm)

LVDs3.6   (2.5-4.0cm)FS (%) 27.8   %

LVEF (%)53.8   (>50%)



M-Mode DIMENSIONS 

Aortic Root3.90   (2.2-3.7cm)Aortic Cusp Exc.1.50   (1.5-2.0cm)



Aortic Valve

AoV Peak Tiorptmw735.0cm/Tisha Peak GR.10mmHg



Mitral Valve

MV E Onznvlva66.3cm/sMV A Yucvrwwl72.2cm/sE/A ratio0.9



TDI

Lateral E' Peak V8.09cm/sMedial E' Peak V5.46cm/sE/Lateral E'11.3

E/Medial E'16.7



Pulmonary Valve

PV Peak Ptggxawm27.7cm/sPV Peak Grad.2mmHg



Tricuspid Valve

TR Peak Wkzilfya382es/sRAP [id number] Peak Gr.9mmHg

[id number]



 LEFT VENTRICLE 

The left ventricle is normal size. There is borderline to mild 

concentric left ventricular hypertrophy. The left ventricular 

function is normal.EF-55% There is normal LV segmental wall motion. 

Transmitral Doppler flow pattern is Grade III-reversible restrictive 

diastolic dysfunction. No left ventricle thrombus noted on this 

study. There is no ventricular septal defect visualized. There is no 

left ventricular aneurysm. There is no mass noted in the left 

ventricle.



 RIGHT VENTRICLE 

The right ventricle is mildly to moderately dilated. The right 

ventricle is mildly hypertrophied. Systolic function of RV is mildly 

to moderately reduced.



 ATRIA 

The left atrium is mildly dilated. The right atrium is mildly 

dilated. The interatrial septum is intact with no evidence for an 

atrial septal defect.



 AORTIC VALVE 

The aortic valve is calcified and displays decreased opening.( non 

coronary Cusp is immobile) No aortic regurgitation is present. Aortic 

Sclerosis Vs Mild AS There is no aortic valvular vegetation.



 MITRAL VALVE 

The mitral valve is thickened but opens well. Mitral annular 

calcification is mild to moderate. Mitral regurgitation is trace to 

mild. There is no mitral valve stenosis. There is no evidence of 

mitral valve prolapse.



 TRICUSPID VALVE 

The tricuspid valve leaflets are thickened , but open well. There is 

trace tricuspid regurgitation. There is no tricuspid valve stenosis. 

There is no tricuspid valve prolapse or vegetation.



 PULMONIC VALVE 

The pulmonic valve is mildly thickened. There is mild to moderate 

pulmonic valvular regurgitation. There is no pulmonic valvular 

stenosis.



 GREAT VESSELS 

The aortic root is normal in size. The ascending aorta is normal in 

size. The pulmonary artery is normal. The IVC is normal in size and 

collapses >50% with inspiration.



 PERICARDIAL EFFUSION 

There is no pleural effusion. There is no pericardial effusion.



<Conclusion>

The left ventricle is normal size.

There is borderline to mild concentric left ventricular hypertrophy.

The left ventricular function is normal.EF-55%

The right ventricle is mildly to moderately dilated.

Systolic function of RV is mildly to moderately reduced.

Aortic Sclerosis Vs Mild AS

Mitral regurgitation is trace to mild.

There is trace tricuspid regurgitation.

There is mild to moderate pulmonic valvular regurgitation.

The IVC is normal in size and collapses >50% with inspiration.

There is no pericardial effusion.

No Vegetation or thrombus noted.

## 2018-12-04 NOTE — CP.PCM.PN
<Terry Martinez - Last Filed: 12/04/18 14:50>





Subjective





- Date & Time of Evaluation


Date of Evaluation: 12/04/18


Time of Evaluation: 08:30





- Subjective


Subjective: 


Terry Martinez PGY-1 Progress Note for Hospitalist Service





Patient seen and evaluated at bedside. No acute complaints reported overnight. 

Patient states his pain is well controlled. He was able to tolerate out of bed 

to chair yesterday. Denies fevers, chills, chest pain, shortness of breath, 

abdominal pain. Denies left foot pain. Plan for OR today for bone fragment of L 

foot. 








Objective





- Vital Signs/Intake and Output


Vital Signs (last 24 hours): 


                                        











Temp Pulse Resp BP Pulse Ox


 


 97.5 F L  31 L  18   122/59 L  97 


 


 12/04/18 07:00  12/04/18 12:45  12/04/18 12:45  12/04/18 12:45  12/04/18 07:00








Intake and Output: 


                                        











 12/04/18 12/04/18





 06:59 18:59


 


Output Total 1000 


 


Balance -1000 














- Medications


Medications: 


                               Current Medications





Acetaminophen (Tylenol 325mg Tab)  650 mg PO Q4H PRN


   PRN Reason: Pain, Mild (1-3)


Amlodipine Besylate (Norvasc)  5 mg PO BID [facility]


   Last Admin: 12/04/18 09:25 Dose:  Not Given


Heparin Sodium (Porcine) (Heparin)  5,000 units SC Q8H [facility]; Protocol


   Last Admin: 12/03/18 10:11 Dose:  5,000 units


Ceftaroline Fosamil 600 mg/ (Sodium Chloride)  100 mls @ 100 mls/hr IVPB Q12 [facility]


   Last Admin: 12/04/18 10:42 Dose:  100 mls/hr


Insulin Human Regular (Humulin R Med)  0 units SC ACHS [facility]; Protocol


   Last Admin: 12/04/18 11:46 Dose:  Not Given


Losartan Potassium (Cozaar)  25 mg PO DAILY [facility]


   Last Admin: 12/04/18 09:25 Dose:  Not Given


Ondansetron HCl (Zofran Inj)  4 mg IVP ONCE PRN


   PRN Reason: Nausea/Vomiting


Pantoprazole Sodium (Protonix Ec Tab)  20 mg PO 0600,1600 [facility]


   Last Admin: 12/04/18 05:12 Dose:  20 mg











- Labs


Labs: 


                                        





                                 12/04/18 06:30 





                                 12/04/18 06:30 





                                        











PT  15.0 SECONDS (9.4-12.5)  H  12/04/18  06:30    


 


INR  1.30   12/04/18  06:30    


 


APTT  38.5 Seconds (25.1-36.5)  H  12/04/18  06:30    














- Additional Findings


Additional findings: 





- Constitutional


Appears: Well, Non-toxic, No Acute Distress





- Head Exam


Head Exam: ATRAUMATIC, NORMAL INSPECTION, NORMOCEPHALIC





- Eye Exam


Eye Exam: EOMI, Normal appearance





- ENT Exam


ENT Exam: Mucous Membranes Moist





- Respiratory Exam


Respiratory Exam: Clear to Ausculation Bilateral, NORMAL BREATHING PATTERN





- Cardiovascular Exam


Cardiovascular Exam: REGULAR RHYTHM, +S1, +S2





- GI/Abdominal Exam


GI & Abdominal Exam: Soft, Normal Bowel Sounds.  absent: Tenderness





- Extremities Exam


Extremities Exam: Pedal Edema (left lower extremity).  absent: Calf Tenderness, 

Normal Inspection (left lower leg), Tenderness





- Back Exam


Back Exam: NORMAL INSPECTION





- Neurological Exam


Neurological Exam: Alert, Awake, Oriented x3. Remaining toes have poor sensation

2/2 diabetic peripheral neuropathy





- Psychiatric Exam


Psychiatric exam: Normal Affect, Normal Mood





- Skin


Skin Exam: absent: Normal Color


Additional comments: 





amputated second metatarsal head of left foot - dressings c/d/i





Assessment and Plan





- Assessment and Plan (Free Text)


Assessment: 





82 y/o M with PMHx of peripheral artery disease, chronic LLE cellulitis, COPD, 

hypertension, dementia, obesity presents to [facility] with complaints of LLE anterior 

tibial region cellulitis and gangrenous L foot 2nd distal phalanx. OM confirmed 

on MRI 11/25. Amputation of second digit AM 11/28. Currently pain controlled 

waiting on ID and podiatry recs. POD #5.





Plan: 





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


- LLE DEYSI studies reveal possible left tibial occlusive disease


- Podiatry on consult -Dr. Do - amputated second metatarsal head of left 

foot 11/29. OR 12/4 to excise the bone fragment from the amputation site


- ID on consult - Dr. Baca recs- at least 4-6 weeks of Ceftaroline with 

weekly ESR, CRP, CBC and CMP





- Ceftaroline day# 13 as per ID (CrCl >55; can continue with current abx dose).





Path: margin report 12/3: Inflammatory process involves bone and soft tissue at 

resection margin. Separate bone with OM. Separate skin and soft tissue with 

marked acute inflammation.


-f/u podiatry and ID recs thereafter for oral ABx choices suitable for ARLIN.





- L foot xray: no signs of osteomyelitis, cellulitis present


- Foot MRI 11/25 Marrow edema in 2nd proximal and middle phalanx consistent with

osteo


- Repeat wound cultures from 11/29 show MRSA


- Urine cx negative


- Final blood culture shows no growth after 5 days


- Procalcitonin 0.07





Bradycardia


HR into 30s


Cardiology consulted in advance of surgery- Dr. Golden


f/u Hgba1c, lipid panel, Mg, Phos, TSH


Per Dr. Golden, patient cleared for surgery with podiatry








Hx of Hypertension


-Continue amlodipine and losartan. Losartan held per nursing due to bradycardia


- will consider increasing Losartan if BP remains elevated tomorrow


IVF discontinued, so will continue to monitor response to BP 





Hx of Pre-diabetes, Diabetic Peripheral Neuropathy


- HgA1C 6.2


- Diabetes prevention and diet education


- ISS-low





DVT/GI PPx: 


- Heparin 5000 q8


- Protonix 





Dispo: SW/CM - ARLIN when medically clear


F/u ID recs thereafter for oral vs IV ABx choice suitable for ARLIN placement


F/u PT eval for strengthening





Patient seen, case reviewed and plan approved by Dr. Cote.





Terry Martinez, PGY-1





<Maynor Cote - Last Filed: 12/04/18 15:09>





Objective





- Vital Signs/Intake and Output


Vital Signs (last 24 hours): 


                                        











Temp Pulse Resp BP Pulse Ox


 


 97.5 F L  31 L  18   122/59 L  97 


 


 12/04/18 07:00  12/04/18 12:45  12/04/18 12:45  12/04/18 12:45  12/04/18 07:00








Intake and Output: 


                                        











 12/04/18 12/04/18





 06:59 18:59


 


Output Total 1000 


 


Balance -1000 














- Medications


Medications: 


                               Current Medications





Acetaminophen (Tylenol 325mg Tab)  650 mg PO Q4H PRN


   PRN Reason: Pain, Mild (1-3)


Amlodipine Besylate (Norvasc)  5 mg PO BID [facility]


   Last Admin: 12/04/18 09:25 Dose:  Not Given


Heparin Sodium (Porcine) (Heparin)  5,000 units SC Q8H [facility]; Protocol


   Last Admin: 12/03/18 10:11 Dose:  5,000 units


Ceftaroline Fosamil 600 mg/ (Sodium Chloride)  100 mls @ 100 mls/hr IVPB Q12 [facility]


   Last Admin: 12/04/18 10:42 Dose:  100 mls/hr


Insulin Human Regular (Humulin R Med)  0 units SC ACHS [facility]; Protocol


   Last Admin: 12/04/18 11:46 Dose:  Not Given


Losartan Potassium (Cozaar)  25 mg PO DAILY [facility]


   Last Admin: 12/04/18 09:25 Dose:  Not Given


Ondansetron HCl (Zofran Inj)  4 mg IVP ONCE PRN


   PRN Reason: Nausea/Vomiting


Pantoprazole Sodium (Protonix Ec Tab)  20 mg PO 0600,1600 [facility]


   Last Admin: 12/04/18 05:12 Dose:  20 mg











- Labs


Labs: 


                                        





                                 12/04/18 06:30 





                                 12/04/18 06:30 





                                        











PT  15.0 SECONDS (9.4-12.5)  H  12/04/18  06:30    


 


INR  1.30   12/04/18  06:30    


 


APTT  38.5 Seconds (25.1-36.5)  H  12/04/18  06:30    














Attending/Attestation





- Attestation


I have personally seen and examined this patient.: Yes


I have fully participated in the care of the patient.: Yes


I have reviewed all pertinent clinical information, including history, physical 

exam and plan: Yes


Notes (Text): 





12/04/18 15:07


83 year old male with past medical history of PAD, COPD, hypertension, and 

chronic LE cellulitis who presented with complaint of left lower extremity 

cellulitis and gangrenous left 2nd distal phalanx.  Foot MRI was consistent with

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  Patient 

is on IV antibiotics.  ID and podiatry are following.  He is s/p amputation of 

second digit POD #6.  Pathology was reviewed as above.  Patient is going for 

procedure with podiatry this afternoon.  Preop EKG showed bradycardia.  Patient 

is asymptomatic and not on beta blocker.  Case discussed with Dr. Golden; ok to go

for procedure later today.





Maynor Cote MD


Hospitalist.

## 2018-12-04 NOTE — CON
DATE:  12/04/2018



SERVICE:  Cardiology.



REASON FOR CONSULTATION:  Cardiac evaluation for possible surgery,

bradycardia, coronary artery disease, peripheral arterial disease.



BRIEF CLINICAL HISTORY:  This is an 83-year-old male with past medical

history significant for peripheral arterial disease, dementia,

hypertension, obesity, COPD, admitted with cellulitis of left lower

extremity as well as left foot gangrene, status post amputation of left

toe.  EKG shows bradycardia.  A preoperative evaluation and risk

stratification, cardiology consult is called.  The patient denies any chest

pain, shortness of breath or any palpitation.



PAST MEDICAL HISTORY:  Significant for r peripheral arterial disease,

cellulitis of lower extremity, hypertension, COPD, CAD, nephrolithiasis,

cataract, hard of hearing, dementia, history of renal failure, history of

rhabdomyolysis in the past.



CURRENT MEDICATIONS:  At home patient was taking losartan, amlodipine and

aspirin.



SOCIAL HISTORY:  Ex-smoker.  Denies any history of substance abuse.  Denies

any history of alcohol abuse in the past.



ALLERGIES:  NO KNOWN DRUG ALLERGY.



PREVIOUS CARDIAC WORKUP:  As follows:  The patient had echocardiography

done on 03/28/2017 that revealed ejection fraction 55%, trace aortic

regurgitation, mild mitral regurgitation, trace to mild tricuspid

regurgitation, RV systolic pressure 30.  Previous EKG showed sinus rhythm,

first-degree AV block, right bundle-branch block, inferior wall MI of

undetermined age dated 11/21/2018.  Prior EKG also in 03/31/2017 shows

normal sinus rhythm, first-degree AV block, right bundle-branch block,

inferior wall MI, no significant change from 03/31/2017.  The patient had

intervention procedure done by Dr. Sotelo.  Ultrasound fluoroscopic PICC

line on 03/27/2017.



REVIEW OF SYSTEMS:  As per HPI.



PHYSICAL EXAMINATION:  As follows:

VITAL SIGNS:  Height of the patient is 5 feet 10 inches, weight of the

patient is 222 pounds, body mass index 31.9 kg/m2.  Rest of the

examination, temperature afebrile, heart rate 60, blood pressure 179/62.

HEENT:  PERRLA.  Extraocular muscles intact.

NECK:  Supple.  No carotid bruit or thyromegaly.

CHEST:  Clear to auscultation.

HEART:  S1, S2 regular.

ABDOMEN:  Soft.

EXTREMITIES:  Clubbing and cyanosis negative.  Left foot is in dressing.



LABORATORY DATA:  Blood workup:  WBC 7.3, hemoglobin 11, hematocrit 34.2,

platelet count 213.  Chemistry shows sodium 130, potassium 4, chloride 106,

carbon dioxide 27, anion gap of 10, BUN 28, creatinine 1.3.



IMPRESSION:  An 83-year-old morbidly obese male with a past medical history

significant for diabetes, hypertension, hyperlipidemia, peripheral arterial

disease, admitted with gangrene of the toe, status post amputation, needs

more surgical intervention.  Cardiology consult was called for preop

evaluation and for bradycardia.



RECOMMENDATION:  Follow up.  Repeat EKG.  Telemetry monitoring shows heart

rate 60 now.  At one point, the patient's  heart rate was 40, though the

patient is not on beta blocker.  Continue antibiotic.  Continue DVT

prophylaxis.  Continue amlodipine.  Continue Norvasc.  Continue non-rate

limiting calcium channel blocker.  We will get echo to assess LV function. 

Further recommendation depending on the hospital course.  We will follow

TSH and lipid profile.



Thank you, Dr. Cote for providing us the opportunity of taking care of the

patient, Ritesh Tobias.







__________________________________________

Donnell Golden MD



DD:  12/04/2018 11:18:33

DT:  12/04/2018 13:05:53

Job # [id number]

## 2018-12-04 NOTE — RAD
Date of service: 



12/04/2018



HISTORY:

 Pre operative 



COMPARISON:

04/06/2017 



FINDINGS:



LUNGS:

No active pulmonary disease.



PLEURA:

No significant pleural effusion identified, no pneumothorax apparent.



CARDIOVASCULAR:

No aortic atherosclerotic calcification present.



Normal cardiac size. No pulmonary vascular congestion. 



OSSEOUS STRUCTURES:

No significant abnormalities.



VISUALIZED UPPER ABDOMEN:

Normal.



OTHER FINDINGS:

None.



IMPRESSION:

No active disease.

## 2018-12-05 LAB
ALBUMIN SERPL-MCNC: 3.3 G/DL (ref 3–4.8)
ALBUMIN/GLOB SERPL: 1 {RATIO} (ref 1.1–1.8)
ALT SERPL-CCNC: 20 U/L (ref 7–56)
AST SERPL-CCNC: 27 U/L (ref 17–59)
BASOPHILS # BLD AUTO: 0.03 K/MM3 (ref 0–2)
BASOPHILS NFR BLD: 0.4 % (ref 0–3)
BUN SERPL-MCNC: 26 MG/DL (ref 7–21)
CALCIUM SERPL-MCNC: 9.1 MG/DL (ref 8.4–10.5)
EOSINOPHIL # BLD: 0.3 10*3/UL (ref 0–0.7)
EOSINOPHIL NFR BLD: 3.7 % (ref 1.5–5)
ERYTHROCYTE [DISTWIDTH] IN BLOOD BY AUTOMATED COUNT: 13.5 % (ref 11.5–14.5)
GFR NON-AFRICAN AMERICAN: 53
GRANULOCYTES # BLD: 6.15 10*3/UL (ref 1.4–6.5)
GRANULOCYTES NFR BLD: 71.7 % (ref 50–68)
HDLC SERPL-MCNC: 25 MG/DL (ref 29–60)
HGB BLD-MCNC: 11.3 G/DL (ref 14–18)
LDLC SERPL-MCNC: 76 MG/DL (ref 0–129)
LYMPHOCYTES # BLD: 1.2 10*3/UL (ref 1.2–3.4)
LYMPHOCYTES NFR BLD AUTO: 13.8 % (ref 22–35)
MCH RBC QN AUTO: 26.6 PG (ref 25–35)
MCHC RBC AUTO-ENTMCNC: 32.2 G/DL (ref 31–37)
MCV RBC AUTO: 82.6 FL (ref 80–105)
MONOCYTES # BLD AUTO: 0.9 10*3/UL (ref 0.1–0.6)
MONOCYTES NFR BLD: 10.4 % (ref 1–6)
PLATELET # BLD: 202 10^3/UL (ref 120–450)
PMV BLD AUTO: 8.1 FL (ref 7–11)
RBC # BLD AUTO: 4.25 10^6/UL (ref 3.5–6.1)
WBC # BLD AUTO: 8.6 10^3/UL (ref 4.5–11)

## 2018-12-05 RX ADMIN — PANTOPRAZOLE SODIUM SCH MG: 20 TABLET, DELAYED RELEASE ORAL at 18:09

## 2018-12-05 RX ADMIN — INSULIN HUMAN SCH: 100 INJECTION, SOLUTION PARENTERAL at 07:58

## 2018-12-05 RX ADMIN — INSULIN HUMAN SCH: 100 INJECTION, SOLUTION PARENTERAL at 22:07

## 2018-12-05 RX ADMIN — PANTOPRAZOLE SODIUM SCH MG: 20 TABLET, DELAYED RELEASE ORAL at 05:32

## 2018-12-05 RX ADMIN — INSULIN HUMAN SCH: 100 INJECTION, SOLUTION PARENTERAL at 11:47

## 2018-12-05 NOTE — PN
DATE:  12/04/2018



SUBJECTIVE:  The patient is in bed, in no acute distress.



PHYSICAL EXAMINATION:

VITAL SIGNS:  Temperature is 97, blood pressure is 160/70, respiratory rate

is 16.

HEENT:  Unremarkable.

NECK:  Supple.

LUNGS:  Have decreased breath sounds.

HEART:  Normal S1, S2.

ABDOMEN:  Soft, nontender.  No organomegaly.



LABORATORY DATA:  Reveals a white count of 7.3, hemoglobin of 11, BUN of

28, creatinine of 1.3, procalcitonin is 0.07.



ASSESSMENT AND PLAN:  This is an 83-year-old male seen earlier today with

left leg foot cellulitis, second toe gangrene and osteomyelitis with

methicillin-resistant Staphylococcus aureus, status post amputation with

the margin still with osteomyelitis and the patient with coronary artery

disease, hypertension, renal disease, dementia.  The patient is for further

debridement today.  We will check on the pathology from that debridement

today and currently on ceftaroline.  We will check on the pathology report

from today and we will make further recommendations.





__________________________________________

Lj Baca MD





DD:  12/04/2018 21:04:22

DT:  12/04/2018 22:52:09Job # [id number]

## 2018-12-05 NOTE — PN
DATE:  12/05/2018



REASON FOR CONSULTATION AND FOLLOWUP:  Cardiac evaluation, possible

surgery, bradycardiac, coronary artery disease and peripheral arterial

disease.  The patient denies any chest pain, shortness of breath any

palpitation.



PHYSICAL EXAMINATION:

GENERAL:  Not in apparent distress.

VITAL SIGNS:  As follows, temperature is afebrile, heart rate 42 and blood

pressure 138/47.

HEENT:  PERRLA is intact.

NECK:  Supple.  No carotid bruit or thyromegaly.

CHEST:  Clear to auscultation.

HEART:  S1 and S2, regular.

ABDOMEN:  Soft.

EXTREMITIES:  Clubbing and cyanosis negative.



LABORATORY DATA:  Blood workup, WBC 8.6, hemoglobin 11.3, hematocrit 35.1,

and platelet count 202.  Chemistry; sodium 130, potassium 4.1, chloride

106, _____, anion gap of 9, BUN 26, and creatinine 1.3.



IMPRESSION:  An 83-year-old male with past medical history of chronic

obstructive pulmonary disease, hypertension, obesity, cellulitis of lower

extremity, left toe gangrene status post osteomyelitis of the left foot,

status post amputation.  The patient was bradycardic, cardiac consult was

called for Preoperative evaluation and risk stratification.  The patient

underwent amputation yesterday.  Baseline, the patient is bradycardiac not

on beta-blocker, but hemodynamically remained stable.



RECOMMENDATIONS:  We will get repeat EKG today.  We will get Holter monitor

for 24 hours.  Continue Losartan.  Continue postoperative care.  [facility] status

is stable.  We will follow with you.  The patient had echocardiography done

yesterday as a preop and that revealed ejection fraction 55%, right

ventricle is mild to moderately dilated, systolic function right

ventricular moderately reduced, mild aortic stenosis versus aortic

sclerosis, trace-to-mild mitral regurgitation, trace tricuspid

regurgitation.  For risk stratification consider stress test as outpatient

_____ the patient is stable because the multiple risk factor of coronary

artery disease.  We will put Holter to assist need for pacemaker to see

_____ significant bradycardia or not for 24 hours.  Further recommendation

with Holter finding.







__________________________________________

Donnell Golden MD





DD:  12/05/2018 8:36:56

DT:  12/05/2018 10:26:31

Job # [id number]

## 2018-12-05 NOTE — OP
PROCEDURE DATE:  12/04/2018



SURGEON:  Rodrigo Do DPM



ASSISTANT:  Garfield Sanches MD



ANESTHETIST:  Dr. Campo.



TYPE OF ANESTHESIA:  IV sedation and local anesthesia.



PREOPERATIVE DIAGNOSES:  Six days status post left fourth toe amputation

secondary to left fourth toe infected ulcers underlying osteomyelitis.



POSTOPERATIVE DIAGNOSES:  Six days status post left fourth toe amputation

secondary to left fourth toe infected ulcers underlying osteomyelitis.



PROCEDURE:  Revision of left fourth toe amputation.



INDICATION:  The patient is an 83-year-old male with above diagnosis.  The

patient has exhausted all the conservative treatments at this time and now

requests surgical intervention.  The patient and wife both have signed the

consent after careful explanation of risks, benefits, complication and

alternative for surgical procedure.  No guarantees were given nor implied.



PREPARATION:  The patient was brought to the operating room and placed on

the operating table in a supine position.  Time-out was performed for

identification of the correct patient and the procedure.  After induction

of IV sedation, the patient received a total of 10 mL of 1:1 mixture of

0.5% Marcaine plain and 2% lidocaine plain in a local block type fashion to

the left foot at the level of the fourth metatarsal bone.  Once the local

anesthesia was achieved, the left foot was then prepped and draped in a

normal sterile manner.  Blood was exsanguinated from the right foot using

an Esmarch and ankle tourniquet was used to control the bleeding.  Then the

procedure began.



DESCRIPTION OF PROCEDURE:  Attention was then drawn to the left fourth

region where approximately 5 cm incision from the previous surgery was

noted.  The skin and subcutaneous sutures removed using sterile 15 blade,

then using sterile 15 blade and Sharmila scissors, removal of the bone

fragments from the site of the previous surgery with debridement of

extensor and flexor tendon up to the level of the distal fourth left shaft.

Then using pulsed lavage, copious amount of sterile saline used to irrigate

the surgical site.  Closure of the amputation site using 4-0 Vicryl for

subcutaneous layer and 3-0 nylon suture for the skin, in both horizontal

mattress and simple suture fashion.  Dressings were applied using Betadine,

Adaptic, Kerlix, and Coban.



POSTOPERATIVE CONDITION:  The patient tolerated the anesthesia and the

procedure well and was then escorted to the recovery room with vital signs

stable and neurovascular status intact to the left lower extremity. 

Podiatry will continue to follow up the patient while he remains in-house. 

The patient will follow up with Dr. Do at the wound care center upon

discharge.



__________________________________________

GARFIELD SANCHES MD





__________________________________________

Rodrigo Do DPM







DD:  12/04/2018 21:32:05

DT:  12/05/2018 3:19:10

Job # [id number]

[email or web address]

## 2018-12-05 NOTE — CP.PCM.PN
Subjective





- Date & Time of Evaluation


Date of Evaluation: 12/05/18


Time of Evaluation: 08:55





- Subjective


Subjective: 





Had debridement again yesterday, no fevers, not in distress.





Objective





- Vital Signs/Intake and Output


Vital Signs (last 24 hours): 


                                        











Temp Pulse Resp BP Pulse Ox


 


 98.3 F   45 L  18   133/54 L  97 


 


 12/05/18 13:40  12/05/18 13:40  12/05/18 13:40  12/05/18 13:40  12/05/18 13:40








Intake and Output: 


                                        











 12/05/18 12/05/18





 06:59 18:59


 


Intake Total 375 


 


Output Total 1200 


 


Balance -825 














- Medications


Medications: 


                               Current Medications





Acetaminophen (Tylenol 325mg Tab)  650 mg PO Q4H PRN


   PRN Reason: Pain, Mild (1-3)


Amlodipine Besylate (Norvasc)  5 mg PO BID [facility]


   Last Admin: 12/05/18 11:46 Dose:  5 mg


Heparin Sodium (Porcine) (Heparin)  5,000 units SC Q8H [facility]; Protocol


   Last Admin: 12/03/18 10:11 Dose:  5,000 units


Ceftaroline Fosamil 600 mg/ (Sodium Chloride)  100 mls @ 100 mls/hr IVPB Q12 [facility]


   Last Admin: 12/05/18 11:45 Dose:  100 mls/hr


Insulin Human Regular (Humulin R Med)  0 units SC ACHS [facility]; Protocol


   Last Admin: 12/05/18 11:47 Dose:  Not Given


Losartan Potassium (Cozaar)  25 mg PO DAILY [facility]


   Last Admin: 12/05/18 11:47 Dose:  25 mg


Ondansetron HCl (Zofran Inj)  4 mg IVP ONCE PRN


   PRN Reason: Nausea/Vomiting


Ondansetron HCl (Zofran Inj)  4 mg IVP ONCE PRN


   PRN Reason: Nausea/Vomiting


Pantoprazole Sodium (Protonix Ec Tab)  20 mg PO 0600,1600 [facility]


   Last Admin: 12/05/18 05:32 Dose:  20 mg











- Labs


Labs: 


                                        





                                 12/05/18 06:30 





                                 12/05/18 06:30 





                                        











PT  15.0 SECONDS (9.4-12.5)  H  12/04/18  06:30    


 


INR  1.30   12/04/18  06:30    


 


APTT  38.5 Seconds (25.1-36.5)  H  12/04/18  06:30    














- Constitutional


Appears: Chronically Ill





- Head Exam


Head Exam: NORMAL INSPECTION





- Respiratory Exam


Respiratory Exam: Decreased Breath Sounds





- Cardiovascular Exam


Cardiovascular Exam: +S1, +S2





- GI/Abdominal Exam


GI & Abdominal Exam: Soft.  absent: Tenderness





- Extremities Exam


Additional comments: 





left foot with dressings in place





Assessment and Plan





- Assessment and Plan (Free Text)


Plan: 





Assessment


left leg and foot cellulitis and 2nd toe gangrene, with osteomyelitis with MRSA 

S/P amputation POD #5, S/P further debridement POD #1 - margins of bone still 

with osteomyelitis


history of Group G strep bacteremia, probably secondary to bilateral lower 

extremities skin and skin structure infection;


history of rhabdomyolysis


CAD


HTN


chronic renal failure


dementia


obesity with BMI 32





Plan


continue Teflaro and will need at least 4-6 weeks of antibiotics with weekly 

ESR, CRP, CBC, CMP to be followed by the wound care center while on antibiotics 

if the OR pathology from yesterday is still having osteomyelitis on the bone 

margins

## 2018-12-05 NOTE — RAD
Date of service: 



12/05/2018



PROCEDURE:  Left Foot Radiographs.



HISTORY:

 S/P L 4th toe amp 



COMPARISON:

None.



FINDINGS:



BONES:

There has been removal of the remainder of the 2nd toe since the 

previous exam. 



JOINTS:

Degenerative changes are seen throughout the foot and ankle 



SOFT TISSUES:

Normal. 



OTHER FINDINGS:

None.



IMPRESSION:

As above

## 2018-12-05 NOTE — PN
DATE:  12/05/2018



SUBJECTIVE:  An 83-year-old male seen status post day 1 revision of his

left second partial ray amputation.  The patient is resting comfortably. 

He has been afebrile, offers no complaints.  The patient is being brought

down for x-rays which were ordered last night and were not done.



OBJECTIVE:

VITAL SIGNS:  Revealed temperature of 97.7, pulse rate of 42, blood

pressure of 138/47, respiratory rate of 20.



LABORATORY FINDINGS:  Reveal white count of 8.6, hemoglobin of 11.3,

hematocrit of 35.1, platelet count of 202.  Most recent culture report

reveals MRSA; however, new cultures were taken yesterday in the OR are

awaiting results.



Palpable pedal pulses noted bilaterally.  Incision site on the left foot

presents with all sutures intact.  There is noted to be minimal edema and

erythema.  There is no drainage.  There is no dehiscence.  There is no

signs of acute bacterial infection.



ASSESSMENT:  Status post day 1, revisional surgery of the left second ray

resection.



PLAN:  The patient was examined, his wound was cleansed.  New dressing

consisting of Betadine-soaked Adaptic and a dry sterile dressing was

applied.  We are waiting new x-rays taken yesterday.  We are awaiting

culture and sensitivity as well as pathology report from the bone that was

resected yesterday.  At this point, his osteomyelitis has been eradicated. 

The previous bony resection at the most distal portion of the first metatarsal

revealed no osteomyelitic activity and the bone fragment that was present was 
removed yesterday.  We will plan on transfer to TCU with

oral antibiotics, pending approval by Infectious Disease.  We will await

all microbiology and histology reports taken yesterday.







__________________________________________

Rodrigo Do DPM





DD:  12/05/2018 10:06:08

DT:  12/05/2018 10:09:51

Job # [id number]



MARIA DEL CARMEN

## 2018-12-05 NOTE — CP.PCM.PN
<Terry Martinez - Last Filed: 12/05/18 12:35>





Subjective





- Date & Time of Evaluation


Date of Evaluation: 12/05/18


Time of Evaluation: 08:00





- Subjective


Subjective: 


Terry Martinez PGY-1 Progress Note for Hospitalist Service





Patient seen and evaluated at bedside. No acute complaints reported overnight. 

Patient states his pain is well controlled. He was able to tolerate out of bed 

to chair yesterday. Denies fevers, chills, chest pain, shortness of breath, 

abdominal pain. Denies left foot pain. POD#1 revision of 2nd toe amputation with

bone fragment removal in L foot. 











Objective





- Vital Signs/Intake and Output


Vital Signs (last 24 hours): 


                                        











Temp Pulse Resp BP Pulse Ox


 


 97.7 F   42 L  20   147/48 L  94 L


 


 12/05/18 06:00  12/05/18 11:47  12/05/18 06:00  12/05/18 11:47  12/05/18 06:00








Intake and Output: 


                                        











 12/05/18 12/05/18





 06:59 18:59


 


Intake Total 375 


 


Output Total 1200 


 


Balance -825 














- Medications


Medications: 


                               Current Medications





Acetaminophen (Tylenol 325mg Tab)  650 mg PO Q4H PRN


   PRN Reason: Pain, Mild (1-3)


Amlodipine Besylate (Norvasc)  5 mg PO BID [facility]


   Last Admin: 12/05/18 11:46 Dose:  5 mg


Heparin Sodium (Porcine) (Heparin)  5,000 units SC Q8H [facility]; Protocol


   Last Admin: 12/03/18 10:11 Dose:  5,000 units


Ceftaroline Fosamil 600 mg/ (Sodium Chloride)  100 mls @ 100 mls/hr IVPB Q12 [facility]


   Last Admin: 12/05/18 11:45 Dose:  100 mls/hr


Insulin Human Regular (Humulin R Med)  0 units SC ACHS [facility]; Protocol


   Last Admin: 12/05/18 11:47 Dose:  Not Given


Losartan Potassium (Cozaar)  25 mg PO DAILY [facility]


   Last Admin: 12/05/18 11:47 Dose:  25 mg


Ondansetron HCl (Zofran Inj)  4 mg IVP ONCE PRN


   PRN Reason: Nausea/Vomiting


Ondansetron HCl (Zofran Inj)  4 mg IVP ONCE PRN


   PRN Reason: Nausea/Vomiting


Pantoprazole Sodium (Protonix Ec Tab)  20 mg PO 0600,1600 [facility]


   Last Admin: 12/05/18 05:32 Dose:  20 mg











- Labs


Labs: 


                                        





                                 12/05/18 06:30 





                                 12/05/18 06:30 





                                        











PT  15.0 SECONDS (9.4-12.5)  H  12/04/18  06:30    


 


INR  1.30   12/04/18  06:30    


 


APTT  38.5 Seconds (25.1-36.5)  H  12/04/18  06:30    














- Additional Findings


Additional findings: 





- Constitutional


Appears: Well, Non-toxic, No Acute Distress





- Head Exam


Head Exam: ATRAUMATIC, NORMAL INSPECTION, NORMOCEPHALIC





- Eye Exam


Eye Exam: EOMI, Normal appearance





- ENT Exam


ENT Exam: Mucous Membranes Moist





- Respiratory Exam


Respiratory Exam: Clear to Ausculation Bilateral, NORMAL BREATHING PATTERN





- Cardiovascular Exam


Cardiovascular Exam: REGULAR RHYTHM, +S1, +S2





- GI/Abdominal Exam


GI & Abdominal Exam: Soft, Normal Bowel Sounds.  absent: Tenderness





- Extremities Exam


Extremities Exam: Pedal Edema (left lower extremity).  absent: Calf Tenderness, 

Normal Inspection (left lower leg), Tenderness





- Back Exam


Back Exam: NORMAL INSPECTION





- Neurological Exam


Neurological Exam: Alert, Awake, Oriented x3. Remaining toes have poor sensation

2/2 diabetic peripheral neuropathy





- Psychiatric Exam


Psychiatric exam: Normal Affect, Normal Mood





- Skin


Skin Exam: absent: Normal Color


Additional comments: 





amputated second metatarsal head of left foot - dressings c/d/i





Assessment and Plan





- Assessment and Plan (Free Text)


Assessment: 





84 y/o M with PMHx of peripheral artery disease, chronic LLE cellulitis, COPD, 

hypertension, dementia, obesity [place] [facility] [place] of LLE anterior 

tibial region cellulitis and gangrenous L foot 2nd distal phalanx. OM confirmed 

on MRI 11/25. Amputation of second digit AM 11/28. Currently pain controlled 

waiting on ID and podiatry recs. POD #6 of L 2nd toe amputation. POD #1 of 

revision of amputation with removal of bone fragment. 





Plan: 





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


- LLE DEYSI studies reveal possible left tibial occlusive disease


- Podiatry on consult -Dr. Do - amputated second metatarsal head of left 

foot 11/29. OR 12/4 to excise the bone fragment from the amputation site


- ID on consult - Dr. Baca recs- at least 4-6 weeks of Ceftaroline with 

weekly ESR, CRP, CBC and CMP.  ID recs regarding oral options pending 





- Ceftaroline day# 14 as per ID (CrCl >55; can continue with current abx dose).





Path: margin report 12/3: Inflammatory process involves bone and soft tissue at 

resection margin. Separate bone with OM. Separate skin and soft tissue with 

marked acute inflammation.


Podiatry: f/u x-rays, cultures, sensitivities, and path report. OM has been er

adicated. Transfer to TCU with oral ABx





- L foot xray: no signs of osteomyelitis, cellulitis present


- Foot MRI 11/25 Marrow edema in 2nd proximal and middle phalanx consistent with

osteo


- Repeat wound cultures from 11/29 show MRSA


- Urine cx negative


- Final blood culture shows no growth after 5 days


- Procalcitonin 0.07





Bradycardia


HR paroxysmally drops into 30s, 41 lowest so far today while awake


Cardiology consulted - Dr. Golden


Hgba1c 6.2


Lipid panel, Mg, Phos wnl


TSH 3.92


Holter monitor to be placed for 24 hours to assess need for pacemaker - f/u 

findings


Echo - EF 55% RV mild-moderate dialted, systolic function of R ventricle 

moderately reduced, mild AS, Mild MR. Consider stress test as outpatient





Hx of Hypertension


-Continue amlodipine and losartan


IVF discontinued, so will continue to monitor response to BP 





Hx of Pre-diabetes, Diabetic Peripheral Neuropathy


- HgA1C 6.2


- Diabetes prevention and diet education


- ISS-low





DVT/GI PPx: 


- Heparin 5000 q8


- Protonix 





Dispo: SW/CM - ARLIN when medically clear


Ortho - TCU with oral antibiotics pending ID recs


F/u ID recs for oral vs IV ABx choice suitable for ARLIN/TCU placement


F/u PT eval for strengthening





Patient seen, case reviewed and plan approved by Dr. Cote.





Terry Martinez, PGY-1





<Maynor Cote - Last Filed: 12/05/18 13:03>





Objective





- Vital Signs/Intake and Output


Vital Signs (last 24 hours): 


                                        











Temp Pulse Resp BP Pulse Ox


 


 97.7 F   42 L  20   147/48 L  94 L


 


 12/05/18 06:00  12/05/18 11:47  12/05/18 06:00  12/05/18 11:47  12/05/18 06:00








Intake and Output: 


                                        











 12/05/18 12/05/18





 06:59 18:59


 


Intake Total 375 


 


Output Total 1200 


 


Balance -825 














- Medications


Medications: 


                               Current Medications





Acetaminophen (Tylenol 325mg Tab)  650 mg PO Q4H PRN


   PRN Reason: Pain, Mild (1-3)


Amlodipine Besylate (Norvasc)  5 mg PO BID [facility]


   Last Admin: 12/05/18 11:46 Dose:  5 mg


Heparin Sodium (Porcine) (Heparin)  5,000 units SC Q8H [facility]; Protocol


   Last Admin: 12/03/18 10:11 Dose:  5,000 units


Ceftaroline Fosamil 600 mg/ (Sodium Chloride)  100 mls @ 100 mls/hr IVPB Q12 [facility]


   Last Admin: 12/05/18 11:45 Dose:  100 mls/hr


Insulin Human Regular (Humulin R Med)  0 units SC ACHS [facility]; Protocol


   Last Admin: 12/05/18 11:47 Dose:  Not Given


Losartan Potassium (Cozaar)  25 mg PO DAILY [facility]


   Last Admin: 12/05/18 11:47 Dose:  25 mg


Ondansetron HCl (Zofran Inj)  4 mg IVP ONCE PRN


   PRN Reason: Nausea/Vomiting


Ondansetron HCl (Zofran Inj)  4 mg IVP ONCE PRN


   PRN Reason: Nausea/Vomiting


Pantoprazole Sodium (Protonix Ec Tab)  20 mg PO 0600,1600 [facility]


   Last Admin: 12/05/18 05:32 Dose:  20 mg











- Labs


Labs: 


                                        





                                 12/05/18 06:30 





                                 12/05/18 06:30 





                                        











PT  15.0 SECONDS (9.4-12.5)  H  12/04/18  06:30    


 


INR  1.30   12/04/18  06:30    


 


APTT  38.5 Seconds (25.1-36.5)  H  12/04/18  06:30    














Attending/Attestation





- Attestation


I have personally seen and examined this patient.: Yes


I have fully participated in the care of the patient.: Yes


I have reviewed all pertinent clinical information, including history, physical 

exam and plan: Yes


Notes (Text): 





12/05/18 12:57


83 year old male with past medical history of PAD, COPD, hypertension, and 

chronic LE cellulitis who presented with complaint of left lower extremity 

cellulitis and gangrenous left 2nd distal phalanx.  Foot MRI was consistent with

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  Patient 

is on IV antibiotics.  ID and podiatry are following.  He is s/p amputation of 

second digit POD #7.  Pathology was reviewed as above and patient went again to 

OR yesterday for further bone fragment excision at amputation site POD #1.  

Awaiting repeat pathology / cultures.


Cardiology evaluation was appreciated for bradycardia.  Patient is asymptomatic.

 Not on beta blockers.  Echocardiogram was reviewed.


He is on norvasc and cozaar for hypertension.





Maynor Cote MD


Hospitalist.

## 2018-12-06 LAB
ALBUMIN SERPL-MCNC: 3.3 G/DL (ref 3–4.8)
ALBUMIN/GLOB SERPL: 1 {RATIO} (ref 1.1–1.8)
ALT SERPL-CCNC: 18 U/L (ref 7–56)
AST SERPL-CCNC: 20 U/L (ref 17–59)
BASOPHILS # BLD AUTO: 0.03 K/MM3 (ref 0–2)
BASOPHILS NFR BLD: 0.4 % (ref 0–3)
BUN SERPL-MCNC: 36 MG/DL (ref 7–21)
CALCIUM SERPL-MCNC: 8.9 MG/DL (ref 8.4–10.5)
EOSINOPHIL # BLD: 0.4 10*3/UL (ref 0–0.7)
EOSINOPHIL NFR BLD: 4.6 % (ref 1.5–5)
ERYTHROCYTE [DISTWIDTH] IN BLOOD BY AUTOMATED COUNT: 13.7 % (ref 11.5–14.5)
GFR NON-AFRICAN AMERICAN: 41
GRANULOCYTES # BLD: 5.28 10*3/UL (ref 1.4–6.5)
GRANULOCYTES NFR BLD: 65.4 % (ref 50–68)
HGB BLD-MCNC: 10.7 G/DL (ref 14–18)
LYMPHOCYTES # BLD: 1.4 10*3/UL (ref 1.2–3.4)
LYMPHOCYTES NFR BLD AUTO: 17.7 % (ref 22–35)
MCH RBC QN AUTO: 27 PG (ref 25–35)
MCHC RBC AUTO-ENTMCNC: 32.7 G/DL (ref 31–37)
MCV RBC AUTO: 82.4 FL (ref 80–105)
MONOCYTES # BLD AUTO: 1 10*3/UL (ref 0.1–0.6)
MONOCYTES NFR BLD: 11.9 % (ref 1–6)
PLATELET # BLD: 185 10^3/UL (ref 120–450)
PMV BLD AUTO: 8.4 FL (ref 7–11)
RBC # BLD AUTO: 3.97 10^6/UL (ref 3.5–6.1)
WBC # BLD AUTO: 8.1 10^3/UL (ref 4.5–11)

## 2018-12-06 RX ADMIN — PANTOPRAZOLE SODIUM SCH: 20 TABLET, DELAYED RELEASE ORAL at 05:23

## 2018-12-06 RX ADMIN — PANTOPRAZOLE SODIUM SCH MG: 20 TABLET, DELAYED RELEASE ORAL at 16:50

## 2018-12-06 RX ADMIN — INSULIN HUMAN SCH: 100 INJECTION, SOLUTION PARENTERAL at 07:52

## 2018-12-06 RX ADMIN — INSULIN HUMAN SCH: 100 INJECTION, SOLUTION PARENTERAL at 11:28

## 2018-12-06 RX ADMIN — INSULIN HUMAN SCH: 100 INJECTION, SOLUTION PARENTERAL at 22:11

## 2018-12-06 RX ADMIN — INSULIN HUMAN SCH: 100 INJECTION, SOLUTION PARENTERAL at 16:47

## 2018-12-06 NOTE — CP.PCM.PN
Subjective





- Date & Time of Evaluation


Date of Evaluation: 12/06/18


Time of Evaluation: 06:55





- Subjective


Subjective: 





Awake, alert, no distress





Reason for consultation and follow up: Cardiac evaluation of bradycardia, status

post revision of left 4th digit








Seen and examined by me and Dr. Golden





Objective





- Vital Signs/Intake and Output


Vital Signs (last 24 hours): 


                                        











Temp Pulse Resp BP Pulse Ox


 


 98.3 F   44 L  18   145/39 L  96 


 


 12/05/18 22:00  12/05/18 22:00  12/05/18 22:00  12/05/18 22:00  12/05/18 22:00








Intake and Output: 


                                        











 12/06/18 12/06/18





 06:59 18:59


 


Intake Total 740 


 


Output Total 400 


 


Balance 340 














- Medications


Medications: 


                               Current Medications





Acetaminophen (Tylenol 325mg Tab)  650 mg PO Q4H PRN


   PRN Reason: Pain, Mild (1-3)


Amlodipine Besylate (Norvasc)  5 mg PO BID [facility]


   Last Admin: 12/05/18 18:09 Dose:  5 mg


Heparin Sodium (Porcine) (Heparin)  5,000 units SC Q8H [facility]; Protocol


   Last Admin: 12/03/18 10:11 Dose:  5,000 units


Ceftaroline Fosamil 600 mg/ (Sodium Chloride)  100 mls @ 100 mls/hr IVPB Q12 [facility]


   Last Admin: 12/05/18 21:45 Dose:  100 mls/hr


Insulin Human Regular (Humulin R Med)  0 units SC ACHS [facility]; Protocol


   Last Admin: 12/06/18 07:52 Dose:  Not Given


Losartan Potassium (Cozaar)  25 mg PO DAILY [facility]


   Last Admin: 12/05/18 11:47 Dose:  25 mg


Ondansetron HCl (Zofran Inj)  4 mg IVP ONCE PRN


   PRN Reason: Nausea/Vomiting


Ondansetron HCl (Zofran Inj)  4 mg IVP ONCE PRN


   PRN Reason: Nausea/Vomiting


Pantoprazole Sodium (Protonix Ec Tab)  20 mg PO 0600,1600 [facility]


   Last Admin: 12/06/18 05:23 Dose:  Not Given











- Labs


Labs: 


                                        





                                 12/06/18 06:20 





                                 12/06/18 06:20 





                                        











PT  15.0 SECONDS (9.4-12.5)  H  12/04/18  06:30    


 


INR  1.30   12/04/18  06:30    


 


APTT  38.5 Seconds (25.1-36.5)  H  12/04/18  06:30    














- Constitutional


Appears: Non-toxic, No Acute Distress





- Head Exam


Head Exam: NORMAL INSPECTION, NORMOCEPHALIC





- Eye Exam


Eye Exam: Normal appearance


Pupil Exam: NORMAL ACCOMODATION





- ENT Exam


ENT Exam: Mucous Membranes Moist, Normal Exam





- Respiratory Exam


Respiratory Exam: Clear to Ausculation Bilateral, NORMAL BREATHING PATTERN





- Cardiovascular Exam


Cardiovascular Exam: +S1, +S2





- GI/Abdominal Exam


GI & Abdominal Exam: Soft, Normal Bowel Sounds





- Extremities Exam


Additional comments: 





left foot dressing





- Neurological Exam


Neurological Exam: Alert, Awake, Oriented x3





- Psychiatric Exam


Psychiatric exam: Normal Affect, Normal Mood





- Skin


Skin Exam: Dry, Normal Color, Warm





Assessment and Plan





- Assessment and Plan (Free Text)


Assessment: 








An 83 year old  male who wascame to the ER due to left toe wound. history of  

COPD, diabetes, hypertension,obesity, cellilitis, left toe osteomyelitis post 

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 Patient  bradycardic and 

consult was called. Patient is not on betablocker. Placed on Holter 

monitor.Stress test as outpatient once better from toe surgery.Echo done.LVEF 

55%,Aortic sclerosis Vs Mild As, mild mitral regurgitation,trace TR, moderate 

pulmonic valve regurgitation,no vegetation or thrombus.


Plan: 





On Holter monitor x 24 hours, will follow up result


No distress


Stable blood pressure


Heart rate 40's


No Betablocker


On Norvasc 5 mg BID


Continue antibiotics as ordered


Continue current treatment


Continue current medications


Will follow up


Further recommendations during hospital course





Plan and treatment discussed with Dr. Golden

## 2018-12-06 NOTE — CP.PCM.PN
<Terry Martinez - Last Filed: 12/06/18 16:48>





Subjective





- Date & Time of Evaluation


Date of Evaluation: 12/06/18


Time of Evaluation: 08:00





- Subjective


Subjective: 





Terry Martinez PGY-1 Progress Note for Hospitalist Service





Patient seen and evaluated at bedside. No acute complaints reported overnight. 

Patient states his pain is well controlled. He was able to tolerate out of bed 

to chair yesterday. Denies fevers, chills, chest pain, shortness of breath, 

abdominal pain. Denies left foot pain. POD #2 revision of 2nd toe amputation 

with bone fragment removal in L foot. 





Objective





- Vital Signs/Intake and Output


Vital Signs (last 24 hours): 


                                        











Temp Pulse Resp BP Pulse Ox


 


 98.3 F   44 L  18   145/39 L  96 


 


 12/05/18 22:00  12/05/18 22:00  12/05/18 22:00  12/05/18 22:00  12/05/18 22:00








Intake and Output: 


                                        











 12/06/18 12/06/18





 06:59 18:59


 


Intake Total 740 


 


Output Total 400 


 


Balance 340 














- Medications


Medications: 


                               Current Medications





Acetaminophen (Tylenol 325mg Tab)  650 mg PO Q4H PRN


   PRN Reason: Pain, Mild (1-3)


Amlodipine Besylate (Norvasc)  5 mg PO BID [facility]


   Last Admin: 12/05/18 18:09 Dose:  5 mg


Heparin Sodium (Porcine) (Heparin)  5,000 units SC Q8H [facility]; Protocol


   Last Admin: 12/03/18 10:11 Dose:  5,000 units


Ceftaroline Fosamil 600 mg/ (Sodium Chloride)  100 mls @ 100 mls/hr IVPB Q12 [facility]


   Last Admin: 12/05/18 21:45 Dose:  100 mls/hr


Sodium Chloride (Sodium Chloride 0.9%)  1,000 mls @ 75 mls/hr IV .[id number] [facility]


   Stop: 12/07/18 06:00


Insulin Human Regular (Humulin R Med)  0 units SC ACHS [facility]; Protocol


   Last Admin: 12/06/18 07:52 Dose:  Not Given


Losartan Potassium (Cozaar)  25 mg PO DAILY [facility]


   Last Admin: 12/05/18 11:47 Dose:  25 mg


Ondansetron HCl (Zofran Inj)  4 mg IVP ONCE PRN


   PRN Reason: Nausea/Vomiting


Pantoprazole Sodium (Protonix Ec Tab)  20 mg PO 0600,1600 [facility]


   Last Admin: 12/06/18 05:23 Dose:  Not Given











- Labs


Labs: 


                                        





                                 12/06/18 06:20 





                                 12/06/18 06:20 





                                        











PT  15.0 SECONDS (9.4-12.5)  H  12/04/18  06:30    


 


INR  1.30   12/04/18  06:30    


 


APTT  38.5 Seconds (25.1-36.5)  H  12/04/18  06:30    














- Additional Findings


Additional findings: 





- Constitutional


Appears: Well, Non-toxic, No Acute Distress





- Head Exam


Head Exam: ATRAUMATIC, NORMAL INSPECTION, NORMOCEPHALIC





- Eye Exam


Eye Exam: EOMI, Normal appearance





- ENT Exam


ENT Exam: Mucous Membranes Moist





- Respiratory Exam


Respiratory Exam: Clear to Ausculation Bilateral, NORMAL BREATHING PATTERN





- Cardiovascular Exam


Cardiovascular Exam: REGULAR RHYTHM, +S1, +S2





- GI/Abdominal Exam


GI & Abdominal Exam: Soft, Normal Bowel Sounds.  absent: Tenderness





- Extremities Exam


Extremities Exam: Pedal Edema (left lower extremity).  absent: Calf Tenderness, 

Normal Inspection (left lower leg), Tenderness





- Back Exam


Back Exam: NORMAL INSPECTION





- Neurological Exam


Neurological Exam: Alert, Awake, Oriented x3. Remaining toes have poor sensation

2/2 diabetic peripheral neuropathy





- Psychiatric Exam


Psychiatric exam: Normal Affect, Normal Mood





- Skin


Skin Exam: absent: Normal Color


Additional comments: 





amputated second metatarsal head of left foot - dressings c/d/i





Assessment and Plan





- Assessment and Plan (Free Text)


Assessment: 





84 y/o M with PMHx of peripheral artery disease, chronic LLE cellulitis, COPD, 

hypertension, dementia, obesity presents to [facility] [place] with complaints of LLE anterior 

tibial region cellulitis and gangrenous L foot 2nd distal phalanx. OM confirmed 

on MRI 11/25. Amputation of second digit AM 11/28. Currently pain controlled 

waiting on ID and podiatry recs. POD #7 of L 2nd toe amputation. POD #2 of 

revision of L 2nd toe amputation with removal of bone fragment. Awaiting 

podiatry input and cultures and sensitivities.





Plan: 





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


- LLE DEYSI studies reveal possible left tibial occlusive disease


- Podiatry on consult -Dr. Arloro - amputated second metatarsal head of left 

foot 11/29. OR 12/4 to excise the bone fragment from the amputation site


- ID on consult - Dr. Baca recs- at least 4-6 weeks of Ceftaroline with 

weekly ESR, CRP, CBC and CMP.  ID recs regarding oral options pending cultures 

and path report





- Ceftaroline day# 15 as per ID (CrCl >55; can continue with current abx dose).





Path: margin report 12/3: Inflammatory process involves bone and soft tissue at 

resection margin. Separate bone with OM. Separate skin and soft tissue with 

marked acute inflammation.


Podiatry: f/u x-rays, cultures, sensitivities, and path report. OM has been 

eradicated. Possible transfer to TCU with oral ABx





- L foot xray: no signs of osteomyelitis, cellulitis present


- Foot MRI 11/25 Marrow edema in 2nd proximal and middle phalanx consistent with

osteo


- Repeat wound cultures from 11/29 show MRSA


- Urine cx negative


- Final blood culture shows no growth after 5 days


- Procalcitonin 0.07





CARLOS


Cr up to 1.6, slightly elevated from baseline 1.3-1.4


Light hydration NS @ 75 cc/hr


possibly 2/2 hypotensive episodes late


Nephrotoxic Losartan held as BP controlled, continue to monitor BP and will 

evaluate BUN/Cr in AM





Bradycardia


HR paroxysmally drops into 30s at night


Cardiology consulted - Dr. Golden


Hgba1c 6.2


Lipid panel, Mg, Phos wnl


TSH 3.92


Holter monitor to be placed for 24 hours to assess need for pacemaker - f/u 

findings


Echo - EF 55% RV mild-moderate dialted, systolic function of R ventricle 

moderately reduced, mild AS, Mild MR. Consider stress test as outpatient





Hx of Hypertension


-Continue amlodipine and losartan


- Light hydration NS @ 75 cc/hr





Hx of Pre-diabetes, Diabetic Peripheral Neuropathy


- HgA1C 6.2


- Diabetes prevention and diet education


- ISS-low





DVT/GI PPx: 


- Heparin 5000 q8


- Protonix 





Dispo: SW/CM - ARLIN when medically clear


Ortho - TCU with oral antibiotics pending ID recs


F/u ID recs for oral vs IV ABx choice suitable for ARLIN/TCU placement


F/u PT eval for strengthening





Patient seen, case reviewed and plan approved by Dr. Cote.





Teryr Martinez, PGY-1





<Maynor Cote - Last Filed: 12/07/18 06:48>





Objective





- Vital Signs/Intake and Output


Vital Signs (last 24 hours): 


                                        











Temp Pulse Resp BP Pulse Ox


 


 98 F   54 L  18   145/58 L  96 


 


 12/06/18 22:41  12/07/18 00:30  12/06/18 22:41  12/07/18 00:30  12/06/18 22:41








Intake and Output: 


                                        











 12/06/18 12/07/18





 18:59 06:59


 


Intake Total  860


 


Output Total  1410


 


Balance  -550














- Medications


Medications: 


                               Current Medications





Acetaminophen (Tylenol 325mg Tab)  650 mg PO Q4H PRN


   PRN Reason: Pain, Mild (1-3)


Amlodipine Besylate (Norvasc)  5 mg PO BID [facility]


   Last Admin: 12/06/18 19:12 Dose:  5 mg


Heparin Sodium (Porcine) (Heparin)  5,000 units SC Q8H [facility]; Protocol


   Last Admin: 12/03/18 10:11 Dose:  5,000 units


Ceftaroline Fosamil 600 mg/ (Sodium Chloride)  100 mls @ 100 mls/hr IVPB Q12 [facility]


   Last Admin: 12/06/18 22:12 Dose:  100 mls/hr


Insulin Human Regular (Humulin R Med)  0 units SC ACHS [facility]; Protocol


   Last Admin: 12/06/18 22:11 Dose:  Not Given


Losartan Potassium (Cozaar)  25 mg PO DAILY [facility]


   Last Admin: 12/05/18 11:47 Dose:  25 mg


Ondansetron HCl (Zofran Inj)  4 mg IVP ONCE PRN


   PRN Reason: Nausea/Vomiting


Pantoprazole Sodium (Protonix Ec Tab)  20 mg PO 0600,1600 [facility]


   Last Admin: 12/07/18 05:45 Dose:  20 mg











- Labs


Labs: 


                                        





                                 12/06/18 06:20 





                                 12/06/18 06:20 





                                        











PT  15.0 SECONDS (9.4-12.5)  H  12/04/18  06:30    


 


INR  1.30   12/04/18  06:30    


 


APTT  38.5 Seconds (25.1-36.5)  H  12/04/18  06:30    














Attending/Attestation





- Attestation


I have personally seen and examined this patient.: Yes


I have fully participated in the care of the patient.: Yes


I have reviewed all pertinent clinical information, including history, physical 

exam and plan: Yes


Notes (Text): 





12/06/18 


83 year old male with past medical history of PAD, COPD, hypertension, and 

chronic LE cellulitis who presented with complaint of left lower extremity 

cellulitis and gangrenous left 2nd distal phalanx.  Foot MRI was consistent with

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  Patient 

is on IV antibiotics.  ID and podiatry are following.  He is s/p amputation of 

second digit POD #8.  Pathology was reviewed as above and patient went again to 

OR earlier this week for further bone fragment excision at amputation site POD 

#2.  Pathology was reviewed.  Repeat cultures are pending.





Cardiology is following for bradycardia.  Patient is asymptomatic.  Not on beta 

blockers.  Holter applied.  Echocardiogram was reviewed.  Consider outpatient 

stress test.





He is on norvasc and cozaar for hypertension.





Maynor Cote MD


Hospitalist.

## 2018-12-06 NOTE — PQF
PROVIDER RESPONSE TEXT:



Ckd 3



REVIEWER QUERY TEXT:



Kidney Disease, Chronic CKD Stage



Chronic Kidney Disease (CKD) is documented in the Medical Record.  Please specify the disease stage (
includes probable or suspected)

Such as:

-- Chronic kidney disease Stage 1

-- Chronic kidney disease Stage 2

-- Chronic kidney disease Stage 3

-- Chronic kidney disease Stage 4

-- Chronic kidney disease Stage 5

-- Chronic kidney disease Stage 5, requiring dialysis

-- End Stage Renal Disease

-- Other, please specify



Stages are defined by the [employer] as follows:

CKD Stage I  GFR >= 90 ml / min per 1.73 m2 and persistent albuminuria

CKD Stage 2 GFR between 60 and 89 with persistent albuminuria

CKD Stage 3 GFR between 30 and 59

CKD Stage 4 GFR between 15 and 29

CKD Stage 5 GFR between <15 or End Stage Renal Disease



The patient's Clinical Indicators include:

Patient with hx CKD per medical record.

Admitted with elevated BUN, BUN/creatinine fluctuating throughout stay.

Today BUN 36, creatinine 1.6.

Please clarify if CKD is present and if so the stage of the CKD.





Query created by: Chelsea Christianson on 12/6/2018 10:14 AM





Electronically signed by:  Maynor Cote MD 12/6/2018 10:28 AM

## 2018-12-06 NOTE — CP.PCM.PN
<Garfield Sanches - Last Filed: 12/06/18 15:10>





Subjective





- Date & Time of Evaluation


Date of Evaluation: 12/06/18


Time of Evaluation: 15:10





- Subjective


Subjective: 





Podiatry progress note for Dr. Howard





84 y/o M patient  seen and evaluated at the bedside 2 days S/P revision of left 

second digit amputation with met head resection.  Patient was resting 

comfortably and NAD. He denies any pain to surgical site. Denies any acute 

events overnight. Patient denies N/V/F/C/SOB/CP and has no other pedal 

complaints at this time.




















Objective





- Vital Signs/Intake and Output


Vital Signs (last 24 hours): 


                                        











Temp Pulse Resp BP Pulse Ox


 


 100.3 F H  124 H  20   115/47 L  98 


 


 12/06/18 14:00  12/06/18 14:00  12/06/18 14:00  12/06/18 14:00  12/06/18 14:00








Intake and Output: 


                                        











 12/06/18 12/06/18





 06:59 18:59


 


Intake Total 740 


 


Output Total 400 


 


Balance 340 














- Medications


Medications: 


                               Current Medications





Acetaminophen (Tylenol 325mg Tab)  650 mg PO Q4H PRN


   PRN Reason: Pain, Mild (1-3)


Amlodipine Besylate (Norvasc)  5 mg PO BID [facility]


   Last Admin: 12/06/18 11:35 Dose:  5 mg


Heparin Sodium (Porcine) (Heparin)  5,000 units SC Q8H [facility]; Protocol


   Last Admin: 12/03/18 10:11 Dose:  5,000 units


Ceftaroline Fosamil 600 mg/ (Sodium Chloride)  100 mls @ 100 mls/hr IVPB Q12 [facility]


   Last Admin: 12/06/18 11:28 Dose:  100 mls/hr


Sodium Chloride (Sodium Chloride 0.9%)  1,000 mls @ 75 mls/hr IV .[id number] [facility]


   Stop: 12/07/18 06:00


   Last Admin: 12/06/18 11:27 Dose:  75 mls/hr


Insulin Human Regular (Humulin R Med)  0 units SC ACHS [facility]; Protocol


   Last Admin: 12/06/18 11:28 Dose:  Not Given


Losartan Potassium (Cozaar)  25 mg PO DAILY [facility]


   Last Admin: 12/05/18 11:47 Dose:  25 mg


Ondansetron HCl (Zofran Inj)  4 mg IVP ONCE PRN


   PRN Reason: Nausea/Vomiting


Pantoprazole Sodium (Protonix Ec Tab)  20 mg PO 0600,1600 [facility]


   Last Admin: 12/06/18 05:23 Dose:  Not Given











- Labs


Labs: 


                                        





                                 12/06/18 06:20 





                                 12/06/18 06:20 





                                        











PT  15.0 SECONDS (9.4-12.5)  H  12/04/18  06:30    


 


INR  1.30   12/04/18  06:30    


 


APTT  38.5 Seconds (25.1-36.5)  H  12/04/18  06:30    














- Constitutional


Appears: Well, Non-toxic, No Acute Distress





- Head Exam


Head Exam: ATRAUMATIC, NORMOCEPHALIC





- Extremities Exam


Additional comments: 





Left lower extremities exam:





Dressing was C/D/I. Dressing left intact.





Vasc: Cap refill < 3 sec to the remaining digits.





Neuro: Protective sensation diminished.























- Neurological Exam


Neurological Exam: Alert, Awake, Oriented x3





Assessment and Plan





- Assessment and Plan (Free Text)


Assessment: 








84 y/o M patient  seen and evaluated at the bedside 2 days S/P revision of left 

second digit amputation with met head resection


 











Plan: 





Patient seen and evaluated at bedside.


Discussed in detail with Dr. Howard


Charts, labs ad vitals reviewed; Afebrile, absent leukocytosis


Post operative left foot X-ray: small, tiny fragments of bone at the surgical 

site


Dressing was C/D/I. Dressing left intact.


Wound cx 11/29 - MRSA


Continue IV Abx per ID 


Patient will need 4 weeks of IV Abx


Pathology postop - fagments of bone with focal acute OM, fragments of fibrodense

tissue with fibroconnective tissue .


Continue pain management as needed


Patient seen by PT and evaluated - recommend [facility] for continued skilled PT and 

management.


Podiatry will continue to follow up the patient while in house





<Priya Howard - Last Filed: 12/07/18 14:40>





Objective





- Vital Signs/Intake and Output


Vital Signs (last 24 hours): 


                                        











Temp Pulse Resp BP Pulse Ox


 


 97.1 F L  54 L  18   158/58 H  96 


 


 12/07/18 06:00  12/07/18 06:00  12/07/18 06:00  12/07/18 06:00  12/07/18 06:00








Intake and Output: 


                                        











 12/07/18 12/07/18





 06:59 18:59


 


Intake Total 860 


 


Output Total 1410 


 


Balance -550 














- Medications


Medications: 


                               Current Medications





Acetaminophen (Tylenol 325mg Tab)  650 mg PO Q4H PRN


   PRN Reason: Pain, Mild (1-3)


Amlodipine Besylate (Norvasc)  5 mg PO BID [facility]


   Last Admin: 12/07/18 09:41 Dose:  5 mg


Heparin Sodium (Porcine) (Heparin)  5,000 units SC Q8H [facility]; Protocol


   Last Admin: 12/03/18 10:11 Dose:  5,000 units


Ceftaroline Fosamil 600 mg/ (Sodium Chloride)  100 mls @ 100 mls/hr IVPB Q12 [facility]


   Last Admin: 12/07/18 09:41 Dose:  100 mls/hr


Insulin Human Regular (Humulin R Med)  0 units SC ACHS [facility]; Protocol


   Last Admin: 12/07/18 11:50 Dose:  Not Given


Losartan Potassium (Cozaar)  25 mg PO DAILY [facility]


   Last Admin: 12/05/18 11:47 Dose:  25 mg


Ondansetron HCl (Zofran Inj)  4 mg IVP ONCE PRN


   PRN Reason: Nausea/Vomiting


Pantoprazole Sodium (Protonix Ec Tab)  20 mg PO 0600,1600 [facility]


   Last Admin: 12/07/18 05:45 Dose:  20 mg











- Labs


Labs: 


                                        





                                 12/07/18 06:45 





                                 12/07/18 06:45 





                                        











PT  15.0 SECONDS (9.4-12.5)  H  12/04/18  06:30    


 


INR  1.30   12/04/18  06:30    


 


APTT  38.5 Seconds (25.1-36.5)  H  12/04/18  06:30    














Attending/Attestation





- Attestation


I have personally seen and examined this patient.: Yes


I have fully participated in the care of the patient.: Yes


I have reviewed all pertinent clinical information, including history, physical 

exam and plan: Yes


Notes (Text): 





12/07/18 14:38


pt seen in no distriss dressing CDI; review of xrays shows some residual small 

fragments of bone and the pathology revealed the extruded bones were OM thus pt 

will need IV antibiotics as per ID; PICC line ordered; case discussed with Dr Do

## 2018-12-06 NOTE — PN
DATE:  12/06/2018



SUBJECTIVE:  The patient seen earlier this morning in 562, bed 1.  The

patient is in bed, no acute distress, nontoxic.



PHYSICAL EXAMINATION:

VITAL SIGNS:  Temperature is 97, blood pressure is 120/40, respiratory rate

of 18.

HEENT:  Unremarkable.

NECK:  Supple.

LUNGS:  Have decreased breath sounds.

HEART:  Normal S1, S2.

ABDOMEN:  Soft, nontender.



LABORATORY EXAMINATION:  Reveals a white count of 8.1, hemoglobin is 10,

platelets are 185.  Chemistries reveal creatinine is 1.6, has increased

from 1.3.  Microbiology reveals the MRSA from the toe culture, both the

first culture and the repeat culture.  Review of orders reveals the patient

is on ceftaroline.  Case discussed with Dr. Howard and the bone pathology

is reviewed.



ASSESSMENT AND PLAN:  An 83-year-old male with a left leg and left foot

cellulitis, second toe gangrene, osteomyelitis, status post amputation,

post procedure day #6, and another debridement yesterday and case discussed

with Dr. Howard who states that there is still infected bone, recommends

treatment as osteomyelitis.  We will treat 4-6 weeks of Teflaro with weekly

CBC, SMA-18, sed rate, C-reactive protein.





__________________________________________

Lj Baca MD



DD:  12/06/2018 16:35:20

DT:  12/06/2018 18:54:25

Job # [id number]

## 2018-12-06 NOTE — PN
DATE:  12/06/2018



REASON FOR CONSULTATION:  Followup, cardiac evaluation, bradycardia, status

post revision of the fourth digit.



SUBJECTIVE:  The patient denies any chest pain, shortness of breath or any

palpitation.  No complaint of chest pain noted.  Heart rate is 40, blood

pressure is 108/63.



This note is in addition to dictated by the nurse practitioner, Mel Dickens.



Echo done, ejection fraction 25%, mild aortic regurgitation, mild mitral

regurgitation, trace TR, moderate pulmonary insufficiency.  No vegetation

or thrombus noted.  Holter in progress; we will follow the result of the

Holter.  The patient is not on beta-blocker.  Continue Norvasc.  Further

recommendation will be made during the hospital course and Holter finding. 

We will follow with you.



Thank you Dr. Cote for providing us the opportunity in taking care of the

patient, Ritesh Tobias.





__________________________________________

Donnell Golden MD





DD:  12/06/2018 13:55:22

DT:  12/06/2018 16:32:24

Job # [id number]

## 2018-12-07 LAB
ALBUMIN SERPL-MCNC: 3.4 G/DL (ref 3–4.8)
ALBUMIN/GLOB SERPL: 1 {RATIO} (ref 1.1–1.8)
ALT SERPL-CCNC: 19 U/L (ref 7–56)
AST SERPL-CCNC: 31 U/L (ref 17–59)
BASOPHILS # BLD AUTO: 0.03 K/MM3 (ref 0–2)
BASOPHILS NFR BLD: 0.4 % (ref 0–3)
BUN SERPL-MCNC: 33 MG/DL (ref 7–21)
CALCIUM SERPL-MCNC: 8.8 MG/DL (ref 8.4–10.5)
EOSINOPHIL # BLD: 0.4 10*3/UL (ref 0–0.7)
EOSINOPHIL NFR BLD: 5.3 % (ref 1.5–5)
ERYTHROCYTE [DISTWIDTH] IN BLOOD BY AUTOMATED COUNT: 13.7 % (ref 11.5–14.5)
GFR NON-AFRICAN AMERICAN: 53
GRANULOCYTES # BLD: 5.32 10*3/UL (ref 1.4–6.5)
GRANULOCYTES NFR BLD: 66.9 % (ref 50–68)
HGB BLD-MCNC: 10.8 G/DL (ref 14–18)
LYMPHOCYTES # BLD: 1.5 10*3/UL (ref 1.2–3.4)
LYMPHOCYTES NFR BLD AUTO: 18.2 % (ref 22–35)
MCH RBC QN AUTO: 26.6 PG (ref 25–35)
MCHC RBC AUTO-ENTMCNC: 32.4 G/DL (ref 31–37)
MCV RBC AUTO: 82 FL (ref 80–105)
MONOCYTES # BLD AUTO: 0.7 10*3/UL (ref 0.1–0.6)
MONOCYTES NFR BLD: 9.2 % (ref 1–6)
PLATELET # BLD: 200 10^3/UL (ref 120–450)
PMV BLD AUTO: 8.8 FL (ref 7–11)
RBC # BLD AUTO: 4.06 10^6/UL (ref 3.5–6.1)
WBC # BLD AUTO: 8 10^3/UL (ref 4.5–11)

## 2018-12-07 PROCEDURE — 02HV33Z INSERTION OF INFUSION DEVICE INTO SUPERIOR VENA CAVA, PERCUTANEOUS APPROACH: ICD-10-PCS | Performed by: INTERNAL MEDICINE

## 2018-12-07 PROCEDURE — B51M1ZA FLUOROSCOPY OF RIGHT UPPER EXTREMITY VEINS USING LOW OSMOLAR CONTRAST, GUIDANCE: ICD-10-PCS | Performed by: INTERNAL MEDICINE

## 2018-12-07 RX ADMIN — INSULIN HUMAN SCH: 100 INJECTION, SOLUTION PARENTERAL at 22:19

## 2018-12-07 RX ADMIN — INSULIN HUMAN SCH: 100 INJECTION, SOLUTION PARENTERAL at 11:50

## 2018-12-07 RX ADMIN — PANTOPRAZOLE SODIUM SCH MG: 20 TABLET, DELAYED RELEASE ORAL at 18:13

## 2018-12-07 RX ADMIN — PANTOPRAZOLE SODIUM SCH MG: 20 TABLET, DELAYED RELEASE ORAL at 05:45

## 2018-12-07 RX ADMIN — INSULIN HUMAN SCH: 100 INJECTION, SOLUTION PARENTERAL at 17:00

## 2018-12-07 RX ADMIN — INSULIN HUMAN SCH: 100 INJECTION, SOLUTION PARENTERAL at 08:15

## 2018-12-07 NOTE — CP.PCM.PN
Subjective





- Date & Time of Evaluation


Date of Evaluation: 12/07/18


Time of Evaluation: 09:20





- Subjective


Subjective: 





No fevers, no increased pain in the left foot, no nausea. No diarrhea.





Objective





- Vital Signs/Intake and Output


Vital Signs (last 24 hours): 


                                        











Temp Pulse Resp BP Pulse Ox


 


 97.1 F L  54 L  18   158/58 H  96 


 


 12/07/18 06:00  12/07/18 06:00  12/07/18 06:00  12/07/18 06:00  12/07/18 06:00








Intake and Output: 


                                        











 12/07/18 12/07/18





 06:59 18:59


 


Intake Total 860 


 


Output Total 1410 


 


Balance -550 














- Medications


Medications: 


                               Current Medications





Acetaminophen (Tylenol 325mg Tab)  650 mg PO Q4H PRN


   PRN Reason: Pain, Mild (1-3)


Amlodipine Besylate (Norvasc)  5 mg PO BID [facility]


   Last Admin: 12/07/18 09:41 Dose:  5 mg


Heparin Sodium (Porcine) (Heparin)  5,000 units SC Q8H [facility]; Protocol


   Last Admin: 12/03/18 10:11 Dose:  5,000 units


Ceftaroline Fosamil 600 mg/ (Sodium Chloride)  100 mls @ 100 mls/hr IVPB Q12 [facility]


   Last Admin: 12/07/18 09:41 Dose:  100 mls/hr


Insulin Human Regular (Humulin R Med)  0 units SC ACHS [facility]; Protocol


   Last Admin: 12/07/18 11:50 Dose:  Not Given


Losartan Potassium (Cozaar)  25 mg PO DAILY [facility]


   Last Admin: 12/05/18 11:47 Dose:  25 mg


Ondansetron HCl (Zofran Inj)  4 mg IVP ONCE PRN


   PRN Reason: Nausea/Vomiting


Pantoprazole Sodium (Protonix Ec Tab)  20 mg PO 0600,1600 [facility]


   Last Admin: 12/07/18 05:45 Dose:  20 mg











- Labs


Labs: 


                                        





                                 12/07/18 06:45 





                                 12/07/18 06:45 





                                        











PT  15.0 SECONDS (9.4-12.5)  H  12/04/18  06:30    


 


INR  1.30   12/04/18  06:30    


 


APTT  38.5 Seconds (25.1-36.5)  H  12/04/18  06:30    














- Constitutional


Appears: Chronically Ill





- Head Exam


Head Exam: NORMAL INSPECTION





- Neck Exam


Neck Exam: absent: Meningismus





- Respiratory Exam


Respiratory Exam: Decreased Breath Sounds





- Cardiovascular Exam


Cardiovascular Exam: +S1, +S2





- GI/Abdominal Exam


GI & Abdominal Exam: Soft.  absent: Tenderness





- Extremities Exam


Additional comments: 





left foot with dressings in place





Assessment and Plan





- Assessment and Plan (Free Text)


Plan: 





Assessment


left leg and foot cellulitis and 2nd toe gangrene, with osteomyelitis with MRSA 

S/P amputation POD #7, S/P further debridement POD #3 - margins of bone still 

with osteomyelitis even on latest debridement


history of Group G strep bacteremia, probably secondary to bilateral lower 

extremities skin and skin structure infection;


history of rhabdomyolysis


CAD


HTN


chronic renal failure


dementia


obesity with BMI 32





Plan


continue Teflaro and will need at least 4-6 weeks of antibiotics with weekly 

ESR, CRP, CBC, CMP to be followed by the wound care center while on antibiotics 

- discussed with Dr. Do

## 2018-12-07 NOTE — PN
DATE:  12/07/2018



SEX OF THE PATIENT:  Male.



AGE OF THE PATIENT:  83.



TYPE OF DICTATION:  Progress note.



REFERRING PHYSICIAN:  Dr. Cote



REASON FOR CONSULTATION:  Followup, cardiac evaluation, bradycardia, status

post re-amputation of fourth toe.



This note is in addition to dictated by nurse practitioner, Mel Dickens.



The patient has revision of the left fourth toe amputation done.  Found to

be brachycardia.  Holter was placed, but Holter turned out to be blank, did

not record any activities, so repeat Holter was in progress.  The patient

has echocardiography done with ejection fraction of 55%, mild aortic

stenosis versus aortic sclerosis, mild mitral regurgitation, and trace TR.



RECOMMENDATIONS:  Continue repeat Holter though the patient is bradycardic

with asymptomatic.  Continue Holter monitor.  Avoid beta blocker.  _____

calcium channel blocker.  We will put hydralazine and consider losartan. 

Further recommendation after Holter monitor.  We will follow with you.



Thank you Dr. Cote for providing us the opportunity in taking car of the

patient, Ritesh Tobias.





__________________________________________

Donnell Golden MD





DD:  12/07/2018 17:03:54

DT:  12/07/2018 21:57:27

Job # [id number]

## 2018-12-07 NOTE — CP.PCM.PN
<Terry Martinez - Last Filed: 12/07/18 14:59>





Subjective





- Date & Time of Evaluation


Date of Evaluation: 12/07/18


Time of Evaluation: 07:40





- Subjective


Subjective: 





Terry Martinez PGY-1 Progress Note for Hospitalist Service





Patient seen and evaluated at bedside. No acute complaints reported overnight. 

Patient states his pain is well controlled. He was able to tolerate out of bed 

to chair yesterday. Denies fevers, chills, chest pain, shortness of breath, 

abdominal pain. Denies left foot pain. POD #3 revision of 2nd toe amputation 

with bone fragment removal in L foot. 








Objective





- Vital Signs/Intake and Output


Vital Signs (last 24 hours): 


                                        











Temp Pulse Resp BP Pulse Ox


 


 97.6 F   43 L  20   144/86   96 


 


 12/07/18 14:00  12/07/18 14:00  12/07/18 14:00  12/07/18 14:00  12/07/18 14:00








Intake and Output: 


                                        











 12/07/18 12/07/18





 06:59 18:59


 


Intake Total 860 


 


Output Total 1410 


 


Balance -550 














- Medications


Medications: 


                               Current Medications





Acetaminophen (Tylenol 325mg Tab)  650 mg PO Q4H PRN


   PRN Reason: Pain, Mild (1-3)


Amlodipine Besylate (Norvasc)  5 mg PO BID [facility]


   Last Admin: 12/07/18 09:41 Dose:  5 mg


Heparin Sodium (Porcine) (Heparin)  5,000 units SC Q8H [facility]; Protocol


   Last Admin: 12/03/18 10:11 Dose:  5,000 units


Ceftaroline Fosamil 600 mg/ (Sodium Chloride)  100 mls @ 100 mls/hr IVPB Q12 [facility]


   Last Admin: 12/07/18 09:41 Dose:  100 mls/hr


Insulin Human Regular (Humulin R Med)  0 units SC ACHS [facility]; Protocol


   Last Admin: 12/07/18 11:50 Dose:  Not Given


Losartan Potassium (Cozaar)  25 mg PO DAILY [facility]


   Last Admin: 12/05/18 11:47 Dose:  25 mg


Ondansetron HCl (Zofran Inj)  4 mg IVP ONCE PRN


   PRN Reason: Nausea/Vomiting


Pantoprazole Sodium (Protonix Ec Tab)  20 mg PO 0600,1600 [facility]


   Last Admin: 12/07/18 05:45 Dose:  20 mg











- Labs


Labs: 


                                        





                                 12/07/18 06:45 





                                 12/07/18 06:45 





                                        











PT  15.0 SECONDS (9.4-12.5)  H  12/04/18  06:30    


 


INR  1.30   12/04/18  06:30    


 


APTT  38.5 Seconds (25.1-36.5)  H  12/04/18  06:30    














- Additional Findings


Additional findings: 





- Constitutional


Appears: Well, Non-toxic, No Acute Distress





- Head Exam


Head Exam: ATRAUMATIC, NORMAL INSPECTION, NORMOCEPHALIC





- Eye Exam


Eye Exam: EOMI, Normal appearance





- ENT Exam


ENT Exam: Mucous Membranes Moist





- Respiratory Exam


Respiratory Exam: Clear to Ausculation Bilateral, NORMAL BREATHING PATTERN





- Cardiovascular Exam


Cardiovascular Exam: REGULAR RHYTHM, +S1, +S2





- GI/Abdominal Exam


GI & Abdominal Exam: Soft, Normal Bowel Sounds.  absent: Tenderness





- Extremities Exam


Extremities Exam: Pedal Edema (left lower extremity).  absent: Calf Tenderness, 

Normal Inspection (left lower leg), Tenderness





- Back Exam


Back Exam: NORMAL INSPECTION





- Neurological Exam


Neurological Exam: Alert, Awake, Oriented x3. Remaining toes have poor sensation

2/2 diabetic peripheral neuropathy





- Psychiatric Exam


Psychiatric exam: Normal Affect, Normal Mood





- Skin


Skin Exam: absent: Normal Color


Additional comments: 





amputated second metatarsal head of left foot - dressings c/d/i





Assessment and Plan





- Assessment and Plan (Free Text)


Assessment: 





84 y/o M with PMHx of peripheral artery disease, chronic LLE cellulitis, COPD, 

hypertension, dementia, obesity presents to [facility] with complaints of LLE anterior 

tibial region cellulitis and gangrenous L foot 2nd distal phalanx. OM confirmed 

on MRI 11/25. Amputation of second digit AM 11/28. Currently pain controlled 

waiting on ID and podiatry recs. POD #7 of L 2nd toe amputation. POD #2 of 

revision of L 2nd toe amputation with removal of bone fragment. Awaiting 

podiatry input and cultures and sensitivities.





Plan: 





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


- LLE DEYSI studies reveal possible left tibial occlusive disease


- Podiatry on consult -Dr. Do - amputated second metatarsal head of left 

foot 11/29. OR 12/4 to excise the bone fragment from the amputation site


- ID on consult - Dr. Baca recs- at least 4-6 weeks of Ceftaroline with 

weekly ESR, CRP, CBC and CMP.  ID recs regarding oral options pending cultures 

and path report


PICC line placed 12/7





- Ceftaroline day# 16 as per ID (CrCl >55; can continue with current abx dose).





Path: margin report 12/3: Inflammatory process involves bone and soft tissue at 

resection margin. Separate bone with OM. Separate skin and soft tissue with 

marked acute inflammation.


Podiatry: f/u x-rays, cultures, sensitivities, and path report. OM has been 

eradicated.





- L foot xray: no signs of osteomyelitis, cellulitis present


- Foot MRI 11/25 Marrow edema in 2nd proximal and middle phalanx consistent with

osteo


- Repeat wound cultures from 11/29 show MRSA


- Urine cx negative


- Final blood culture shows no growth after 5 days


- Procalcitonin 0.07





CARLOS - resolved


Cr up to 1.6, today back to baseline 1.3


Light hydration NS @ 75 cc/hr discontinued


possibly 2/2 hypotensive episodes late


Nephrotoxic Losartan held as BP controlled, continue to monitor BP and will 

evaluate BUN/Cr in AM





Bradycardia


HR paroxysmally drops into 30s at night


Cardiology consulted - Dr. Golden


Hgba1c 6.2


Lipid panel, Mg, Phos wnl


TSH 3.92


Holter monitor to be placed for 24 hours to assess need for pacemaker - f/u 

findings


Echo - EF 55% RV mild-moderate dilated, systolic function of R ventricle 

moderately reduced, mild AS, Mild MR. Consider stress test as outpatient





Hx of Hypertension


-Continue amlodipine and losartan


- Light hydration NS @ 75 cc/hr





Hx of Pre-diabetes, Diabetic Peripheral Neuropathy


- HgA1C 6.2


- Diabetes prevention and diet education


- ISS-low





DVT/GI PPx: 


- Heparin 5000 q8


- Protonix 





Dispo: SW/CM - Pt wife Beata who stated that she cannot make decision at 

present if [facility] vs Home ivab infusion


She will reach out to her daughter. Patient already accepted by [facility], 

and [facility] that can accomodate iv Teflaro.


F/u PT eval for strengthening





Patient seen, case reviewed and plan approved by Dr. Cote.





Terry Martinez, PGY-1





<Maynor Cote - Last Filed: 12/07/18 16:38>





Objective





- Vital Signs/Intake and Output


Vital Signs (last 24 hours): 


                                        











Temp Pulse Resp BP Pulse Ox


 


 97.6 F   43 L  20   144/86   96 


 


 12/07/18 14:00  12/07/18 14:00  12/07/18 14:00  12/07/18 14:00  12/07/18 14:00








Intake and Output: 


                                        











 12/07/18 12/07/18





 06:59 18:59


 


Intake Total 860 360


 


Output Total 1410 


 


Balance -550 360














- Medications


Medications: 


                               Current Medications





Acetaminophen (Tylenol 325mg Tab)  650 mg PO Q4H PRN


   PRN Reason: Pain, Mild (1-3)


Amlodipine Besylate (Norvasc)  5 mg PO BID [facility]


   Last Admin: 12/07/18 09:41 Dose:  5 mg


Heparin Sodium (Porcine) (Heparin)  5,000 units SC Q8H [facility]; Protocol


   Last Admin: 12/03/18 10:11 Dose:  5,000 units


Ceftaroline Fosamil 600 mg/ (Sodium Chloride)  100 mls @ 100 mls/hr IVPB Q12 [facility]


   Last Admin: 12/07/18 09:41 Dose:  100 mls/hr


Insulin Human Regular (Humulin R Med)  0 units SC ACHS [facility]; Protocol


   Last Admin: 12/07/18 11:50 Dose:  Not Given


Losartan Potassium (Cozaar)  25 mg PO DAILY [facility]


   Last Admin: 12/05/18 11:47 Dose:  25 mg


Ondansetron HCl (Zofran Inj)  4 mg IVP ONCE PRN


   PRN Reason: Nausea/Vomiting


Pantoprazole Sodium (Protonix Ec Tab)  20 mg PO 0600,1600 [facility]


   Last Admin: 12/07/18 05:45 Dose:  20 mg











- Labs


Labs: 


                                        





                                 12/07/18 06:45 





                                 12/07/18 06:45 





                                        











PT  15.0 SECONDS (9.4-12.5)  H  12/04/18  06:30    


 


INR  1.30   12/04/18  06:30    


 


APTT  38.5 Seconds (25.1-36.5)  H  12/04/18  06:30    














Attending/Attestation





- Attestation


I have personally seen and examined this patient.: Yes


I have fully participated in the care of the patient.: Yes


I have reviewed all pertinent clinical information, including history, physical 

exam and plan: Yes


Notes (Text): 





12/07/18 16:37


83 year old male with past medical history of PAD, COPD, hypertension, and 

chronic LE cellulitis who presented with complaint of left lower extremity 

cellulitis and gangrenous left 2nd distal phalanx.  Foot MRI was consistent with

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  Patient 

is on IV antibiotics.  ID and podiatry are following.  He is s/p amputation of 

second digit POD #9.  Pathology was reviewed as above and patient went again to 

OR earlier this week for further bone fragment excision at amputation site POD 

#3.  Pathology was reviewed.  Repeat cultures is growing growing gram positive 

cocci.





Cardiology is following for bradycardia.  Patient is asymptomatic.  Not on beta 

blockers.  Holter applied.  Echocardiogram was reviewed.  Consider outpatient 

stress test.





He is on norvasc and cozaar for hypertension.





Picc line is placed today.  Possible d/c planning this weekend to [facility].





Maynor Cote MD


Hospitalist.

## 2018-12-07 NOTE — CP.PCM.PN
Subjective





- Date & Time of Evaluation


Date of Evaluation: 12/07/18


Time of Evaluation: 06:45





- Subjective


Subjective: 








Awake, alert, no distress, denies chest pain or shortness of breath





Reason for consultation and follow up: Cardiac evaluation of bradycardia, status

post revision of left 4th digit








Seen and examined by me and Dr. Golden








Objective





- Vital Signs/Intake and Output


Vital Signs (last 24 hours): 


                                        











Temp Pulse Resp BP Pulse Ox


 


 98 F   54 L  18   145/58 L  96 


 


 12/06/18 22:41  12/07/18 00:30  12/06/18 22:41  12/07/18 00:30  12/06/18 22:41








Intake and Output: 


                                        











 12/07/18 12/07/18





 06:59 18:59


 


Intake Total 860 


 


Output Total 1410 


 


Balance -550 














- Medications


Medications: 


                               Current Medications





Acetaminophen (Tylenol 325mg Tab)  650 mg PO Q4H PRN


   PRN Reason: Pain, Mild (1-3)


Amlodipine Besylate (Norvasc)  5 mg PO BID [facility]


   Last Admin: 12/06/18 19:12 Dose:  5 mg


Heparin Sodium (Porcine) (Heparin)  5,000 units SC Q8H [facility]; Protocol


   Last Admin: 12/03/18 10:11 Dose:  5,000 units


Ceftaroline Fosamil 600 mg/ (Sodium Chloride)  100 mls @ 100 mls/hr IVPB Q12 [facility]


   Last Admin: 12/06/18 22:12 Dose:  100 mls/hr


Insulin Human Regular (Humulin R Med)  0 units SC ACHS [facility]; Protocol


   Last Admin: 12/06/18 22:11 Dose:  Not Given


Losartan Potassium (Cozaar)  25 mg PO DAILY [facility]


   Last Admin: 12/05/18 11:47 Dose:  25 mg


Ondansetron HCl (Zofran Inj)  4 mg IVP ONCE PRN


   PRN Reason: Nausea/Vomiting


Pantoprazole Sodium (Protonix Ec Tab)  20 mg PO 0600,1600 [facility]


   Last Admin: 12/07/18 05:45 Dose:  20 mg











- Labs


Labs: 


                                        





                                 12/07/18 06:45 





                                 12/07/18 06:45 





                                        











PT  15.0 SECONDS (9.4-12.5)  H  12/04/18  06:30    


 


INR  1.30   12/04/18  06:30    


 


APTT  38.5 Seconds (25.1-36.5)  H  12/04/18  06:30    














- Constitutional


Appears: Non-toxic, No Acute Distress





- Head Exam


Head Exam: NORMAL INSPECTION, NORMOCEPHALIC





- Eye Exam


Eye Exam: Normal appearance


Pupil Exam: NORMAL ACCOMODATION





- ENT Exam


ENT Exam: Mucous Membranes Moist, Normal Exam





- Respiratory Exam


Respiratory Exam: Clear to Ausculation Bilateral, NORMAL BREATHING PATTERN





- Cardiovascular Exam


Cardiovascular Exam: +S1, +S2





- GI/Abdominal Exam


GI & Abdominal Exam: Soft, Normal Bowel Sounds





- Extremities Exam


Extremities Exam: Full ROM


Additional comments: 





left foot dressing





- Neurological Exam


Neurological Exam: Alert, Awake, Oriented x3





- Psychiatric Exam


Psychiatric exam: Normal Affect, Normal Mood





- Skin


Skin Exam: Dry, Normal Color, Warm





Assessment and Plan





- Assessment and Plan (Free Text)


Assessment: 








An 83 year old  male who wascame to the ER due to left toe wound. history of  

COPD, diabetes, hypertension,obesity, cellilitis, left toe osteomyelitis post 

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 Patient  bradycardic and 

consult was called. Patient is not on betablocker. Placed on Holter 

monitor.Stress test as outpatient once better from toe surgery.Echo done.LVEF 

55%,Aortic sclerosis Vs Mild As, mild mitral regurgitation,trace TR, moderate 

pulmonic valve regurgitation,no vegetation or thrombus. Continue  Holter 

monitor.





Plan: 





Status post revision of left toe


No distress, denies shortness of breath


Initial placement of Holter unable to capture,


so on Holter monitor x 24 hours, will follow up result


No distress


Stable blood pressure


Heart rate 40's


No Betablocker


On Norvasc 5 mg BID


Continue antibiotics as ordered


Continue current treatment


Continue current medications


Will follow up








Plan and treatment discussed with Dr. Golden

## 2018-12-07 NOTE — CP.PCM.PN
<Garfield Sanches - Last Filed: 12/07/18 15:29>





Subjective





- Date & Time of Evaluation


Date of Evaluation: 12/07/18


Time of Evaluation: 15:27





- Subjective


Subjective: 





Podiatry progress note for Dr. Do





84 y/o M patient  seen and evaluated at the bedside 3 days S/P revision of left 

second digit amputation with met head resection.  Patient was resting 

comfortably and NAD. He denies any pain to surgical site. Denies any acute 

events overnight. Patient denies N/V/F/C/SOB/CP and has no other pedal 

complaints at this time.




















Objective





- Vital Signs/Intake and Output


Vital Signs (last 24 hours): 


                                        











Temp Pulse Resp BP Pulse Ox


 


 97.6 F   43 L  20   144/86   96 


 


 12/07/18 14:00  12/07/18 14:00  12/07/18 14:00  12/07/18 14:00  12/07/18 14:00








Intake and Output: 


                                        











 12/07/18 12/07/18





 06:59 18:59


 


Intake Total 860 360


 


Output Total 1410 


 


Balance -550 360














- Medications


Medications: 


                               Current Medications





Acetaminophen (Tylenol 325mg Tab)  650 mg PO Q4H PRN


   PRN Reason: Pain, Mild (1-3)


Amlodipine Besylate (Norvasc)  5 mg PO BID [facility]


   Last Admin: 12/07/18 09:41 Dose:  5 mg


Heparin Sodium (Porcine) (Heparin)  5,000 units SC Q8H [facility]; Protocol


   Last Admin: 12/03/18 10:11 Dose:  5,000 units


Ceftaroline Fosamil 600 mg/ (Sodium Chloride)  100 mls @ 100 mls/hr IVPB Q12 [facility]


   Last Admin: 12/07/18 09:41 Dose:  100 mls/hr


Insulin Human Regular (Humulin R Med)  0 units SC ACHS [facility]; Protocol


   Last Admin: 12/07/18 11:50 Dose:  Not Given


Losartan Potassium (Cozaar)  25 mg PO DAILY [facility]


   Last Admin: 12/05/18 11:47 Dose:  25 mg


Ondansetron HCl (Zofran Inj)  4 mg IVP ONCE PRN


   PRN Reason: Nausea/Vomiting


Pantoprazole Sodium (Protonix Ec Tab)  20 mg PO 0600,1600 [facility]


   Last Admin: 12/07/18 05:45 Dose:  20 mg











- Labs


Labs: 


                                        





                                 12/07/18 06:45 





                                 12/07/18 06:45 





                                        











PT  15.0 SECONDS (9.4-12.5)  H  12/04/18  06:30    


 


INR  1.30   12/04/18  06:30    


 


APTT  38.5 Seconds (25.1-36.5)  H  12/04/18  06:30    














- Constitutional


Appears: Well, Non-toxic, No Acute Distress





- Head Exam


Head Exam: ATRAUMATIC, NORMOCEPHALIC





- Extremities Exam


Additional comments: 








Bilateral lower extremities exam





Vasc: faintly palpable pedal pulses bilaterally, Temp gradient warm to cool in 

the right side and warm to warm in the left side. Cap refill < 3 sec to all 

remaining digits. No erythema noted. Mild non pitting edema noted to the left 

foot. 





Neuro: Gross and protective sensations are grossly diminished.





Derm: Surgical site incision well coapted, sutures intact, no evidence of pus or

purulent drainage, No drainage noted through the dressing, no openings or signs 

of wound dehiscence, no probing, no clinical signs of infection





MSK: No pain on palpation of foot or legs bilaterally. Muscle power intact 5/5 

to all groups b/l.

















- Neurological Exam


Neurological Exam: Alert, Awake, Oriented x3





- Psychiatric Exam


Psychiatric exam: Normal Affect, Normal Mood





Assessment and Plan





- Assessment and Plan (Free Text)


Assessment: 








84 y/o M patient  seen and evaluated at the bedside 3 days S/P revision of left 

second digit amputation with met head resection


 








Plan: 





Patient seen and evaluated at bedside.


Discussed in detail with Dr. Do


Charts, labs ad vitals reviewed; Afebrile, absent leukocytosis


Post operative left foot X-ray: small, tiny fragments of bone at the surgical 

site


Dressing was C/D/I. Dressing left intact.


Wound cx 11/29 - MRSA


Continue IV Abx per ID 


Patient will need 4 weeks of IV Abx


Pathology postop - fragments of bone with focal acute OM, fragments of 

fibrodense tissue with fibroconnective tissue.


Deep tissue cultures from the surgery; Gram positive cocci (preliminary)


Continue pain management as needed


Podiatry will continue to follow up the patient while in house





<Rodrigo Do - Last Filed: 12/08/18 08:23>





Objective





- Vital Signs/Intake and Output


Vital Signs (last 24 hours): 


                                        











Temp Pulse Resp BP Pulse Ox


 


 98.0 F   43 L  18   135/52 L  97 


 


 12/08/18 06:00  12/08/18 06:00  12/08/18 06:00  12/08/18 06:00  12/08/18 06:00








Intake and Output: 


                                        











 12/08/18 12/08/18





 06:59 18:59


 


Intake Total 540 


 


Output Total 200 


 


Balance 340 














- Medications


Medications: 


                               Current Medications





Acetaminophen (Tylenol 325mg Tab)  650 mg PO Q4H PRN


   PRN Reason: Pain, Mild (1-3)


Amlodipine Besylate (Norvasc)  5 mg PO BID [facility]


   Last Admin: 12/07/18 18:11 Dose:  5 mg


Heparin Sodium (Porcine) (Heparin)  5,000 units SC Q8H [facility]; Protocol


   Last Admin: 12/03/18 10:11 Dose:  5,000 units


Ceftaroline Fosamil 600 mg/ (Sodium Chloride)  100 mls @ 100 mls/hr IVPB Q12 [facility]


   Last Admin: 12/07/18 22:19 Dose:  100 mls/hr


Insulin Human Regular (Humulin R Med)  0 units SC ACHS [facility]; Protocol


   Last Admin: 12/07/18 22:19 Dose:  Not Given


Losartan Potassium (Cozaar)  25 mg PO DAILY [facility]


   Last Admin: 12/05/18 11:47 Dose:  25 mg


Ondansetron HCl (Zofran Inj)  4 mg IVP ONCE PRN


   PRN Reason: Nausea/Vomiting


Pantoprazole Sodium (Protonix Ec Tab)  20 mg PO 0600,1600 [facility]


   Last Admin: 12/08/18 05:36 Dose:  20 mg











- Labs


Labs: 


                                        





                                 12/08/18 06:40 





                                 12/07/18 06:45 





                                        











PT  15.0 SECONDS (9.4-12.5)  H  12/04/18  06:30    


 


INR  1.30   12/04/18  06:30    


 


APTT  38.5 Seconds (25.1-36.5)  H  12/04/18  06:30    














Attending/Attestation





- Attestation


I have personally seen and examined this patient.: Yes


I have fully participated in the care of the patient.: Yes


I have reviewed all pertinent clinical information, including history, physical 

exam and plan: Yes

## 2018-12-08 LAB
ALBUMIN SERPL-MCNC: 3.3 G/DL (ref 3–4.8)
ALBUMIN/GLOB SERPL: 1 {RATIO} (ref 1.1–1.8)
ALT SERPL-CCNC: 17 U/L (ref 7–56)
AST SERPL-CCNC: 23 U/L (ref 17–59)
BASOPHILS # BLD AUTO: 0.02 K/MM3 (ref 0–2)
BASOPHILS NFR BLD: 0.3 % (ref 0–3)
BUN SERPL-MCNC: 33 MG/DL (ref 7–21)
CALCIUM SERPL-MCNC: 9 MG/DL (ref 8.4–10.5)
EOSINOPHIL # BLD: 0.3 10*3/UL (ref 0–0.7)
EOSINOPHIL NFR BLD: 4 % (ref 1.5–5)
ERYTHROCYTE [DISTWIDTH] IN BLOOD BY AUTOMATED COUNT: 13.7 % (ref 11.5–14.5)
GFR NON-AFRICAN AMERICAN: 53
GRANULOCYTES # BLD: 5.1 10*3/UL (ref 1.4–6.5)
GRANULOCYTES NFR BLD: 65.5 % (ref 50–68)
HGB BLD-MCNC: 10.8 G/DL (ref 14–18)
LYMPHOCYTES # BLD: 1.8 10*3/UL (ref 1.2–3.4)
LYMPHOCYTES NFR BLD AUTO: 22.9 % (ref 22–35)
MCH RBC QN AUTO: 26.7 PG (ref 25–35)
MCHC RBC AUTO-ENTMCNC: 32.1 G/DL (ref 31–37)
MCV RBC AUTO: 83 FL (ref 80–105)
MONOCYTES # BLD AUTO: 0.6 10*3/UL (ref 0.1–0.6)
MONOCYTES NFR BLD: 7.3 % (ref 1–6)
PLATELET # BLD: 181 10^3/UL (ref 120–450)
PMV BLD AUTO: 8.8 FL (ref 7–11)
RBC # BLD AUTO: 4.05 10^6/UL (ref 3.5–6.1)
WBC # BLD AUTO: 7.8 10^3/UL (ref 4.5–11)

## 2018-12-08 RX ADMIN — INSULIN HUMAN SCH: 100 INJECTION, SOLUTION PARENTERAL at 13:20

## 2018-12-08 RX ADMIN — INSULIN HUMAN SCH: 100 INJECTION, SOLUTION PARENTERAL at 21:38

## 2018-12-08 RX ADMIN — PANTOPRAZOLE SODIUM SCH MG: 20 TABLET, DELAYED RELEASE ORAL at 05:36

## 2018-12-08 RX ADMIN — PANTOPRAZOLE SODIUM SCH MG: 20 TABLET, DELAYED RELEASE ORAL at 18:13

## 2018-12-08 RX ADMIN — INSULIN HUMAN SCH: 100 INJECTION, SOLUTION PARENTERAL at 18:12

## 2018-12-08 RX ADMIN — INSULIN HUMAN SCH: 100 INJECTION, SOLUTION PARENTERAL at 08:32

## 2018-12-08 NOTE — CP.PCM.PN
<Romina Nelson - Last Filed: 12/08/18 15:40>





Subjective





- Date & Time of Evaluation


Date of Evaluation: 12/08/18


Time of Evaluation: 15:40





- Subjective


Subjective: 


Romina Nelson, PGY-1, Internal Medicine Progress Note for Dr. Cote





Patient seen and evaluated at bedside. Patient had no acute events overnight. 

Patient reports no symptoms at this time. Patient denies headache, fever, chest 

pain, shortness of breath, nausea, vomiting, constipation, diarrhea, abdominal 

pain, dysuria, hematuria, numbness/tingling. 12-point ROS is negative except for

what is mentioned above.








Objective





- Vital Signs/Intake and Output


Vital Signs (last 24 hours): 


                                        











Temp Pulse Resp BP Pulse Ox


 


 98.0 F   43 L  18   135/52 L  97 


 


 12/08/18 06:00  12/08/18 06:00  12/08/18 06:00  12/08/18 06:00  12/08/18 06:00








Intake and Output: 


                                        











 12/08/18 12/08/18





 06:59 18:59


 


Intake Total 540 


 


Output Total 200 


 


Balance 340 














- Medications


Medications: 


                               Current Medications





Acetaminophen (Tylenol 325mg Tab)  650 mg PO Q4H PRN


   PRN Reason: Pain, Mild (1-3)


Amlodipine Besylate (Norvasc)  5 mg PO BID [facility]


   Last Admin: 12/08/18 13:20 Dose:  5 mg


Heparin Sodium (Porcine) (Heparin)  5,000 units SC Q8H [facility]; Protocol


   Last Admin: 12/03/18 10:11 Dose:  5,000 units


Ceftaroline Fosamil 600 mg/ (Sodium Chloride)  100 mls @ 100 mls/hr IVPB Q12 [facility]


   Last Admin: 12/08/18 13:05 Dose:  100 mls/hr


Insulin Human Regular (Humulin R Med)  0 units SC ACHS [facility]; Protocol


   Last Admin: 12/08/18 13:20 Dose:  Not Given


Losartan Potassium (Cozaar)  25 mg PO DAILY [facility]


   Last Admin: 12/05/18 11:47 Dose:  25 mg


Ondansetron HCl (Zofran Inj)  4 mg IVP ONCE PRN


   PRN Reason: Nausea/Vomiting


Pantoprazole Sodium (Protonix Ec Tab)  20 mg PO 0600,1600 [facility]


   Last Admin: 12/08/18 05:36 Dose:  20 mg











- Labs


Labs: 


                                        





                                 12/08/18 06:40 





                                 12/08/18 06:40 





                                        











PT  15.0 SECONDS (9.4-12.5)  H  12/04/18  06:30    


 


INR  1.30   12/04/18  06:30    


 


APTT  38.5 Seconds (25.1-36.5)  H  12/04/18  06:30    














- Constitutional


Appears: Well, Non-toxic, No Acute Distress





- Head Exam


Head Exam: ATRAUMATIC, NORMAL INSPECTION, NORMOCEPHALIC





- Eye Exam


Eye Exam: EOMI


Pupil Exam: PERRL





- Neck Exam


Neck Exam: Full ROM





- Respiratory Exam


Respiratory Exam: Clear to Ausculation Bilateral, NORMAL BREATHING PATTERN





- Cardiovascular Exam


Cardiovascular Exam: Bradycardia, REGULAR RHYTHM





- GI/Abdominal Exam


GI & Abdominal Exam: Soft, Normal Bowel Sounds.  absent: Tenderness





- Extremities Exam


Extremities Exam: Full ROM





- Neurological Exam


Neurological Exam: Alert, Awake, CN II-XII Intact, Oriented x3





Assessment and Plan





- Assessment and Plan (Free Text)


Assessment: 


83 year old male with past medical history of peripheral artery disease, chronic

LLE cellulitis, COPD, HTN, dementia, obesity presents to [facility] with complaints of 

LLE anterior tibial region cellulitis and black L foot 2nd distal phalanx. 

Amputation of L second phalax done on 11/28. Patient is POD #8. Revision of L 

2nd phalanx with removal of bone fragment POD#3. 





Plan: 


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


-MRI L foot: marrow edema in 2nd proximal and middle phalanx consistent with 

osteomyelitis


-Postoperative left foot X ray: small, tiny fragments of bone at the surgical 

site


-Wound cultures: MRSA


-Blood cultures: negative after 5 days


-Urine culture: negative


-Procalcitonin on 11/22: 0.07


-Dr. Do amputated 2nd metatarsal head of left foot on 11/29 which was sunday

ed on 12/4 with bone fragment excised.


-ID, Dr. Baca, recommends 4-6 weeks of Ceftaroline day 17 with weekly ESR,

CRP, CBC, and CMP. 


-PICC line was placed by 12/7.





Bradycardia


-HR ranging from 30 to mid 50s


-Holter monitor placed on patient which showed sinus bradycardia, arrhythmia, 

junctional rhythm, and escape beats. Patient also had 237 dropped beats.


-Patient will have permanent pacemaker placement on Monday.


-Hold beta blockers, cozaar





Hypertension


-BP: 134/95


-HHD diet


-Norvasc 5 mg BID. Hold beta blockers and cozaar due to bradycardia.





Normocytic Anemia


-Hgb: 10.8


-Continue to monitor. 





History of prediabetes, diabetic peripheral neuropathy


-Hemoglobin A1c: 6.2


-Low dose sliding scale insulin





DVT/GI PPx: 


- Heparin 5000 q8 held


- Protonix 20 mg daily





Patient plan discussed with Dr. Cote








<Maynor Cote - Last Filed: 12/09/18 06:48>





Objective





- Vital Signs/Intake and Output


Vital Signs (last 24 hours): 


                                        











Temp Pulse Resp BP Pulse Ox


 


 98.9 F   46 L  18   179/65 H  96 


 


 12/08/18 22:20  12/09/18 06:19  12/08/18 22:20  12/09/18 06:19  12/08/18 22:20








Intake and Output: 


                                        











 12/08/18 12/09/18





 18:59 06:59


 


Intake Total  180


 


Output Total  500


 


Balance  -320














- Medications


Medications: 


                               Current Medications





Acetaminophen (Tylenol 325mg Tab)  650 mg PO Q4H PRN


   PRN Reason: Pain, Mild (1-3)


Amlodipine Besylate (Norvasc)  5 mg PO BID [facility]


   Last Admin: 12/09/18 06:19 Dose:  5 mg


Heparin Sodium (Porcine) (Heparin)  5,000 units SC Q8H [facility]; Protocol


   Last Admin: 12/03/18 10:11 Dose:  5,000 units


Ceftaroline Fosamil 600 mg/ (Sodium Chloride)  100 mls @ 100 mls/hr IVPB Q12 [facility]


   Last Admin: 12/08/18 21:37 Dose:  100 mls/hr


Insulin Human Regular (Humulin R Med)  0 units SC ACHS [facility]; Protocol


   Last Admin: 12/08/18 21:38 Dose:  Not Given


Losartan Potassium (Cozaar)  25 mg PO DAILY [facility]


   Last Admin: 12/05/18 11:47 Dose:  25 mg


Ondansetron HCl (Zofran Inj)  4 mg IVP ONCE PRN


   PRN Reason: Nausea/Vomiting


Pantoprazole Sodium (Protonix Ec Tab)  20 mg PO 0600,1600 [facility]


   Last Admin: 12/09/18 06:14 Dose:  20 mg











- Labs


Labs: 


                                        





                                 12/08/18 06:40 





                                 12/08/18 06:40 





                                        











PT  15.0 SECONDS (9.4-12.5)  H  12/04/18  06:30    


 


INR  1.30   12/04/18  06:30    


 


APTT  38.5 Seconds (25.1-36.5)  H  12/04/18  06:30    














Attending/Attestation





- Attestation


I have personally seen and examined this patient.: Yes


I have fully participated in the care of the patient.: Yes


I have reviewed all pertinent clinical information, including history, physical 

exam and plan: Yes


Notes (Text): 





12/08/18 


83 year old male with past medical history of PAD, COPD, hypertension, and 

chronic LE cellulitis who presented with complaint of left lower extremity 

cellulitis and gangrenous left 2nd distal phalanx.  Foot MRI was consistent with

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  Patient 

is on IV antibiotics.  ID and podiatry are following.  He is s/p amputation of 

second digit POD #10.  Patient went again to OR for further bone fragment 

excision at amputation site POD #4.  Repeat cultures is growing MRSA.  Patient 

will need long term iv antibiotics.  Picc line is in place.





Cardiology is following for bradycardia.  Patient is asymptomatic.  Not on beta 

blockers.  Holter was applied which showed bradycardia, multiple skipped beats, 

and pauses.  Case was discussed with cardiology and discharged to [facility] today is 

cancelled with plan for PPM on Monday.





He is on norvasc and cozaar for hypertension.





Maynor Cote MD


Hospitalist.

## 2018-12-08 NOTE — CP.PCM.PN
<Willard Raymundo - Last Filed: 12/08/18 10:13>





Subjective





- Date & Time of Evaluation


Date of Evaluation: 12/08/18


Time of Evaluation: 10:13





- Subjective


Subjective: 





Podiatry progress note for Dr. Howard





82 y/o M patient  seen and evaluated at the bedside 3 days S/P revision of left 

second digit amputation with met head resection.  Patient was resting 

comfortably and NAD. He denies any pain to surgical site. Denies any acute 

events overnight. Patient denies N/V/F/C/SOB/CP and has no other pedal 

complaints at this time.





Objective





- Vital Signs/Intake and Output


Vital Signs (last 24 hours): 


                                        











Temp Pulse Resp BP Pulse Ox


 


 98.0 F   43 L  18   135/52 L  97 


 


 12/08/18 06:00  12/08/18 06:00  12/08/18 06:00  12/08/18 06:00  12/08/18 06:00








Intake and Output: 


                                        











 12/08/18 12/08/18





 06:59 18:59


 


Intake Total 540 


 


Output Total 200 


 


Balance 340 














- Medications


Medications: 


                               Current Medications





Acetaminophen (Tylenol 325mg Tab)  650 mg PO Q4H PRN


   PRN Reason: Pain, Mild (1-3)


Amlodipine Besylate (Norvasc)  5 mg PO BID [facility]


   Last Admin: 12/07/18 18:11 Dose:  5 mg


Heparin Sodium (Porcine) (Heparin)  5,000 units SC Q8H [facility]; Protocol


   Last Admin: 12/03/18 10:11 Dose:  5,000 units


Ceftaroline Fosamil 600 mg/ (Sodium Chloride)  100 mls @ 100 mls/hr IVPB Q12 [facility]


   Last Admin: 12/07/18 22:19 Dose:  100 mls/hr


Insulin Human Regular (Humulin R Med)  0 units SC ACHS [facility]; Protocol


   Last Admin: 12/08/18 08:32 Dose:  Not Given


Losartan Potassium (Cozaar)  25 mg PO DAILY [facility]


   Last Admin: 12/05/18 11:47 Dose:  25 mg


Ondansetron HCl (Zofran Inj)  4 mg IVP ONCE PRN


   PRN Reason: Nausea/Vomiting


Pantoprazole Sodium (Protonix Ec Tab)  20 mg PO 0600,1600 [facility]


   Last Admin: 12/08/18 05:36 Dose:  20 mg











- Labs


Labs: 


                                        





                                 12/08/18 06:40 





                                 12/08/18 06:40 





                                        











PT  15.0 SECONDS (9.4-12.5)  H  12/04/18  06:30    


 


INR  1.30   12/04/18  06:30    


 


APTT  38.5 Seconds (25.1-36.5)  H  12/04/18  06:30    














- Constitutional


Appears: Well, Non-toxic, No Acute Distress





- Extremities Exam


Additional comments: 





Left lower extremities exam:





Dressing was C/D/I. Dressing left intact.





Vasc: Cap refill < 3 sec to the remaining digits.





Neuro: Protective sensation diminished.





- Neurological Exam


Neurological Exam: Alert, Awake, Oriented x3





- Psychiatric Exam


Psychiatric exam: Normal Affect, Normal Mood





Assessment and Plan





- Assessment and Plan (Free Text)


Assessment: 





82 y/o M patient  seen and evaluated at the bedside 2 days S/P revision of left 

second digit amputation with met head resection


 


Plan: 





Patient seen and evaluated at bedside.


Discussed in detail with Dr. Do


Charts, labs ad vitals reviewed; Afebrile, absent leukocytosis


Post operative left foot X-ray: small, tiny fragments of bone at the surgical 

site


Dressing was C/D/I. Dressing left intact.


Wound cx 11/29 - MRSA


Continue IV Abx per ID 


Patient will need 4 weeks of IV Abx


Pathology postop - fagments of bone with focal acute OM, fragments of fibrodense

tissue with fibroconnective tissue .


Continue pain management as needed


Patient seen by PT and evaluated - recommend [facility] for continued skilled PT and 

management.


Podiatry will continue to follow up the patient while in house





<Rodrigo Do - Last Filed: 12/10/18 10:22>





Objective





- Vital Signs/Intake and Output


Vital Signs (last 24 hours): 


                                        











Temp Pulse Resp BP Pulse Ox


 


 98 F   39 L  20   155/98 H  98 


 


 12/09/18 22:00  12/09/18 22:00  12/09/18 22:00  12/09/18 22:00  12/09/18 22:00








Intake and Output: 


                                        











 12/10/18 12/10/18





 06:59 18:59


 


Intake Total 180 


 


Output Total 600 


 


Balance -420 














- Medications


Medications: 


                               Current Medications





Acetaminophen (Tylenol 325mg Tab)  650 mg PO Q4H PRN


   PRN Reason: Pain, Mild (1-3)


Amlodipine Besylate (Norvasc)  5 mg PO BID [facility]


   Last Admin: 12/09/18 18:10 Dose:  Not Given


Heparin Sodium (Porcine) (Heparin)  5,000 units SC Q8H [facility]; Protocol


   Last Admin: 12/03/18 10:11 Dose:  5,000 units


Ceftaroline Fosamil 600 mg/ (Sodium Chloride)  100 mls @ 100 mls/hr IVPB Q12 [facility]


   Last Admin: 12/09/18 22:05 Dose:  100 mls/hr


Insulin Human Regular (Humulin R Med)  0 units SC ACHS [facility]; Protocol


   Last Admin: 12/09/18 22:30 Dose:  Not Given


Losartan Potassium (Cozaar)  25 mg PO DAILY [facility]


   Last Admin: 12/05/18 11:47 Dose:  25 mg


Ondansetron HCl (Zofran Inj)  4 mg IVP ONCE PRN


   PRN Reason: Nausea/Vomiting


Pantoprazole Sodium (Protonix Ec Tab)  20 mg PO 0600,1600 [facility]


   Last Admin: 12/10/18 05:25 Dose:  20 mg











- Labs


Labs: 


                                        





                                 12/10/18 07:40 





                                 12/10/18 07:40 





                                        











PT  15.7 SECONDS (9.4-12.5)  H  12/10/18  07:40    


 


INR  1.36   12/10/18  07:40    


 


APTT  37.1 Seconds (25.1-36.5)  H  12/10/18  07:40    














Attending/Attestation





- Attestation


I have personally seen and examined this patient.: Yes


I have fully participated in the care of the patient.: Yes


I have reviewed all pertinent clinical information, including history, physical 

exam and plan: Yes

## 2018-12-08 NOTE — CP.PCM.PN
Subjective





- Date & Time of Evaluation


Date of Evaluation: 12/08/18


Time of Evaluation: 07:40





- Subjective


Subjective: 








Awake, alert, no distress, denies chest pain





Reason for consultation and follow up: Cardiac evaluation of bradycardia, status

post revision of left 4th digit








Seen and examined by me and Dr. Howard





Objective





- Vital Signs/Intake and Output


Vital Signs (last 24 hours): 


                                        











Temp Pulse Resp BP Pulse Ox


 


 98.0 F   43 L  18   135/52 L  97 


 


 12/08/18 06:00  12/08/18 06:00  12/08/18 06:00  12/08/18 06:00  12/08/18 06:00








Intake and Output: 


                                        











 12/08/18 12/08/18





 06:59 18:59


 


Intake Total 540 


 


Output Total 200 


 


Balance 340 














- Medications


Medications: 


                               Current Medications





Acetaminophen (Tylenol 325mg Tab)  650 mg PO Q4H PRN


   PRN Reason: Pain, Mild (1-3)


Amlodipine Besylate (Norvasc)  5 mg PO BID [facility]


   Last Admin: 12/07/18 18:11 Dose:  5 mg


Heparin Sodium (Porcine) (Heparin)  5,000 units SC Q8H [facility]; Protocol


   Last Admin: 12/03/18 10:11 Dose:  5,000 units


Ceftaroline Fosamil 600 mg/ (Sodium Chloride)  100 mls @ 100 mls/hr IVPB Q12 [facility]


   Last Admin: 12/07/18 22:19 Dose:  100 mls/hr


Insulin Human Regular (Humulin R Med)  0 units SC ACHS [facility]; Protocol


   Last Admin: 12/08/18 08:32 Dose:  Not Given


Losartan Potassium (Cozaar)  25 mg PO DAILY [facility]


   Last Admin: 12/05/18 11:47 Dose:  25 mg


Ondansetron HCl (Zofran Inj)  4 mg IVP ONCE PRN


   PRN Reason: Nausea/Vomiting


Pantoprazole Sodium (Protonix Ec Tab)  20 mg PO 0600,1600 [facility]


   Last Admin: 12/08/18 05:36 Dose:  20 mg











- Labs


Labs: 


                                        





                                 12/08/18 06:40 





                                 12/08/18 06:40 





                                        











PT  15.0 SECONDS (9.4-12.5)  H  12/04/18  06:30    


 


INR  1.30   12/04/18  06:30    


 


APTT  38.5 Seconds (25.1-36.5)  H  12/04/18  06:30    














- Constitutional


Appears: Non-toxic, No Acute Distress





- Head Exam


Head Exam: NORMAL INSPECTION, NORMOCEPHALIC





- Eye Exam


Eye Exam: Normal appearance


Pupil Exam: NORMAL ACCOMODATION





- ENT Exam


ENT Exam: Mucous Membranes Moist, Normal Exam





- Respiratory Exam


Respiratory Exam: Clear to Ausculation Bilateral, NORMAL BREATHING PATTERN





- Cardiovascular Exam


Cardiovascular Exam: +S1, +S2





- GI/Abdominal Exam


GI & Abdominal Exam: Soft, Normal Bowel Sounds





- Extremities Exam


Additional comments: 





left foot dressing





- Neurological Exam


Neurological Exam: Alert, Awake, Oriented x3





- Psychiatric Exam


Psychiatric exam: Normal Affect, Normal Mood





- Skin


Skin Exam: Dry, Normal Color, Warm





Assessment and Plan





- Assessment and Plan (Free Text)


Assessment: 








An 83 year old  male who wascame to the ER due to left toe wound. history of  

COPD, diabetes, hypertension,obesity, cellilitis, left toe osteomyelitis post 

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 Patient  bradycardic and 

consult was called. Patient is not on betablocker. Placed on Holter 

monitor.Stress test as outpatient once better from toe surgery.Echo done.LVEF 

55%,Aortic sclerosis Vs Mild As, mild mitral regurgitation,trace TR, moderate 

pulmonic valve regurgitation,no vegetation or thrombus. Initial placement of 

Holter unable to capture,so on Holter monitor x 24 hours, will follow up result 

Status post revision of left toe. Stable clinically.





Plan: 





Holter monitor showed multiple escape beats,


 Marked sinus payam arrythmia/junctional rhythm and escape beats


 Minimum heart rate 30/min, maximum heart rate 97/min


 237 drop beats, longest R-R 2-8 seconds


For PPM Monday


[facility]


No distress, denies shortness of breath


Cardiac status stable


Stable blood pressure


Heart rate 40's-50's asymptomatic


No Betablocker


Status post revision of left toe


On Norvasc 5 mg BID


Continue antibiotics as ordered


Continue current treatment


Continue current medications





Will follow up








Plan and treatment discussed with Dr. Howard

## 2018-12-08 NOTE — CARD
--------------- APPROVED REPORT --------------





Date of service: 12/07/2018



Reason for Test: BRADYCARDIA



Hookup date: 2018-12-05

Scan date: 2018-12-07

Recording time: 23 HR 59 MIN



Heart Rate Data

Total Beats: 43966

Min HR: 30 BPM at 1:27AM

Avg HR: 45 BPM

Max HR: 97 BPM at 6:52PM



Ventricular Ectopy

Total VE Beats: 76 (%)

Single/Interp PVC: 66/0

Single/Late VE's: 10/0



Supraventricular Ectopy

Total VE Beats: 4536 (%)

Atrial Pairs: 10 Events

Drop/Late: 238/75

Longest R-R: 2.8 sec at 5:41 AM

Single PAC's: 2473

Bi/Trigeminy: 70/1898 Beats



Conclusion

SINUS RHYTHM / SINUS BRADYCARDIA / MARKED SINUS AMY/ARRHYTHMIA

JUNCTIONAL RHYTHM / ESCAPE BEATS

MINIMUM HR 30 BPM

MAXIMUM HR 97 BPM

RARE SVE's, ISOLATED PAIRED

ISOLATED VPC'S

237 DROPPED BEATS, LONGEST R-R 2.8 SECONDS

## 2018-12-09 RX ADMIN — PANTOPRAZOLE SODIUM SCH MG: 20 TABLET, DELAYED RELEASE ORAL at 17:58

## 2018-12-09 RX ADMIN — INSULIN HUMAN SCH: 100 INJECTION, SOLUTION PARENTERAL at 12:52

## 2018-12-09 RX ADMIN — PANTOPRAZOLE SODIUM SCH MG: 20 TABLET, DELAYED RELEASE ORAL at 06:14

## 2018-12-09 RX ADMIN — INSULIN HUMAN SCH: 100 INJECTION, SOLUTION PARENTERAL at 17:53

## 2018-12-09 RX ADMIN — INSULIN HUMAN SCH: 100 INJECTION, SOLUTION PARENTERAL at 09:05

## 2018-12-09 RX ADMIN — INSULIN HUMAN SCH: 100 INJECTION, SOLUTION PARENTERAL at 22:30

## 2018-12-09 NOTE — PN
DATE:  12/08/2018



SUBJECTIVE:  The patient is in bed, in no acute distress, nontoxic.



PHYSICAL EXAMINATION:

VITAL SIGNS:  Temperature is 98, blood pressure is 140/50, respiratory rate

of 18.

HEENT:  Unremarkable.

NECK:  Supple.

LUNGS:  Have decreased breath sounds.

HEART:  Normal S1 and S2.

ABDOMEN:  Soft, nontender.



LABORATORY EXAMINATION:  Reveals a white count of 7.8, and chemistries are

noted.  BUN of 33, creatinine of 1.3.  Microbiology reveals MRSA from the

toe.  Review of orders reveals the patient to be on ceftaroline.



ASSESSMENT AND PLAN:  An 83-year-old male with left leg and foot

cellulitis, second toe gangrene with osteomyelitis with

methicillin-resistant Staphylococcus aureus, status post amputation,

postprocedure day #8, and further debridement postprocedure day #4,

cellular osteomyelitis, and the patient with dementia, renal disease,

hypertension, coronary artery disease, and will need 4-6 weeks of

antibiotics.  We will repeat CBC, SMA-18, sedimentation rate, C-reactive

protein, and we will follow with you.





__________________________________________

Lj Baca MD





DD:  12/08/2018 23:04:25

DT:  12/09/2018 2:09:44

Job # [id number]

## 2018-12-09 NOTE — CP.PCM.PN
<Willard Raymundo - Last Filed: 12/09/18 10:49>





Subjective





- Date & Time of Evaluation


Date of Evaluation: 12/09/18


Time of Evaluation: 10:49





- Subjective


Subjective: 





Podiatry progress note for Dr. Howard





84 y/o M patient  seen and evaluated at the bedside 4 days S/P revision of left 

second digit amputation with met head resection.  Patient was resting 

comfortably and NAD. He denies any pain to surgical site. Denies any acute 

events overnight. Patient denies N/V/F/C/SOB/CP and has no other pedal 

complaints at this time.





Objective





- Vital Signs/Intake and Output


Vital Signs (last 24 hours): 


                                        











Temp Pulse Resp BP Pulse Ox


 


 97.6 F   46 L  20   179/65 H  96 


 


 12/09/18 06:00  12/09/18 06:19  12/09/18 06:00  12/09/18 06:19  12/09/18 06:00








Intake and Output: 


                                        











 12/09/18 12/09/18





 06:59 18:59


 


Intake Total 180 


 


Output Total 500 


 


Balance -320 














- Medications


Medications: 


                               Current Medications





Acetaminophen (Tylenol 325mg Tab)  650 mg PO Q4H PRN


   PRN Reason: Pain, Mild (1-3)


Amlodipine Besylate (Norvasc)  5 mg PO BID [facility]


   Last Admin: 12/09/18 06:19 Dose:  5 mg


Heparin Sodium (Porcine) (Heparin)  5,000 units SC Q8H [facility]; Protocol


   Last Admin: 12/03/18 10:11 Dose:  5,000 units


Ceftaroline Fosamil 600 mg/ (Sodium Chloride)  100 mls @ 100 mls/hr IVPB Q12 [facility]


   Last Admin: 12/09/18 09:08 Dose:  100 mls/hr


Insulin Human Regular (Humulin R Med)  0 units SC ACHS [facility]; Protocol


   Last Admin: 12/09/18 09:05 Dose:  Not Given


Losartan Potassium (Cozaar)  25 mg PO DAILY [facility]


   Last Admin: 12/05/18 11:47 Dose:  25 mg


Ondansetron HCl (Zofran Inj)  4 mg IVP ONCE PRN


   PRN Reason: Nausea/Vomiting


Pantoprazole Sodium (Protonix Ec Tab)  20 mg PO 0600,1600 [facility]


   Last Admin: 12/09/18 06:14 Dose:  20 mg











- Labs


Labs: 


                                        





                                 12/08/18 06:40 





                                 12/08/18 06:40 





                                        











PT  15.0 SECONDS (9.4-12.5)  H  12/04/18  06:30    


 


INR  1.30   12/04/18  06:30    


 


APTT  38.5 Seconds (25.1-36.5)  H  12/04/18  06:30    














- Constitutional


Appears: Well, Non-toxic, No Acute Distress





- Extremities Exam


Additional comments: 





Bilateral lower extremities exam





Vasc: faintly palpable pedal pulses bilaterally, Temp gradient warm to cool in 

the right side and warm to warm in the left side. Cap refill < 3 sec to all 

remaining digits. No erythema noted. Mild non pitting edema noted to the left 

foot. 





Neuro: Gross and protective sensations are grossly diminished.





Derm: Surgical site incision well coapted, sutures intact, no evidence of pus or

purulent drainage, No drainage noted through the dressing, no openings or signs 

of wound dehiscence, no probing, no clinical signs of infection





MSK: No pain on palpation of foot or legs bilaterally. Muscle power intact 5/5 

to all groups b/l.





- Neurological Exam


Neurological Exam: Alert, Awake, Oriented x3





- Psychiatric Exam


Psychiatric exam: Normal Affect, Normal Mood





Assessment and Plan





- Assessment and Plan (Free Text)


Assessment: 





84 y/o M patient  seen and evaluated at the bedside 4 days S/P revision of left 

second digit amputation with met head resection


Plan: 





Patient seen and evaluated at bedside.


Discussed in detail with Dr. Do


Charts, labs ad vitals reviewed; Afebrile, absent leukocytosis


Post operative left foot X-ray: small, tiny fragments of bone at the surgical 

site


Dressing was C/D/I. Dressing left intact.


Wound cx 11/29 - MRSA


Continue IV Abx per ID 


Patient will need 4 weeks of IV Abx


Pathology postop - fagments of bone with focal acute OM, fragments of fibrodense

tissue with fibroconnective tissue .


Continue pain management as needed


Patient seen by PT and evaluated - recommend [facility] for continued skilled PT and 

management.


Podiatry will continue to follow up the patient while in house








<Rodrigo Do - Last Filed: 12/10/18 10:18>





Objective





- Vital Signs/Intake and Output


Vital Signs (last 24 hours): 


                                        











Temp Pulse Resp BP Pulse Ox


 


 98 F   39 L  20   155/98 H  98 


 


 12/09/18 22:00  12/09/18 22:00  12/09/18 22:00  12/09/18 22:00  12/09/18 22:00








Intake and Output: 


                                        











 12/10/18 12/10/18





 06:59 18:59


 


Intake Total 180 


 


Output Total 600 


 


Balance -420 














- Medications


Medications: 


                               Current Medications





Acetaminophen (Tylenol 325mg Tab)  650 mg PO Q4H PRN


   PRN Reason: Pain, Mild (1-3)


Amlodipine Besylate (Norvasc)  5 mg PO BID [facility]


   Last Admin: 12/09/18 18:10 Dose:  Not Given


Heparin Sodium (Porcine) (Heparin)  5,000 units SC Q8H [facility]; Protocol


   Last Admin: 12/03/18 10:11 Dose:  5,000 units


Ceftaroline Fosamil 600 mg/ (Sodium Chloride)  100 mls @ 100 mls/hr IVPB Q12 [facility]


   Last Admin: 12/09/18 22:05 Dose:  100 mls/hr


Insulin Human Regular (Humulin R Med)  0 units SC ACHS [facility]; Protocol


   Last Admin: 12/09/18 22:30 Dose:  Not Given


Losartan Potassium (Cozaar)  25 mg PO DAILY [facility]


   Last Admin: 12/05/18 11:47 Dose:  25 mg


Ondansetron HCl (Zofran Inj)  4 mg IVP ONCE PRN


   PRN Reason: Nausea/Vomiting


Pantoprazole Sodium (Protonix Ec Tab)  20 mg PO 0600,1600 [facility]


   Last Admin: 12/10/18 05:25 Dose:  20 mg











- Labs


Labs: 


                                        





                                 12/10/18 07:40 





                                 12/10/18 07:40 





                                        











PT  15.7 SECONDS (9.4-12.5)  H  12/10/18  07:40    


 


INR  1.36   12/10/18  07:40    


 


APTT  37.1 Seconds (25.1-36.5)  H  12/10/18  07:40    














Attending/Attestation





- Attestation


I have personally seen and examined this patient.: Yes


I have fully participated in the care of the patient.: Yes


I have reviewed all pertinent clinical information, including history, physical 

exam and plan: Yes

## 2018-12-09 NOTE — CP.PCM.PN
Subjective





- Date & Time of Evaluation


Date of Evaluation: 12/09/18


Time of Evaluation: 07:15





- Subjective


Subjective: 








Lying in bed, Awake, alert, no distress, denies chest pain





Reason for consultation and follow up: Cardiac evaluation of bradycardia, status

post revision of left 4th digit








Seen and examined by me and Dr. Howard








Objective





- Vital Signs/Intake and Output


Vital Signs (last 24 hours): 


                                        











Temp Pulse Resp BP Pulse Ox


 


 97.6 F   46 L  20   179/65 H  96 


 


 12/09/18 06:00  12/09/18 06:19  12/09/18 06:00  12/09/18 06:19  12/09/18 06:00








Intake and Output: 


                                        











 12/09/18 12/09/18





 06:59 18:59


 


Intake Total 180 


 


Output Total 500 


 


Balance -320 














- Medications


Medications: 


                               Current Medications





Acetaminophen (Tylenol 325mg Tab)  650 mg PO Q4H PRN


   PRN Reason: Pain, Mild (1-3)


Amlodipine Besylate (Norvasc)  5 mg PO BID [facility]


   Last Admin: 12/09/18 06:19 Dose:  5 mg


Heparin Sodium (Porcine) (Heparin)  5,000 units SC Q8H [facility]; Protocol


   Last Admin: 12/03/18 10:11 Dose:  5,000 units


Ceftaroline Fosamil 600 mg/ (Sodium Chloride)  100 mls @ 100 mls/hr IVPB Q12 [facility]


   Last Admin: 12/09/18 09:08 Dose:  100 mls/hr


Insulin Human Regular (Humulin R Med)  0 units SC ACHS [facility]; Protocol


   Last Admin: 12/09/18 09:05 Dose:  Not Given


Losartan Potassium (Cozaar)  25 mg PO DAILY [facility]


   Last Admin: 12/05/18 11:47 Dose:  25 mg


Ondansetron HCl (Zofran Inj)  4 mg IVP ONCE PRN


   PRN Reason: Nausea/Vomiting


Pantoprazole Sodium (Protonix Ec Tab)  20 mg PO 0600,1600 [facility]


   Last Admin: 12/09/18 06:14 Dose:  20 mg











- Labs


Labs: 


                                        





                                 12/08/18 06:40 





                                 12/08/18 06:40 





                                        











PT  15.0 SECONDS (9.4-12.5)  H  12/04/18  06:30    


 


INR  1.30   12/04/18  06:30    


 


APTT  38.5 Seconds (25.1-36.5)  H  12/04/18  06:30    














- Constitutional


Appears: Non-toxic, No Acute Distress





- Head Exam


Head Exam: NORMAL INSPECTION, NORMOCEPHALIC





- Eye Exam


Eye Exam: Normal appearance


Pupil Exam: NORMAL ACCOMODATION





- ENT Exam


ENT Exam: Mucous Membranes Moist, Normal Exam





- Cardiovascular Exam


Cardiovascular Exam: +S1, +S2





- GI/Abdominal Exam


GI & Abdominal Exam: Soft, Normal Bowel Sounds





- Extremities Exam


Additional comments: 





left foot dressing





- Neurological Exam


Neurological Exam: Alert, Awake, Oriented x3





- Psychiatric Exam


Psychiatric exam: Normal Affect, Normal Mood





- Skin


Skin Exam: Dry, Normal Color, Warm





Assessment and Plan





- Assessment and Plan (Free Text)


Assessment: 








An 83 year old  male who wascame to the ER due to left toe wound. history of  

COPD, diabetes, hypertension,obesity, cellilitis, left toe osteomyelitis post 

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 Patient  bradycardic and 

consult was called. Patient is not on betablocker. Placed on Holter 

monitor.Stress test as outpatient once better from toe surgery.Echo done.LVEF 

55%,Aortic sclerosis Vs Mild As, mild mitral regurgitation,trace TR, moderate 

pulmonic valve regurgitation,no vegetation or thrombus. Initial placement of 

Holter unable to capture,so on Holter monitor x 24 hours, will follow up result 

Status post revision of left toe.Holter monitor showed multiple escape beats,


 Marked sinus payam arrythmia/junctional rhythm and escape beats, Minimum heart 

rate 30/min, maximum heart rate 97/min, 237 drop beats, longest R-R 2-8 seconds.

For PPM Monday. Dr. Howard had spoken to patient and wife and agreed.





Plan: 








For PPM Monday for sick sinus syndrome (11am)


He was suppose to be transferred to [facility] yesterday but was held due to 


 results of Holter monitor


NPO post midnight except meds


No distress, denies shortness of breath


Cardiac status stable


Stable blood pressure


Heart rate 40's-50's asymptomatic


No Betablocker


Status post revision of left toe


On Norvasc 5 mg BID


Continue antibiotics as ordered


Continue current treatment


Continue current medications





Will follow up








Plan and treatment discussed with Dr. Howard

## 2018-12-09 NOTE — CP.PCM.PN
<Romina Nelson - Last Filed: 12/09/18 15:28>





Subjective





- Date & Time of Evaluation


Date of Evaluation: 12/09/18


Time of Evaluation: 15:28





- Subjective


Subjective: 


Romina Nelson, PGY-1, Internal Medicine Progress Note for Dr. Cote





Patient seen and examined at bedside. Patient had no acute overnight events. 

Patient has no complaints of this time. Patient denies headache, chest pain, 

heart palpitations, shortness of breath, nausea, vomiting, constipation, 

diarrhea, dysuria, and hematuria. 12-point ROS was negative except for what was 

mentioned above.








Objective





- Vital Signs/Intake and Output


Vital Signs (last 24 hours): 


                                        











Temp Pulse Resp BP Pulse Ox


 


 97.6 F   46 L  20   179/65 H  96 


 


 12/09/18 06:00  12/09/18 06:19  12/09/18 06:00  12/09/18 06:19  12/09/18 06:00








Intake and Output: 


                                        











 12/09/18 12/09/18





 06:59 18:59


 


Intake Total 180 


 


Output Total 500 


 


Balance -320 














- Medications


Medications: 


                               Current Medications





Acetaminophen (Tylenol 325mg Tab)  650 mg PO Q4H PRN


   PRN Reason: Pain, Mild (1-3)


Amlodipine Besylate (Norvasc)  5 mg PO BID [facility]


   Last Admin: 12/09/18 06:19 Dose:  5 mg


Heparin Sodium (Porcine) (Heparin)  5,000 units SC Q8H [facility]; Protocol


   Last Admin: 12/03/18 10:11 Dose:  5,000 units


Ceftaroline Fosamil 600 mg/ (Sodium Chloride)  100 mls @ 100 mls/hr IVPB Q12 [facility]


   Last Admin: 12/09/18 09:08 Dose:  100 mls/hr


Insulin Human Regular (Humulin R Med)  0 units SC ACHS [facility]; Protocol


   Last Admin: 12/09/18 09:05 Dose:  Not Given


Losartan Potassium (Cozaar)  25 mg PO DAILY [facility]


   Last Admin: 12/05/18 11:47 Dose:  25 mg


Ondansetron HCl (Zofran Inj)  4 mg IVP ONCE PRN


   PRN Reason: Nausea/Vomiting


Pantoprazole Sodium (Protonix Ec Tab)  20 mg PO 0600,1600 [facility]


   Last Admin: 12/09/18 06:14 Dose:  20 mg











- Labs


Labs: 


                                        





                                 12/08/18 06:40 





                                 12/08/18 06:40 





                                        











PT  15.0 SECONDS (9.4-12.5)  H  12/04/18  06:30    


 


INR  1.30   12/04/18  06:30    


 


APTT  38.5 Seconds (25.1-36.5)  H  12/04/18  06:30    














- Constitutional


Appears: Well, Non-toxic, No Acute Distress





- Head Exam


Head Exam: ATRAUMATIC, NORMAL INSPECTION, NORMOCEPHALIC





- Eye Exam


Eye Exam: EOMI, PERRL





- Respiratory Exam


Respiratory Exam: Clear to Ausculation Bilateral, NORMAL BREATHING PATTERN





- Cardiovascular Exam


Cardiovascular Exam: REGULAR RHYTHM





- GI/Abdominal Exam


GI & Abdominal Exam: Soft, Normal Bowel Sounds





- Back Exam


Back Exam: Full ROM





- Neurological Exam


Neurological Exam: Alert, Awake, CN II-XII Intact, Oriented x3


Neuro motor strength exam: Left Upper Extremity: 5, Right Upper Extremity: 5, 

Left Lower Extremity: 5, Right Lower Extremity: 5





Assessment and Plan





- Assessment and Plan (Free Text)


Assessment: 


83 year old male with past medical history of peripheral artery disease, chronic

LLE cellulitis, COPD, HTN, dementia, obesity [place] [facility] [place] 

LLE anterior tibial region cellulitis and black L foot 2nd distal phalanx. 

Amputation of L second phalax done on 11/28. Patient is POD #9. Revision of L 

2nd phalanx with removal of bone fragment POD#4. 





Plan: 


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


-MRI L foot: marrow edema in 2nd proximal and middle phalanx consistent with 

osteomyelitis


-Postoperative left foot X ray: small, tiny fragments of bone at the surgical 

site


-Wound cultures: MRSA


-Blood cultures: negative after 5 days


-Urine culture: negative


-Procalcitonin on 11/22: 0.07


-Dr. Do amputated 2nd metatarsal head of left foot on 11/29 which was 

revised on 12/4 with bone fragment excised.


-ID, Dr. Baca, recommends 4-6 weeks of Ceftaroline day 18 with weekly ESR,

CRP, CBC, and CMP. 


-PICC line was placed by 12/7.





Bradycardia


-HR ranging from 30 to mid 50s


-Holter monitor placed on patient which showed sinus bradycardia, arrhythmia, j

unctional rhythm, and escape beats. Patient also had 237 dropped beats.


-Patient will have permanent pacemaker placement on Monday.


-Hold beta blockers, cozaar





Hypertension


-BP: 134/95


-HHD diet


-Norvasc 5 mg BID. Hold beta blockers and cozaar due to bradycardia.





Normocytic Anemia


-Hgb: 10.8


-Continue to monitor. 





History of prediabetes, diabetic peripheral neuropathy


-Hemoglobin A1c: 6.2


-Low dose sliding scale insulin





DVT/GI PPx: 


- Heparin 5000 q8 held


- Protonix 20 mg daily





Patient plan discussed with Dr. Cote








<Maynor Cote - Last Filed: 12/09/18 15:36>





Objective





- Vital Signs/Intake and Output


Vital Signs (last 24 hours): 


                                        











Temp Pulse Resp BP Pulse Ox


 


 97.6 F   46 L  20   179/65 H  96 


 


 12/09/18 06:00  12/09/18 06:19  12/09/18 06:00  12/09/18 06:19  12/09/18 06:00








Intake and Output: 


                                        











 12/09/18 12/09/18





 06:59 18:59


 


Intake Total 180 


 


Output Total 500 


 


Balance -320 














- Medications


Medications: 


                               Current Medications





Acetaminophen (Tylenol 325mg Tab)  650 mg PO Q4H PRN


   PRN Reason: Pain, Mild (1-3)


Amlodipine Besylate (Norvasc)  5 mg PO BID [facility]


   Last Admin: 12/09/18 06:19 Dose:  5 mg


Heparin Sodium (Porcine) (Heparin)  5,000 units SC Q8H [facility]; Protocol


   Last Admin: 12/03/18 10:11 Dose:  5,000 units


Ceftaroline Fosamil 600 mg/ (Sodium Chloride)  100 mls @ 100 mls/hr IVPB Q12 [facility]


   Last Admin: 12/09/18 09:08 Dose:  100 mls/hr


Insulin Human Regular (Humulin R Med)  0 units SC ACHS [facility]; Protocol


   Last Admin: 12/09/18 09:05 Dose:  Not Given


Losartan Potassium (Cozaar)  25 mg PO DAILY [facility]


   Last Admin: 12/05/18 11:47 Dose:  25 mg


Ondansetron HCl (Zofran Inj)  4 mg IVP ONCE PRN


   PRN Reason: Nausea/Vomiting


Pantoprazole Sodium (Protonix Ec Tab)  20 mg PO 0600,1600 [facility]


   Last Admin: 12/09/18 06:14 Dose:  20 mg











- Labs


Labs: 


                                        





                                 12/08/18 06:40 





                                 12/08/18 06:40 





                                        











PT  15.0 SECONDS (9.4-12.5)  H  12/04/18  06:30    


 


INR  1.30   12/04/18  06:30    


 


APTT  38.5 Seconds (25.1-36.5)  H  12/04/18  06:30    














Attending/Attestation





- Attestation


I have personally seen and examined this patient.: Yes


I have fully participated in the care of the patient.: Yes


I have reviewed all pertinent clinical information, including history, physical 

exam and plan: Yes


Notes (Text): 





12/09/18 15:35


83 year old male with past medical history of PAD, COPD, hypertension, and 

chronic LE cellulitis who presented with complaint of left lower extremity 

cellulitis and gangrenous left 2nd distal phalanx.  Foot MRI was consistent with

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  Patient 

is on IV antibiotics.  ID and podiatry are following.  He is s/p amputation of 

second digit POD #11.  Patient went again to OR for further bone fragment 

excision at amputation site POD #5.  Repeat cultures is growing MRSA.  Patient 

will need long term iv antibiotics.  Picc line is in place.





Cardiology is following for bradycardia.  Patient is asymptomatic.  Not on beta 

blockers.  Holter was applied which showed bradycardia, multiple skipped beats, 

and pauses.  Plan is to transfer to telemetry unit and PPM tomorrow.





He is on norvasc and cozaar for hypertension.





Maynor Cote MD


Hospitalist.

## 2018-12-10 LAB
APTT BLD: 37.1 SECONDS (ref 25.1–36.5)
BASOPHILS # BLD AUTO: 0.02 K/MM3 (ref 0–2)
BASOPHILS NFR BLD: 0.3 % (ref 0–3)
BUN SERPL-MCNC: 39 MG/DL (ref 7–21)
CALCIUM SERPL-MCNC: 8.8 MG/DL (ref 8.4–10.5)
EOSINOPHIL # BLD: 0.3 10*3/UL (ref 0–0.7)
EOSINOPHIL NFR BLD: 3.5 % (ref 1.5–5)
ERYTHROCYTE [DISTWIDTH] IN BLOOD BY AUTOMATED COUNT: 13.6 % (ref 11.5–14.5)
GFR NON-AFRICAN AMERICAN: 48
GRANULOCYTES # BLD: 5.18 10*3/UL (ref 1.4–6.5)
GRANULOCYTES NFR BLD: 67.1 % (ref 50–68)
HGB BLD-MCNC: 10.8 G/DL (ref 14–18)
INR PPP: 1.36
LYMPHOCYTES # BLD: 1.7 10*3/UL (ref 1.2–3.4)
LYMPHOCYTES NFR BLD AUTO: 21.7 % (ref 22–35)
MCH RBC QN AUTO: 26.7 PG (ref 25–35)
MCHC RBC AUTO-ENTMCNC: 32.2 G/DL (ref 31–37)
MCV RBC AUTO: 82.7 FL (ref 80–105)
MONOCYTES # BLD AUTO: 0.6 10*3/UL (ref 0.1–0.6)
MONOCYTES NFR BLD: 7.4 % (ref 1–6)
PLATELET # BLD: 165 10^3/UL (ref 120–450)
PMV BLD AUTO: 8.6 FL (ref 7–11)
PROTHROMBIN TIME: 15.7 SECONDS (ref 9.4–12.5)
RBC # BLD AUTO: 4.05 10^6/UL (ref 3.5–6.1)
WBC # BLD AUTO: 7.7 10^3/UL (ref 4.5–11)

## 2018-12-10 RX ADMIN — INSULIN HUMAN SCH: 100 INJECTION, SOLUTION PARENTERAL at 17:21

## 2018-12-10 RX ADMIN — PANTOPRAZOLE SODIUM SCH MG: 20 TABLET, DELAYED RELEASE ORAL at 17:58

## 2018-12-10 RX ADMIN — PANTOPRAZOLE SODIUM SCH MG: 20 TABLET, DELAYED RELEASE ORAL at 05:25

## 2018-12-10 RX ADMIN — INSULIN HUMAN SCH: 100 INJECTION, SOLUTION PARENTERAL at 08:00

## 2018-12-10 RX ADMIN — INSULIN HUMAN SCH: 100 INJECTION, SOLUTION PARENTERAL at 23:04

## 2018-12-10 RX ADMIN — INSULIN HUMAN SCH: 100 INJECTION, SOLUTION PARENTERAL at 13:11

## 2018-12-10 NOTE — PROCN
DATE:  12/09/2018



The patient is in room 562, bed 1.



This dictated addendum which _____ already dictated by Mel Dickens.



The patient's second Holter monitor showed multiple pauses of 2.8 second

and also the patient's heart rate went down to mid 30s.  Although the

patient is asymptomatic, but he needs a pacemaker.  I discussed this the

wife and the patient.  They agreed.  So, the patient is scheduled for

permanent pacemaker insertion tomorrow.  In the meantime, he will continue

the therapy which we have already undertaken.  The patient is on heparin

5000 units subcu every 8 hourly which has been put on hold now, losartan 25

mg daily, amlodipine 5 mg daily, Protonix 40 mg daily, ceftaroline 60 mg IV

every 12 hours.





__________________________________________

Donnell Howard MD





DD:  12/09/2018 15:03:50

DT:  12/09/2018 17:43:27

Job # [id number]

## 2018-12-10 NOTE — PN
DATE:  12/10/2018



SEX OF THE PATIENT:  Male.



AGE OF THE PATIENT:  83.



TYPE OF DICTATION:  Progress note.



REFERRING PHYSICIAN:   _____



REASON FOR CONSULTATION:  Followup, cardiac evaluation, bradycardia, status

post revision of the left fourth digit, payam-tachy syndrome, on PPM,

multiple pauses, the longest being 2.8 seconds, is scheduled for pacemaker

today, family refused, wanted to wait for the blood culture.



SUBJECTIVE:  The patient denies any chest pain, shortness of breath or any

palpitation.



PHYSICAL EXAMINATION:

VITAL SIGNS:  Temperature is afebrile, heart rate 51, and blood pressure

115/53.

HEENT:  PERRLA, intact.

NECK:  Supple.  No carotid bruit or thyromegaly.

CHEST:  Clear to auscultation.

HEART:  S1 and S2 are regular.

ABDOMEN:  Soft.

EXTREMITIES:  Clubbing and cyanosis negative.



LABORATORY DATA:  Blood workup as follows:  WBC 7.3, hemoglobin 10.8,

hematocrit 33.5, and platelet count 165.  Chemistry shows sodium 130,

potassium 4.3, chloride 109, carbon dioxide 26, anion gap of 9, BUN 30, and

creatinine 1.5.



IMPRESSION:  This is an 83-year-old male with past medical history

significant for morbid obesity, admitted with revision of fourth digit

sepsis osteomyelitis.  The patient underwent Holter monitor, which shows

multiple pauses, so the patient is scheduled for pacemaker today, but the

family and wife wanted to repeat the blood culture though first culture was

negative, hence the pacemaker is on hold.  We left the message twice to the

family, waiting for the response.  If the family does not want the

pacemaker, we will take him off from the list and possible discharge.  We

will discuss with  _____ follow with you.  Avoid rate-limiting calcium

channel blocker and beta-blocker.



This note is in addition to dictated by the nurse practitioner, Mel Dickens.





__________________________________________

Donnell Golden MD





DD:  12/10/2018 15:30:46

DT:  12/10/2018 19:51:33

Job # [id number]

## 2018-12-10 NOTE — PN
DATE:  12/09/2018



SUBJECTIVE:  The patient is in bed in no acute distress, nontoxic; is seen

earlier today and is awake.



PHYSICAL EXAMINATION:

VITAL SIGNS:  Temperature is 98, blood pressure is 180/60, and respiratory

rate 20.

HEENT:  Unremarkable.

NECK:  Supple.

LUNGS:  Have decreased breath sounds.

HEART:  Normal S1 and S2.

ABDOMEN:  Soft.



LABORATORY DATA:  Reveals a white count of 7.8 and hemoglobin of 10,

creatinine is 1.3.  Microbiology reveals MRSA and review of orders reveals

the patient to be on Teflaro.



ASSESSMENT AND PLAN:  This is an 83-year-old male who is with a left leg

and foot cellulitis, second toe gangrene, osteomyelitis,

methicillin-resistant Staphylococcus aureus, status post amputation,

postprocedure day #9, and further debridement postprocedure day #5, severe

osteomyelitis with renal disease, hypertension, coronary artery disease. 

Will need 4 to 6 weeks of antibiotics.  Currently on Teflaro.  Recommend a

weekly CBC, SMA-18, sedimentation rate, and C-reactive protein.





__________________________________________

Lj Baca MD



DD:  12/09/2018 11:52:42

DT:  12/09/2018 11:53:57

Job # [id number]

## 2018-12-10 NOTE — CP.PCM.PN
Subjective





- Date & Time of Evaluation


Date of Evaluation: 12/10/18


Time of Evaluation: 08:35





- Subjective


Subjective: 





No increase in left foot pain, no fevers, no nausea, no diarrhea.





Objective





- Vital Signs/Intake and Output


Vital Signs (last 24 hours): 


                                        











Temp Pulse Resp BP Pulse Ox


 


 97.6 F   34 L  20   105/50 L  96 


 


 12/09/18 06:00  12/09/18 18:10  12/09/18 06:00  12/09/18 18:10  12/09/18 06:00











- Medications


Medications: 


                               Current Medications





Acetaminophen (Tylenol 325mg Tab)  650 mg PO Q4H PRN


   PRN Reason: Pain, Mild (1-3)


Amlodipine Besylate (Norvasc)  5 mg PO BID [facility]


   Last Admin: 12/09/18 18:10 Dose:  Not Given


Heparin Sodium (Porcine) (Heparin)  5,000 units SC Q8H [facility]; Protocol


   Last Admin: 12/03/18 10:11 Dose:  5,000 units


Ceftaroline Fosamil 600 mg/ (Sodium Chloride)  100 mls @ 100 mls/hr IVPB Q12 [facility]


   Last Admin: 12/09/18 22:05 Dose:  100 mls/hr


Insulin Human Regular (Humulin R Med)  0 units SC ACHS [facility]; Protocol


   Last Admin: 12/09/18 17:53 Dose:  Not Given


Losartan Potassium (Cozaar)  25 mg PO DAILY [facility]


   Last Admin: 12/05/18 11:47 Dose:  25 mg


Ondansetron HCl (Zofran Inj)  4 mg IVP ONCE PRN


   PRN Reason: Nausea/Vomiting


Pantoprazole Sodium (Protonix Ec Tab)  20 mg PO 0600,1600 [facility]


   Last Admin: 12/09/18 17:58 Dose:  20 mg











- Labs


Labs: 


                                        





                                 12/08/18 06:40 





                                 12/08/18 06:40 





                                        











PT  15.0 SECONDS (9.4-12.5)  H  12/04/18  06:30    


 


INR  1.30   12/04/18  06:30    


 


APTT  38.5 Seconds (25.1-36.5)  H  12/04/18  06:30    














- Constitutional


Appears: Chronically Ill





- Head Exam


Head Exam: NORMAL INSPECTION





- Respiratory Exam


Respiratory Exam: Decreased Breath Sounds





- Cardiovascular Exam


Cardiovascular Exam: +S1, +S2





- GI/Abdominal Exam


GI & Abdominal Exam: Soft.  absent: Tenderness





- Extremities Exam


Additional comments: 





left foot with dressings in place





Assessment and Plan





- Assessment and Plan (Free Text)


Plan: 





Assessment


left leg and foot cellulitis and 2nd toe gangrene, with osteomyelitis with MRSA 

S/P amputation, S/P further debridement - margins of bone still with o

steomyelitis even on latest debridement


history of Group G strep bacteremia, probably secondary to bilateral lower 

extremities skin and skin structure infection;


history of rhabdomyolysis


CAD


HTN


chronic renal failure


dementia


obesity with BMI 32





Plan


continue Teflaro and will need at least 4-6 weeks of antibiotics with weekly 

ESR, CRP, CBC, CMP to be followed by the wound care center while on antibiotics 

- discussed with Dr. Do today

## 2018-12-10 NOTE — CP.PCM.PN
Subjective





- Date & Time of Evaluation


Date of Evaluation: 12/10/18


Time of Evaluation: 06:20





- Subjective


Subjective: 








Lying in bed, Awake, alert, no distress, denies chest pain





Reason for consultation and follow up: Cardiac evaluation of bradycardia, status

post revision of left 4th digit, payam-tachy arrythmia, for PPM





Seen and examined by me and 








Objective





- Vital Signs/Intake and Output


Vital Signs (last 24 hours): 


                                        











Temp Pulse Resp BP Pulse Ox


 


 98 F   39 L  20   155/98 H  98 


 


 12/09/18 22:00  12/09/18 22:00  12/09/18 22:00  12/09/18 22:00  12/09/18 22:00








Intake and Output: 


                                        











 12/09/18 12/10/18





 18:59 06:59


 


Intake Total  180


 


Output Total  600


 


Balance  -420














- Medications


Medications: 


                               Current Medications





Acetaminophen (Tylenol 325mg Tab)  650 mg PO Q4H PRN


   PRN Reason: Pain, Mild (1-3)


Amlodipine Besylate (Norvasc)  5 mg PO BID [facility]


   Last Admin: 12/09/18 18:10 Dose:  Not Given


Heparin Sodium (Porcine) (Heparin)  5,000 units SC Q8H [facility]; Protocol


   Last Admin: 12/03/18 10:11 Dose:  5,000 units


Ceftaroline Fosamil 600 mg/ (Sodium Chloride)  100 mls @ 100 mls/hr IVPB Q12 [facility]


   Last Admin: 12/09/18 22:05 Dose:  100 mls/hr


Insulin Human Regular (Humulin R Med)  0 units SC ACHS [facility]; Protocol


   Last Admin: 12/09/18 22:30 Dose:  Not Given


Losartan Potassium (Cozaar)  25 mg PO DAILY [facility]


   Last Admin: 12/05/18 11:47 Dose:  25 mg


Ondansetron HCl (Zofran Inj)  4 mg IVP ONCE PRN


   PRN Reason: Nausea/Vomiting


Pantoprazole Sodium (Protonix Ec Tab)  20 mg PO 0600,1600 [facility]


   Last Admin: 12/10/18 05:25 Dose:  20 mg











- Labs


Labs: 


                                        





                                 12/08/18 06:40 





                                 12/08/18 06:40 





                                        











PT  15.0 SECONDS (9.4-12.5)  H  12/04/18  06:30    


 


INR  1.30   12/04/18  06:30    


 


APTT  38.5 Seconds (25.1-36.5)  H  12/04/18  06:30    














- Constitutional


Appears: Non-toxic, No Acute Distress





- Head Exam


Head Exam: NORMAL INSPECTION, NORMOCEPHALIC





- Eye Exam


Eye Exam: Normal appearance


Pupil Exam: NORMAL ACCOMODATION





- ENT Exam


ENT Exam: Mucous Membranes Moist





- Respiratory Exam


Respiratory Exam: Decreased Breath Sounds, Clear to Ausculation Bilateral, 

NORMAL BREATHING PATTERN





- Cardiovascular Exam


Cardiovascular Exam: +S1, +S2





- GI/Abdominal Exam


GI & Abdominal Exam: Soft, Normal Bowel Sounds





- Extremities Exam


Additional comments: 





left foot dressing





- Neurological Exam


Neurological Exam: Alert, Awake, Oriented x3





- Psychiatric Exam


Psychiatric exam: Normal Affect, Normal Mood





- Skin


Skin Exam: Dry, Normal Color, Warm





Assessment and Plan





- Assessment and Plan (Free Text)


Assessment: 








An 83 year old  male who wascame to the ER due to left toe wound. history of  

COPD, diabetes, hypertension,obesity, cellilitis, left toe osteomyelitis post 

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 Patient  bradycardic and 

consult was called. Patient is not on betablocker. Placed on Holter 

monitor.Stress test as outpatient once better from toe surgery.Echo done.LVEF 

55%,Aortic sclerosis Vs Mild As, mild mitral regurgitation,trace TR, moderate 

pulmonic valve regurgitation,no vegetation or thrombus. Initial placement of 

Holter unable to capture,so on Holter monitor x 24 hours, will follow up result 

Status post revision of left toe.Holter monitor showed multiple escape beats,


 Marked sinus payam arrythmia/junctional rhythm and escape beats, Minimum heart 

rate 30/min, maximum heart rate 97/min, 237 drop beats, longest R-R 2-8 seconds.

For PPM, procedure scheduled for monday but wife requesting another blood 

culture (initial result negative) wound toe culture positive for MRSA. Will 

order blood culture.


Plan: 





 No distress, denies shortness of breath


Hold  PPM today, pending blood culture result.


Toe wound culture positive for MRSA, on contact isolation


Cardiac status stable


Stable blood pressure


Heart rate 40's-50's asymptomatic


Status post revision of left toe


On Norvasc 5 mg BID


Continue antibiotics as ordered


Continue current treatment


Continue current medications





Will follow up





Plan and treatment discussed with Dr. Golden

## 2018-12-10 NOTE — CP.PCM.PN
Subjective





- Date & Time of Evaluation


Date of Evaluation: 12/10/18


Time of Evaluation: 13:16





- Subjective


Subjective: 





Podiatry progress note for Dr. Howard





82 y/o M patient  seen and evaluated at the bedside 6 days S/P revision of left 

second digit amputation with met head resection.  Patient was resting 

comfortably and NAD. He denies any pain to surgical site. Denies any acute 

events overnight. Patient denies N/V/F/C/SOB/CP and has no other pedal 

complaints at this time.














Objective





- Vital Signs/Intake and Output


Vital Signs (last 24 hours): 


                                        











Temp Pulse Resp BP Pulse Ox


 


 98.2 F   46 L  18   158/68 H  95 


 


 12/10/18 06:00  12/10/18 06:00  12/10/18 06:00  12/10/18 11:20  12/10/18 06:00








Intake and Output: 


                                        











 12/10/18 12/10/18





 06:59 18:59


 


Intake Total 180 


 


Output Total 600 


 


Balance -420 














- Medications


Medications: 


                               Current Medications





Acetaminophen (Tylenol 325mg Tab)  650 mg PO Q4H PRN


   PRN Reason: Pain, Mild (1-3)


Amlodipine Besylate (Norvasc)  5 mg PO BID [facility]


   Last Admin: 12/10/18 13:12 Dose:  Not Given


Heparin Sodium (Porcine) (Heparin)  5,000 units SC Q8H [facility]; Protocol


   Last Admin: 12/03/18 10:11 Dose:  5,000 units


Ceftaroline Fosamil 600 mg/ (Sodium Chloride)  100 mls @ 100 mls/hr IVPB Q12 [facility]


   Last Admin: 12/10/18 11:11 Dose:  100 mls/hr


Insulin Human Regular (Humulin R Med)  0 units SC ACHS [facility]; Protocol


   Last Admin: 12/10/18 13:11 Dose:  Not Given


Losartan Potassium (Cozaar)  25 mg PO DAILY [facility]


   Last Admin: 12/10/18 11:19 Dose:  25 mg


Ondansetron HCl (Zofran Inj)  4 mg IVP ONCE PRN


   PRN Reason: Nausea/Vomiting


Pantoprazole Sodium (Protonix Ec Tab)  20 mg PO 0600,1600 [facility]


   Last Admin: 12/10/18 05:25 Dose:  20 mg











- Labs


Labs: 


                                        





                                 12/10/18 07:40 





                                 12/10/18 07:40 





                                        











PT  15.7 SECONDS (9.4-12.5)  H  12/10/18  07:40    


 


INR  1.36   12/10/18  07:40    


 


APTT  37.1 Seconds (25.1-36.5)  H  12/10/18  07:40    














- Constitutional


Appears: Well, Non-toxic, No Acute Distress





- Head Exam


Head Exam: ATRAUMATIC, NORMOCEPHALIC





- Extremities Exam


Additional comments: 





Bilateral lower extremities exam





Vasc: faintly palpable pedal pulses bilaterally, Temp gradient warm to cool in 

the right side and warm to warm in the left side. Cap refill < 3 sec to all 

remaining digits. No erythema noted. Mild non pitting edema noted to the left 

foot. 





Neuro: Gross and protective sensations are grossly diminished.





Derm: Surgical site incision well coapted, sutures intact, no evidence of pus or

purulent drainage, No drainage noted through the dressing, no openings or signs 

of wound dehiscence, no probing, no clinical signs of infection





MSK: No pain on palpation of foot or legs bilaterally. Muscle power intact 5/5 

to all groups b/l.














- Neurological Exam


Neurological Exam: Alert, Awake, Oriented x3





- Psychiatric Exam


Psychiatric exam: Normal Affect, Normal Mood





Assessment and Plan





- Assessment and Plan (Free Text)


Assessment: 





82 y/o M patient  seen and evaluated at the bedside 6 days S/P revision of left 

second digit amputation with met head resection








Plan: 





Patient seen and evaluated at bedside.


Discussed in detail with Dr. Howard


Charts, labs ad vitals reviewed; Afebrile, absent leukocytosis


Post operative left foot X-ray: small, tiny fragments of bone at the surgical 

site


Wound cx 11/29 - MRSA


Wound cx 12/04 - MRSA


Continue IV Abx per ID 


Patient will need 4 weeks of IV Abx


Pathology postop - fagments of bone with focal acute OM, fragments of fibrodense

tissue with fibroconnective tissue .


Continue pain management as needed


Wound dressed using betadine, DSD and ace bandage. Dimethicone cream applied to 

the RLE for dryness


Patient seen by PT and evaluated - recommend ARLIN for continued skilled PT and 

management.


Podiatry will continue to follow up the patient while in house

## 2018-12-10 NOTE — CP.PCM.PN
Subjective





- Date & Time of Evaluation


Date of Evaluation: 12/10/18


Time of Evaluation: 07:15





- Subjective


Subjective: 


Terry Martinez, PGY-1 Progress Note for Hospitalist Service





Patient seen and evaluated at bedside. Patient had no acute overnight events. 

Patient has no complaints of this time. Patient denies headache, chest pain, 

heart palpitations, shortness of breath, nausea, vomiting, constipation, 

diarrhea, dysuria, and hematuria. Patient's wife was contacted to update her on 

patient status.








Objective





- Vital Signs/Intake and Output


Vital Signs (last 24 hours): 


                                        











Temp Pulse Resp BP Pulse Ox


 


 98.2 F   46 L  18   158/68 H  95 


 


 12/10/18 06:00  12/10/18 06:00  12/10/18 06:00  12/10/18 11:20  12/10/18 06:00








Intake and Output: 


                                        











 12/10/18 12/10/18





 06:59 18:59


 


Intake Total 180 


 


Output Total 600 


 


Balance -420 














- Medications


Medications: 


                               Current Medications





Acetaminophen (Tylenol 325mg Tab)  650 mg PO Q4H PRN


   PRN Reason: Pain, Mild (1-3)


Amlodipine Besylate (Norvasc)  5 mg PO BID [facility]


   Last Admin: 12/10/18 11:20 Dose:  5 mg


Heparin Sodium (Porcine) (Heparin)  5,000 units SC Q8H [facility]; Protocol


   Last Admin: 12/03/18 10:11 Dose:  5,000 units


Ceftaroline Fosamil 600 mg/ (Sodium Chloride)  100 mls @ 100 mls/hr IVPB Q12 [facility]


   Last Admin: 12/10/18 11:11 Dose:  100 mls/hr


Insulin Human Regular (Humulin R Med)  0 units SC ACHS [facility]; Protocol


   Last Admin: 12/10/18 08:00 Dose:  Not Given


Losartan Potassium (Cozaar)  25 mg PO DAILY [facility]


   Last Admin: 12/10/18 11:19 Dose:  25 mg


Ondansetron HCl (Zofran Inj)  4 mg IVP ONCE PRN


   PRN Reason: Nausea/Vomiting


Pantoprazole Sodium (Protonix Ec Tab)  20 mg PO 0600,1600 [facility]


   Last Admin: 12/10/18 05:25 Dose:  20 mg











- Labs


Labs: 


                                        





                                 12/10/18 07:40 





                                 12/10/18 07:40 





                                        











PT  15.7 SECONDS (9.4-12.5)  H  12/10/18  07:40    


 


INR  1.36   12/10/18  07:40    


 


APTT  37.1 Seconds (25.1-36.5)  H  12/10/18  07:40    














- Additional Findings


Additional findings: 








- Constitutional


Appears: Well, Non-toxic, No Acute Distress





- Head Exam


Head Exam: ATRAUMATIC, NORMAL INSPECTION, NORMOCEPHALIC





- Eye Exam


Eye Exam: EOMI, PERRL





- Respiratory Exam


Respiratory Exam: Clear to Ausculation Bilateral, NORMAL BREATHING PATTERN





- Cardiovascular Exam


Cardiovascular Exam: REGULAR RHYTHM





- GI/Abdominal Exam


GI & Abdominal Exam: Soft, Normal Bowel Sounds





- Back Exam


Back Exam: Full ROM





- Neurological Exam


Neurological Exam: Alert, Awake, CN II-XII Intact, Oriented x3


Neuro motor strength exam: Left Upper Extremity: 5, Right Upper Extremity: 5, 

Left Lower Extremity: 5, Right Lower Extremity: 5





Extremity: L foot wrapped , dressing for L second toe c/d/i








Assessment and Plan





- Assessment and Plan (Free Text)


Assessment: 





83 year old male with past medical history of peripheral artery disease, chronic

LLE cellulitis, COPD, HTN, dementia, obesity presents to [facility] with complaints of 

LLE anterior tibial region cellulitis and black L foot 2nd distal phalanx. 

Amputation of L second phalanx done on 11/28. Patient is POD #10. Revision of L 

2nd phalanx with removal of bone fragment POD#6. 





Plan: 


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


-MRI L foot: marrow edema in 2nd proximal and middle phalanx consistent with 

osteomyelitis


-Postoperative left foot X ray: small, tiny fragments of bone at the surgical 

site


-Wound cultures: MRSA


-Blood cultures: negative after 5 days


-Urine culture: negative


-Procalcitonin on 11/22: 0.07


-Dr. Do amputated 2nd metatarsal head of left foot on 11/29 which was 

revised on 12/4 with bone fragment excised.


-ID, Dr. Baca, recommends 4-6 weeks of Ceftaroline (day 19) with weekly 

ESR, CRP, CBC, and CMP. 


-PICC line was placed by 12/7.





Bradycardia


-HR ranging from 30 to mid 50s


-Holter monitor placed on patient which showed sinus bradycardia, arrhythmia, 

junctional rhythm, and escape beats. Patient also had 237 dropped beats.


-Patient will have permanent pacemaker placement today.


-Hold beta blockers, cozaar. F/u cardio recs (Dr. Golden/Dr. Howard)


- F/u blood cultures





Hypertension


-BP: 129/47


-HHD diet


-Norvasc 5 mg BID. Hold beta blockers and cozaar due to bradycardia.





Normocytic Anemia


-Hgb: 10.8


-Continue to monitor. 





History of prediabetes, diabetic peripheral neuropathy


-Hemoglobin A1c: 6.2


-Low dose sliding scale insulin





DVT/GI PPx: 


- Heparin 5000 q8 held


- Protonix 20 mg daily





Patient seen, case reviewed, and plan approved by Dr. Chappell.





Terry Martinez, PGY-1

## 2018-12-11 LAB
ALBUMIN SERPL-MCNC: 3.3 G/DL (ref 3–4.8)
ALBUMIN/GLOB SERPL: 1 {RATIO} (ref 1.1–1.8)
ALT SERPL-CCNC: 25 U/L (ref 7–56)
AST SERPL-CCNC: 37 U/L (ref 17–59)
BASOPHILS # BLD AUTO: 0.03 K/MM3 (ref 0–2)
BASOPHILS NFR BLD: 0.4 % (ref 0–3)
BUN SERPL-MCNC: 34 MG/DL (ref 7–21)
CALCIUM SERPL-MCNC: 8.9 MG/DL (ref 8.4–10.5)
EOSINOPHIL # BLD: 0.3 10*3/UL (ref 0–0.7)
EOSINOPHIL NFR BLD: 4.7 % (ref 1.5–5)
ERYTHROCYTE [DISTWIDTH] IN BLOOD BY AUTOMATED COUNT: 13.8 % (ref 11.5–14.5)
GFR NON-AFRICAN AMERICAN: 48
GRANULOCYTES # BLD: 4.64 10*3/UL (ref 1.4–6.5)
GRANULOCYTES NFR BLD: 66.3 % (ref 50–68)
HGB BLD-MCNC: 10.9 G/DL (ref 14–18)
LYMPHOCYTES # BLD: 1.5 10*3/UL (ref 1.2–3.4)
LYMPHOCYTES NFR BLD AUTO: 21 % (ref 22–35)
MCH RBC QN AUTO: 26.8 PG (ref 25–35)
MCHC RBC AUTO-ENTMCNC: 32.4 G/DL (ref 31–37)
MCV RBC AUTO: 82.6 FL (ref 80–105)
MONOCYTES # BLD AUTO: 0.5 10*3/UL (ref 0.1–0.6)
MONOCYTES NFR BLD: 7.6 % (ref 1–6)
PLATELET # BLD: 157 10^3/UL (ref 120–450)
PMV BLD AUTO: 8.7 FL (ref 7–11)
RBC # BLD AUTO: 4.07 10^6/UL (ref 3.5–6.1)
WBC # BLD AUTO: 7 10^3/UL (ref 4.5–11)

## 2018-12-11 RX ADMIN — PANTOPRAZOLE SODIUM SCH MG: 20 TABLET, DELAYED RELEASE ORAL at 16:00

## 2018-12-11 RX ADMIN — INSULIN HUMAN SCH: 100 INJECTION, SOLUTION PARENTERAL at 17:51

## 2018-12-11 RX ADMIN — INSULIN HUMAN SCH: 100 INJECTION, SOLUTION PARENTERAL at 16:50

## 2018-12-11 RX ADMIN — PANTOPRAZOLE SODIUM SCH MG: 20 TABLET, DELAYED RELEASE ORAL at 05:51

## 2018-12-11 RX ADMIN — INSULIN HUMAN SCH: 100 INJECTION, SOLUTION PARENTERAL at 22:23

## 2018-12-11 RX ADMIN — INSULIN HUMAN SCH: 100 INJECTION, SOLUTION PARENTERAL at 09:51

## 2018-12-11 NOTE — PN
DATE:  12/11/2018



SEX OF THE PATIENT:  Male.



AGE OF THE PATIENT:  83.



TYPE OF DICTATION:  Progress note.



DICTATING PHYSICIAN:  Donnell Golden MD.



REASON FOR DICTATION:  Cardiac evaluation, bradycardia, status post

revision of the fourth digit is scheduled for pacemaker, family wanted to

hold for next 48 hours.  Blood culture remains negative.



PHYSICAL EXAMINATION:

VITAL SIGNS:  Temperature is afebrile, heart rate 37, and blood pressure

135/58.

HEENT:  PERRLA.  Extraocular muscles intact.

NECK:  Supple.  No carotid bruit or thyromegaly.

CHEST:  Clear to auscultation.

HEART:  S1 and S2 are regular.

ABDOMEN:  Soft.

EXTREMITIES:  Clubbing and cyanosis negative.



LABORATORY DATA:  Blood workup as follows; WBC 7.0, hemoglobin 10.9,

hematocrit 33.6, and platelet count 157.  Chemistry shows sodium 139,

potassium 4.3, chloride 108, carbon dioxide 27, anion gap of 9, BUN 30,

creatinine 1.4.  Telemetry shows a junctional payam rate of 39, blood

pressure was 184/62, repeat blood culture 24 hours negative.



IMPRESSION AND PLAN:  An 83-year-old male with past medical history

significant for morbid obesity, admitted to the revision of fourth digit

sepsis osteomyelitis.  Underwent Holter monitor without the bradycardia

found to be multiple pauses 2.8 second.  The patient is scheduled for

pacemaker yesterday, but the family (wife) wanted to hold it off and want

to repeat blood culture though on admission the first blood culture was

negative, but wound culture was positive methicillin-resistant

Staphylococcus aureus, length of discussion with wife was very much adamant

to getting blood culture wait for 48 hours, so blood culture was sent

yesterday first 24 hours still negative.  Discussed in length again second

sitting done yesterday wanted to wait for 48 hours, so 48 hours be today 6

p.m. so we will scheduled for pacemaker tomorrow if the blood culture

remains negative, which was negative to begin with.  Avoid rate-limiting

calcium channel blocker and beta blocker and discussed with Dr. Chappell

primary care physician.  I did informed with the patient's wife _____.  So

we will keep n.p.o. after 12 midnight for pacemaker tomorrow.  I would put

low-dose of dopamine 2.5 to prevent any further episode of bradyarrhythmia.



Thank you Dr. Chappell for providing us the opportunity in taking care of the

patient, Micheline Awad.







__________________________________________

Donnell Golden MD





DD:  12/11/2018 10:41:37

DT:  12/11/2018 13:14:38

Job # [id number]

## 2018-12-11 NOTE — CP.PCM.PN
<Terry Martinez - Last Filed: 12/11/18 16:20>





Subjective





- Date & Time of Evaluation


Date of Evaluation: 12/11/18


Time of Evaluation: 08:00





- Subjective


Subjective: 


Terry Martinez PGY-1 Progress Note for Hospitalist Service





Patient seen and evaluated at bedside. Patient had no acute overnight events and

denies complaints at this time. Patient denies headache, chest pain, heart 

palpitations, shortness of breath, nausea, vomiting, constipation, diarrhea, 

dysuria, and hematuria. Patient and wife were updated on plan for pacemaker 

placement tomorrow.











Objective





- Vital Signs/Intake and Output


Vital Signs (last 24 hours): 


                                        











Temp Pulse Resp BP Pulse Ox


 


 98.4 F   52 L  20   166/46 H  97 


 


 12/11/18 12:00  12/11/18 13:45  12/11/18 12:00  12/11/18 13:45  12/11/18 06:00








Intake and Output: 


                                        











 12/11/18 12/11/18





 06:59 18:59


 


Intake Total 120 


 


Output Total 700 


 


Balance -580 














- Medications


Medications: 


                               Current Medications





Acetaminophen (Tylenol 325mg Tab)  650 mg PO Q4H PRN


   PRN Reason: Pain, Mild (1-3)


Amlodipine Besylate (Norvasc)  5 mg PO BID [facility]


   Last Admin: 12/11/18 09:51 Dose:  Not Given


Heparin Sodium (Porcine) (Heparin)  5,000 units SC Q8H [facility]; Protocol


   Last Admin: 12/03/18 10:11 Dose:  5,000 units


Ceftaroline Fosamil 600 mg/ (Sodium Chloride)  100 mls @ 100 mls/hr IVPB Q12 [facility]


   Last Admin: 12/11/18 09:52 Dose:  100 mls/hr


Dopamine HCl/Dextrose (Dopamine 400mg/250ml D5w)  400 mg in 250 mls @ 9.606 

mls/hr IV .Q24H [facility]


   Last Admin: 12/11/18 11:53 Dose:  9.606 mls/hr


Insulin Human Regular (Humulin R Med)  0 units SC ACHS [facility]; Protocol


   Last Admin: 12/11/18 09:51 Dose:  Not Given


Losartan Potassium (Cozaar)  25 mg PO DAILY [facility]


   Last Admin: 12/11/18 09:52 Dose:  25 mg


Ondansetron HCl (Zofran Inj)  4 mg IVP ONCE PRN


   PRN Reason: Nausea/Vomiting


Pantoprazole Sodium (Protonix Ec Tab)  20 mg PO 0600,1600 [facility]


   Last Admin: 12/11/18 05:51 Dose:  20 mg











- Labs


Labs: 


                                        





                                 12/11/18 05:57 





                                 12/11/18 05:57 





                                        











PT  15.7 SECONDS (9.4-12.5)  H  12/10/18  07:40    


 


INR  1.36   12/10/18  07:40    


 


APTT  37.1 Seconds (25.1-36.5)  H  12/10/18  07:40    














- Additional Findings


Additional findings: 





- Constitutional


Appears: Well, Non-toxic, No Acute Distress





- Head Exam


Head Exam: ATRAUMATIC, NORMAL INSPECTION, NORMOCEPHALIC





- Eye Exam


Eye Exam: EOMI, PERRL





- Respiratory Exam


Respiratory Exam: Clear to Ausculation Bilateral, NORMAL BREATHING PATTERN





- Cardiovascular Exam


Cardiovascular Exam: REGULAR RHYTHM





- GI/Abdominal Exam


GI & Abdominal Exam: Soft, Normal Bowel Sounds





- Back Exam


Back Exam: Full ROM





- Neurological Exam


Neurological Exam: Alert, Awake, CN II-XII Intact, Oriented x3


Neuro motor strength exam: Left Upper Extremity: 5, Right Upper Extremity: 5, 

Left Lower Extremity: 5, Right Lower Extremity: 5





Extremity: L foot wrapped , dressing for L second toe c/d/i








Assessment and Plan





- Assessment and Plan (Free Text)


Assessment: 





83 year old male with past medical history of peripheral artery disease, chronic

LLE cellulitis, COPD, HTN, dementia, obesity presents to [facility] with complaints of 

LLE anterior tibial region cellulitis and black L foot 2nd distal phalanx. 

Amputation of L second phalanx done on 11/28. Patient is POD #11. Revision of L 

2nd phalanx with removal of bone fragment POD#7. 





Plan: 


Bradycardia


-HR ranging from 30 to mid 50s,  Junctional sinus payam last night overnight


-Holter monitor placed on patient on weekend which showed sinus bradycardia, 

arrhythmia, junctional rhythm, and escape beats.


-Patient will have permanent pacemaker placement tomorrow if blood culture 

remains negative  per family request, NPO after midnight


- Dopamine 2.5 mg to prevent bradyarrythmia


-Hold beta blockers, cozaar. F/u cardio recs (Dr. Golden/Dr. Howard)


- F/u blood cultures





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


-MRI L foot: marrow edema in 2nd proximal and middle phalanx consistent with 

osteomyelitis


-Postoperative left foot X ray: small, tiny fragments of bone at the surgical 

site


-Wound cultures: MRSA


-Blood cultures: negative after 5 days


-Urine culture: negative


-Procalcitonin on 11/22: 0.07


-Dr. Do amputated 2nd metatarsal head of left foot on 11/29 which was 

revised on 12/4 with bone fragment excised.


-ID, Dr. Baca, recommends 4-6 weeks of Ceftaroline (day 20) with weekly 

ESR, CRP, CBC, and CMP. 


-PICC line was placed by 12/7.





Hypertension


-BP: 129/47


-HHD diet


-Norvasc 5 mg BID. Hold beta blockers and cozaar due to bradycardia.





Normocytic Anemia


-Hgb: 10.9


-Continue to monitor. 





History of prediabetes, diabetic peripheral neuropathy


-Hemoglobin A1c: 6.2


-Low dose sliding scale insulin





DVT/GI PPx: 


- Heparin 5000 q8 held


- Protonix 20 mg daily





Patient seen, case reviewed and plan approved by Dr. Yin.





Terry Martinez, PGY-1








<Heladio Yin - Last Filed: 12/12/18 16:48>





Objective





- Vital Signs/Intake and Output


Vital Signs (last 24 hours): 


                                        











Temp Pulse Resp BP Pulse Ox


 


 95 F L  60   14   126/63   98 


 


 12/12/18 12:10  12/12/18 12:10  12/12/18 12:10  12/12/18 12:10  12/12/18 12:10








Intake and Output: 


                                        











 12/12/18 12/12/18





 06:59 18:59


 


Intake Total 240 


 


Output Total 300 


 


Balance -60 














- Medications


Medications: 


                               Current Medications





Acetaminophen (Tylenol 325mg Tab)  650 mg PO Q4H PRN


   PRN Reason: Pain, Mild (1-3)


Amlodipine Besylate (Norvasc)  5 mg PO BID [facility]


   Last Admin: 12/12/18 14:16 Dose:  Not Given


Heparin Sodium (Porcine) (Heparin)  5,000 units SC Q8H [facility]; Protocol


   Last Admin: 12/03/18 10:11 Dose:  5,000 units


Hydralazine HCl (Apresoline)  10 mg PO QID PRN


   PRN Reason: For sbp>160


Ceftaroline Fosamil 600 mg/ (Sodium Chloride)  100 mls @ 100 mls/hr IVPB Q12 [facility]


   Last Admin: 12/12/18 14:16 Dose:  Not Given


Insulin Human Regular (Humulin R Med)  0 units SC ACHS [facility]; Protocol


   Last Admin: 12/12/18 14:16 Dose:  Not Given


Losartan Potassium (Cozaar)  25 mg PO DAILY [facility]


   Last Admin: 12/11/18 09:52 Dose:  25 mg


Ondansetron HCl (Zofran Inj)  4 mg IVP ONCE PRN


   PRN Reason: Nausea/Vomiting


Pantoprazole Sodium (Protonix Ec Tab)  20 mg PO 0600,1600 [facility]


   Last Admin: 12/12/18 06:54 Dose:  Not Given











- Labs


Labs: 


                                        





                                 12/12/18 05:28 





                                 12/12/18 05:28 





                                        











PT  15.9 SECONDS (9.4-12.5)  H  12/12/18  15:41    


 


INR  1.39   12/12/18  15:41    


 


APTT  38.9 Seconds (25.1-36.5)  H  12/12/18  15:41    














Attending/Attestation





- Attestation


I have personally seen and examined this patient.: Yes


I have fully participated in the care of the patient.: Yes


I have reviewed all pertinent clinical information, including history, physical 

exam and plan: Yes


Notes (Text): 


83 year old male with past medical history of peripheral artery disease, chronic

LLE cellulitis, COPD, HTN, dementia, obesity presents to [facility] with complaints of 

LLE anterior tibial region cellulitis and black L foot 2nd distal phalanx. 

Amputation of L second phalanx done on 11/28. 





Osteomyelitis


Bradycardia


HTN


Anemia


Diabetes





s/p multiple toes amputation


c/w abx as per ID recommendations


Cardio on board for placement of PPM on 12/12


Avoid AV jenna blockers


c/w RISS

## 2018-12-11 NOTE — CP.PCM.PN
<Garfield Sanches - Last Filed: 12/11/18 14:07>





Subjective





- Date & Time of Evaluation


Date of Evaluation: 12/11/18


Time of Evaluation: 14:07





- Subjective


Subjective: 





Podiatry progress note for Dr. Do





84 y/o M patient  seen and evaluated at the bedside 7 days S/P revision of left 

second digit amputation with met head resection. Patient was resting comfortably

and NAD. He denies any pain to surgical site. Denies any acute events overnight.

Patient denies N/V/F/C/SOB/CP and has no other pedal complaints at this time. 

Patient wife and daughter were present at the bedside.




















Objective





- Vital Signs/Intake and Output


Vital Signs (last 24 hours): 


                                        











Temp Pulse Resp BP Pulse Ox


 


 98.4 F   52 L  20   166/46 H  97 


 


 12/11/18 12:00  12/11/18 13:45  12/11/18 12:00  12/11/18 13:45  12/11/18 06:00








Intake and Output: 


                                        











 12/11/18 12/11/18





 06:59 18:59


 


Intake Total 120 


 


Output Total 700 


 


Balance -580 














- Medications


Medications: 


                               Current Medications





Acetaminophen (Tylenol 325mg Tab)  650 mg PO Q4H PRN


   PRN Reason: Pain, Mild (1-3)


Amlodipine Besylate (Norvasc)  5 mg PO BID [facility]


   Last Admin: 12/11/18 09:51 Dose:  Not Given


Heparin Sodium (Porcine) (Heparin)  5,000 units SC Q8H [facility]; Protocol


   Last Admin: 12/03/18 10:11 Dose:  5,000 units


Ceftaroline Fosamil 600 mg/ (Sodium Chloride)  100 mls @ 100 mls/hr IVPB Q12 [facility]


   Last Admin: 12/11/18 09:52 Dose:  100 mls/hr


Dopamine HCl/Dextrose (Dopamine 400mg/250ml D5w)  400 mg in 250 mls @ 9.606 

mls/hr IV .Q24H [facility]


   Last Admin: 12/11/18 11:53 Dose:  9.606 mls/hr


Insulin Human Regular (Humulin R Med)  0 units SC ACHS [facility]; Protocol


   Last Admin: 12/11/18 09:51 Dose:  Not Given


Losartan Potassium (Cozaar)  25 mg PO DAILY [facility]


   Last Admin: 12/11/18 09:52 Dose:  25 mg


Ondansetron HCl (Zofran Inj)  4 mg IVP ONCE PRN


   PRN Reason: Nausea/Vomiting


Pantoprazole Sodium (Protonix Ec Tab)  20 mg PO 0600,1600 [facility]


   Last Admin: 12/11/18 05:51 Dose:  20 mg











- Labs


Labs: 


                                        





                                 12/11/18 05:57 





                                 12/11/18 05:57 





                                        











PT  15.7 SECONDS (9.4-12.5)  H  12/10/18  07:40    


 


INR  1.36   12/10/18  07:40    


 


APTT  37.1 Seconds (25.1-36.5)  H  12/10/18  07:40    














- Constitutional


Appears: Well, Non-toxic, No Acute Distress





- Head Exam


Head Exam: ATRAUMATIC, NORMOCEPHALIC





- Extremities Exam


Additional comments: 





Bilateral lower extremities exam





Vasc: faintly palpable pedal pulses bilaterally, Temp gradient warm to cool in 

the right side and warm to warm in the left side. Cap refill < 3 sec to all 

remaining digits. No erythema noted. Mild non pitting edema noted to the left 

foot. 





Neuro: Gross and protective sensations are grossly diminished.





Derm: Surgical site incision well coapted, sutures intact, no evidence of pus or

purulent drainage, No drainage noted through the dressing, no openings or signs 

of wound dehiscence, no probing, no clinical signs of infection





MSK: No pain on palpation of foot or legs bilaterally. Muscle power intact 5/5 

to all groups b/l.


























- Neurological Exam


Neurological Exam: Alert, Awake





Assessment and Plan





- Assessment and Plan (Free Text)


Assessment: 





84 y/o M patient  seen and evaluated at the bedside 7 days S/P revision of left 

second digit amputation with met head resection














Plan: 





Patient seen and evaluated at bedside.


Discussed in detail with Dr. Do


Charts, labs ad vitals reviewed; Afebrile, absent leukocytosis


Post operative left foot X-ray: small, tiny fragments of bone at the surgical 

site


Wound cx 11/29 - MRSA


Wound cx 12/04 - MRSA


Continue IV Abx per ID 


Patient will need 4-6 weeks of IV Abx as per ID


Pathology postop - fagments of bone with focal acute OM, fragments of fibrodense

tissue with fibroconnective tissue .


Continue pain management as needed


Wound dressed using betadine, DSD and ace bandage.


Patient seen by PT and evaluated - recommend ARLIN for continued skilled PT and 

management.


Podiatry will continue to follow up the patient while in house








<Rodrigo Do - Last Filed: 12/12/18 16:55>





Objective





- Vital Signs/Intake and Output


Vital Signs (last 24 hours): 


                                        











Temp Pulse Resp BP Pulse Ox


 


 95 F L  60   14   126/63   98 


 


 12/12/18 12:10  12/12/18 12:10  12/12/18 12:10  12/12/18 12:10  12/12/18 12:10








Intake and Output: 


                                        











 12/12/18 12/12/18





 06:59 18:59


 


Intake Total 240 


 


Output Total 300 


 


Balance -60 














- Medications


Medications: 


                               Current Medications





Acetaminophen (Tylenol 325mg Tab)  650 mg PO Q4H PRN


   PRN Reason: Pain, Mild (1-3)


Amlodipine Besylate (Norvasc)  5 mg PO BID [facility]


   Last Admin: 12/12/18 14:16 Dose:  Not Given


Heparin Sodium (Porcine) (Heparin)  5,000 units SC Q8H [facility]; Protocol


   Last Admin: 12/03/18 10:11 Dose:  5,000 units


Hydralazine HCl (Apresoline)  10 mg PO QID PRN


   PRN Reason: For sbp>160


Ceftaroline Fosamil 600 mg/ (Sodium Chloride)  100 mls @ 100 mls/hr IVPB Q12 [facility]


   Last Admin: 12/12/18 14:16 Dose:  Not Given


Insulin Human Regular (Humulin R Med)  0 units SC ACHS [facility]; Protocol


   Last Admin: 12/12/18 14:16 Dose:  Not Given


Losartan Potassium (Cozaar)  25 mg PO DAILY [facility]


   Last Admin: 12/11/18 09:52 Dose:  25 mg


Ondansetron HCl (Zofran Inj)  4 mg IVP ONCE PRN


   PRN Reason: Nausea/Vomiting


Pantoprazole Sodium (Protonix Ec Tab)  20 mg PO 0600,1600 [facility]


   Last Admin: 12/12/18 06:54 Dose:  Not Given











- Labs


Labs: 


                                        





                                 12/12/18 05:28 





                                 12/12/18 05:28 





                                        











PT  15.9 SECONDS (9.4-12.5)  H  12/12/18  15:41    


 


INR  1.39   12/12/18  15:41    


 


APTT  38.9 Seconds (25.1-36.5)  H  12/12/18  15:41    














Attending/Attestation





- Attestation


I have personally seen and examined this patient.: Yes


I have fully participated in the care of the patient.: Yes


I have reviewed all pertinent clinical information, including history, physical 

exam and plan: Yes

## 2018-12-11 NOTE — CP.PCM.PN
Subjective





- Date & Time of Evaluation


Date of Evaluation: 12/11/18


Time of Evaluation: 08:55





- Subjective


Subjective: 





Afebrile, not in distress, no increased pain in the left foot, no nausea, no 

diarrhea.





Objective





- Vital Signs/Intake and Output


Vital Signs (last 24 hours): 


                                        











Temp Pulse Resp BP Pulse Ox


 


 97.7 F   51 L  20   156/68 H  34 L


 


 12/10/18 14:00  12/10/18 14:00  12/10/18 14:00  12/10/18 17:55  12/10/18 14:00











- Medications


Medications: 


                               Current Medications





Acetaminophen (Tylenol 325mg Tab)  650 mg PO Q4H PRN


   PRN Reason: Pain, Mild (1-3)


Amlodipine Besylate (Norvasc)  5 mg PO BID [facility]


   Last Admin: 12/10/18 17:55 Dose:  5 mg


Heparin Sodium (Porcine) (Heparin)  5,000 units SC Q8H [facility]; Protocol


   Last Admin: 12/03/18 10:11 Dose:  5,000 units


Ceftaroline Fosamil 600 mg/ (Sodium Chloride)  100 mls @ 100 mls/hr IVPB Q12 [facility]


   Last Admin: 12/10/18 11:11 Dose:  100 mls/hr


Insulin Human Regular (Humulin R Med)  0 units SC ACHS [facility]; Protocol


   Last Admin: 12/10/18 17:21 Dose:  Not Given


Losartan Potassium (Cozaar)  25 mg PO DAILY [facility]


   Last Admin: 12/10/18 11:19 Dose:  25 mg


Ondansetron HCl (Zofran Inj)  4 mg IVP ONCE PRN


   PRN Reason: Nausea/Vomiting


Pantoprazole Sodium (Protonix Ec Tab)  20 mg PO 0600,1600 [facility]


   Last Admin: 12/10/18 17:58 Dose:  20 mg











- Labs


Labs: 


                                        





                                 12/10/18 07:40 





                                 12/10/18 07:40 





                                        











PT  15.7 SECONDS (9.4-12.5)  H  12/10/18  07:40    


 


INR  1.36   12/10/18  07:40    


 


APTT  37.1 Seconds (25.1-36.5)  H  12/10/18  07:40    














- Constitutional


Appears: Non-toxic, Chronically Ill





- Head Exam


Head Exam: NORMAL INSPECTION





- Neck Exam


Neck Exam: absent: Meningismus





- Respiratory Exam


Respiratory Exam: Decreased Breath Sounds





- Cardiovascular Exam


Cardiovascular Exam: +S1, +S2





- GI/Abdominal Exam


GI & Abdominal Exam: Soft.  absent: Tenderness





- Extremities Exam


Additional comments: 





left foot with dressings in place





Assessment and Plan





- Assessment and Plan (Free Text)


Plan: 








Assessment


left leg and foot cellulitis and 2nd toe gangrene, with osteomyelitis with MRSA 

S/P amputation, S/P further debridement - margins of bone still with 

osteomyelitis even on latest debridement


history of Group G strep bacteremia, probably secondary to bilateral lower 

extremities skin and skin structure infection;


history of rhabdomyolysis


CAD


HTN


chronic renal failure


dementia


obesity with BMI 32





Plan


continue Teflaro and will need at least 4-6 weeks of antibiotics with weekly 

ESR, CRP, CBC, CMP to be followed by the wound care center while on antibiotics 

- discussed with Dr. Do previously

## 2018-12-12 LAB
ALBUMIN SERPL-MCNC: 3.2 G/DL (ref 3–4.8)
ALBUMIN/GLOB SERPL: 1 {RATIO} (ref 1.1–1.8)
ALT SERPL-CCNC: 23 U/L (ref 7–56)
APTT BLD: 38.9 SECONDS (ref 25.1–36.5)
AST SERPL-CCNC: 26 U/L (ref 17–59)
BASOPHILS # BLD AUTO: 0.02 K/MM3 (ref 0–2)
BASOPHILS NFR BLD: 0.3 % (ref 0–3)
BUN SERPL-MCNC: 31 MG/DL (ref 7–21)
CALCIUM SERPL-MCNC: 8.9 MG/DL (ref 8.4–10.5)
EOSINOPHIL # BLD: 0.4 10*3/UL (ref 0–0.7)
EOSINOPHIL NFR BLD: 4.6 % (ref 1.5–5)
ERYTHROCYTE [DISTWIDTH] IN BLOOD BY AUTOMATED COUNT: 13.8 % (ref 11.5–14.5)
GFR NON-AFRICAN AMERICAN: 53
GRANULOCYTES # BLD: 5.12 10*3/UL (ref 1.4–6.5)
GRANULOCYTES NFR BLD: 67.9 % (ref 50–68)
HGB BLD-MCNC: 10.6 G/DL (ref 14–18)
INR PPP: 1.39
LYMPHOCYTES # BLD: 1.3 10*3/UL (ref 1.2–3.4)
LYMPHOCYTES NFR BLD AUTO: 17.7 % (ref 22–35)
MCH RBC QN AUTO: 26.4 PG (ref 25–35)
MCHC RBC AUTO-ENTMCNC: 32.1 G/DL (ref 31–37)
MCV RBC AUTO: 82.3 FL (ref 80–105)
MONOCYTES # BLD AUTO: 0.7 10*3/UL (ref 0.1–0.6)
MONOCYTES NFR BLD: 9.5 % (ref 1–6)
PLATELET # BLD: 158 10^3/UL (ref 120–450)
PMV BLD AUTO: 8.6 FL (ref 7–11)
PROTHROMBIN TIME: 15.9 SECONDS (ref 9.4–12.5)
RBC # BLD AUTO: 4.01 10^6/UL (ref 3.5–6.1)
WBC # BLD AUTO: 7.6 10^3/UL (ref 4.5–11)

## 2018-12-12 PROCEDURE — 0JH605Z INSERTION OF PACEMAKER, SINGLE CHAMBER RATE RESPONSIVE INTO CHEST SUBCUTANEOUS TISSUE AND FASCIA, OPEN APPROACH: ICD-10-PCS

## 2018-12-12 PROCEDURE — 02HK3JZ INSERTION OF PACEMAKER LEAD INTO RIGHT VENTRICLE, PERCUTANEOUS APPROACH: ICD-10-PCS

## 2018-12-12 RX ADMIN — INSULIN HUMAN SCH: 100 INJECTION, SOLUTION PARENTERAL at 14:15

## 2018-12-12 RX ADMIN — PANTOPRAZOLE SODIUM SCH: 20 TABLET, DELAYED RELEASE ORAL at 06:54

## 2018-12-12 RX ADMIN — INSULIN HUMAN SCH: 100 INJECTION, SOLUTION PARENTERAL at 14:16

## 2018-12-12 RX ADMIN — PANTOPRAZOLE SODIUM SCH MG: 20 TABLET, DELAYED RELEASE ORAL at 17:35

## 2018-12-12 RX ADMIN — INSULIN HUMAN SCH: 100 INJECTION, SOLUTION PARENTERAL at 17:19

## 2018-12-12 RX ADMIN — INSULIN HUMAN SCH: 100 INJECTION, SOLUTION PARENTERAL at 22:40

## 2018-12-12 NOTE — CPOSTOP
CARDIOVASCULAR LAB POSTPROCEDURE NOTE



DATE:  12/12/2018



PHYSICIAN:  Dr. Indy Golden.



SCRUB TECH:  LUIGI Doss.



TYPE OF ANESTHESIA:  Moderate conscious sedation, total 3 mg of Versed and

100 of fentanyl given periodically.  Started 1 mg of Versed and 50 of

fentanyl.



PRE-PROCEDURE DIAGNOSES:  Sick sinus syndrome, tachy-payam syndrome,

symptomatic bradycardia, multiple pause.



PROCEDURE PERFORMED:

1.  Implantation of pacemaker, permanent.

2.  Single chamber VVI MRI, safe.



FINDINGS:  Symptomatic bradycardia, pacemaker dependent.



FINAL DIAGNOSIS:  Pacemaker dependent.



POST PROCEDURE CONDITION:  The post procedure patient condition is stable.



VASCULAR ACCESS SITE:  Left subclavian vein.



CLOSURE DEVICE:  Stitch and Dermabond applied.



TOTAL RADIATION DOSE:  4266.88 milligray unit.



TOTAL FLUORO TIME:  7.2 minutes.







__________________________________________

Donnell Golden MD





DD:  12/12/2018 10:44:10

DT:  12/12/2018 11:06:51

Job # [id number]

## 2018-12-12 NOTE — CARD
--------------- APPROVED REPORT --------------





Date of service: 12/12/2018



EKG Measurement

Heart [id number]

[id number]

[id number]

[id number]



<Conclusion>

Electronic ventricular pacemaker

## 2018-12-12 NOTE — CP.PCM.PN
Subjective





- Date & Time of Evaluation


Date of Evaluation: 12/12/18


Time of Evaluation: 13:25





- Subjective


Subjective: 








Podiatry progress note for Dr. Howard





84 y/o M patient  seen and evaluated at the bedside 8 days S/P revision of left 

second digit amputation with met head resection. Patient was resting comfortably

and NAD. He denies any pain to surgical site. Denies any acute events overnight.

Patient denies N/V/F/C/SOB/CP and has no other pedal complaints at this time. 

Patient seen after he had Pacemaker insertion today.

















Objective





- Vital Signs/Intake and Output


Vital Signs (last 24 hours): 


                                        











Temp Pulse Resp BP Pulse Ox


 


 95 F L  60   14   126/63   98 


 


 12/12/18 12:10  12/12/18 12:10  12/12/18 12:10  12/12/18 12:10  12/12/18 12:10








Intake and Output: 


                                        











 12/12/18 12/12/18





 06:59 18:59


 


Intake Total 240 


 


Output Total 300 


 


Balance -60 














- Medications


Medications: 


                               Current Medications





Acetaminophen (Tylenol 325mg Tab)  650 mg PO Q4H PRN


   PRN Reason: Pain, Mild (1-3)


Amlodipine Besylate (Norvasc)  5 mg PO BID [facility]


   Last Admin: 12/11/18 17:47 Dose:  5 mg


Heparin Sodium (Porcine) (Heparin)  5,000 units SC Q8H [facility]; Protocol


   Last Admin: 12/03/18 10:11 Dose:  5,000 units


Hydralazine HCl (Apresoline)  10 mg PO QID PRN


   PRN Reason: For sbp>160


Ceftaroline Fosamil 600 mg/ (Sodium Chloride)  100 mls @ 100 mls/hr IVPB Q12 [facility]


   Last Admin: 12/11/18 23:13 Dose:  100 mls/hr


Insulin Human Regular (Humulin R Med)  0 units SC ACHS [facility]; Protocol


   Last Admin: 12/11/18 22:23 Dose:  Not Given


Losartan Potassium (Cozaar)  25 mg PO DAILY [facility]


   Last Admin: 12/11/18 09:52 Dose:  25 mg


Ondansetron HCl (Zofran Inj)  4 mg IVP ONCE PRN


   PRN Reason: Nausea/Vomiting


Pantoprazole Sodium (Protonix Ec Tab)  20 mg PO 0600,1600 [facility]


   Last Admin: 12/12/18 06:54 Dose:  Not Given











- Labs


Labs: 


                                        





                                 12/12/18 05:28 





                                 12/12/18 05:28 





                                        











PT  15.7 SECONDS (9.4-12.5)  H  12/10/18  07:40    


 


INR  1.36   12/10/18  07:40    


 


APTT  37.1 Seconds (25.1-36.5)  H  12/10/18  07:40    














- Constitutional


Appears: Well, Non-toxic, No Acute Distress





- Head Exam


Head Exam: ATRAUMATIC, NORMOCEPHALIC





- Extremities Exam


Additional comments: 





Left lower extremity focused exam





Surgical site dressing looks C/D/I with no strike through





Vasc:  Cap refill < 3 sec to all remaining digits. 





Neuro: Gross and protective sensations are grossly diminished.





MSK: Patient could perform active ROM with the remaining digits.





























- Neurological Exam


Neurological Exam: Alert, Awake, Oriented x3





- Psychiatric Exam


Psychiatric exam: Normal Affect, Normal Mood





Assessment and Plan





- Assessment and Plan (Free Text)


Assessment: 








84 y/o M patient  seen and evaluated at the bedside 8 days S/P revision of left 

second digit amputation with met head resection











Plan: 








Patient seen and evaluated at bedside.


Discussed in detail with Dr. Howard


Charts, labs ad vitals reviewed; Afebrile, absent leukocytosis


Post operative left foot X-ray: small, tiny fragments of bone at the surgical 

site


Wound cx 11/29 - MRSA


Wound cx 12/04 - MRSA


Continue IV Abx per ID 


Patient will need 4-6 weeks of IV Abx as per ID


Pathology postop - fagments of bone with focal acute OM, fragments of fibrodense

tissue with fibroconnective tissue .


Continue pain management as needed


Patient had Pacemaker insertion today.


Surgical site dressing looks C/D/I with no strike through


No dressing change done for the surgical site today.


Patient seen by PT and evaluated - recommend ARLIN for continued skilled PT and 

management.


Podiatry will continue to follow up the patient while in house

## 2018-12-12 NOTE — CARD
--------------- APPROVED REPORT --------------





Date of service: 12/12/2018



HISTORY

The Patient is a 83 year-old male with a history of Symptomatic 

bradycardia, PAD



PROCEDURES

Insertion Single Chamber Ventricle Pacemaker



INDICATIONS

Symptomatic bradycardia

Multiple pauses

SS



CONSCIOUS SEDATION AGENTS

Versed

Fentanyl



IMPLANTED DEVICES

Medtronic 5076 - 58cm  Active V Lead

Medtronic ,Pulse generator... EDGAR XTSR MRI safe



OPERATIVE NOTE

The patient was brought to the Cardiac Catheterization Laboratory in 

a fasting state and was prepped and draped in a sterile manner. 

The left subclavian region was infiltrated with 2% Lidocaine, 

subcutaneous anesthesia. A transverse incision was made in the left 

subclavicular area. 

The subcutaneous pocket was formed via blunt dissection, Percutaneous 

venous access was achieved and an introducer sheath was inserted into 

the Lt Subclavian vein. 

Through the introducer sheath, the ventricular lead wire was 

postitioned in the right ventricular apex utilizing fluoroscopic 

guidance. 

The ventricular was advanced over the wire under fluoroscopic 

guidance and positioned in the right ventricle. Capturing and sensing 

thresholds were verified. 



THE VENTRICULAR ELECTRODE PARAMETERS

R WAVE 6

THRESHOLD0.4

RESISTANCE 550

The ventricular lead was then secured using Silk 0. The subcutaneous 

pocket was irrigated with Betadine. The ventricular lead was attached 

to the appropriate receptacle on the pulse generator and set screws 

firmly tightened to insure adequate contact and stability. The lead 

and pulse generator were placed into the subcutaneous pocket. Sharp 

and sponge counts were confirmed to be correct. At this time the 

pocket was closed subcutaneously with a Vicryl 2.0 and the skin was 

closed with a Vicryl 4.0 .The operative site was dressed in sterile 

fashion. The patient tolerated the procedure well and was transferred 

to the floor in stable condition. 



COMPLICATIONS

The patient tolerated the procedure well and there were no 

complications associated with the procedure. 



CONCLUSION

Successful PPM VVIR, Medtronic MRI Safe, implanted.

Arrangements has been made for f/u in PPM clinic.



Cc; Calvin Cote / [facility]

## 2018-12-12 NOTE — CP.PCM.PN
Subjective





- Date & Time of Evaluation


Date of Evaluation: 12/12/18


Time of Evaluation: 10:05





- Subjective


Subjective: 





No fevers, no nausea.





Objective





- Vital Signs/Intake and Output


Vital Signs (last 24 hours): 


                                        











Temp Pulse Resp BP Pulse Ox


 


 97.9 F   48 L  19   184/62 H  97 


 


 12/11/18 06:00  12/11/18 06:00  12/11/18 06:00  12/11/18 06:00  12/11/18 06:00








Intake and Output: 


                                        











 12/11/18 12/11/18





 06:59 18:59


 


Intake Total 120 


 


Output Total 700 


 


Balance -580 














- Medications


Medications: 


                               Current Medications





Acetaminophen (Tylenol 325mg Tab)  650 mg PO Q4H PRN


   PRN Reason: Pain, Mild (1-3)


Amlodipine Besylate (Norvasc)  5 mg PO BID [facility]


   Last Admin: 12/10/18 17:55 Dose:  5 mg


Heparin Sodium (Porcine) (Heparin)  5,000 units SC Q8H [facility]; Protocol


   Last Admin: 12/03/18 10:11 Dose:  5,000 units


Ceftaroline Fosamil 600 mg/ (Sodium Chloride)  100 mls @ 100 mls/hr IVPB Q12 [facility]


   Last Admin: 12/10/18 21:46 Dose:  100 mls/hr


Insulin Human Regular (Humulin R Med)  0 units SC ACHS [facility]; Protocol


   Last Admin: 12/10/18 23:04 Dose:  Not Given


Losartan Potassium (Cozaar)  25 mg PO DAILY [facility]


   Last Admin: 12/10/18 11:19 Dose:  25 mg


Ondansetron HCl (Zofran Inj)  4 mg IVP ONCE PRN


   PRN Reason: Nausea/Vomiting


Pantoprazole Sodium (Protonix Ec Tab)  20 mg PO 0600,1600 [facility]


   Last Admin: 12/11/18 05:51 Dose:  20 mg











- Labs


Labs: 


                                        





                                 12/11/18 05:57 





                                 12/11/18 05:57 





                                        











PT  15.7 SECONDS (9.4-12.5)  H  12/10/18  07:40    


 


INR  1.36   12/10/18  07:40    


 


APTT  37.1 Seconds (25.1-36.5)  H  12/10/18  07:40    














- Constitutional


Appears: Chronically Ill





- Head Exam


Head Exam: NORMAL INSPECTION





- Respiratory Exam


Respiratory Exam: Decreased Breath Sounds





- Cardiovascular Exam


Cardiovascular Exam: +S1, +S2





- GI/Abdominal Exam


GI & Abdominal Exam: Soft.  absent: Tenderness





- Extremities Exam


Additional comments: 





left foot with dressings in place





Assessment and Plan





- Assessment and Plan (Free Text)


Plan: 











Assessment


left leg and foot cellulitis and 2nd toe gangrene, with osteomyelitis with MRSA 

S/P amputation, S/P further debridement - margins of bone still with osteom

yelitis even on latest debridement


history of Group G strep bacteremia, probably secondary to bilateral lower 

extremities skin and skin structure infection;


history of rhabdomyolysis


CAD


HTN


chronic renal failure


dementia


obesity with BMI 32





Plan


continue Teflaro and will need at least 4-6 weeks of antibiotics (from 11/29, 

time of first surgery) with weekly ESR, CRP, CBC, CMP to be followed by the 

wound care center while on antibiotics - discussed with Dr. Do previously

## 2018-12-12 NOTE — CP.PCM.APN
Subjective





- Date & Time of Evaluation


Date of Evaluation: 12/12/18


Time of Evaluation: 14:15





- Subjective


Subjective: 





83 yr. old male with left leg and foot cellulitis and 2nd toe gangrene, with 

osteomyelitis with MRSA S/P amputation POD #10, S/P further debridement POD #6 -

margins of bone still with osteomyelitis even on latest debridement


history of Group G strep bacteremia, probably secondary to bilateral lower 

extremities skin and skin structure infection;


history of rhabdomyolysis


CAD


HTN


chronic renal failure


dementia


obesity with BMI 32





Pt. seen and examined at bedside. 





Offers no complaints, denied foot/leg pain, denied chest pain, shortness of 

breath, dizziness or weakness. 


Right leg noted with dressing in place. Clean, dry, intact. Pt. s/p pacemaker 

insertion, denied any pain to Pacemaker insertion site. 











Review of Systems





- Review of Systems


All systems: reviewed and no additional remarkable complaints except





Objective





- Vital Signs/Intake and Output


Vital Signs (last 24 hours): 


                                        











Temp Pulse Resp BP Pulse Ox


 


 95 F L  60   14   126/63   98 


 


 12/12/18 12:10  12/12/18 12:10  12/12/18 12:10  12/12/18 12:10  12/12/18 12:10








Intake and Output: 


                                        











 12/12/18 12/12/18





 06:59 18:59


 


Intake Total 240 


 


Output Total 300 


 


Balance -60 














- Medications


Medications: 


                               Current Medications





Acetaminophen (Tylenol 325mg Tab)  650 mg PO Q4H PRN


   PRN Reason: Pain, Mild (1-3)


Amlodipine Besylate (Norvasc)  5 mg PO BID [facility]


   Last Admin: 12/12/18 14:16 Dose:  Not Given


Heparin Sodium (Porcine) (Heparin)  5,000 units SC Q8H [facility]; Protocol


   Last Admin: 12/03/18 10:11 Dose:  5,000 units


Hydralazine HCl (Apresoline)  10 mg PO QID PRN


   PRN Reason: For sbp>160


Ceftaroline Fosamil 600 mg/ (Sodium Chloride)  100 mls @ 100 mls/hr IVPB Q12 [facility]


   Last Admin: 12/12/18 14:16 Dose:  Not Given


Insulin Human Regular (Humulin R Med)  0 units SC ACHS [facility]; Protocol


   Last Admin: 12/12/18 14:16 Dose:  Not Given


Losartan Potassium (Cozaar)  25 mg PO DAILY [facility]


   Last Admin: 12/11/18 09:52 Dose:  25 mg


Ondansetron HCl (Zofran Inj)  4 mg IVP ONCE PRN


   PRN Reason: Nausea/Vomiting


Pantoprazole Sodium (Protonix Ec Tab)  20 mg PO 0600,1600 [facility]


   Last Admin: 12/12/18 06:54 Dose:  Not Given











- Labs


Labs: 


                                        





                                 12/12/18 05:28 





                                 12/12/18 05:28 





                                        











PT  15.9 SECONDS (9.4-12.5)  H  12/12/18  15:41    


 


INR  1.39   12/12/18  15:41    


 


APTT  38.9 Seconds (25.1-36.5)  H  12/12/18  15:41    














- Constitutional


Appears: Well, In Acute Distress





- Head Exam


Head Exam: NORMAL INSPECTION, NORMOCEPHALIC





- Eye Exam


Eye Exam: Normal appearance, PERRL





- ENT Exam


ENT Exam: Mucous Membranes Moist





- Neck Exam


Neck Exam: Full ROM, Normal Inspection





- Respiratory Exam


Respiratory Exam: Clear to Ausculation Bilateral, NORMAL BREATHING PATTERN





- Cardiovascular Exam


Additional comments: 





Left chest wall noted with Pacemaker incision, no drainage or erythema noted.





- GI/Abdominal Exam


GI & Abdominal Exam: Soft, Normal Bowel Sounds





- Rectal Exam


Rectal Exam: Deferred





-  Exam


 Exam: NORMAL INSPECTION





- Extremities Exam


Additional comments: 





Right foot with dressing in place.





- Neurological Exam


Neurological Exam: Alert, Awake, Oriented x3





- Psychiatric Exam


Psychiatric exam: Normal Affect





- Skin


Skin Exam: Intact





Assessment and Plan





- Assessment and Plan (Free Text)


Assessment: 





1. Severe Bradycardia, is s/p pacemaker insertion today by Dr. Golden. continue 

cardiac meds and recs as per cardiology.


2. Osteomyelitis of right foot, 2nd digit, POD #13 amputation - Wound care as 

per podiatry


3. Infected right foot, 2nd digit, cx revealed MRSA- continue 2 more weeks of IV

Teflaro as per I.D, post amputation.


4. Physical deconditioning- s/p pacemaker insertion, will need PT eval post 

procedure for ARLIN as per SW.





SW/CM for Discharge planning to [facility] for continuation of IV Teflaro, 2 more 

weeks.


Will continue to follow and monitor clinical status.

## 2018-12-12 NOTE — CP.PCM.PN
<Terry Martinez - Last Filed: 12/12/18 15:33>





Subjective





- Date & Time of Evaluation


Date of Evaluation: 12/12/18


Time of Evaluation: 07:30





- Subjective


Subjective: 


Terry Martinez PGY-1 Progress Note for Hospitalist Service





Patient seen and evaluated at bedside. Patient had no acute overnight events and

denies complaints at this time. Patient denies headache, chest pain, heart 

palpitations, shortness of breath, nausea, vomiting, constipation, diarrhea, 

dysuria, and hematuria. Patient is s/p pacemaker placement this morning. 














Objective





- Vital Signs/Intake and Output


Vital Signs (last 24 hours): 


                                        











Temp Pulse Resp BP Pulse Ox


 


 95 F L  60   14   126/63   98 


 


 12/12/18 12:10  12/12/18 12:10  12/12/18 12:10  12/12/18 12:10  12/12/18 12:10








Intake and Output: 


                                        











 12/12/18 12/12/18





 06:59 18:59


 


Intake Total 240 


 


Output Total 300 


 


Balance -60 














- Medications


Medications: 


                               Current Medications





Acetaminophen (Tylenol 325mg Tab)  650 mg PO Q4H PRN


   PRN Reason: Pain, Mild (1-3)


Amlodipine Besylate (Norvasc)  5 mg PO BID [facility]


   Last Admin: 12/12/18 14:16 Dose:  Not Given


Heparin Sodium (Porcine) (Heparin)  5,000 units SC Q8H [facility]; Protocol


   Last Admin: 12/03/18 10:11 Dose:  5,000 units


Hydralazine HCl (Apresoline)  10 mg PO QID PRN


   PRN Reason: For sbp>160


Ceftaroline Fosamil 600 mg/ (Sodium Chloride)  100 mls @ 100 mls/hr IVPB Q12 [facility]


   Last Admin: 12/12/18 14:16 Dose:  Not Given


Insulin Human Regular (Humulin R Med)  0 units SC ACHS [facility]; Protocol


   Last Admin: 12/12/18 14:16 Dose:  Not Given


Losartan Potassium (Cozaar)  25 mg PO DAILY [facility]


   Last Admin: 12/11/18 09:52 Dose:  25 mg


Ondansetron HCl (Zofran Inj)  4 mg IVP ONCE PRN


   PRN Reason: Nausea/Vomiting


Pantoprazole Sodium (Protonix Ec Tab)  20 mg PO 0600,1600 [facility]


   Last Admin: 12/12/18 06:54 Dose:  Not Given











- Labs


Labs: 


                                        





                                 12/12/18 05:28 





                                 12/12/18 05:28 





                                        











PT  15.7 SECONDS (9.4-12.5)  H  12/10/18  07:40    


 


INR  1.36   12/10/18  07:40    


 


APTT  37.1 Seconds (25.1-36.5)  H  12/10/18  07:40    














- Additional Findings


Additional findings: 





- Constitutional


Appears: Well, Non-toxic, No Acute Distress





- Head Exam


Head Exam: ATRAUMATIC, NORMAL INSPECTION, NORMOCEPHALIC





- Eye Exam


Eye Exam: EOMI, PERRL





- Respiratory Exam


Respiratory Exam: Clear to Ausculation Bilateral, NORMAL BREATHING PATTERN, 

Pacemaker scar in L upper chest, no discharge or blood noted





- Cardiovascular Exam


Cardiovascular Exam: REGULAR RHYTHM





- GI/Abdominal Exam


GI & Abdominal Exam: Soft, Normal Bowel Sounds





- Back Exam


Back Exam: Full ROM





- Neurological Exam


Neurological Exam: Alert, Awake, CN II-XII Intact, Oriented x3


Neuro motor strength exam: Left Upper Extremity: 5, Right Upper Extremity: 5, 

Left Lower Extremity: 5, Right Lower Extremity: 5





Extremity: L foot wrapped , dressing for L second toe c/d/i





Assessment and Plan





- Assessment and Plan (Free Text)


Assessment: 





83 year old male with past medical history of peripheral artery disease, chronic

LLE cellulitis, COPD, HTN, dementia, obesity [place] to [facility] [place] with complaints of 

LLE anterior tibial region cellulitis and black L foot 2nd distal phalanx. 

Amputation of L second phalanx done on 11/28. Patient is POD #12. Revision of L 

2nd phalanx with removal of bone fragment POD#8. Pacemaker placed in left upper 

chest this morning by Dr. Golden.





Plan: 





Pacemaker Placement, single chamber (MRI safe per report) s/p bradycardia


- F/u final procedure report per Dr. Golden


- HOB elevated


- f/u vital sign q2x2


- Repeat EKG shows ventricular pacemaker, HR @ 60 bpm, QTc 524


- Chest two view in AM to r/o pneumo





Bradycardia


-HR ranging from 30 to mid 50s, Junctional sinus payam last night overnight


-Holter monitor placed on patient on weekend which showed sinus bradycardia, 

arrhythmia, junctional rhythm, and escape beats.


- Dopamine 2.5 mg to prevent bradyarrythmia - discontinued earlier today


-Hold beta blockers, cozaar. F/u cardio recs (Dr. Golden)


- blood cultures prelim neg after 48 hours





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


-MRI L foot: marrow edema in 2nd proximal and middle phalanx consistent with 

osteomyelitis


-Postoperative left foot X ray: small, tiny fragments of bone at the surgical 

site


-Wound cultures: MRSA


-Blood cultures: negative after 5 days


-Urine culture: negative


-Procalcitonin on 11/22: 0.07


-Dr. Do amputated 2nd metatarsal head of left foot on 11/29 which was 

revised on 12/4 with bone fragment excised.


-ID, Dr. Baca, recommends 4-6 weeks of Ceftaroline (day 21) with weekly 

ESR, CRP, CBC, and CMP. 


-PICC line was placed by 12/7.





Hypertension


-BP: 126/63


-HHD diet


-Norvasc 5 mg BID. Hold beta blockers and cozaar due to bradycardia.





Normocytic Anemia


-Hgb: 10.6, stable


-Continue to monitor. 





History of prediabetes, diabetic peripheral neuropathy


-Hemoglobin A1c: 6.2


-Low dose sliding scale insulin





DVT/GI PPx: 


- Heparin 5000 q8 held


- Protonix 20 mg daily





[facility] [place]





Patient seen, case reviewed and plan approved by Dr. Yin.





Terry Martinez, PGY-1





<Heladio Yin - Last Filed: 12/12/18 16:50>





Objective





- Vital Signs/Intake and Output


Vital Signs (last 24 hours): 


                                        











Temp Pulse Resp BP Pulse Ox


 


 95 F L  60   14   126/63   98 


 


 12/12/18 12:10  12/12/18 12:10  12/12/18 12:10  12/12/18 12:10  12/12/18 12:10








Intake and Output: 


                                        











 12/12/18 12/12/18





 06:59 18:59


 


Intake Total 240 


 


Output Total 300 


 


Balance -60 














- Medications


Medications: 


                               Current Medications





Acetaminophen (Tylenol 325mg Tab)  650 mg PO Q4H PRN


   PRN Reason: Pain, Mild (1-3)


Amlodipine Besylate (Norvasc)  5 mg PO BID [facility]


   Last Admin: 12/12/18 14:16 Dose:  Not Given


Heparin Sodium (Porcine) (Heparin)  5,000 units SC Q8H [facility]; Protocol


   Last Admin: 12/03/18 10:11 Dose:  5,000 units


Hydralazine HCl (Apresoline)  10 mg PO QID PRN


   PRN Reason: For sbp>160


Ceftaroline Fosamil 600 mg/ (Sodium Chloride)  100 mls @ 100 mls/hr IVPB Q12 [facility]


   Last Admin: 12/12/18 14:16 Dose:  Not Given


Insulin Human Regular (Humulin R Med)  0 units SC ACHS [facility]; Protocol


   Last Admin: 12/12/18 14:16 Dose:  Not Given


Losartan Potassium (Cozaar)  25 mg PO DAILY [facility]


   Last Admin: 12/11/18 09:52 Dose:  25 mg


Ondansetron HCl (Zofran Inj)  4 mg IVP ONCE PRN


   PRN Reason: Nausea/Vomiting


Pantoprazole Sodium (Protonix Ec Tab)  20 mg PO 0600,1600 [facility]


   Last Admin: 12/12/18 06:54 Dose:  Not Given











- Labs


Labs: 


                                        





                                 12/12/18 05:28 





                                 12/12/18 05:28 





                                        











PT  15.9 SECONDS (9.4-12.5)  H  12/12/18  15:41    


 


INR  1.39   12/12/18  15:41    


 


APTT  38.9 Seconds (25.1-36.5)  H  12/12/18  15:41    














Attending/Attestation





- Attestation


I have personally seen and examined this patient.: Yes


I have fully participated in the care of the patient.: Yes


I have reviewed all pertinent clinical information, including history, physical 

exam and plan: Yes


Notes (Text): 


83 year old male with past medical history of peripheral artery disease, chronic

LLE cellulitis, COPD, HTN, dementia, obesity presents to [facility] with complaints of 

LLE anterior tibial region cellulitis and black L foot 2nd distal phalanx. 

Amputation of L second phalanx done on 11/28. 





Osteomyelitis


Bradycardia


HTN


Anemia


Diabetes





s/p multiple toes amputation


c/w abx as per ID recommendations


Cardio on board s/p PPM placement today


will f/u post-op CXR 


c/w RISS

## 2018-12-12 NOTE — RAD
Date of service: 



12/12/2018



HISTORY:

 Post Pacemaker 



COMPARISON:

12/04/2018 



FINDINGS:

The right PICC line terminates in the SVC. 



LUNGS:

The lungs are well inflated.  There is moderate pulmonary venous 

congestion and mild interstitial pulmonary edema.  No focal 

consolidation. 



PLEURA:

No pleural effusions or pneumothorax. 



CARDIOVASCULAR:

The heart is normal in size.  No aortic atherosclerotic calcification 

present.  There is interval placement of a unipolar permanent pacing 

device with leads terminating in the right ventricle 



OSSEOUS STRUCTURES:

Within normal limits for the patient's age.



VISUALIZED UPPER ABDOMEN:

Normal.



OTHER FINDINGS:

None.



IMPRESSION:

Interval placement of left-sided unipolar permanent pacing device 

with lead terminating in the right ventricle.

## 2018-12-13 VITALS — OXYGEN SATURATION: 97 %

## 2018-12-13 VITALS — DIASTOLIC BLOOD PRESSURE: 64 MMHG | SYSTOLIC BLOOD PRESSURE: 136 MMHG | RESPIRATION RATE: 18 BRPM | TEMPERATURE: 98.9 F

## 2018-12-13 VITALS — HEART RATE: 70 BPM

## 2018-12-13 LAB
ALBUMIN SERPL-MCNC: 3.2 G/DL (ref 3–4.8)
ALBUMIN/GLOB SERPL: 1 {RATIO} (ref 1.1–1.8)
ALT SERPL-CCNC: 22 U/L (ref 7–56)
AST SERPL-CCNC: 23 U/L (ref 17–59)
BASOPHILS # BLD AUTO: 0.01 K/MM3 (ref 0–2)
BASOPHILS NFR BLD: 0.1 % (ref 0–3)
BUN SERPL-MCNC: 29 MG/DL (ref 7–21)
CALCIUM SERPL-MCNC: 8.6 MG/DL (ref 8.4–10.5)
EOSINOPHIL # BLD: 0.4 10*3/UL (ref 0–0.7)
EOSINOPHIL NFR BLD: 3.8 % (ref 1.5–5)
ERYTHROCYTE [DISTWIDTH] IN BLOOD BY AUTOMATED COUNT: 13.9 % (ref 11.5–14.5)
GFR NON-AFRICAN AMERICAN: 53
GRANULOCYTES # BLD: 6.54 10*3/UL (ref 1.4–6.5)
GRANULOCYTES NFR BLD: 71.8 % (ref 50–68)
HGB BLD-MCNC: 11 G/DL (ref 14–18)
LYMPHOCYTES # BLD: 1.4 10*3/UL (ref 1.2–3.4)
LYMPHOCYTES NFR BLD AUTO: 14.9 % (ref 22–35)
MCH RBC QN AUTO: 27 PG (ref 25–35)
MCHC RBC AUTO-ENTMCNC: 32.5 G/DL (ref 31–37)
MCV RBC AUTO: 82.8 FL (ref 80–105)
MONOCYTES # BLD AUTO: 0.9 10*3/UL (ref 0.1–0.6)
MONOCYTES NFR BLD: 9.4 % (ref 1–6)
PLATELET # BLD: 150 10^3/UL (ref 120–450)
PMV BLD AUTO: 9 FL (ref 7–11)
RBC # BLD AUTO: 4.08 10^6/UL (ref 3.5–6.1)
WBC # BLD AUTO: 9.1 10^3/UL (ref 4.5–11)

## 2018-12-13 RX ADMIN — PANTOPRAZOLE SODIUM SCH MG: 20 TABLET, DELAYED RELEASE ORAL at 05:36

## 2018-12-13 RX ADMIN — PANTOPRAZOLE SODIUM SCH MG: 20 TABLET, DELAYED RELEASE ORAL at 18:45

## 2018-12-13 RX ADMIN — INSULIN HUMAN SCH: 100 INJECTION, SOLUTION PARENTERAL at 18:45

## 2018-12-13 NOTE — CP.PCM.PN
Subjective





- Date & Time of Evaluation


Date of Evaluation: 12/13/18


Time of Evaluation: 11:03





- Subjective


Subjective: 





Podiatry progress note for Dr. Howard





82 y/o M patient  seen and evaluated at the bedside 9 days S/P revision of left 

second digit amputation with met head resection. Patient was resting comfortably

and NAD. He denies any pain to surgical site. Denies any acute events overnight.

Patient denies N/V/F/C/SOB/CP and has no other pedal complaints at this time. 

Patient seen after he had Pacemaker insertion yesterday. Patient is moving to 

subacute rehab today.




















Objective





- Vital Signs/Intake and Output


Vital Signs (last 24 hours): 


                                        











Temp Pulse Resp BP Pulse Ox


 


 97.9 F   60   19   176/70 H  97 


 


 12/13/18 06:00  12/13/18 06:00  12/13/18 06:00  12/13/18 06:00  12/13/18 06:00








Intake and Output: 


                                        











 12/13/18 12/13/18





 06:59 18:59


 


Intake Total 1000 


 


Output Total 1850 


 


Balance -850 














- Medications


Medications: 


                               Current Medications





Acetaminophen (Tylenol 325mg Tab)  650 mg PO Q4H PRN


   PRN Reason: Pain, Mild (1-3)


Amlodipine Besylate (Norvasc)  5 [facility] BID [facility]


   Last Admin: 12/12/18 17:35 Dose:  5 mg


Heparin Sodium (Porcine) (Heparin)  5,000 units SC Q8H [facility]; Protocol


   Last Admin: 12/03/18 10:11 Dose:  5,000 units


Hydralazine HCl (Apresoline)  10 mg PO QID PRN


   PRN Reason: For sbp>160


Ceftaroline Fosamil 600 mg/ (Sodium Chloride)  100 mls @ 100 mls/hr IVPB Q12 [facility]


   Last Admin: 12/12/18 22:42 Dose:  100 mls/hr


Insulin Human Regular (Humulin R Med)  0 units SC ACHS [facility]; Protocol


   Last Admin: 12/12/18 22:40 Dose:  Not Given


Losartan Potassium (Cozaar)  25 mg PO DAILY [facility]


   Last Admin: 12/12/18 17:35 Dose:  25 mg


Ondansetron HCl (Zofran Inj)  4 mg IVP ONCE PRN


   PRN Reason: Nausea/Vomiting


Pantoprazole Sodium (Protonix Ec Tab)  20 mg PO 0600,1600 [facility]


   Last Admin: 12/13/18 05:36 Dose:  20 mg











- Labs


Labs: 


                                        





                                 12/13/18 06:00 





                                 12/13/18 06:00 





                                        











PT  15.9 SECONDS (9.4-12.5)  H  12/12/18  15:41    


 


INR  1.39   12/12/18  15:41    


 


APTT  38.9 Seconds (25.1-36.5)  H  12/12/18  15:41    














- Constitutional


Appears: Well, Non-toxic, No Acute Distress





- Head Exam


Head Exam: ATRAUMATIC, NORMOCEPHALIC





- Extremities Exam


Additional comments: 





Bilateral lower extremities exam





Vasc: faintly palpable pedal pulses bilaterally, Temp gradient warm to cool in 

the right side and warm to warm in the left side. Cap refill < 3 sec to all 

remaining digits. No erythema noted. Mild non pitting edema noted to the left 

foot. 





Neuro: Gross and protective sensations are grossly diminished.





Derm: Surgical site incision well coapted, sutures intact, no evidence of pus or

purulent drainage, No drainage noted through the dressing, no openings or signs 

of wound dehiscence, no probing, no clinical signs of infection





MSK: No pain on palpation of foot or legs bilaterally. Muscle power intact 5/5 

to all groups b/l.
































- Neurological Exam


Neurological Exam: Alert, Awake, Oriented x3





Assessment and Plan





- Assessment and Plan (Free Text)


Assessment: 





82 y/o M patient  seen and evaluated at the bedside 9 days S/P revision of left 

second digit amputation with met head resection

















Plan: 





Patient seen and evaluated at bedside.


Discussed in detail with Dr. Howard


Charts, labs ad vitals reviewed; Afebrile, absent leukocytosis


Post operative left foot X-ray: small, tiny fragments of bone at the surgical 

site


Wound cx 11/29 - MRSA


Wound cx 12/04 - MRSA


Continue IV Abx per ID 


Patient will need 4-6 weeks of IV Abx as per ID


Pathology postop - fagments of bone with focal acute OM, fragments of fibrodense

tissue with fibroconnective tissue .


Continue pain management as needed


Patient had Pacemaker insertion today.


Surgical site dressing looks C/D/I with no strike through


Wound dressed using betadine and DSD.


Patient will follow up with Dr. Do at the wound care center upon discharge 

once/week.


Podiatry will continue to follow up the patient while in house

## 2018-12-13 NOTE — PN
DATE:  12/13/2018



SUBJECTIVE:  The patient is seen earlier today in room 266, bed 1.  No

fevers.  No chills.  No nausea.  No vomiting.



PHYSICAL EXAMINATION:

VITAL SIGNS:  Temperature 97, blood pressure 170/70, respiratory rate 18.

HEENT:  Examination of HEENT is unremarkable.

NECK:  Supple.

LUNGS:  Decreased breath sounds.

HEART:  Normal S1, S2.

ABDOMEN:  Soft, nontender.

EXTREMITIES:  Foot examination is noted.



LABORATORY DATA:  White count is 9.1, hemoglobin of 11 and platelets of

150.  BUN of 29, creatinine of 1.3.  Microbiology reveals MRSA from the

wound cultures.  The blood cultures are negative.



Review of orders reveals the patient to have ceftaroline.



ASSESSMENT AND PLAN:  This is an 83-year-old with a left leg and left foot

cellulitis, second toe gangrene and osteomyelitis with

methicillin-resistant Staphylococcus aureus, status post amputation, status

post further debridement, margins of the bone still with osteomyelitis even

on the latest debridement with a history of group G strep bacteremia,

probably secondary to skin infection; coronary artery disease;

hypertension; renal failure; dementia.  Currently on Teflaro, will need 46

weeks of antibiotics from the time of this first surgery, 11/29/2018 with a

weekly sed rate and C-reactive protein and CBC, SMA-18.  Case discussed

with nursing staff this morning at length and review of orders reveals the

patient's Teflaro to be active.







__________________________________________

Lj Baca MD





DD:  12/13/2018 7:55:16

DT:  12/13/2018 7:56:51

Job # [id number]

## 2018-12-13 NOTE — CP.PCM.DIS
Provider





- Provider


Date of Admission: 


11/21/18 16:34





Attending physician: 


Donnell Chappell MD





Primary care physician: 


Jose Martin Arguello MD





Consults: 








11/21/18 16:52


Infectious Disease Consult Routine 


   Comment: 


   Consulting Provider: Lj Baca


   Consulting Physician: Lj Baca


   Reason for Consult: left 2nd digit wound


Vascular Surgery Routine 


   Comment: 


   Consulting Provider: Jerzy Sotelo


   Physician Instructions: 


   Reason For Exam: left 2nd digit wound





11/21/18 17:41


Physician Consult Routine 


   Comment: 


   Consulting Provider: Rodrigo Do


   Consulting Physician: Rodrigo Do


   Reason for Consult: LLE 2nd distal phalanx ?gangrene/LLE cellulitis





11/21/18 19:29


Social Work Referral Routine 


   Comment: d/c plan


   Physician Instructions: 


   Reason For Exam: eval





11/21/18 19:58


Case Management Referral Routine 


   Comment: 


   Physician Instructions: 


   Reason For Exam: 


   Reason for Referral: Discharge Planning





11/30/18 10:44


TRCU [Evaluation for TRCU] Routine 


   Comment: gait training


   Physician Instructions: 


   Reason For Exam: antibiotics





12/04/18 09:43


Cardiology Consult Routine 


   Comment: 


   Consulting Provider: Donnell Golden


   Consulting Physician: Donnell Golden


   Reason for Consult: bradycardia





12/05/18 16:42


TCU [Evaluation for TRCU] Routine 


   Comment: 


   Physician Instructions: 


   Reason For Exam: pt, iv antibx











Time Spent in preparation of Discharge (in minutes): 35





Hospital Course





- Lab Results


Lab Results: 


                                  Micro Results





12/09/18 18:30   Blood   Blood Culture - Preliminary


                            NO GROWTH AFTER 3 DAYS


12/04/18 19:21   Other: Please Indicate   Gram Stain - Final


12/04/18 19:21   Other: Please Indicate   Anaerobic Culture - Final


                            NO ANAEROBES ISOLATED.


12/04/18 19:21   Other: Please Indicate   Wound Culture - Final


                            Methicillin Resistant S Aureus


11/29/18 12:46   Toe   Gram Stain - Final


11/29/18 12:46   Toe   Anaerobic Culture - Final


                            NO ANAEROBES ISOLATED.


11/29/18 12:46   Toe   Wound Culture - Final


                            Methicillin Resistant S Aureus


11/26/18 09:20   Naris   MRSA Culture (Admit) - Final


                            MRSA NOT DETECTED


11/21/18 16:30   Blood   Blood Culture - Final


                            NO GROWTH AFTER 5 DAYS


11/21/18 16:30   Blood   Gram Stain - Final


                            TEST NOT PERFORMED


11/21/18 15:41   Blood   Blood Culture - Final


                            NO GROWTH AFTER 5 DAYS


11/21/18 15:41   Blood   Gram Stain - Final


                            TEST NOT PERFORMED


11/21/18 22:59   Urine   Urine Culture - Final


                            No Growth (<1,000 CFU/ML)


11/21/18 15:41   Toe   Gram Stain - Final


11/21/18 15:41   Toe   Wound Culture - Final


                            Methicillin Resistant S Aureus





                             Most Recent Lab Values











WBC  9.1 10^3/uL (4.5-11.0)   12/13/18  06:00    


 


RBC  4.08 10^6/uL (3.5-6.1)   12/13/18  06:00    


 


Hgb  11.0 g/dL (14.0-18.0)  L  12/13/18  06:00    


 


Hct  33.8 % (42.0-52.0)  L  12/13/18  06:00    


 


MCV  82.8 fl (80.0-105.0)   12/13/18  06:00    


 


MCH  27.0 pg (25.0-35.0)   12/13/18  06:00    


 


MCHC  32.5 g/dl (31.0-37.0)   12/13/18  06:00    


 


RDW  13.9 % (11.5-14.5)   12/13/18  06:00    


 


Plt Count  150 10^3/uL (120.0-450.0)   12/13/18  06:00    


 


MPV  9.0 fl (7.0-11.0)   12/13/18  06:00    


 


Gran %  71.8 % (50.0-68.0)  H  12/13/18  06:00    


 


Lymph % (Auto)  14.9 % (22.0-35.0)  L  12/13/18  06:00    


 


Mono % (Auto)  9.4 % (1.0-6.0)  H  12/13/18  06:00    


 


Eos % (Auto)  3.8 % (1.5-5.0)   12/13/18  06:00    


 


Baso % (Auto)  0.1 % (0.0-3.0)   12/13/18  06:00    


 


Gran #  6.54  (1.4-6.5)  H  12/13/18  06:00    


 


Lymph # (Auto)  1.4  (1.2-3.4)   12/13/18  06:00    


 


Mono # (Auto)  0.9  (0.1-0.6)  H  12/13/18  06:00    


 


Eos # (Auto)  0.4  (0.0-0.7)   12/13/18  06:00    


 


Baso # (Auto)  0.01 K/mm3 (0.0-2.0)   12/13/18  06:00    


 


ESR  76 mm/hr (0.00-15.0)  H  11/21/18  15:41    


 


PT  15.9 SECONDS (9.4-12.5)  H  12/12/18  15:41    


 


INR  1.39   12/12/18  15:41    


 


APTT  38.9 Seconds (25.1-36.5)  H  12/12/18  15:41    


 


Sodium  138 mmol/L (132-148)   12/13/18  06:00    


 


Potassium  4.6 mmol/L (3.6-5.0)   12/13/18  06:00    


 


Chloride  106 mmol/L ()   12/13/18  06:00    


 


Carbon Dioxide  27 mmol/L (21-33)   12/13/18  06:00    


 


Anion Gap  10  (10-20)   12/13/18  06:00    


 


BUN  29 mg/dL (7-21)  H  12/13/18  06:00    


 


Creatinine  1.3 mg/dl (0.8-1.5)   12/13/18  06:00    


 


Est GFR ( Amer)  > 60   12/13/18  06:00    


 


Est GFR (Non-Af Amer)  53   12/13/18  06:00    


 


POC Glucose (mg/dL)  120 mg/dL ()  H  12/10/18  16:29    


 


Random Glucose  97 mg/dL ()   12/13/18  06:00    


 


Hemoglobin A1c  6.2 % (4.2-6.5)   12/05/18  06:30    


 


Calcium  8.6 mg/dL (8.4-10.5)   12/13/18  06:00    


 


Phosphorus  3.6 mg/dL (2.5-4.5)   12/08/18  06:40    


 


Magnesium  2.1 mg/dL (1.7-2.2)   12/08/18  06:40    


 


Total Bilirubin  0.5 mg/dL (0.2-1.3)   12/13/18  06:00    


 


AST  23 U/L (17-59)   12/13/18  06:00    


 


ALT  22 U/L (7-56)   12/13/18  06:00    


 


Alkaline Phosphatase  88 U/L ()   12/13/18  06:00    


 


C-Reactive Protein  69.50 mg/L (0.0-9.9)  H  11/21/18  15:41    


 


Total Protein  6.5 g/dL (5.8-8.3)   12/13/18  06:00    


 


Albumin  3.2 g/dL (3.0-4.8)   12/13/18  06:00    


 


Globulin  3.3 gm/dL  12/13/18  06:00    


 


Albumin/Globulin Ratio  1.0  (1.1-1.8)  L  12/13/18  06:00    


 


Triglycerides  104 mg/dL ()   12/05/18  06:30    


 


Cholesterol  125 mg/dL (130-200)  L  12/05/18  06:30    


 


LDL Cholesterol Direct  76 mg/dL (0-129)   12/05/18  06:30    


 


HDL Cholesterol  25 mg/dL (29-60)  L  12/05/18  06:30    


 


Procalcitonin  0.07 NG/ML (0.19-0.49)  L  11/22/18  05:30    


 


TSH 3rd Generation  3.92 mIU/mL (0.46-4.68)   12/05/18  06:30    














- Hospital Course


Hospital Course: 


Terry Martinez, PGY-1 Discharge Summary for Hospitalist Service





83 year old male with past medical history of PAD, COPD, hypertension, and 

chronic LE cellulitis who presented with complaint of left lower extremity 

cellulitis and gangrenous left 2nd distal phalanx.  Foot MRI was consistent with

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  Patient 

was placed on IV antibiotic Ceftaroline. ID (Dr. Baca) and podiatry (Dr. Do) were consulted.  Patient went for amputation of L second digit.  Patient

went again to OR for further bone fragment excision at amputation site after 

wound culture margins showed persistent OM.  Repeat cultures were found to be 

growing MRSA.  As patient will need long term iv antibiotics,  R PICC line was 

placed.


Patient's wife and daughter were updated throughout hospital course.


Course was complicated by periods of bradycardia in advance of surgery. Patient 

required pre-op clearance before revision of his second toe. Patient has 

remained asymptomatic.  Cardiology was consulted (Dr. Golden). Patient was not on 

beta blockers.  EKG was ordered and patient was transferred to telemetry.  

Holter was applied which showed bradycardia, multiple skipped beats, and pauses.

 On telemetry, patient was found to be in junctional rhythm with periods of 

skipped beats and pauses. He was on norvasc and cozaar for hypertension. 

Hydralazine 10 mg QID was started as needed for BP control. 


MRI safe pacemaker was placed in L upper chest by Dr. Golden without complication.

 EKG thereafter showed 60 bpm in ventricular pacemaker.


CXR were performed that showed no disease and no pleural effusion or 

pneumothorax.


Patient was urged to followup in pacemaker clinic per Dr. Golden.


Patient is being transferred to [facility] in [place], and was told to continue his 

antibiotics for four additional weeks in addition to weekly CBC, CMP, ESR and 

CRP. Patient was told to follow up in wound care while on antibiotics and have 

dressings changed every other day per podiatry.





Patient was updated on status and questions were answered in detail to patient 

satisfaction. Patient hemodynamically stable, without any chest pain or foot 

pain. Patient is to be transferred to subacute rehab in [place].





Patient seen, case reviewed and plan approved by Dr. Arciniega.











Discharge Exam





- Head Exam


Head Exam: ATRAUMATIC





- Additional Findings


Additional findings: 





- Constitutional


Appears: Well, Non-toxic, No Acute Distress





- Head Exam


Head Exam: ATRAUMATIC, NORMAL INSPECTION, NORMOCEPHALIC





- Eye Exam


Eye Exam: EOMI, PERRL





- Respiratory Exam


Respiratory Exam: Clear to Ausculation Bilateral, NORMAL BREATHING PATTERN, 

Pacemaker scar in L upper chest, no discharge or blood noted





- Cardiovascular Exam


Cardiovascular Exam: REGULAR RHYTHM





- GI/Abdominal Exam


GI & Abdominal Exam: Soft, Normal Bowel Sounds





- Back Exam


Back Exam: Full ROM





- Neurological Exam


Neurological Exam: Alert, Awake, CN II-XII Intact, Oriented x3


Neuro motor strength exam: Left Upper Extremity: 5, Right Upper Extremity: 5, 

Left Lower Extremity: 5, Right Lower Extremity: 5





Extremity: L foot wrapped , dressing for L second toe c/d/i





Discharge Plan





- Follow Up Plan


Condition: STABLE


Disposition: REHAB FACILITY/REHAB UNIT


Instructions:  Osteomyelitis, Sepsis in Adults, Cellulitis (Skin Infection), 

Adult (DC)


Additional Instructions: 


Please follow up with PCP Dr. Arguello within one week.


You are being discharged to subacute rehab [facility] [place], where you will 

continue your current medical regimen.


Continue with Teflaro for total of 6 weeks (4 more additional weeks) with weekly

ESR, CRP, CBC, CMP to be followed by the wound care center weekly and wound 

dressing changes every other day.


Please followup in pacemaker clinic with Dr. Golden.


Should symptoms worsen, visit nearest emergency department.


Referrals: 


Rodrigo Do DPM [Staff Provider] - 


Donnell Golden MD [Staff Provider] - 


Jose Martin Arguello MD [Primary Care Provider] -

## 2018-12-13 NOTE — RAD
Date of service: 



12/13/2018



HISTORY:

Post pacemaker 



COMPARISON:

12/12/2018.



TECHNIQUE:

Chest PA and lateral



FINDINGS:



LINES AND TUBES:

The right PICC line terminates in the SVC. 



LUNG AND PLEURA:

The lungs are well inflated and clear. No pleural effusion or 

pneumothorax.



HEART AND MEDIASTINUM:

The heart is not enlarged.  There is stable position of left-sided 

unipolar permanent pacing device.  No aortic atherosclerotic 

calcification present.  The hilar and mediastinal contours are within 

normal limits.



SKELETAL STRUCTURES:

The bony structures are within normal limits for the patient's age.



VISUALIZED UPPER ABDOMEN:

Normal.



OTHER FINDINGS:

None.



IMPRESSION:

No acute findings.  No significant interval change.

## 2018-12-13 NOTE — CP.PCM.PN
Subjective





- Date & Time of Evaluation


Date of Evaluation: 12/13/18


Time of Evaluation: 06:25





- Subjective


Subjective: 








Lying in bed, Awake, alert, no distress, denies chest pain





Reason for consultation and follow up: Cardiac evaluation of bradycardia, status

post revision of left 4th digit, sick sinus syndrome post PPM





Seen and examined by me and 





Objective





- Vital Signs/Intake and Output


Vital Signs (last 24 hours): 


                                        











Temp Pulse Resp BP Pulse Ox


 


 97.9 F   60   19   176/70 H  97 


 


 12/13/18 06:00  12/13/18 06:00  12/13/18 06:00  12/13/18 06:00  12/13/18 06:00








Intake and Output: 


                                        











 12/13/18 12/13/18





 06:59 18:59


 


Intake Total 1000 


 


Output Total 1850 


 


Balance -850 














- Medications


Medications: 


                               Current Medications





Acetaminophen (Tylenol 325mg Tab)  650 mg PO Q4H PRN


   PRN Reason: Pain, Mild (1-3)


Amlodipine Besylate (Norvasc)  5 mg PO BID [facility]


   Last Admin: 12/12/18 17:35 Dose:  5 mg


Heparin Sodium (Porcine) (Heparin)  5,000 units SC Q8H [facility]; Protocol


   Last Admin: 12/03/18 10:11 Dose:  5,000 units


Hydralazine HCl (Apresoline)  10 mg PO QID PRN


   PRN Reason: For sbp>160


Ceftaroline Fosamil 600 mg/ (Sodium Chloride)  100 mls @ 100 mls/hr IVPB Q12 [facility]


   Last Admin: 12/12/18 22:42 Dose:  100 mls/hr


Insulin Human Regular (Humulin R Med)  0 units SC ACHS [facility]; Protocol


   Last Admin: 12/12/18 22:40 Dose:  Not Given


Losartan Potassium (Cozaar)  25 mg PO DAILY [facility]


   Last Admin: 12/12/18 17:35 Dose:  25 mg


Ondansetron HCl (Zofran Inj)  4 mg IVP ONCE PRN


   PRN Reason: Nausea/Vomiting


Pantoprazole Sodium (Protonix Ec Tab)  20 mg PO 0600,1600 [facility]


   Last Admin: 12/13/18 05:36 Dose:  20 mg











- Labs


Labs: 


                                        





                                 12/13/18 06:00 





                                 12/13/18 06:00 





                                        











PT  15.9 SECONDS (9.4-12.5)  H  12/12/18  15:41    


 


INR  1.39   12/12/18  15:41    


 


APTT  38.9 Seconds (25.1-36.5)  H  12/12/18  15:41    














- Constitutional


Appears: Non-toxic, No Acute Distress





- Head Exam


Head Exam: NORMAL INSPECTION, NORMOCEPHALIC





- Eye Exam


Eye Exam: Normal appearance


Pupil Exam: NORMAL ACCOMODATION





- ENT Exam


ENT Exam: Mucous Membranes Moist, Normal Exam





- Respiratory Exam


Respiratory Exam: Decreased Breath Sounds, Clear to Ausculation Bilateral, 

NORMAL BREATHING PATTERN





- Cardiovascular Exam


Cardiovascular Exam: +S1, +S2


Additional comments: 





left chest incision intact, PPM site





- GI/Abdominal Exam


GI & Abdominal Exam: Soft, Normal Bowel Sounds





- Extremities Exam


Extremities Exam: Full ROM


Additional comments: 





left foot dressing





- Neurological Exam


Neurological Exam: Alert, Awake, Oriented x3





- Psychiatric Exam


Psychiatric exam: Normal Affect, Normal Mood





- Skin


Skin Exam: Dry, Normal Color, Warm





Assessment and Plan





- Assessment and Plan (Free Text)


Assessment: 








An 83 year old  male who wascame to the ER due to left toe wound. history of  

COPD, diabetes, hypertension,obesity, cellilitis, left toe osteomyelitis post 

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 Patient  bradycardic and 

consult was called. Patient is not on betablocker. Placed on Holter 

monitor.Stress test as outpatient once better from toe surgery.Echo done.LVEF 

55%,Aortic sclerosis Vs Mild As, mild mitral regurgitation,trace TR, moderate 

pulmonic valve regurgitation,no vegetation or thrombus. Initial placement of 

Holter unable to capture,so on Holter monitor x 24 hours, will follow up result 

Status post revision of left toe.Holter monitor showed multiple escape beats,


 Marked sinus payam arrythmia/junctional rhythm and escape beats, Minimum heart 

rate 30/min, maximum heart rate 97/min, 237 drop beats, longest R-R 2-8 seconds.

Wound left toe culture positive for MRSA. Blood culture negative growth. Post 

PPM, V pacing at 60/min.





Plan: 





Post PPM (single chamber VVI) yesterday, site no bleeding, no hematoma


No distress, denies shortness of breath


Heart rate V-pacing


Cardiac status stable


Stable blood pressure


Left toe wound culture positive for MRSA, on contact isolation


Continue antibiotics as ordered


On Norvasc 5 mg BID, Losartan 25 mg daily


Continue antibiotics as ordered


Continue current treatment


Continue current medications


Okay to discharge from cardiac standpoint


Discharge planning





Will follow up





Plan and treatment discussed with

## 2018-12-14 NOTE — PN
DATE:  12/12/2018



Additional progress note.



The patient in room 256, bed 1.  Detailed progress note has been already

written by Mel Dickens.  This is an additional note.



The patient was admitted with sepsis for revision of the fourth digit

osteomyelitis of the foot and found to have bradycardia.  Then, Holter

monitor was put on which showed multiple pauses of 2.8 seconds along with

severe bradycardia of 30 to 34 per minute, so the patient was advised to

have the pacemaker inserted which was done yesterday, VVI pacemaker which

is functioning normally, and the wound is healing well.  The patient is

asymptomatic.  The patient's blood cultures prior to insertion of pacemaker

were negative.  The wound showed MRSA positive.  Infectious Disease was

consulted and they said as the blood cultures are negative, the patient can

have the pacemaker inserted, so we will proceeded with the pacemaker.  The

patient post-pacemaker is doing well.  We will continue present therapy. 

The patient is on losartan 25 mg daily, amlodipine 5 mg daily, Protonix 20

mg b.i.d.  The patient is also getting ceftaroline 600 mg IV every 12

hours.  We will follow up.





__________________________________________

Donnell Howard MD





DD:  12/13/2018 17:13:51

DT:  12/13/2018 18:43:06

Job # [id number]

## 2021-05-05 NOTE — CARD
--------------- APPROVED REPORT --------------





EKG Measurement

Heart [id number]

MI [id number]

[id number]

[id number]

[id number]



<Conclusion>

Normal sinus rhythm

Right bundle branch block

Inferior infarct, age undetermined

Abnormal ECG Home